# Patient Record
Sex: FEMALE | Race: WHITE | NOT HISPANIC OR LATINO | Employment: OTHER | URBAN - METROPOLITAN AREA
[De-identification: names, ages, dates, MRNs, and addresses within clinical notes are randomized per-mention and may not be internally consistent; named-entity substitution may affect disease eponyms.]

---

## 2017-01-11 ENCOUNTER — ALLSCRIPTS OFFICE VISIT (OUTPATIENT)
Dept: OTHER | Facility: OTHER | Age: 72
End: 2017-01-11

## 2017-01-24 RX ORDER — SODIUM CHLORIDE 9 MG/ML
20 INJECTION, SOLUTION INTRAVENOUS ONCE
Status: DISCONTINUED | OUTPATIENT
Start: 2017-02-01 | End: 2017-02-04 | Stop reason: HOSPADM

## 2017-01-31 ENCOUNTER — LAB CONVERSION - ENCOUNTER (OUTPATIENT)
Dept: OTHER | Facility: OTHER | Age: 72
End: 2017-01-31

## 2017-01-31 LAB
A/G RATIO (HISTORICAL): 1.1 (CALC) (ref 1–2.5)
ALBUMIN SERPL BCP-MCNC: 3.2 G/DL (ref 3.6–5.1)
ALP SERPL-CCNC: 87 U/L (ref 33–130)
ALT SERPL W P-5'-P-CCNC: 14 U/L (ref 6–29)
AST SERPL W P-5'-P-CCNC: 24 U/L (ref 10–35)
BASOPHILS # BLD AUTO: 0.5 %
BASOPHILS # BLD AUTO: 40 CELLS/UL (ref 0–200)
BILIRUB SERPL-MCNC: 1.1 MG/DL (ref 0.2–1.2)
BUN SERPL-MCNC: 8 MG/DL (ref 7–25)
BUN/CREA RATIO (HISTORICAL): ABNORMAL (CALC) (ref 6–22)
CALCIUM SERPL-MCNC: 8.5 MG/DL (ref 8.6–10.4)
CHLORIDE SERPL-SCNC: 102 MMOL/L (ref 98–110)
CO2 SERPL-SCNC: 27 MMOL/L (ref 20–31)
CREAT SERPL-MCNC: 0.84 MG/DL (ref 0.6–0.93)
DEPRECATED RDW RBC AUTO: 19.9 % (ref 11–15)
EGFR AFRICAN AMERICAN (HISTORICAL): 81 ML/MIN/1.73M2
EGFR-AMERICAN CALC (HISTORICAL): 70 ML/MIN/1.73M2
EOSINOPHIL # BLD AUTO: 166 CELLS/UL (ref 15–500)
EOSINOPHIL # BLD AUTO: 2.1 %
GAMMA GLOBULIN (HISTORICAL): 2.9 G/DL (CALC) (ref 1.9–3.7)
GLUCOSE (HISTORICAL): 97 MG/DL (ref 65–99)
HCT VFR BLD AUTO: 38.4 % (ref 35–45)
HGB BLD-MCNC: 12.9 G/DL (ref 11.7–15.5)
LYMPHOCYTES # BLD AUTO: 695 CELLS/UL (ref 850–3900)
LYMPHOCYTES # BLD AUTO: 8.8 %
MCH RBC QN AUTO: 30.7 PG (ref 27–33)
MCHC RBC AUTO-ENTMCNC: 33.5 G/DL (ref 32–36)
MCV RBC AUTO: 91.5 FL (ref 80–100)
MONOCYTES # BLD AUTO: 916 CELLS/UL (ref 200–950)
MONOCYTES (HISTORICAL): 11.6 %
NEUTROPHILS # BLD AUTO: 6083 CELLS/UL (ref 1500–7800)
NEUTROPHILS # BLD AUTO: 77 %
PLATELET # BLD AUTO: 166 THOUSAND/UL (ref 140–400)
PMV BLD AUTO: 9.3 FL (ref 7.5–12.5)
POTASSIUM SERPL-SCNC: 3.9 MMOL/L (ref 3.5–5.3)
RBC # BLD AUTO: 4.19 MILLION/UL (ref 3.8–5.1)
SODIUM SERPL-SCNC: 136 MMOL/L (ref 135–146)
TOTAL PROTEIN (HISTORICAL): 6.1 G/DL (ref 6.1–8.1)
WBC # BLD AUTO: 7.9 THOUSAND/UL (ref 3.8–10.8)

## 2017-02-01 ENCOUNTER — HOSPITAL ENCOUNTER (OUTPATIENT)
Dept: INFUSION CENTER | Facility: HOSPITAL | Age: 72
Discharge: HOME | End: 2017-02-01
Payer: COMMERCIAL

## 2017-02-01 VITALS
DIASTOLIC BLOOD PRESSURE: 72 MMHG | HEART RATE: 77 BPM | TEMPERATURE: 97.2 F | OXYGEN SATURATION: 96 % | BODY MASS INDEX: 32.09 KG/M2 | SYSTOLIC BLOOD PRESSURE: 123 MMHG | HEIGHT: 61 IN | WEIGHT: 169.97 LBS | RESPIRATION RATE: 18 BRPM

## 2017-02-01 PROCEDURE — 96377 APPLICATON ON-BODY INJECTOR: CPT

## 2017-02-01 PROCEDURE — 96413 CHEMO IV INFUSION 1 HR: CPT

## 2017-02-01 PROCEDURE — 96367 TX/PROPH/DG ADDL SEQ IV INF: CPT

## 2017-02-01 PROCEDURE — 96417 CHEMO IV INFUS EACH ADDL SEQ: CPT

## 2017-02-01 RX ADMIN — CYCLOPHOSPHAMIDE 1000 MG: 1 INJECTION, POWDER, FOR SOLUTION INTRAVENOUS; ORAL at 12:22

## 2017-02-01 RX ADMIN — FAMOTIDINE 20 MG: 10 INJECTION INTRAVENOUS at 10:06

## 2017-02-01 RX ADMIN — PEGFILGRASTIM 6 MG: KIT SUBCUTANEOUS at 13:06

## 2017-02-01 RX ADMIN — DEXAMETHASONE SODIUM PHOSPHATE: 10 INJECTION, SOLUTION INTRAMUSCULAR; INTRAVENOUS at 09:46

## 2017-02-01 RX ADMIN — DOCETAXEL 105 MG: 20 INJECTION, SOLUTION, CONCENTRATE INTRAVENOUS at 11:09

## 2017-02-01 RX ADMIN — SODIUM CHLORIDE 150 MG: 0.9 INJECTION, SOLUTION INTRAVENOUS at 10:27

## 2017-02-01 NOTE — PROGRESS NOTES
Cytoxan and NSS flush done  IV removed intact Lt forearm, pressure dssg applied  Pt rom well  Neulasta ON Body applied to JANELL  Due to start at 1406 tomorrow thru [phone]  Instructions reviewed  Pt disch amb to home with family, steady gait

## 2017-02-01 NOTE — PLAN OF CARE
Knowledge Deficit     Patient/family/caregiver demonstrates understanding of disease process, treatment plan, medications, and discharge instructions Progressing        Potential for Falls     Patient will remain free of falls Progressing

## 2017-02-01 NOTE — PROGRESS NOTES
Pt arrived amb for chemo, face and hands are flushed  Pt has been off chemo while she took steroids and steroid cream for her hand and foot syndrome  Starting 3rd cycle today with a 20% decrease in Taxotere  States the skin on her feet is still peeling a bit  Hands look better except for the flushing  Offers no other c/o  IV started Lt forearm by Julian Lindsay RN  NSS to kvo, premeds infusing

## 2017-02-03 ENCOUNTER — GENERIC CONVERSION - ENCOUNTER (OUTPATIENT)
Dept: OTHER | Facility: OTHER | Age: 72
End: 2017-02-03

## 2017-02-06 ENCOUNTER — HOSPITAL ENCOUNTER (EMERGENCY)
Facility: HOSPITAL | Age: 72
Discharge: HOME | End: 2017-02-06
Attending: EMERGENCY MEDICINE | Admitting: EMERGENCY MEDICINE
Payer: COMMERCIAL

## 2017-02-06 ENCOUNTER — APPOINTMENT (EMERGENCY)
Dept: RADIOLOGY | Facility: HOSPITAL | Age: 72
End: 2017-02-06
Payer: COMMERCIAL

## 2017-02-06 VITALS
RESPIRATION RATE: 18 BRPM | OXYGEN SATURATION: 98 % | DIASTOLIC BLOOD PRESSURE: 67 MMHG | SYSTOLIC BLOOD PRESSURE: 128 MMHG | HEIGHT: 62 IN | BODY MASS INDEX: 30.36 KG/M2 | WEIGHT: 165 LBS | TEMPERATURE: 98.3 F | HEART RATE: 105 BPM

## 2017-02-06 DIAGNOSIS — N39.0 URINARY TRACT INFECTION: Primary | ICD-10-CM

## 2017-02-06 LAB
BACTERIA UR QL AUTO: ABNORMAL /HPF
BILIRUB UR QL STRIP: ABNORMAL
CLARITY UR: CLEAR
CLARITY, POC: CLEAR
COLOR UR: ABNORMAL
COLOR, POC: NORMAL
GLUCOSE UR STRIP-MCNC: NEGATIVE MG/DL
HGB UR QL STRIP.AUTO: ABNORMAL
HYALINE CASTS #/AREA URNS LPF: ABNORMAL /LPF
KETONES UR STRIP-MCNC: NEGATIVE MG/DL
LEUKOCYTE ESTERASE UR QL STRIP: ABNORMAL
NITRITE UR QL STRIP: POSITIVE
NON-SQ EPI CELLS URNS QL MICRO: ABNORMAL /HPF
PH UR STRIP.AUTO: 6.5 [PH] (ref 4.5–8)
PROT UR STRIP-MCNC: ABNORMAL MG/DL
RBC #/AREA URNS AUTO: ABNORMAL /HPF
SP GR UR STRIP.AUTO: 1.02 (ref 1–1.03)
UROBILINOGEN UR QL STRIP.AUTO: >=8 E.U./DL
WBC #/AREA URNS AUTO: ABNORMAL /HPF

## 2017-02-06 PROCEDURE — 99284 EMERGENCY DEPT VISIT MOD MDM: CPT

## 2017-02-06 PROCEDURE — 87077 CULTURE AEROBIC IDENTIFY: CPT

## 2017-02-06 PROCEDURE — 81001 URINALYSIS AUTO W/SCOPE: CPT

## 2017-02-06 PROCEDURE — 87086 URINE CULTURE/COLONY COUNT: CPT

## 2017-02-06 PROCEDURE — 81002 URINALYSIS NONAUTO W/O SCOPE: CPT | Performed by: EMERGENCY MEDICINE

## 2017-02-06 PROCEDURE — 87186 SC STD MICRODIL/AGAR DIL: CPT

## 2017-02-06 RX ORDER — CEPHALEXIN 250 MG/1
500 CAPSULE ORAL ONCE
Status: COMPLETED | OUTPATIENT
Start: 2017-02-06 | End: 2017-02-06

## 2017-02-06 RX ORDER — CEPHALEXIN 500 MG/1
500 CAPSULE ORAL 2 TIMES DAILY
Qty: 20 CAPSULE | Refills: 0 | Status: SHIPPED | OUTPATIENT
Start: 2017-02-06 | End: 2017-02-16

## 2017-02-06 RX ADMIN — CEPHALEXIN 500 MG: 250 CAPSULE ORAL at 21:56

## 2017-02-09 LAB
BACTERIA UR CULT: NORMAL
BACTERIA UR CULT: NORMAL

## 2017-02-13 ENCOUNTER — ALLSCRIPTS OFFICE VISIT (OUTPATIENT)
Dept: OTHER | Facility: OTHER | Age: 72
End: 2017-02-13

## 2017-02-13 ENCOUNTER — TRANSCRIBE ORDERS (OUTPATIENT)
Dept: ADMINISTRATIVE | Facility: HOSPITAL | Age: 72
End: 2017-02-13

## 2017-02-13 DIAGNOSIS — C50.911 MALIGNANT NEOPLASM OF RIGHT FEMALE BREAST, UNSPECIFIED SITE OF BREAST: Primary | ICD-10-CM

## 2017-02-16 ENCOUNTER — TRANSCRIBE ORDERS (OUTPATIENT)
Dept: ADMINISTRATIVE | Facility: HOSPITAL | Age: 72
End: 2017-02-16

## 2017-02-16 ENCOUNTER — ALLSCRIPTS OFFICE VISIT (OUTPATIENT)
Dept: OTHER | Facility: OTHER | Age: 72
End: 2017-02-16

## 2017-02-16 DIAGNOSIS — C50.911 MALIGNANT NEOPLASM OF RIGHT FEMALE BREAST, UNSPECIFIED SITE OF BREAST: Primary | ICD-10-CM

## 2017-02-16 DIAGNOSIS — R93.89 ABNORMAL RADIOLOGICAL FINDINGS IN SKIN AND SUBCUTANEOUS TISSUE: ICD-10-CM

## 2017-02-16 DIAGNOSIS — N64.52 NIPPLE DISCHARGE: ICD-10-CM

## 2017-02-22 ENCOUNTER — ALLSCRIPTS OFFICE VISIT (OUTPATIENT)
Dept: OTHER | Facility: OTHER | Age: 72
End: 2017-02-22

## 2017-02-22 ENCOUNTER — HOSPITAL ENCOUNTER (OUTPATIENT)
Dept: INFUSION CENTER | Facility: HOSPITAL | Age: 72
Discharge: HOME | End: 2017-02-22
Payer: MEDICARE

## 2017-02-22 LAB
BILIRUB UR QL STRIP: NORMAL
CLARITY UR: NORMAL
COLOR UR: NORMAL
GLUCOSE (HISTORICAL): NORMAL
HGB UR QL STRIP.AUTO: 250
KETONES UR STRIP-MCNC: NEGATIVE MG/DL
LEUKOCYTE ESTERASE UR QL STRIP: NORMAL
NITRITE UR QL STRIP: NEGATIVE
PH UR STRIP.AUTO: 6 [PH]
PROT UR STRIP-MCNC: NORMAL MG/DL
SP GR UR STRIP.AUTO: 1.02
UROBILINOGEN UR QL STRIP.AUTO: 12

## 2017-02-23 ENCOUNTER — HOSPITAL ENCOUNTER (OUTPATIENT)
Dept: MRI IMAGING | Facility: HOSPITAL | Age: 72
Discharge: HOME | End: 2017-02-23
Attending: SURGERY
Payer: COMMERCIAL

## 2017-02-23 DIAGNOSIS — N64.52 NIPPLE DISCHARGE: ICD-10-CM

## 2017-02-23 DIAGNOSIS — C50.911 MALIGNANT NEOPLASM OF RIGHT FEMALE BREAST (HCC): ICD-10-CM

## 2017-02-23 DIAGNOSIS — R93.89 ABNORMAL FINDINGS ON DIAGNOSTIC IMAGING OF OTHER SPECIFIED BODY STRUCTURES: ICD-10-CM

## 2017-02-23 PROCEDURE — C8908 MRI W/O FOL W/CONT, BREAST,: HCPCS

## 2017-02-23 PROCEDURE — 0159T HB CAD BREAST MRI: CPT

## 2017-02-24 ENCOUNTER — GENERIC CONVERSION - ENCOUNTER (OUTPATIENT)
Dept: OTHER | Facility: OTHER | Age: 72
End: 2017-02-24

## 2017-02-24 ENCOUNTER — LAB CONVERSION - ENCOUNTER (OUTPATIENT)
Dept: OTHER | Facility: OTHER | Age: 72
End: 2017-02-24

## 2017-02-24 LAB
BACTERIA UR QL AUTO: ABNORMAL /HPF
BILIRUB UR QL STRIP: NEGATIVE
COLOR UR: YELLOW
COMMENT (HISTORICAL): CLEAR
COMMENTS (HISTORICAL): ABNORMAL
CULTURE RESULT (HISTORICAL): ABNORMAL
CULTURE RESULT (HISTORICAL): NORMAL
FECAL OCCULT BLOOD DIAGNOSTIC (HISTORICAL): ABNORMAL
GLUCOSE (HISTORICAL): NEGATIVE
HYALINE CASTS #/AREA URNS LPF: ABNORMAL /LPF
KETONES UR STRIP-MCNC: NEGATIVE MG/DL
LEUKOCYTE ESTERASE UR QL STRIP: ABNORMAL
NITRITE UR QL STRIP: NEGATIVE
PH UR STRIP.AUTO: 6 [PH] (ref 5–8)
PROT UR STRIP-MCNC: ABNORMAL MG/DL
RBC (HISTORICAL): ABNORMAL /HPF
SP GR UR STRIP.AUTO: 1.03 (ref 1–1.03)
SQUAMOUS EPITHELIAL CELLS (HISTORICAL): ABNORMAL /HPF
WBC # BLD AUTO: ABNORMAL /HPF

## 2017-03-02 ENCOUNTER — ALLSCRIPTS OFFICE VISIT (OUTPATIENT)
Dept: OTHER | Facility: OTHER | Age: 72
End: 2017-03-02

## 2017-03-10 ENCOUNTER — GENERIC CONVERSION - ENCOUNTER (OUTPATIENT)
Dept: OTHER | Facility: OTHER | Age: 72
End: 2017-03-10

## 2017-03-15 RX ORDER — SODIUM CHLORIDE, SODIUM LACTATE, POTASSIUM CHLORIDE, CALCIUM CHLORIDE 600; 310; 30; 20 MG/100ML; MG/100ML; MG/100ML; MG/100ML
20 INJECTION, SOLUTION INTRAVENOUS CONTINUOUS
Status: DISCONTINUED | OUTPATIENT
Start: 2017-03-16 | End: 2017-03-16 | Stop reason: HOSPADM

## 2017-03-16 ENCOUNTER — HOSPITAL ENCOUNTER (OUTPATIENT)
Facility: HOSPITAL | Age: 72
Setting detail: OUTPATIENT SURGERY
Discharge: HOME | End: 2017-03-16
Attending: SURGERY | Admitting: SURGERY
Payer: COMMERCIAL

## 2017-03-16 ENCOUNTER — ANESTHESIA EVENT (OUTPATIENT)
Dept: PERIOP | Facility: HOSPITAL | Age: 72
End: 2017-03-16
Payer: COMMERCIAL

## 2017-03-16 ENCOUNTER — ANESTHESIA (OUTPATIENT)
Dept: PERIOP | Facility: HOSPITAL | Age: 72
End: 2017-03-16
Payer: COMMERCIAL

## 2017-03-16 ENCOUNTER — HOSPITAL ENCOUNTER (OUTPATIENT)
Dept: ULTRASOUND IMAGING | Facility: HOSPITAL | Age: 72
Discharge: HOME | End: 2017-03-16
Attending: SURGERY
Payer: COMMERCIAL

## 2017-03-16 ENCOUNTER — CONVERSION ENCOUNTER (OUTPATIENT)
Dept: MRI IMAGING | Facility: HOSPITAL | Age: 72
End: 2017-03-16

## 2017-03-16 ENCOUNTER — APPOINTMENT (OUTPATIENT)
Dept: MAMMOGRAPHY | Facility: HOSPITAL | Age: 72
End: 2017-03-16
Attending: SURGERY
Payer: COMMERCIAL

## 2017-03-16 VITALS
WEIGHT: 179 LBS | BODY MASS INDEX: 32.94 KG/M2 | SYSTOLIC BLOOD PRESSURE: 134 MMHG | HEIGHT: 62 IN | RESPIRATION RATE: 16 BRPM | OXYGEN SATURATION: 98 % | HEART RATE: 84 BPM | TEMPERATURE: 97.9 F | DIASTOLIC BLOOD PRESSURE: 87 MMHG

## 2017-03-16 DIAGNOSIS — C50.911 MALIGNANT NEOPLASM OF RIGHT FEMALE BREAST (HCC): ICD-10-CM

## 2017-03-16 DIAGNOSIS — C50.911 MALIGNANT NEOPLASM OF RIGHT FEMALE BREAST, UNSPECIFIED SITE OF BREAST: ICD-10-CM

## 2017-03-16 PROCEDURE — 88342 IMHCHEM/IMCYTCHM 1ST ANTB: CPT | Performed by: SURGERY

## 2017-03-16 PROCEDURE — 88341 IMHCHEM/IMCYTCHM EA ADD ANTB: CPT | Performed by: SURGERY

## 2017-03-16 PROCEDURE — 19285 PERQ DEV BREAST 1ST US IMAG: CPT

## 2017-03-16 PROCEDURE — 88307 TISSUE EXAM BY PATHOLOGIST: CPT | Performed by: SURGERY

## 2017-03-16 RX ORDER — EPHEDRINE SULFATE 50 MG/ML
INJECTION, SOLUTION INTRAVENOUS AS NEEDED
Status: DISCONTINUED | OUTPATIENT
Start: 2017-03-16 | End: 2017-03-16 | Stop reason: SURG

## 2017-03-16 RX ORDER — IBUPROFEN 600 MG/1
600 TABLET ORAL EVERY 6 HOURS PRN
Status: DISCONTINUED | OUTPATIENT
Start: 2017-03-16 | End: 2017-03-16 | Stop reason: HOSPADM

## 2017-03-16 RX ORDER — FENTANYL CITRATE/PF 50 MCG/ML
25 SYRINGE (ML) INJECTION
Status: DISCONTINUED | OUTPATIENT
Start: 2017-03-16 | End: 2017-03-16 | Stop reason: HOSPADM

## 2017-03-16 RX ORDER — LIDOCAINE HYDROCHLORIDE 10 MG/ML
5 INJECTION, SOLUTION INFILTRATION; PERINEURAL ONCE
Status: COMPLETED | OUTPATIENT
Start: 2017-03-16 | End: 2017-03-16

## 2017-03-16 RX ORDER — BUPIVACAINE HYDROCHLORIDE 5 MG/ML
INJECTION, SOLUTION PERINEURAL AS NEEDED
Status: DISCONTINUED | OUTPATIENT
Start: 2017-03-16 | End: 2017-03-16 | Stop reason: HOSPADM

## 2017-03-16 RX ORDER — PROPOFOL 10 MG/ML
INJECTION, EMULSION INTRAVENOUS AS NEEDED
Status: DISCONTINUED | OUTPATIENT
Start: 2017-03-16 | End: 2017-03-16 | Stop reason: SURG

## 2017-03-16 RX ORDER — METOCLOPRAMIDE HYDROCHLORIDE 5 MG/ML
10 INJECTION INTRAMUSCULAR; INTRAVENOUS ONCE
Status: DISCONTINUED | OUTPATIENT
Start: 2017-03-16 | End: 2017-03-16 | Stop reason: HOSPADM

## 2017-03-16 RX ORDER — ACETAMINOPHEN 325 MG/1
650 TABLET ORAL EVERY 6 HOURS PRN
Status: DISCONTINUED | OUTPATIENT
Start: 2017-03-16 | End: 2017-03-16 | Stop reason: HOSPADM

## 2017-03-16 RX ORDER — MIDAZOLAM HYDROCHLORIDE 1 MG/ML
INJECTION INTRAMUSCULAR; INTRAVENOUS AS NEEDED
Status: DISCONTINUED | OUTPATIENT
Start: 2017-03-16 | End: 2017-03-16 | Stop reason: SURG

## 2017-03-16 RX ORDER — FENTANYL CITRATE 50 UG/ML
INJECTION, SOLUTION INTRAMUSCULAR; INTRAVENOUS AS NEEDED
Status: DISCONTINUED | OUTPATIENT
Start: 2017-03-16 | End: 2017-03-16 | Stop reason: SURG

## 2017-03-16 RX ADMIN — CEFAZOLIN SODIUM 2000 MG: 2 SOLUTION INTRAVENOUS at 15:15

## 2017-03-16 RX ADMIN — SODIUM CHLORIDE, SODIUM LACTATE, POTASSIUM CHLORIDE, AND CALCIUM CHLORIDE 20 ML/HR: .6; .31; .03; .02 INJECTION, SOLUTION INTRAVENOUS at 13:11

## 2017-03-16 RX ADMIN — MIDAZOLAM HYDROCHLORIDE 2 MG: 1 INJECTION, SOLUTION INTRAMUSCULAR; INTRAVENOUS at 15:12

## 2017-03-16 RX ADMIN — EPHEDRINE SULFATE 10 MG: 50 INJECTION, SOLUTION INTRAMUSCULAR; INTRAVENOUS; SUBCUTANEOUS at 15:42

## 2017-03-16 RX ADMIN — LIDOCAINE HYDROCHLORIDE 5 ML: 10 INJECTION, SOLUTION INFILTRATION; PERINEURAL at 14:15

## 2017-03-16 RX ADMIN — IBUPROFEN 600 MG: 600 TABLET ORAL at 17:07

## 2017-03-16 RX ADMIN — PROPOFOL 50 MG: 10 INJECTION, EMULSION INTRAVENOUS at 15:17

## 2017-03-16 RX ADMIN — FENTANYL CITRATE 100 MCG: 50 INJECTION, SOLUTION INTRAMUSCULAR; INTRAVENOUS at 15:12

## 2017-03-30 ENCOUNTER — TRANSCRIBE ORDERS (OUTPATIENT)
Dept: ADMINISTRATIVE | Facility: HOSPITAL | Age: 72
End: 2017-03-30

## 2017-03-30 ENCOUNTER — ALLSCRIPTS OFFICE VISIT (OUTPATIENT)
Dept: OTHER | Facility: OTHER | Age: 72
End: 2017-03-30

## 2017-03-30 DIAGNOSIS — R41.3 MEMORY LOSS: Primary | ICD-10-CM

## 2017-04-05 ENCOUNTER — LAB CONVERSION - ENCOUNTER (OUTPATIENT)
Dept: OTHER | Facility: OTHER | Age: 72
End: 2017-04-05

## 2017-04-05 ENCOUNTER — ALLSCRIPTS OFFICE VISIT (OUTPATIENT)
Dept: OTHER | Facility: OTHER | Age: 72
End: 2017-04-05

## 2017-04-05 LAB
BUN SERPL-MCNC: 12 MG/DL (ref 7–25)
CREAT SERPL-MCNC: 0.84 MG/DL (ref 0.6–0.93)
DEPRECATED RDW RBC AUTO: 15.2 % (ref 11–15)
EGFR AFRICAN AMERICAN (HISTORICAL): 81 ML/MIN/1.73M2
EGFR-AMERICAN CALC (HISTORICAL): 70 ML/MIN/1.73M2
HCT VFR BLD AUTO: 35.9 % (ref 35–45)
HGB BLD-MCNC: 11.9 G/DL (ref 11.7–15.5)
MCH RBC QN AUTO: 33.1 PG (ref 27–33)
MCHC RBC AUTO-ENTMCNC: 33.2 G/DL (ref 32–36)
MCV RBC AUTO: 99.8 FL (ref 80–100)
PLATELET # BLD AUTO: 181 THOUSAND/UL (ref 140–400)
PMV BLD AUTO: 9.2 FL (ref 7.5–12.5)
RBC # BLD AUTO: 3.6 MILLION/UL (ref 3.8–5.1)
WBC # BLD AUTO: 4.7 THOUSAND/UL (ref 3.8–10.8)

## 2017-04-08 ENCOUNTER — HOSPITAL ENCOUNTER (OUTPATIENT)
Dept: MRI IMAGING | Facility: HOSPITAL | Age: 72
Discharge: HOME | End: 2017-04-08
Attending: SURGERY
Payer: COMMERCIAL

## 2017-04-08 DIAGNOSIS — C50.911 MALIGNANT NEOPLASM OF RIGHT FEMALE BREAST (HCC): ICD-10-CM

## 2017-04-08 DIAGNOSIS — R60.0 LOCALIZED EDEMA: ICD-10-CM

## 2017-04-08 PROCEDURE — 70553 MRI BRAIN STEM W/O & W/DYE: CPT

## 2017-04-08 PROCEDURE — A9585 GADOBUTROL INJECTION: HCPCS | Performed by: SURGERY

## 2017-04-08 RX ADMIN — GADOBUTROL 7 ML: 604.72 INJECTION INTRAVENOUS at 10:05

## 2017-04-10 ENCOUNTER — ALLSCRIPTS OFFICE VISIT (OUTPATIENT)
Dept: OTHER | Facility: OTHER | Age: 72
End: 2017-04-10

## 2017-04-11 ENCOUNTER — GENERIC CONVERSION - ENCOUNTER (OUTPATIENT)
Dept: OTHER | Facility: OTHER | Age: 72
End: 2017-04-11

## 2017-04-11 ENCOUNTER — ALLSCRIPTS OFFICE VISIT (OUTPATIENT)
Dept: OTHER | Facility: OTHER | Age: 72
End: 2017-04-11

## 2017-04-20 ENCOUNTER — ALLSCRIPTS OFFICE VISIT (OUTPATIENT)
Dept: OTHER | Facility: OTHER | Age: 72
End: 2017-04-20

## 2017-05-03 ENCOUNTER — HOSPITAL ENCOUNTER (OUTPATIENT)
Dept: NON INVASIVE DIAGNOSTICS | Facility: HOSPITAL | Age: 72
Discharge: HOME | End: 2017-05-03
Attending: INTERNAL MEDICINE
Payer: COMMERCIAL

## 2017-05-03 DIAGNOSIS — R60.0 LOCALIZED EDEMA: ICD-10-CM

## 2017-05-03 PROCEDURE — 93970 EXTREMITY STUDY: CPT

## 2017-05-04 ENCOUNTER — ALLSCRIPTS OFFICE VISIT (OUTPATIENT)
Dept: OTHER | Facility: OTHER | Age: 72
End: 2017-05-04

## 2017-05-18 ENCOUNTER — ALLSCRIPTS OFFICE VISIT (OUTPATIENT)
Dept: OTHER | Facility: OTHER | Age: 72
End: 2017-05-18

## 2017-05-27 ENCOUNTER — LAB CONVERSION - ENCOUNTER (OUTPATIENT)
Dept: OTHER | Facility: OTHER | Age: 72
End: 2017-05-27

## 2017-05-27 LAB
BASOPHILS # BLD AUTO: 0.4 %
BASOPHILS # BLD AUTO: 16 CELLS/UL (ref 0–200)
DEPRECATED RDW RBC AUTO: 12.6 % (ref 11–15)
EOSINOPHIL # BLD AUTO: 1.5 %
EOSINOPHIL # BLD AUTO: 60 CELLS/UL (ref 15–500)
HCT VFR BLD AUTO: 38.4 % (ref 35–45)
HGB BLD-MCNC: 12.9 G/DL (ref 11.7–15.5)
LYMPHOCYTES # BLD AUTO: 20.1 %
LYMPHOCYTES # BLD AUTO: 804 CELLS/UL (ref 850–3900)
MCH RBC QN AUTO: 30.5 PG (ref 27–33)
MCHC RBC AUTO-ENTMCNC: 33.6 G/DL (ref 32–36)
MCV RBC AUTO: 90.7 FL (ref 80–100)
MONOCYTES # BLD AUTO: 392 CELLS/UL (ref 200–950)
MONOCYTES (HISTORICAL): 9.8 %
NEUTROPHILS # BLD AUTO: 2728 CELLS/UL (ref 1500–7800)
NEUTROPHILS # BLD AUTO: 68.2 %
PLATELET # BLD AUTO: 181 THOUSAND/UL (ref 140–400)
PMV BLD AUTO: 9.6 FL (ref 7.5–12.5)
RBC # BLD AUTO: 4.23 MILLION/UL (ref 3.8–5.1)
WBC # BLD AUTO: 4 THOUSAND/UL (ref 3.8–10.8)

## 2017-05-28 ENCOUNTER — LAB CONVERSION - ENCOUNTER (OUTPATIENT)
Dept: OTHER | Facility: OTHER | Age: 72
End: 2017-05-28

## 2017-05-28 LAB
A/G RATIO (HISTORICAL): 1 (CALC) (ref 1–2.5)
ALBUMIN SERPL BCP-MCNC: 3.2 G/DL (ref 3.6–5.1)
ALP SERPL-CCNC: 103 U/L (ref 33–130)
ALT SERPL W P-5'-P-CCNC: 8 U/L (ref 6–29)
AST SERPL W P-5'-P-CCNC: 23 U/L (ref 10–35)
BASOPHILS # BLD AUTO: 0.4 %
BASOPHILS # BLD AUTO: 16 CELLS/UL (ref 0–200)
BILIRUB SERPL-MCNC: 0.9 MG/DL (ref 0.2–1.2)
BUN SERPL-MCNC: 9 MG/DL (ref 7–25)
BUN/CREA RATIO (HISTORICAL): 9 (CALC) (ref 6–22)
CALCIUM SERPL-MCNC: 9 MG/DL (ref 8.6–10.4)
CHLORIDE SERPL-SCNC: 102 MMOL/L (ref 98–110)
CO2 SERPL-SCNC: 28 MMOL/L (ref 20–31)
CREAT SERPL-MCNC: 1.04 MG/DL (ref 0.6–0.93)
DEPRECATED RDW RBC AUTO: 12.6 % (ref 11–15)
EGFR AFRICAN AMERICAN (HISTORICAL): 63 ML/MIN/1.73M2
EGFR-AMERICAN CALC (HISTORICAL): 54 ML/MIN/1.73M2
EOSINOPHIL # BLD AUTO: 1.5 %
EOSINOPHIL # BLD AUTO: 60 CELLS/UL (ref 15–500)
GAMMA GLOBULIN (HISTORICAL): 3.3 G/DL (CALC) (ref 1.9–3.7)
GLUCOSE (HISTORICAL): 105 MG/DL (ref 65–99)
HCT VFR BLD AUTO: 38.4 % (ref 35–45)
HGB BLD-MCNC: 12.9 G/DL (ref 11.7–15.5)
LYMPHOCYTES # BLD AUTO: 20.1 %
LYMPHOCYTES # BLD AUTO: 804 CELLS/UL (ref 850–3900)
MCH RBC QN AUTO: 30.5 PG (ref 27–33)
MCHC RBC AUTO-ENTMCNC: 33.6 G/DL (ref 32–36)
MCV RBC AUTO: 90.7 FL (ref 80–100)
MONOCYTES # BLD AUTO: 392 CELLS/UL (ref 200–950)
MONOCYTES (HISTORICAL): 9.8 %
NEUTROPHILS # BLD AUTO: 2728 CELLS/UL (ref 1500–7800)
NEUTROPHILS # BLD AUTO: 68.2 %
PLATELET # BLD AUTO: 181 THOUSAND/UL (ref 140–400)
PMV BLD AUTO: 9.6 FL (ref 7.5–12.5)
POTASSIUM SERPL-SCNC: 3.8 MMOL/L (ref 3.5–5.3)
RBC # BLD AUTO: 4.23 MILLION/UL (ref 3.8–5.1)
SODIUM SERPL-SCNC: 138 MMOL/L (ref 135–146)
TOTAL PROTEIN (HISTORICAL): 6.5 G/DL (ref 6.1–8.1)
WBC # BLD AUTO: 4 THOUSAND/UL (ref 3.8–10.8)

## 2017-05-30 ENCOUNTER — ALLSCRIPTS OFFICE VISIT (OUTPATIENT)
Dept: OTHER | Facility: OTHER | Age: 72
End: 2017-05-30

## 2017-05-30 ENCOUNTER — LAB CONVERSION - ENCOUNTER (OUTPATIENT)
Dept: OTHER | Facility: OTHER | Age: 72
End: 2017-05-30

## 2017-05-30 LAB
A/G RATIO (HISTORICAL): 1 (CALC) (ref 1–2.5)
ALBUMIN SERPL BCP-MCNC: 3.2 G/DL (ref 3.6–5.1)
ALP SERPL-CCNC: 103 U/L (ref 33–130)
ALT SERPL W P-5'-P-CCNC: 8 U/L (ref 6–29)
AST SERPL W P-5'-P-CCNC: 23 U/L (ref 10–35)
BASOPHILS # BLD AUTO: 0.4 %
BASOPHILS # BLD AUTO: 16 CELLS/UL (ref 0–200)
BILIRUB SERPL-MCNC: 0.9 MG/DL (ref 0.2–1.2)
BUN SERPL-MCNC: 9 MG/DL (ref 7–25)
BUN/CREA RATIO (HISTORICAL): 9 (CALC) (ref 6–22)
CA 27.29 (HISTORICAL): 12 U/ML
CALCIUM SERPL-MCNC: 9 MG/DL (ref 8.6–10.4)
CHLORIDE SERPL-SCNC: 102 MMOL/L (ref 98–110)
CO2 SERPL-SCNC: 28 MMOL/L (ref 20–31)
CREAT SERPL-MCNC: 1.04 MG/DL (ref 0.6–0.93)
DEPRECATED RDW RBC AUTO: 12.6 % (ref 11–15)
EGFR AFRICAN AMERICAN (HISTORICAL): 63 ML/MIN/1.73M2
EGFR-AMERICAN CALC (HISTORICAL): 54 ML/MIN/1.73M2
EOSINOPHIL # BLD AUTO: 1.5 %
EOSINOPHIL # BLD AUTO: 60 CELLS/UL (ref 15–500)
GAMMA GLOBULIN (HISTORICAL): 3.3 G/DL (CALC) (ref 1.9–3.7)
GLUCOSE (HISTORICAL): 105 MG/DL (ref 65–99)
HCT VFR BLD AUTO: 38.4 % (ref 35–45)
HGB BLD-MCNC: 12.9 G/DL (ref 11.7–15.5)
LYMPHOCYTES # BLD AUTO: 20.1 %
LYMPHOCYTES # BLD AUTO: 804 CELLS/UL (ref 850–3900)
MCH RBC QN AUTO: 30.5 PG (ref 27–33)
MCHC RBC AUTO-ENTMCNC: 33.6 G/DL (ref 32–36)
MCV RBC AUTO: 90.7 FL (ref 80–100)
MONOCYTES # BLD AUTO: 392 CELLS/UL (ref 200–950)
MONOCYTES (HISTORICAL): 9.8 %
NEUTROPHILS # BLD AUTO: 2728 CELLS/UL (ref 1500–7800)
NEUTROPHILS # BLD AUTO: 68.2 %
PLATELET # BLD AUTO: 181 THOUSAND/UL (ref 140–400)
PMV BLD AUTO: 9.6 FL (ref 7.5–12.5)
POTASSIUM SERPL-SCNC: 3.8 MMOL/L (ref 3.5–5.3)
RBC # BLD AUTO: 4.23 MILLION/UL (ref 3.8–5.1)
SODIUM SERPL-SCNC: 138 MMOL/L (ref 135–146)
TOTAL PROTEIN (HISTORICAL): 6.5 G/DL (ref 6.1–8.1)
WBC # BLD AUTO: 4 THOUSAND/UL (ref 3.8–10.8)

## 2017-06-01 ENCOUNTER — TRANSCRIBE ORDERS (OUTPATIENT)
Dept: ADMINISTRATIVE | Facility: HOSPITAL | Age: 72
End: 2017-06-01

## 2017-06-01 ENCOUNTER — ALLSCRIPTS OFFICE VISIT (OUTPATIENT)
Dept: OTHER | Facility: OTHER | Age: 72
End: 2017-06-01

## 2017-06-01 DIAGNOSIS — C50.911 MALIGNANT NEOPLASM OF RIGHT FEMALE BREAST, UNSPECIFIED SITE OF BREAST: Primary | ICD-10-CM

## 2017-06-07 ENCOUNTER — HOSPITAL ENCOUNTER (OUTPATIENT)
Dept: CT IMAGING | Facility: HOSPITAL | Age: 72
Discharge: HOME | End: 2017-06-07
Attending: INTERNAL MEDICINE
Payer: COMMERCIAL

## 2017-06-07 DIAGNOSIS — C50.911 MALIGNANT NEOPLASM OF RIGHT FEMALE BREAST (HCC): ICD-10-CM

## 2017-06-07 PROCEDURE — 71260 CT THORAX DX C+: CPT

## 2017-06-07 PROCEDURE — 74177 CT ABD & PELVIS W/CONTRAST: CPT

## 2017-06-07 RX ADMIN — IOHEXOL 100 ML: 350 INJECTION, SOLUTION INTRAVENOUS at 10:25

## 2017-06-14 ENCOUNTER — ALLSCRIPTS OFFICE VISIT (OUTPATIENT)
Dept: OTHER | Facility: OTHER | Age: 72
End: 2017-06-14

## 2017-06-30 ENCOUNTER — ALLSCRIPTS OFFICE VISIT (OUTPATIENT)
Dept: OTHER | Facility: OTHER | Age: 72
End: 2017-06-30

## 2017-08-30 ENCOUNTER — ALLSCRIPTS OFFICE VISIT (OUTPATIENT)
Dept: OTHER | Facility: OTHER | Age: 72
End: 2017-08-30

## 2017-09-01 ENCOUNTER — HOSPITAL ENCOUNTER (OUTPATIENT)
Dept: MAMMOGRAPHY | Facility: CLINIC | Age: 72
Discharge: HOME | End: 2017-09-01
Payer: COMMERCIAL

## 2017-09-01 DIAGNOSIS — C50.911 MALIGNANT NEOPLASM OF RIGHT FEMALE BREAST, UNSPECIFIED SITE OF BREAST: ICD-10-CM

## 2017-09-01 PROCEDURE — G0206 DX MAMMO INCL CAD UNI: HCPCS

## 2017-09-01 PROCEDURE — G0279 TOMOSYNTHESIS, MAMMO: HCPCS

## 2017-09-07 ENCOUNTER — ALLSCRIPTS OFFICE VISIT (OUTPATIENT)
Dept: OTHER | Facility: OTHER | Age: 72
End: 2017-09-07

## 2017-09-08 ENCOUNTER — LAB CONVERSION - ENCOUNTER (OUTPATIENT)
Dept: OTHER | Facility: OTHER | Age: 72
End: 2017-09-08

## 2017-09-08 ENCOUNTER — GENERIC CONVERSION - ENCOUNTER (OUTPATIENT)
Dept: OTHER | Facility: OTHER | Age: 72
End: 2017-09-08

## 2017-09-08 LAB
A/G RATIO (HISTORICAL): 1.2 (CALC) (ref 1–2.5)
ALBUMIN SERPL BCP-MCNC: 3.3 G/DL (ref 3.6–5.1)
ALP SERPL-CCNC: 115 U/L (ref 33–130)
ALT SERPL W P-5'-P-CCNC: 11 U/L (ref 6–29)
AST SERPL W P-5'-P-CCNC: 24 U/L (ref 10–35)
BASOPHILS # BLD AUTO: 0.2 %
BASOPHILS # BLD AUTO: 8 CELLS/UL (ref 0–200)
BILIRUB SERPL-MCNC: 0.8 MG/DL (ref 0.2–1.2)
BUN SERPL-MCNC: 14 MG/DL (ref 7–25)
BUN/CREA RATIO (HISTORICAL): 14 (CALC) (ref 6–22)
CALCIUM SERPL-MCNC: 8.7 MG/DL (ref 8.6–10.4)
CHLORIDE SERPL-SCNC: 109 MMOL/L (ref 98–110)
CHOLEST SERPL-MCNC: 110 MG/DL
CHOLEST/HDLC SERPL: 2.4 (CALC)
CO2 SERPL-SCNC: 27 MMOL/L (ref 20–31)
CREAT SERPL-MCNC: 0.97 MG/DL (ref 0.6–0.93)
DEPRECATED RDW RBC AUTO: 15.5 % (ref 11–15)
EGFR AFRICAN AMERICAN (HISTORICAL): 68 ML/MIN/1.73M2
EGFR-AMERICAN CALC (HISTORICAL): 59 ML/MIN/1.73M2
EOSINOPHIL # BLD AUTO: 152 CELLS/UL (ref 15–500)
EOSINOPHIL # BLD AUTO: 3.7 %
GAMMA GLOBULIN (HISTORICAL): 2.8 G/DL (CALC) (ref 1.9–3.7)
GLUCOSE (HISTORICAL): 96 MG/DL (ref 65–99)
HCT VFR BLD AUTO: 35 % (ref 35–45)
HDLC SERPL-MCNC: 46 MG/DL
HGB BLD-MCNC: 11.9 G/DL (ref 11.7–15.5)
LDL CHOLESTEROL (HISTORICAL): 49 MG/DL (CALC)
LYMPHOCYTES # BLD AUTO: 21 %
LYMPHOCYTES # BLD AUTO: 861 CELLS/UL (ref 850–3900)
MCH RBC QN AUTO: 31 PG (ref 27–33)
MCHC RBC AUTO-ENTMCNC: 34 G/DL (ref 32–36)
MCV RBC AUTO: 91.1 FL (ref 80–100)
MONOCYTES # BLD AUTO: 504 CELLS/UL (ref 200–950)
MONOCYTES (HISTORICAL): 12.3 %
NEUTROPHILS # BLD AUTO: 2575 CELLS/UL (ref 1500–7800)
NEUTROPHILS # BLD AUTO: 62.8 %
NON-HDL-CHOL (CHOL-HDL) (HISTORICAL): 64 MG/DL (CALC)
PLATELET # BLD AUTO: 148 THOUSAND/UL (ref 140–400)
PMV BLD AUTO: 11.5 FL (ref 7.5–12.5)
POTASSIUM SERPL-SCNC: 4 MMOL/L (ref 3.5–5.3)
RBC # BLD AUTO: 3.84 MILLION/UL (ref 3.8–5.1)
SODIUM SERPL-SCNC: 142 MMOL/L (ref 135–146)
TOTAL PROTEIN (HISTORICAL): 6.1 G/DL (ref 6.1–8.1)
TRIGL SERPL-MCNC: 69 MG/DL
TSH SERPL DL<=0.05 MIU/L-ACNC: 0.55 MIU/L (ref 0.4–4.5)
WBC # BLD AUTO: 4.1 THOUSAND/UL (ref 3.8–10.8)

## 2017-09-09 ENCOUNTER — LAB CONVERSION - ENCOUNTER (OUTPATIENT)
Dept: OTHER | Facility: OTHER | Age: 72
End: 2017-09-09

## 2017-09-09 LAB
A/G RATIO (HISTORICAL): 1.2 (CALC) (ref 1–2.5)
ALBUMIN SERPL BCP-MCNC: 3.3 G/DL (ref 3.6–5.1)
ALP SERPL-CCNC: 115 U/L (ref 33–130)
ALT SERPL W P-5'-P-CCNC: 11 U/L (ref 6–29)
AST SERPL W P-5'-P-CCNC: 24 U/L (ref 10–35)
BASOPHILS # BLD AUTO: 0.2 %
BASOPHILS # BLD AUTO: 8 CELLS/UL (ref 0–200)
BILIRUB SERPL-MCNC: 0.8 MG/DL (ref 0.2–1.2)
BUN SERPL-MCNC: 14 MG/DL (ref 7–25)
BUN/CREA RATIO (HISTORICAL): 14 (CALC) (ref 6–22)
CA 27.29 (HISTORICAL): 8 U/ML
CALCIUM SERPL-MCNC: 8.7 MG/DL (ref 8.6–10.4)
CHLORIDE SERPL-SCNC: 109 MMOL/L (ref 98–110)
CO2 SERPL-SCNC: 27 MMOL/L (ref 20–31)
CREAT SERPL-MCNC: 0.97 MG/DL (ref 0.6–0.93)
DEPRECATED RDW RBC AUTO: 15.5 % (ref 11–15)
EGFR AFRICAN AMERICAN (HISTORICAL): 68 ML/MIN/1.73M2
EGFR-AMERICAN CALC (HISTORICAL): 59 ML/MIN/1.73M2
EOSINOPHIL # BLD AUTO: 152 CELLS/UL (ref 15–500)
EOSINOPHIL # BLD AUTO: 3.7 %
GAMMA GLOBULIN (HISTORICAL): 2.8 G/DL (CALC) (ref 1.9–3.7)
GLUCOSE (HISTORICAL): 96 MG/DL (ref 65–99)
HCT VFR BLD AUTO: 35 % (ref 35–45)
HGB BLD-MCNC: 11.9 G/DL (ref 11.7–15.5)
LYMPHOCYTES # BLD AUTO: 21 %
LYMPHOCYTES # BLD AUTO: 861 CELLS/UL (ref 850–3900)
MCH RBC QN AUTO: 31 PG (ref 27–33)
MCHC RBC AUTO-ENTMCNC: 34 G/DL (ref 32–36)
MCV RBC AUTO: 91.1 FL (ref 80–100)
MONOCYTES # BLD AUTO: 504 CELLS/UL (ref 200–950)
MONOCYTES (HISTORICAL): 12.3 %
NEUTROPHILS # BLD AUTO: 2575 CELLS/UL (ref 1500–7800)
NEUTROPHILS # BLD AUTO: 62.8 %
PLATELET # BLD AUTO: 148 THOUSAND/UL (ref 140–400)
PMV BLD AUTO: 11.5 FL (ref 7.5–12.5)
POTASSIUM SERPL-SCNC: 4 MMOL/L (ref 3.5–5.3)
RBC # BLD AUTO: 3.84 MILLION/UL (ref 3.8–5.1)
SODIUM SERPL-SCNC: 142 MMOL/L (ref 135–146)
TOTAL PROTEIN (HISTORICAL): 6.1 G/DL (ref 6.1–8.1)
WBC # BLD AUTO: 4.1 THOUSAND/UL (ref 3.8–10.8)

## 2017-09-20 ENCOUNTER — GENERIC CONVERSION - ENCOUNTER (OUTPATIENT)
Dept: OTHER | Facility: OTHER | Age: 72
End: 2017-09-20

## 2017-09-20 ENCOUNTER — TRANSCRIBE ORDERS (OUTPATIENT)
Dept: ADMINISTRATIVE | Facility: HOSPITAL | Age: 72
End: 2017-09-20

## 2017-09-20 DIAGNOSIS — C50.911 MALIGNANT NEOPLASM OF RIGHT FEMALE BREAST, UNSPECIFIED SITE OF BREAST: Primary | ICD-10-CM

## 2017-10-17 ENCOUNTER — ALLSCRIPTS OFFICE VISIT (OUTPATIENT)
Dept: OTHER | Facility: OTHER | Age: 72
End: 2017-10-17

## 2017-10-18 NOTE — PROGRESS NOTES
Assessment  Assessed    1  Atrial fibrillation, unspecified type (427 31) (I48 91)   2  Long QT interval (794 31) (R94 31)   3  Edema (782 3) (R60 9)    Plan  BPPV (benign paroxysmal positional vertigo), Cardiomyopathy    · ECG WITH RHYTHM STRIP; Status:Complete;   Done: 37MEU5382 01:04PM   Perform:[facility]; Last Updated By:Gris Alfonso; 10/17/2017 1:04:59 PM;Ordered; For:BPPV (benign paroxysmal positional vertigo), Cardiomyopathy; Ordered By:Eugenio Leyva; Discussion/Summary  [facility]   1  Atrial fibrillation, rapid ventricular response, failure of medication/ Post cryo ablation  patient has undergone Successful cryoablation of her atrial fibrillationis currently in sinus rhythm and symptoms are well controlled  recommendation to the patient that as follows: Anticoagulation -she is off apixaban as was having bleeding from surgical site ; now that in sinus we will avoid the same rate control agent - on metoprolol tartrate 50 mg 2 times daily,helps in controlling the PACswas very symptomatic when in atrial fibrillation  Hence we will know when to restart blood thinner if she were to go back into atrial fibrillationhas no obstructive sleep apnea      Lower extremity edemais chronicwith primary physician      Long QTpatient's current QTC is around 460-480 millisecondsis no obvious medication that prolong QThypokalemia as she uses furosemide from time to time  and primary care is being requested to let us know if any new medications are addeddo not want to start any QT prolonging medications in her    is going to have an EKG in 3 months timeneed to follow the QTC if it is further prolonging        Chief Complaint  Follow-up of atrial fibrillation   Chief Complaint Free Text Note Form: 6 month f/u- AFIB      History of Present Illness  The patient is currently doing very well  According to her, she is cancer-free from her breast cancer      As far as her atrial fibrillation is concerned:  Her shortness of breath has improved very significantly  She is able to walk around the house without any difficulty  She does not complain of any angina like chest pain or chest pressure  She does not complain of any orthopnea, paroxysmal nocturnal dyspnea, leg swelling  She is not complaining of any palpitations, presyncope or syncope      She has undergone a successful ablation of her atrial fibrillation in August 2016    Her history of atrial fibrillation is as follows:  Patient is a very pleasant 79-year-old lady who was found to be in atrial fibrillation in April of 2016  She has not been feeling well for 2-3 months, when an EKG was done and atrial fibrillation identified  She was given a brief trial of dronedarone and cardioverted  She could not tolerate the dronedarone because of side effects  Has gone back into atrial fibrillation      She did feel occasional palpitations and lightheadedness ,orthopnea needing 2 pillows, Episodes of PND  significant exercise intolerance- she was able to walk 4-5 miles in 2015 but could  hardly walk a mile  She had difficulty going up flights of steps and she does not walk uphill  She did have an echocardiogram her heart function was reduced        Review of Systems  As described in my history of present illness  All other systems reviewed and are negative        Active Problems  Problems    1  Benign essential hypertension (401 1) (I10)   2  Bilateral edema of lower extremity (782 3) (R60 0)   3  BPPV (benign paroxysmal positional vertigo) (386 11) (H81 10)   4  Cardiomyopathy (425 4) (I42 9)   5  Cervical spinal stenosis (723 0) (M48 02)   6  Dyspnea on exertion (786 09) (R06 09)   7  Edema (782 3) (R60 9)   8  Homocysteinemia (270 4) (E72 11)   9  Hypokalemia (276 8) (E87 6)   10  Hypothyroidism (244 9) (E03 9)   11  Labyrinthitis, unspecified laterality (386 30) (H83 09)   12  Lymphedema (457 1) (I89 0)   13   Malignant neoplasm of unspecified site of right female breast (174 9) (C50 911)   14  Nonhealing skin ulcer, limited to breakdown of skin (707 9) (L98 491)   15  Obesity (278 00) (E66 9)   16  Osteopenia (733 90) (M85 80)   17  Overactive bladder (596 51) (N32 81)   18  Peripheral neuropathy (356 9) (G62 9)   19  Pulmonary nodule seen on imaging study (793 11) (R91 1)   20  UTI (urinary tract infection), bacterial (599 0,041 9) (N39 0,A49 9)   21  Vitamin D deficiency (268 9) (E55 9)    Past Medical History  Problems    1  History of Abnormal chest CT (793 2) (R93 8)   2  History of Abnormal glucose (790 29) (R73 09)   3  Denied: History of Alcohol abuse   4  History of Convalescence after chemotherapy (V66 2) (Z51 89)   5  History of Discharge from left nipple (611 79) (N64 52)   6  History of Fever, unspecified fever cause (780 60) (R50 9)   7  History of basal cell carcinoma (V10 83) (Z85 828)   8  History of malignant neoplasm of skin (V10 83) (Z85 828)   9  History of pregnancy (V13 29)   10  Denied: History of substance abuse   11  History of Intraductal Papilloma Of The Breast (217)   12  History of Memory loss (780 93) (R41 3)   13  History of Menarche (V21 8)   14  Denied: History of Mental health problem   15  History of Open wound (879 8) (T14 8XXA)   16  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)  Active Problems And Past Medical History Reviewed: The active problems and past medical history were reviewed and updated today  Surgical History  Problems    1  History of Appendectomy   2  History of Biopsy Breast Percutaneous Needle Core   3  History of Breast Surgery   4  History of Breast Surgery Mastectomy   5  History of Catheter Ablation Atrial Fibrillation   6  History of Hysterectomy   7  History of Laminectomy Decompressive Up To Two Lumbar Segments   8  History of Oophorectomy   9  History of [place] Lymph Node Biopsy  Surgical History Reviewed:    The surgical history was reviewed and updated today  Family History  Mother    1  Family history of CABG   2  Family history of diabetes mellitus (V18 0) (Z83 3)  Son    3  Family history of myocardial infarction (V17 3) (Z82 49)   4  Family history of Kidney Cancer (V16 51)  Sister    5  Family history of diabetes mellitus (V18 0) (Z83 3)  Brother    10  Family history of CABG   7  Family history of CAD (coronary artery disease) of bypass graft   8  Family history of diabetes mellitus (V18 0) (Z83 3)  Family History    9  Denied: Family history of Alcohol abuse   10  Denied: Family history of substance abuse  Family History Reviewed: The family history was reviewed and updated today  Social History  Problems    · Always uses seat belt   · Being A Social Drinker   · Marital History - Currently    · Never smoker   · Retired  Social History Reviewed: The social history was reviewed and updated today  Current Meds   1  Aleve CAPS; Therapy: (Recorded:21Nov2016) to Recorded   2  Furosemide 40 MG Oral Tablet; TAKE 1 TABLET BY MOUTH DAILY IN THE AM AND 1 TAB   PO Q PM;   Therapy: 52TRU1334 to (Evaluate:28Vei2912)  Requested for: 56RGK7078; Last   Rx:30Jun2017 Ordered   3  Levothyroxine Sodium 150 MCG Oral Tablet; TAKE 1 TABLET DAILY EXCEPT FOR ONE   DAY A WEEK TAKE 1/2 ;   Therapy: 10SCC9716 to (Evaluate:63Ems7171)  Requested for: 62Acm5381; Last   Rx:[id number] Ordered   4  Meclizine HCl - 25 MG Oral Tablet; TAKE 1 TABLET BY MOUTH EVERY 8 HOURS as   needed for dizziness; Therapy: 35OMA8890 to (Evaluate:23Jun2017)  Requested for: 90VTH2275; Last   ZH:[id number] Ordered   5  Metoprolol Tartrate 50 MG Oral Tablet; TAKE 1 TABLET TWICE DAILY  Requested for:   35Azy3321; Last Rx:86Cjc1001 Ordered   6  MV-One Oral Capsule; take 1 capsule daily; Therapy: 21Dql7525 to (Evaluate:08Oct2017)  Requested for: 52Sur3666; Last   Rx:76Nqs9642 Ordered   7  Potassium Chloride ER 10 MEQ Oral Capsule Extended Release; TAKE 1 CAPSULE   DAILY;    Therapy: 57IBP1570 to (Evaluate:96Lyw3651)  Requested for: 55Vrl8826; Last   Rx:06Gcm4115 Ordered   8  Vitamin B-12 500 MCG Oral Tablet; TAKE 1 TABLET DAILY; Therapy: 88IDN7583 to Recorded  Medication List Reviewed: The medication list was reviewed and updated today  Allergies  Medication    1  Morphine Derivatives    Vitals  Vital Signs    Recorded: 82EWK9688 01:05PM   Heart Rate 63   Systolic 530   Diastolic 60   Weight 240 lb 6 oz   BMI Calculated 33 14   BSA Calculated 1 79     Physical Exam    Constitutional   General appearance: No acute distress, well appearing and well nourished  -- Patient has some flushing of the face, she has rosacea  Eyes no pallor, cyanosis or icterus  Ears, Nose, Mouth, and Throat - External inspection of ears and nose: Normal without deformities or discharge  -- Oropharynx: Clear, nares are clear, mucous membranes are moist    Neck   Neck and thyroid: Normal, supple, trachea midline, no thyromegaly  Pulmonary   Auscultation of lungs: Clear to auscultation, no rales, no rhonchi, no wheezing, good air movement  Cardiovascular S1-S2 Regular, no S3 or S4 -- Bilateral pedal edema, chronic  Abdomen central obesity present, normal bowel sounds present  Musculoskeletal Gait and station: Normal gait  Skin - No obvious rash ulcer or nodules  Neurologic - Facial symmetry is retained, extraocular movements are full and symmetric, head neck tongue and palate movement is bilateral and symmetric  -- Speech: Normal     Psychiatric - Orientation to person, place, and time: Normal -- Mood and affect: Normal       Results/Data  ECG Report: EKG reviewed by myself, normal sinus rhythm, single PVC, QTC is between 460-480 milliseconds      Future Appointments    Date/Time Provider Specialty Site   12/27/2017 11:00 AM VICKY Melchor  Hematology Oncology [facility]   03/15/2018 09:30 AM VICKY Zaragoza   Surgical Oncology [facility] [place]   02/28/2018 09:40 AM Deberah Hashimoto, MD Family Medicine Yaquelin Ervin MD     Signatures   Electronically signed by : VICKY Suazo ; Oct 17 2017  1:37PM EST                       (Author)

## 2017-12-20 ENCOUNTER — HOSPITAL ENCOUNTER (OUTPATIENT)
Dept: CT IMAGING | Facility: HOSPITAL | Age: 72
Discharge: HOME | End: 2017-12-20
Attending: INTERNAL MEDICINE
Payer: COMMERCIAL

## 2017-12-20 DIAGNOSIS — C50.911 MALIGNANT NEOPLASM OF RIGHT FEMALE BREAST (HCC): ICD-10-CM

## 2017-12-20 PROCEDURE — 71260 CT THORAX DX C+: CPT

## 2017-12-20 PROCEDURE — 74177 CT ABD & PELVIS W/CONTRAST: CPT

## 2017-12-20 RX ADMIN — IOHEXOL 100 ML: 350 INJECTION, SOLUTION INTRAVENOUS at 13:30

## 2017-12-27 ENCOUNTER — ALLSCRIPTS OFFICE VISIT (OUTPATIENT)
Dept: OTHER | Facility: OTHER | Age: 72
End: 2017-12-27

## 2017-12-28 NOTE — PROGRESS NOTES
Assessment   1  Malignant neoplasm of unspecified site of right female breast (174 9) (C50 421)    Plan   Malignant neoplasm of unspecified site of right female breast    · (1) CA 27 29; Status:Active; Requested for:27Mar2018; Perform:[facility]; JUO:[id number];GRHCZNN; For:Malignant neoplasm of unspecified site of right female breast; Ordered By:Daniel Godfrey;   · (1) CBC/PLT/DIFF; Status:Active; Requested for:27Mar2018; Perform:[facility]; OIZ:[id number];KBIAYMS; For:Malignant neoplasm of unspecified site of right female breast; Ordered By:Daniel Godfrey;   · (1) COMPREHENSIVE METABOLIC PANEL; Status:Active; Requested for:27Mar2018; Perform:[facility]; OHW:00ULH8792;TBQRDCY; For:Malignant neoplasm of unspecified site of right female breast; Ordered By:Daniel Godfrey;   · Follow-up visit in 3 months Evaluation and Treatment  Follow-up  Status: Hold For -    Scheduling  Requested for: 55Vhq2573   Ordered; For: Malignant neoplasm of unspecified site of right female breast; Ordered By: Keiko Mercedes Performed:  Due: 78IGZ7927    Discussion/Summary   Discussion Summary: This is a pleasant 77-year-old female who was diagnosed with a triple negative breast cancer  Her tumor was a T2 N0 M0 triple negative breast cancer with areas of DCIS  It was multifocal  It seemed to be a high-grade tumor  I advised adjuvant chemotherapy  I explained I would use Taxotere and Cytoxan every 3 weeks for 4 cycles  This was because she had negative lymph nodes otherwise they would have gotten an anthracycline-based chemotherapy  The patient agreed  She got 3 cycles and refused any further chemotherapy  Her most recent imaging shows no evidence of recurrence or metastatic disease  She has a small stable 4 mm nodule in her lung  At this point I'll continue her on observation  I'll see her back in 3 months  This will be with blood work  I will repeat imaging in 6 months               Counseling Documentation With Imm: The patient, patient's family was counseled regarding diagnostic results,-- instructions for management,-- prognosis,-- patient and family education  Goals and Barriers: The patient has the current Goals: Prolong survival from breast cancer  The patent has the current Barriers: None  Patient's Capacity to Self-Care: Patient is able to Self-Care  Medication SE Review and Pt Understands Tx: Possible side effects of new medications were reviewed with the patient/guardian today  The treatment plan was reviewed with the patient/guardian  The patient/guardian understands and agrees with the treatment plan      Chief Complaint   Chief Complaint Free Text Note Form: Stage II a triple negative breast cancer  9 lymph nodes were removed all of which were negative for malignancy      History of Present Illness   Previous Therapy:    Surgery in the adjuvant setting  Dose #4 of Taxotere and Cytoxan was opposed to be on 22 February 2017 but she discontinued chemotherapy before then  then had surgery for a small local recurrence/positive margin near clip in the surgical bed which was positive for 4 mm focus             Current Therapy: Observation             Interval History: Patient returns for follow-up visit  She last saw me in February 2017 and received a total of 3 cycles of chemotherapy and refused to get the fourth  She was then found to have a small local recurrence near one of the clips from her surgical site  She had this removed  Her CT scan from 7 June showed no evidence of recurrence or metastatic disease  She had a small pulmonary nodule which was stable at 4 mm  follow-up is recommended so we did this and her most recent imaging from December 2017 shows a stable 4 mm upper lobe, but no evidence of metastatic disease or progression  Denies any nausea denies any vomiting  She did have some issues with her wound which is now healed well  The rest of her 14 point review of systems today was negative   I will now repeat her imaging I see her back in 3 months  I will also do blood work with tumor markers at that time  Her most recent blood work, imaging and tumor marker shows no evidence of recurrence  Review of Systems   Complete-Female: As stated in history of present illness otherwise her 14 point review of systems today was negative  Active Problems   1  Atrial fibrillation, unspecified type (427 31) (I48 91)   2  Benign essential hypertension (401 1) (I10)   3  Bilateral edema of lower extremity (782 3) (R60 0)   4  BPPV (benign paroxysmal positional vertigo) (386 11) (H81 10)   5  Bursitis of left shoulder (726 10) (M75 52)   6  Cardiomyopathy (425 4) (I42 9)   7  Cervical spinal stenosis (723 0) (M48 02)   8  Dyspnea on exertion (786 09) (R06 09)   9  Edema (782 3) (R60 9)   10  Homocysteinemia (270 4) (E72 11)   11  Hypokalemia (276 8) (E87 6)   12  Hypothyroidism (244 9) (E03 9)   13  Labyrinthitis, unspecified laterality (386 30) (H83 09)   14  Long QT interval (794 31) (R94 31)   15  Lymphedema (457 1) (I89 0)   16  Malignant neoplasm of unspecified site of right female breast (174 9) (C50 911)   17  Nonhealing skin ulcer, limited to breakdown of skin (707 9) (L98 491)   18  Obesity (278 00) (E66 9)   19  Osteopenia (733 90) (M85 80)   20  Overactive bladder (596 51) (N32 81)   21  Peripheral neuropathy (356 9) (G62 9)   22  Pulmonary nodule seen on imaging study (793 11) (R91 1)   23  UTI (urinary tract infection), bacterial (599 0,041 9) (N39 0,A49 9)   24  Vitamin D deficiency (268 9) (E55 9)    Past Medical History   1  History of Abnormal chest CT (793 2) (R93 8)   2  History of Abnormal glucose (790 29) (R73 09)   3  Denied: History of Alcohol abuse   4  History of Convalescence after chemotherapy (V66 2) (Z51 89)   5  History of Discharge from left nipple (611 79) (N64 52)   6  History of Fever, unspecified fever cause (780 60) (R50 9)   7  History of basal cell carcinoma (V10 83) (Z85 828)   8   History of malignant neoplasm of skin (V10 83) (Z85 828)   9  History of pregnancy (V13 29)   10  Denied: History of substance abuse   11  History of Intraductal Papilloma Of The Breast (217)   12  History of Memory loss (780 93) (R41 3)   13  History of Menarche (V21 8)   14  Denied: History of Mental health problem   15  History of Open wound (879 8) (T14 8XXA)   16  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)  Active Problems And Past Medical History Reviewed: The active problems and past medical history were reviewed and updated today  Positive for hypertension, hypothyroidism  She has a history of A  fib and is status post ablation  Surgical History   1  History of Appendectomy   2  History of Biopsy Breast Percutaneous Needle Core   3  History of Breast Surgery   4  History of Breast Surgery Mastectomy   5  History of Catheter Ablation Atrial Fibrillation   6  History of Hysterectomy   7  History of Laminectomy Decompressive Up To Two Lumbar Segments   8  History of Oophorectomy   9  History of [place] Lymph Node Biopsy  Surgical History Reviewed: The surgical history was reviewed and updated today  Family History   Mother    1  Family history of CABG   2  Family history of diabetes mellitus (V18 0) (Z83 3)  Son    3  Family history of myocardial infarction (V17 3) (Z82 49)   4  Family history of Kidney Cancer (V16 51)  Sister    5  Family history of diabetes mellitus (V18 0) (Z83 3)  Brother    10  Family history of CABG   7  Family history of CAD (coronary artery disease) of bypass graft   8  Family history of diabetes mellitus (V18 0) (Z83 3)  Family History    9  Denied: Family history of Alcohol abuse   10  Denied: Family history of substance abuse  Family History Reviewed: The family history was reviewed and updated today    Her son had kidney cancer      Social History    · Always uses seat belt   · Being A Social Drinker   · Marital History - Currently    · Never smoker   · Retired  Social History Reviewed: The social history was reviewed and updated today  Does not drink  Does not smoke      Current Meds    1  Aleve CAPS; Therapy: (Recorded:21Nov2016) to Recorded   2  Furosemide 40 MG Oral Tablet; TAKE 1 TABLET BY MOUTH DAILY IN THE AM AND 1 TAB     PO Q PM;     Therapy: 79IXV1769 to (Evaluate:52Fuo2851)  Requested for: 27BET8353; Last     Rx:30Jun2017 Ordered   3  Levothyroxine Sodium 150 MCG Oral Tablet; TAKE 1 TABLET DAILY EXCEPT FOR ONE     DAY A WEEK TAKE 1/2 ;     Therapy: 53GRF2147 to (Evaluate:73Tqg0374)  Requested for: 70Fxq7375; Last     Rx:14Wdj8835 Ordered   4  Meclizine HCl - 25 MG Oral Tablet; TAKE 1 TABLET BY MOUTH EVERY 8 HOURS as     needed for dizziness; Therapy: 40ICV5013 to (Evaluate:23Jun2017)  Requested for: 22HGO4170; Last     ZP:19LXB1617 Ordered   5  Metoprolol Tartrate 50 MG Oral Tablet; TAKE 1 TABLET TWICE DAILY  Requested for:     42Hwl4346; Last Rx:09Gta1627 Ordered   6  MV-One Oral Capsule; take 1 capsule daily; Therapy: 29Qoc9602 to (Evaluate:08Oct2017)  Requested for: 08Yoo2866; Last     Rx:30Fph1730 Ordered   7  Potassium Chloride ER 10 MEQ Oral Capsule Extended Release; TAKE 1 CAPSULE     DAILY; Therapy: 14GNN7405 to (Evaluate:11Aug2017)  Requested for: 49Xmn5138; Last     Rx:37Oiw4318 Ordered   8  Vitamin B-12 500 MCG Oral Tablet; TAKE 1 TABLET DAILY; Therapy: 48JFI7745 to Recorded  Medication List Reviewed: The medication list was reviewed and updated today  Allergies   1  Morphine Derivatives    Vitals   Vital Signs    Recorded: 24Cip4693 11:06AM   Temperature 97 6 F   Heart Rate 77   Respiration 16   Systolic 539   Diastolic 78   Height 5 ft 1 in   Weight 178 lb    BMI Calculated 33 63   BSA Calculated 1 8   O2 Saturation 98   Pain Scale 0     Physical Exam        Constitutional      General appearance: No acute distress, well appearing and well nourished         Eyes      Conjunctiva and lids: No swelling, erythema or discharge  Pupils and irises: Equal, round and reactive to light  Ears, Nose, Mouth, and Throat      External inspection of ears and nose: Normal        Otoscopic examination: Tympanic membranes translucent with normal light reflex  Canals patent without erythema  Nasal mucosa, septum, and turbinates: Normal without edema or erythema  Oropharynx: Normal with no erythema, edema, exudate or lesions  Pulmonary      Respiratory effort: No increased work of breathing or signs of respiratory distress  Auscultation of lungs: Clear to auscultation  Cardiovascular      Palpation of heart: Normal PMI, no thrills  Auscultation of heart: Normal rate and rhythm, normal S1 and S2, without murmurs  Examination of extremities for edema and/or varicosities: Normal        Carotid pulses: Normal        Abdomen      Abdomen: Non-tender, no masses  Liver and spleen: No hepatomegaly or splenomegaly  Lymphatic      Palpation of lymph nodes in neck: No lymphadenopathy  Musculoskeletal      Gait and station: Normal        Digits and nails: Normal without clubbing or cyanosis  Inspection/palpation of joints, bones, and muscles: Normal        Skin      Skin and subcutaneous tissue: Normal without rashes or lesions  Neurologic      Cranial nerves: Cranial nerves 2-12 intact  Reflexes: 2+ and symmetric  Sensation: No sensory loss  Psychiatric      Orientation to person, place, and time: Normal        Mood and affect: Normal        Additional Exam:  She does have redness along with tenderness and mild swelling of bilateral hands  She states the joints do not hurt but it is just the skin that is very tender  ECOG 1       Results/Data   * CT CHEST ABDOMEN PELVIS W CONTRAST 24Kds2913 12:53PM Dulcie Iron   TW Order Number: [id number]       - Patient Instructions:  To schedule this appointment, please contact Central Scheduling at [phone]  Testing to be done @@ [facility]      Test Name Result Flag Reference   CT CHEST ABDOMEN PELVIS W CONTRAST (Report)     CT CHEST, ABDOMEN AND PELVIS WITH IV CONTRAST           INDICATION: C50 911: Malignant neoplasm of unspecified site of right female breast (this clinical history is taken directly from the electronic ordering system)  COMPARISON: Multiple prior examinations, most recently June 7, 2017  TECHNIQUE: CT examination of the chest, abdomen and pelvis was performed  Reformatted images were created in axial, sagittal, and coronal planes  Radiation dose length product (DLP) for this visit: 686 mGy-cm   This examination, like all CT scans performed in the [facility], was performed utilizing techniques to minimize radiation dose exposure, including the use of iterative       reconstruction and automated exposure control  IV Contrast: 100 mL of iohexol (OMNIPAQUE)           Enteric Contrast: Enteric contrast was administered  FINDINGS:           CHEST           LUNGS: A 4 mm noncalcified pulmonary nodule in the lateral right upper lobe on image 16 of series 3 is unchanged from as far back as August 10, 2016  No new or suspicious pulmonary nodule or mass is identified  Biapical pleural parenchymal scarring is      again seen  No tracheal or endobronchial mass  PLEURA: Unremarkable  HEART/GREAT VESSELS: Unremarkable for patient's age  MEDIASTINUM AND MILO: Unremarkable  CHEST WALL AND LOWER NECK: There has been right mastectomy  There is continued decrease in the size of a very small fluid collection, now decreased to only 3 mm in thickness compared with 8 mm on most recent prior examination and 17 mm on November 29, 2016  No axillary lymphadenopathy  ABDOMEN           LIVER/BILIARY TREE: Unremarkable  GALLBLADDER: No calcified gallstones   No pericholecystic inflammatory change  SPLEEN: Unremarkable  PANCREAS: Unremarkable  ADRENAL GLANDS: Unremarkable  KIDNEYS/URETERS: Again noted is a cortical cyst at the upper pole the left kidney, 16 mm in size similar from prior exam  Kidneys are otherwise unremarkable  STOMACH AND BOWEL: Few sigmoid diverticula are noted without evidence of acute diverticulitis  Stomach and bowel appear otherwise unremarkable  APPENDIX: No findings to suggest appendicitis  ABDOMINOPELVIC CAVITY: No ascites or free intraperitoneal air  No lymphadenopathy  VESSELS: No abdominal aortic aneurysm  There is a retroaortic left renal vein and extensive left retroperitoneal varices which appear to arise from the left gonadal vein and drain into the IVC, unchanged from prior exam            PELVIS           REPRODUCTIVE ORGANS: Patient is status post hysterectomy  No suspicious pelvic mass  URINARY BLADDER: Again noted is right urinary bladder dome diverticulum  Bladder is otherwise unremarkable  ABDOMINAL WALL/INGUINAL REGIONS: Unremarkable  OSSEOUS STRUCTURES: No acute fracture or destructive osseous lesion  IMPRESSION:           No CT evidence of recurrent or metastatic disease in the chest, abdomen or pelvis  Continued interval decrease in the size of a now very small right chest wall seroma  A 4 mm right upper lobe pulmonary nodule is unchanged                  Workstation performed: [id number]           Signed by:      Sang Morse MD      12/20/17      (1) COMPREHENSIVE METABOLIC PANEL 62ZTC9024 78:26CS Radha Sanchez      Test Name Result Flag Reference   GLUCOSE 96 mg/dL  65-99   Fasting reference interval   UREA NITROGEN (BUN) 14 mg/dL  7-25   CREATININE 0 97 mg/dL H 0 60-0 93   For patients >52years of age, the reference limit     for Creatinine is approximately 13% higher for people     identified as -American  eGFR NON-AFR  AMERICAN 59 mL/min/1 73m2 L > OR = 60   eGFR AFRICAN AMERICAN 68 mL/min/1 73m2  > OR = 60   BUN/CREATININE RATIO 14 (calc)  6-22   SODIUM 142 mmol/L  135-146   POTASSIUM 4 0 mmol/L  3 5-5 3   CHLORIDE 109 mmol/L     CARBON DIOXIDE 27 mmol/L  20-31   CALCIUM 8 7 mg/dL  8 6-10 4   PROTEIN, TOTAL 6 1 g/dL  6 1-8 1   ALBUMIN 3 3 g/dL L 3 6-5 1   GLOBULIN 2 8 g/dL (calc)  1 9-3 7   ALBUMIN/GLOBULIN RATIO 1 2 (calc)  1 0-2 5   BILIRUBIN, TOTAL 0 8 mg/dL  0 2-1 2   ALKALINE PHOSPHATASE 115 U/L     AST 24 U/L  10-35   ALT 11 U/L  6-29      (1) CBC/PLT/DIFF 17QJJ2353 09:43AM Daniel Stoner      Test Name Result Flag Reference   WHITE BLOOD CELL COUNT 4 1 Thousand/uL  3 8-10 8   RED BLOOD CELL COUNT 3 84 Million/uL  3 80-5 10   HEMOGLOBIN 11 9 g/dL  11 7-15 5   HEMATOCRIT 35 0 %  35 0-45 0   MCV 91 1 fL  80 0-100 0   MCH 31 0 pg  27 0-33 0   MCHC 34 0 g/dL  32 0-36 0   RDW 15 5 % H 11 0-15 0   PLATELET COUNT 758 Thousand/uL  140-400   ABSOLUTE NEUTROPHILS 2575 cells/uL  7531-1025   ABSOLUTE LYMPHOCYTES 861 cells/uL  850-3900   ABSOLUTE MONOCYTES 504 cells/uL  200-950   ABSOLUTE EOSINOPHILS 152 cells/uL     ABSOLUTE BASOPHILS 8 cells/uL  0-200   NEUTROPHILS 62 8 %     LYMPHOCYTES 21 0 %     MONOCYTES 12 3 %     EOSINOPHILS 3 7 %     BASOPHILS 0 2 %     MPV 11 5 fL  7 5-12 5      (1) CA 27 29 98Gqu7234 09:43AM Daniel Stoner   REPORT COMMENT:     FASTING:YES      Test Name Result Flag Reference   CA27 29 (CHIRON) 8 U/mL  <38   This test was performed using the Siemens              chemiluminescent method  Values obtained from different              assay methods cannot be used interchangeably  CA 27 29              levels, regardless of value, should not be interpreted as              absolute evidence of the presence or absence of disease        Future Appointments      Date/Time Provider Specialty Site   01/10/2018 11:00 AM Cardiology, Provider Room [address] [place] [address] 03/15/2018 09:30 AM VICKY Queen   Surgical Oncology [place] [address] [facility]   02/28/2018 09:40 AM Molly Dugan MD Family Medicine Radha Rowan MD     Signatures    Electronically signed by : VICKY Nesbitt ; Dec 27 2017 11:33AM EST                       (Author)

## 2018-01-09 NOTE — PROGRESS NOTES
Discussion/Summary  Social Work-Discussion Summary [facility]: Patient is being seen for a distress screenning assessment  Pt scored 6/10 on Distress Thermometer completed during tx on 2/1/17  MSW contacted pt on 2/2/17 to inform of support services, also emailed info including support group flyer  Pt will contact MSW as needed and meet during next tx  Signatures   Electronically signed by :  RONDA Sharma; Feb  3 2017  2:33PM EST                       (Author)

## 2018-01-09 NOTE — MISCELLANEOUS
Message  Received clearance note from DR Mayra Gamble dated 10/06/16 in [employer]  Cleared for surgery  Patient to stop Elaquis 2 days prior to surgery as per DR Mayra Gamble  Pt had been informed and understands  Will proceed with scheduling surgery  Active Problems    1  Aftercare following surgery of the musculoskeletal system (V58 78) (Z47 89)   2  Arthritis of hand, degenerative (715 94) (M19 049)   3  Atrial fibrillation (427 31) (I48 91)   4  Basal cell carcinoma of skin (173 91) (C44 91)   5  Benign essential hypertension (401 1) (I10)   6  Breast mass, right (611 72) (N63)   7  Cardiomyopathy (425 4) (I42 9)   8  Cervical spinal stenosis (723 0) (M48 02)   9  Dyspnea on exertion (786 09) (R06 09)   10  Edema (782 3) (R60 9)   11  Encounter for screening colonoscopy (V76 51) (Z12 11)   12  Encounter for screening mammogram for malignant neoplasm of breast (V76 12)    (Z12 31)   13  Finger pain, right (729 5) (M79 644)   14  History of diverticulitis of colon (V12 79) (Z87 19)   15  Homocysteinemia (270 4) (E72 11)   16  Hypokalemia (276 8) (E87 6)   17  Hypothyroidism (244 9) (E03 9)   18  Intraductal Papilloma Of The Breast (217)   19  Labyrinthitis, unspecified laterality (386 30) (H83 09)   20  Malignant neoplasm of right female breast, unspecified site of breast (174 9) (C50 911)   21  Obesity (278 00) (E66 9)   22  Osteopenia (733 90) (M85 80)   23  Overactive bladder (596 51) (N32 81)   24  Preoperative clearance (V72 84) (Z01 818)   25  Pulmonary nodule seen on imaging study (793 11) (R91 1)   26  Sesamoid pain (733 90) (M89 9)   27  History of Syncopal vertigo (780 2,780 4) (R55,R42)   28  Visit for routine gyn exam (V72 31) (Z01 419)   29  Vitamin D deficiency (268 9) (E55 9)    Current Meds   1  Eliquis 5 MG Oral Tablet; take 1 tablet every twelve hours  Requested for: 44Nrt8847;   Last Rx:87Yxf0015 Ordered   2  Furosemide 40 MG Oral Tablet; Take 1 tablet daily;    Therapy: 37VJC4888 to (Evaluate:29Sep2016)  Requested for: 66PAZ0151; Last   Rx:01Jun2016 Ordered   3  Levothyroxine Sodium 150 MCG Oral Tablet; TAKE 1 TABLET DAILY EXCEPT FOR ONE   DAY A WEEK TAKE 1/2 ;   Therapy: 88DXH8408 to (77 873 135)  Requested for: 25UMM7328 Recorded   4  Meclizine HCl - 25 MG Oral Tablet; TAKE 1 TABLET BY MOUTH EVERY 8 HOURS as   needed for dizziness; Therapy: 12HZD8388 to (Evaluate:14Oct2016)  Requested for: 91Xaa0383; Last   Rx:01Cjx4069 Ordered   5  Metoprolol Tartrate 50 MG Oral Tablet; TAKE 1 TABLET TWICE DAILY; Therapy: (Recorded:01Jun2016) to Recorded   6  Oxycodone-Acetaminophen 5-325 MG Oral Tablet (Percocet); TAKE 1 TO 2 TABLETS   EVERY 4 HOURS AS NEEDED FOR PAIN;   Therapy: 03Tnm0876 to (Last Rx:29Sep2016) Ordered   7  Potassium Chloride ER 10 MEQ Oral Capsule Extended Release; TAKE 1 CAPSULE   DAILY; Therapy: 59PIU6415 to (Evaluate:11Aug2017)  Requested for: 66Xfi7959; Last   Rx:80Jqe4274 Ordered   8  Vitamin B-12 500 MCG Oral Tablet; TAKE 1 TABLET DAILY; Therapy: 27DIL2232 to Recorded    Allergies    1  Morphine Derivatives    Signatures   Electronically signed by :  Peterson Goldberg, ; Oct 14 2016  1:03PM EST                       (Author)

## 2018-01-09 NOTE — PROGRESS NOTES
Assessment    1  Abnormal chest CT (793 2) (R93 8)   2  Discharge from left nipple (611 79) (N64 52)   3  Malignant neoplasm of right female breast, unspecified site of breast (174 9) (C50 911)   4  History of Convalescence after chemotherapy (V66 2) (Z51 89)    Plan   * MRI BREAST BILATERAL W OR WO CONTRAST; Status:Resulted - Requires Verification;   Done: 68XPE7728 12:00AM  Order Comments:[address] 6:30PM; BDE:[id number]; Ordered; For:Abnormal chest CT, Discharge from left nipple, Malignant neoplasm of right female breast, unspecified site of breast; Ordered By:Judith Young; Annotations              EMAILED [facility]     Discussion/Summary  69 yo female s/p right mastectomy and sentinel node biopsy for a Stage IIA triple 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  She is now s/p three cycles of cytoxan and taxotere and will not complete her fourth cycle secondary to intolerable side effects  She had a stage CT that showed a questionable residual lesion in the right chest wall  She did have multifocal disease on her initial breast imaging with the palpable mass in the lower outer right breast and a second lesion at 6:00; she also had multifocal disease on her surgical pathology  Of note, on her post biopsy mammogram the 6:00 clip could not be seen secondary to the extreme posterior location  There were no chest wall masses palpated at the time of her mastectomy and as noted her surgical pathology did show two lesions in the breast with one of them being at 6:00  She had a right chest wall ultrasound after the CT that showed a nodule with clip and a breast MRI was recommended for this as well as reported nipple discharge on the left side with no focal findings on ultrasound of the left breast  She preferred to wait until she completed chemotherapy to proceed with the MRI  She recently did so and is here today for follow up   I reviewed all these findings with the patient and her family who accompanies her  I will make arrangements for the breast MRI and will call her  Regarding the right chest wall, if the lesion is still present I will recommend excision of this area  If it is no longer appreciated I will refer her for radiation therapy  I will call her with these results  Chief Complaint  Chief Complaint Free Text Note Form: Pt is here for her 3 month breast CA follow-up   no new complaints at this time  breast imagin16 thomas us (ABUS)( RUFUS)  provider:Dr Jarret Sullivan  History of Present Illness  Diagnosis and Staging: Stage IIA IDC right breast  ER/VT/HER2-   Treatment History: 10/28/16 right mastectomy, SLNB  TC x 3 cycles with Dr Haley Reynolds, did not complete 4th cycle secondary to significant side effects      Review of Systems  Complete Female ROS SurgOnc:   Constitutional: feeling poorly  Musculoskeletal: swelling and pain in hands  Active Problems     1  Arthritis of hand, degenerative (715 94) (M19 049)   2  Atrial fibrillation (427 31) (I48 91)   3  Benign essential hypertension (401 1) (I10)   4  Cardiomyopathy (425 4) (I42 9)   5  Cervical spinal stenosis (723 0) (M48 02)   6  Discharge from left nipple (611 79) (N64 52)   7  Dyspnea on exertion (786 09) (R06 09)   8  Edema (782 3) (R60 9)   9  Finger pain, right (729 5) (M79 644)   10  Homocysteinemia (270 4) (E72 11)   11  Hypokalemia (276 8) (E87 6)   12  Hypothyroidism (244 9) (E03 9)   13  Labyrinthitis, unspecified laterality (386 30) (H83 09)   14  Obesity (278 00) (E66 9)   15  Osteopenia (733 90) (M85 80)   16  Overactive bladder (596 51) (N32 81)   17  Pulmonary nodule seen on imaging study (793 11) (R91 1)   18   Vitamin D deficiency (268 9) (E55 9)    Basal cell carcinoma of skin (173 91) (C44 91)       Intraductal Papilloma Of The Breast (217)       Breast mass, right (611 72) (N63)       Malignant neoplasm of right female breast, unspecified site of breast (174 9) (C50 911) 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 left breast  09/12/16 right breast X 3   3  History of Breast Surgery Mastectomy   · 10/28/16   4  History of Catheter Ablation Atrial Fibrillation   5  History of Hysterectomy   6  History of Laminectomy Decompressive Up To Two Lumbar Segments   7  History of Oophorectomy   · 1993   8  History of Salol Lymph Node Biopsy   · 10/28/16  Surgical History Reviewed: The surgical history was reviewed and updated today  Family History  Mother    1  Family history of CABG   2  Family history of diabetes mellitus (V18 0) (Z83 3)  Son    3  Family history of myocardial infarction (V17 3) (Z82 49)   4  Family history of Kidney Cancer (V16 51)  Sister    5  Family history of diabetes mellitus (V18 0) (Z83 3)  Brother    10  Family history of CABG   7  Family history of CAD (coronary artery disease) of bypass graft   8  Family history of diabetes mellitus (V18 0) (Z83 3)  Family History Reviewed: The family history was reviewed and updated today  Social History    · Being A Social Drinker   · Marital History - Currently    · Non-smoker (V49 89) (Z78 9)   · Retired  Social History Reviewed: The social history was reviewed and updated today  The social history was reviewed and is unchanged  Current Meds   1  Aleve CAPS; Therapy: (Recorded:21Nov2016) to Recorded   2  Claritin 10 MG Oral Tablet; TAKE 1 TABLET AT BEDTIME; Therapy: 68Ppc2195 to Recorded   3   Dexamethasone 4 MG Oral Tablet; TAKE 1 TABLET TWICE DAILY  START DAY BEFORE   CHEMO AND ONE DAY AFTER CHEMO-2 DAYS TOTAL; Therapy: 40RFZ6228 to (Evaluate:23Jan2017)  Requested for: 96MFM3828; Last   Rx:74Hsg4029 Ordered   4  Eliquis 5 MG Oral Tablet; take 1 tablet every twelve hours  Requested for: 66Wtk3916;   Last Rx:71Bhe0167 Ordered   5  Furosemide 40 MG Oral Tablet; take 1 tablet by mouth daily as directed; Therapy: 27CJB4654 to (Evaluate:29Jan2017)  Requested for: 31Oct2016; Last   Rx:89Jea0243 Ordered   6  Hydrocortisone 2 5 % External Cream; APPLY SPARINGLY TO AFFECTED AREA(S) 2 TO   3 TIMES DAILY; Therapy: 93VVH3735 to (Evaluate:14Apr2017)  Requested for: 42QKU8089; Last   Rx:28Hov1433 Ordered   7  Levothyroxine Sodium 150 MCG Oral Tablet; TAKE 1 TABLET DAILY EXCEPT FOR ONE   DAY A WEEK TAKE 1/2 ;   Therapy: 10JYS8348 to (Evaluate:09Oct2017)  Requested for: 33WMY4518 Recorded   8  Meclizine HCl - 25 MG Oral Tablet; TAKE 1 TABLET BY MOUTH EVERY 8 HOURS as   needed for dizziness; Therapy: 12YSY7153 to (Evaluate:14Oct2016)  Requested for: 78Xii8142; Last   Rx:86Kmk1913 Ordered   9  Medrol 4 MG Oral Tablet Therapy Pack; Use as directed per pack; Therapy: 20RAK4671 to (Last Rx:12Jan2017)  Requested for: 12Jan2017 Ordered   10  Metoprolol Tartrate 50 MG Oral Tablet; TAKE 1 TABLET TWICE DAILY; Therapy: (Recorded:01Jun2016) to Recorded   11  Potassium Chloride ER 10 MEQ Oral Capsule Extended Release; TAKE 1 CAPSULE    DAILY; Therapy: 58MND8249 to (Evaluate:11Aug2017)  Requested for: 60Tze8943; Last    Rx:99Sxo5875 Ordered   12  Vitamin B-12 500 MCG Oral Tablet; TAKE 1 TABLET DAILY; Therapy: 04TEF7757 to Recorded  Medication List Reviewed: The medication list was reviewed and updated today  Allergies    1   Morphine Derivatives    Vitals  Vital Signs    Recorded: 21XPW6532 10:41AM   Temperature 98 5 F   Heart Rate 110   Respiration 15   Systolic 468   Diastolic 78   Height 5 ft 1 in   Weight 169 lb    BMI Calculated 31 93   BSA Calculated 1 75   O2 Saturation 95     Physical Exam    Constitutional: General appearance: Abnormal   appears tired  Chest: The lungs are clear to auscultation  Cardiac: Heart is regular rate  Extremities: swelling and discoloration bilateral hands  Neuro: Orientation to person, place and time: Normal   Mood and affect: Normal     Lymphatic: no evidence of cervical adenopathy bilaterally  no evidence of axillary adenopathy bilaterally  Skin: Examination of Breast: Abnormal   Breast: Examination of the right breast revealed a total mastectomy and a mastectomy scar, but no breast reconstruction  Examination of the left breast revealed no erythema, no swelling and no tenderness  The left nipple was not inverted  No discharge was noted from the left nipple  No mass was palpable in the left breast  No chest wall masses or skin nodules on the right side  Axillary nodes: no enlarged nodes  Results/Data  Diagnostic Studies Reviewed Surg Onc:   X-ray Review 12/20/16 bilateral breast ultrasound Left side with ductal debris and right chest wall with nodule and clip ? residual disease; MRI recommended; pt preferred to delay MRI until after her chemotherapy; pt had left breast ultrasound as she self reported nipple discharge  CT Scan Review 11/29/16 CT chest/abd/pelvis residual lesion right chest wall, 2-3mm pulm nodule (decreased); ultrasound recommended for right chest wall lesion as noted above  Future Appointments    Date/Time Provider Specialty Site   06/14/2017 10:30 AM VICKY Shepard  Hematology Oncology [facility]   03/16/2017 03:00 PM VICKY Newell  Surgical Oncology [facility]   03/30/2017 02:00 PM VICKY Newell  Surgical Oncology [facility]   03/14/2017 01:20 PM Elicia Hodgkins, M D   Cardiology [facility]   05/30/2017 09:20 AM Michelle Pringle MD Family Medicine Pancho Bass MD     End of Encounter Meds    1  Eliquis 5 MG Oral Tablet; take 1 tablet every twelve hours  Requested for: 66Eru9124;   Last Rx:93Txt2471 Ordered   2  Metoprolol Tartrate 50 MG Oral Tablet; TAKE 1 TABLET TWICE DAILY; Therapy: (Recorded:01Jun2016) to Recorded    3  Furosemide 40 MG Oral Tablet; take 1 tablet by mouth daily as directed; Therapy: 79YOV2367 to (Evaluate:29Jan2017)  Requested for: 75Apw4259; Last   Rx:50Cda5436 Ordered    4  Potassium Chloride ER 10 MEQ Oral Capsule Extended Release; TAKE 1 CAPSULE   DAILY; Therapy: 37TMO9869 to (Evaluate:11Aug2017)  Requested for: 01Hwl5967; Last   Rx:60Gco6143 Ordered    5  Vitamin B-12 500 MCG Oral Tablet; TAKE 1 TABLET DAILY; Therapy: 54TPW7235 to Recorded    6  Levothyroxine Sodium 150 MCG Oral Tablet; TAKE 1 TABLET DAILY EXCEPT FOR ONE   DAY A WEEK TAKE 1/2 ;   Therapy: 95MKT9676 to (Evaluate:09Oct2017)  Requested for: 41RVY5420 Recorded    7  Meclizine HCl - 25 MG Oral Tablet; TAKE 1 TABLET BY MOUTH EVERY 8 HOURS as   needed for dizziness; Therapy: 04JFV3285 to (Evaluate:14Oct2016)  Requested for: 01Tca6891; Last   Rx:06Smi0495 Ordered    8  Dexamethasone 4 MG Oral Tablet; TAKE 1 TABLET TWICE DAILY  START DAY BEFORE   CHEMO AND ONE DAY AFTER CHEMO-2 DAYS TOTAL; Therapy: 36GSZ5695 to (Evaluate:23Jan2017)  Requested for: 74IZN3851; Last   Rx:87Oyr7933 Ordered    9  Hydrocortisone 2 5 % External Cream; APPLY SPARINGLY TO AFFECTED AREA(S) 2 TO   3 TIMES DAILY; Therapy: 91MLQ2634 to (Evaluate:14Apr2017)  Requested for: 98WVK5433; Last   Rx:80Afb8231 Ordered   10  Medrol 4 MG Oral Tablet Therapy Pack (MethylPREDNISolone); Use as directed per pack; Therapy: 88SKW7906 to (Last Rx:12Jan2017)  Requested for: 12Jan2017 Ordered    11  Aleve CAPS; Therapy: (Recorded:21Nov2016) to Recorded   12  Claritin 10 MG Oral Tablet; TAKE 1 TABLET AT BEDTIME;     Therapy: 39TZD9479 to Recorded    Signatures   Electronically signed by : VICKY Serra ; Mar 10 2017  9:05PM EST (Author)

## 2018-01-10 ENCOUNTER — ALLSCRIPTS OFFICE VISIT (OUTPATIENT)
Dept: OTHER | Facility: OTHER | Age: 73
End: 2018-01-10

## 2018-01-10 NOTE — PROGRESS NOTES
Discussion/Summary  Social Work-Discussion Summary [facility]: Patient is being seen for a distress screenning assessment  Contacted pt by phone today  Pt seen by Dr Alexandro Cardona on 9 29 16 and completed a Distress Thermometer, scoring 10/10, citing worry, nervousness, fear, sadness  Pt states she was very frightened but many of her fears were alleviated after meeting with Dr Alexandro Cardona  She is feeling confident about surgery and will be agreeable to whatever treatment is recommended  She has friends who have had mastectomies who she can call on for support  Family also supportive  Pt interested in support group after surgery  MSW will contact pt with info at that time  Signatures   Electronically signed by :  RONDA Olivares; Oct  4 2016  4:12PM EST                       (Author)

## 2018-01-11 NOTE — RESULT NOTES
Verified Results  (1) URINALYSIS w URINE C/S REFLEX (will reflex a microscopy if leukocytes, occult blood, or nitrites are not within normal limits) 85HPY0127 12:00AM Gomez Lindsey     Test Name Result Flag Reference   COLOR YELLOW  YELLOW   APPEARANCE CLEAR  CLEAR   SPECIFIC GRAVITY 1 026  1 001-1 035   PH 6 0  5 0-8 0   GLUCOSE NEGATIVE  NEGATIVE   BILIRUBIN NEGATIVE  NEGATIVE   KETONES NEGATIVE  NEGATIVE   OCCULT BLOOD 3+ A NEGATIVE   PROTEIN 1+ A NEGATIVE   NITRITE NEGATIVE  NEGATIVE   LEUKOCYTE ESTERASE TRACE A NEGATIVE   WBC 6-10 /HPF A < OR = 5   RBC 40-60 /HPF A < OR = 2   SQUAMOUS EPITHELIAL CELLS 10-20 /HPF A < OR = 5   BACTERIA NONE SEEN /HPF  NONE SEEN   HYALINE CAST NONE SEEN /LPF  NONE SEEN   COMMENTS MANY MUCOUS THREADS     REFLEXIVE URINE CULTURE      CULTURE INDICATED - RESULTS TO FOLLOW     REFLEXIVE URINE CULTURE [id number] 12:00AM Gomez Lindsey     Test Name Result Flag Reference   CULTURE, URINE, ROUTINE      CULTURE, URINE, ROUTINE         MICRO NUMBER:      [id number]    TEST STATUS:       FINAL    SPECIMEN SOURCE:   URINE    SPECIMEN QUALITY:  ADEQUATE    RESULT:            No Growth       Discussion/Summary    Urine culture negative   No infection, no need for more antibiotic     pt aware

## 2018-01-12 VITALS
DIASTOLIC BLOOD PRESSURE: 74 MMHG | HEIGHT: 61 IN | WEIGHT: 170 LBS | RESPIRATION RATE: 14 BRPM | SYSTOLIC BLOOD PRESSURE: 118 MMHG | OXYGEN SATURATION: 97 % | BODY MASS INDEX: 32.1 KG/M2 | TEMPERATURE: 98 F | HEART RATE: 80 BPM

## 2018-01-12 VITALS
SYSTOLIC BLOOD PRESSURE: 120 MMHG | RESPIRATION RATE: 15 BRPM | DIASTOLIC BLOOD PRESSURE: 78 MMHG | HEIGHT: 61 IN | TEMPERATURE: 98.5 F | WEIGHT: 169 LBS | OXYGEN SATURATION: 95 % | BODY MASS INDEX: 31.91 KG/M2 | HEART RATE: 110 BPM

## 2018-01-12 VITALS
TEMPERATURE: 97.5 F | DIASTOLIC BLOOD PRESSURE: 68 MMHG | BODY MASS INDEX: 33.79 KG/M2 | OXYGEN SATURATION: 98 % | HEART RATE: 92 BPM | HEIGHT: 61 IN | SYSTOLIC BLOOD PRESSURE: 128 MMHG | WEIGHT: 179 LBS | RESPIRATION RATE: 14 BRPM

## 2018-01-12 VITALS
HEART RATE: 79 BPM | TEMPERATURE: 97.7 F | SYSTOLIC BLOOD PRESSURE: 148 MMHG | DIASTOLIC BLOOD PRESSURE: 76 MMHG | RESPIRATION RATE: 16 BRPM | HEIGHT: 61 IN | BODY MASS INDEX: 32.17 KG/M2 | WEIGHT: 170.38 LBS | OXYGEN SATURATION: 97 %

## 2018-01-12 VITALS
BODY MASS INDEX: 33.99 KG/M2 | HEIGHT: 61 IN | WEIGHT: 180 LBS | SYSTOLIC BLOOD PRESSURE: 110 MMHG | DIASTOLIC BLOOD PRESSURE: 62 MMHG | HEART RATE: 74 BPM

## 2018-01-12 NOTE — MISCELLANEOUS
Message  Spoke with RBC nurse, Tonya Khoury, pt scheduled for US of chest with Dr Karen Green on 12/20/16 at 11:30 am  Informed pt and she understands  She is familiar with the RBC location  Dr Ernestine De La O has placed order  Active Problems    1  Aftercare following surgery of the musculoskeletal system (V58 78) (Z47 89)   2  Arthritis of hand, degenerative (715 94) (M19 049)   3  Atrial fibrillation (427 31) (I48 91)   4  Basal cell carcinoma of skin (173 91) (C44 91)   5  Benign essential hypertension (401 1) (I10)   6  Breast mass, right (611 72) (N63)   7  Cardiomyopathy (425 4) (I42 9)   8  Cervical spinal stenosis (723 0) (M48 02)   9  Dyspnea on exertion (786 09) (R06 09)   10  Edema (782 3) (R60 9)   11  Encounter for screening colonoscopy (V76 51) (Z12 11)   12  Encounter for screening mammogram for malignant neoplasm of breast (V76 12)    (Z12 31)   13  Encounter for special screening examination for genitourinary disorder (V81 6) (Z13 89)   14  Encounter for special screening examination for nervous system disorder (V80 09)    (Z13 858)   15  Finger pain, right (729 5) (M79 644)   16  History of diverticulitis of colon (V12 79) (Z87 19)   17  Homocysteinemia (270 4) (E72 11)   18  Hypokalemia (276 8) (E87 6)   19  Hypothyroidism (244 9) (E03 9)   20  Intraductal Papilloma Of The Breast (217)   21  Labyrinthitis, unspecified laterality (386 30) (H83 09)   22  Malignant neoplasm of right female breast, unspecified site of breast (174 9) (C50 911)   23  Obesity (278 00) (E66 9)   24  Osteopenia (733 90) (M85 80)   25  Overactive bladder (596 51) (N32 81)   26  Preoperative clearance (V72 84) (Z01 818)   27  Pulmonary nodule seen on imaging study (793 11) (R91 1)   28  Sesamoid pain (733 90) (M89 9)   29  History of Syncopal vertigo (780 2,780 4) (R55,R42)   30  Visit for routine gyn exam (V72 31) (Z01 419)   31  Vitamin D deficiency (268 9) (E55 9)    Current Meds   1  Aleve CAPS;    Therapy: (Recorded:21Nov2016) to Recorded   2  Eliquis 5 MG Oral Tablet; take 1 tablet every twelve hours  Requested for: 17Frp5047;   Last Rx:40Kdd5545 Ordered   3  Furosemide 40 MG Oral Tablet; take 1 tablet by mouth daily as directed; Therapy: 64GME1418 to (Evaluate:29Jan2017)  Requested for: 31Oct2016; Last   Rx:31Oct2016 Ordered   4  Levothyroxine Sodium 150 MCG Oral Tablet; TAKE 1 TABLET DAILY EXCEPT FOR ONE   DAY A WEEK TAKE 1/2 ;   Therapy: 02FUT9226 to (Evaluate:09Oct2017)  Requested for: 93RLE8415 Recorded   5  Meclizine HCl - 25 MG Oral Tablet; TAKE 1 TABLET BY MOUTH EVERY 8 HOURS as   needed for dizziness; Therapy: 71BUL1963 to (Evaluate:14Oct2016)  Requested for: 09Vpt6128; Last   Rx:50Upt6788 Ordered   6  Metoprolol Tartrate 50 MG Oral Tablet; TAKE 1 TABLET TWICE DAILY; Therapy: (Recorded:01Jun2016) to Recorded   7  Potassium Chloride ER 10 MEQ Oral Capsule Extended Release; TAKE 1 CAPSULE   DAILY; Therapy: 55SOT6071 to (Evaluate:11Aug2017)  Requested for: 97Ojz5965; Last   Rx:25Nzm9921 Ordered   8  Prochlorperazine Maleate 10 MG Oral Tablet; TAKE 1 TABLET EVERY 6 HOURS AS   NEEDED FOR NAUSEA; Therapy: 63KGR4000 to (Evaluate:01Mar2017)  Requested for: 60TIT8539; Last   Rx:84Giy4066 Ordered   9  Vitamin B-12 500 MCG Oral Tablet; TAKE 1 TABLET DAILY; Therapy: 68XIR6593 to Recorded    Allergies    1  Morphine Derivatives    Signatures   Electronically signed by :  Thelma Zapien, ; Dec  9 2016 11:59AM EST                       (Author)

## 2018-01-13 VITALS
RESPIRATION RATE: 18 BRPM | HEART RATE: 105 BPM | WEIGHT: 165 LBS | SYSTOLIC BLOOD PRESSURE: 108 MMHG | TEMPERATURE: 101.2 F | DIASTOLIC BLOOD PRESSURE: 68 MMHG | BODY MASS INDEX: 31.15 KG/M2 | HEIGHT: 61 IN | OXYGEN SATURATION: 98 %

## 2018-01-13 VITALS
DIASTOLIC BLOOD PRESSURE: 80 MMHG | HEIGHT: 61 IN | HEART RATE: 72 BPM | SYSTOLIC BLOOD PRESSURE: 124 MMHG | WEIGHT: 174.2 LBS | BODY MASS INDEX: 32.89 KG/M2 | TEMPERATURE: 97.9 F

## 2018-01-13 VITALS
TEMPERATURE: 97.8 F | HEIGHT: 61 IN | RESPIRATION RATE: 15 BRPM | WEIGHT: 174 LBS | BODY MASS INDEX: 32.85 KG/M2 | DIASTOLIC BLOOD PRESSURE: 78 MMHG | OXYGEN SATURATION: 98 % | HEART RATE: 77 BPM | SYSTOLIC BLOOD PRESSURE: 118 MMHG

## 2018-01-13 VITALS
SYSTOLIC BLOOD PRESSURE: 134 MMHG | DIASTOLIC BLOOD PRESSURE: 80 MMHG | HEART RATE: 80 BPM | WEIGHT: 165 LBS | BODY MASS INDEX: 31.15 KG/M2 | HEIGHT: 61 IN | TEMPERATURE: 98.5 F

## 2018-01-13 VITALS
OXYGEN SATURATION: 98 % | TEMPERATURE: 98.5 F | HEIGHT: 61 IN | SYSTOLIC BLOOD PRESSURE: 128 MMHG | DIASTOLIC BLOOD PRESSURE: 84 MMHG | BODY MASS INDEX: 34.36 KG/M2 | RESPIRATION RATE: 15 BRPM | HEART RATE: 88 BPM | WEIGHT: 182 LBS

## 2018-01-13 VITALS
SYSTOLIC BLOOD PRESSURE: 110 MMHG | DIASTOLIC BLOOD PRESSURE: 60 MMHG | WEIGHT: 175.38 LBS | HEART RATE: 63 BPM | BODY MASS INDEX: 33.14 KG/M2

## 2018-01-13 VITALS
HEIGHT: 61 IN | WEIGHT: 181 LBS | SYSTOLIC BLOOD PRESSURE: 102 MMHG | TEMPERATURE: 98.6 F | HEART RATE: 68 BPM | DIASTOLIC BLOOD PRESSURE: 60 MMHG | BODY MASS INDEX: 34.17 KG/M2

## 2018-01-13 VITALS
TEMPERATURE: 98.1 F | WEIGHT: 170 LBS | SYSTOLIC BLOOD PRESSURE: 110 MMHG | HEIGHT: 61 IN | OXYGEN SATURATION: 98 % | DIASTOLIC BLOOD PRESSURE: 78 MMHG | HEART RATE: 78 BPM | RESPIRATION RATE: 13 BRPM | BODY MASS INDEX: 32.1 KG/M2

## 2018-01-13 NOTE — RESULT NOTES
Discussion/Summary    Excellent cholesterol and thyroid  Patient aware         Verified Results  (1) LIPID PANEL, FASTING 12Dge9632 09:42AM Claudine Rist     Test Name Result Flag Reference   CHOLESTEROL, TOTAL 110 mg/dL  <200   HDL CHOLESTEROL 46 mg/dL L >44   TRIGLICERIDES 69 mg/dL  <651   LDL-CHOLESTEROL 49 mg/dL (calc)     Reference range: <100     Desirable range <100 mg/dL for patients with CHD or  diabetes and <70 mg/dL for diabetic patients with  known heart disease  The Kunal-Centeno calculation is a validated novel   method that provides better accuracy than the   Friedewald equation in the estimation of LDL-C,   particularly when TG levels are 150-400 mg/dL and   LDL-C levels are lower than 70 mg/dL  Reference:  Sidney Pierson  Comparison of a Novel   Method vs the Friedewald Equation for Estimating   Low-Density Lipoprotein Cholesterol Levels From the   Standard Lipid Profile  CHAN  6128;997(66): 0784-6288  For additional information, please refer to  http://[employer]/faq/ZBP331  (This link is being provided for informational/  educational purposes only )   CHOL/HDLC RATIO 2 4 (calc)  <5 0   NON HDL CHOLESTEROL 64 mg/dL (calc)  <130   For patients with diabetes plus 1 major ASCVD risk   factor, treating to a non-HDL-C goal of <100 mg/dL   (LDL-C of <70 mg/dL) is considered a therapeutic   option       (Q) TSH, 3RD GENERATION W/REFLEX TO FT4 33QBM8226 09:42AM Claudine Rist   REPORT COMMENT:  FASTING:YES     Test Name Result Flag Reference   TSH W/REFLEX TO FT4 0 55 mIU/L  0 40-4 50

## 2018-01-13 NOTE — PROGRESS NOTES
Assessment    1  Encounter for preventive health examination (V70 0) (Z00 00)   2  History of atrial fibrillation (V12 59) (Z86 79)   3  Hypothyroidism (244 9) (E03 9)   4  Malignant neoplasm of unspecified site of right female breast (174 9) (C50 911)   5  BPPV (benign paroxysmal positional vertigo) (386 11) (H81 10)   6  Bilateral edema of lower extremity (782 3) (R60 0)    Plan  BPPV (benign paroxysmal positional vertigo)    · *1 -  PHYSICAL THERAPY-[facility]  *Vestibular  Rehabilitation  Status: Active  Requested for: 30Aug2017  Care Summary provided  : Yes  Health Maintenance    · *VB - Fall Risk Assessment  (Dx Z13 89 Screen for Neurologic Disorder); Status:Active; Requested for:30Aug2017;    · *VB - Urinary Incontinence Screen (Dx Z13 89 Screen for UI); Status:Active; Requested  for:30Aug2017;    · Eat a low fat and low cholesterol diet ; Status:Complete;   Done: 55JHX8096   · Stretch and warm up your muscles during the first 10 minutes , then cool down your  muscles for the last 10 minutes of exercise ; Status:Complete;   Done: 67QZZ7037   · The plan of care for falls is detailed in the plan and/or discussion section of today's note ;  Status:Complete;   Done: 50POD6452   · The plan of care for urinary incontinence is detailed in the plan and/or discussion section  of today's note ; Status:Complete;   Done: 68JYA6350   · We recommend that you bring your body mass index down to 26 ; Status:Complete;    Done: 74LRV0205   · We recommend that you create an advance directive ; Status:Complete;   Done:  25UZG5109  Hypothyroidism    · (1) LIPID PANEL, FASTING; Status:Active; Requested for:30Aug2017;    · (1) TSH WITH FT4 REFLEX; Status:Active; Requested for:30Aug2017;     Discussion/Summary  Impression: Subsequent Annual Wellness Visit, with preventive exam as well as age and risk appropriate counseling completed  Cardiovascular screening and counseling: screening is current     Diabetes screening and counseling: screening is current  Colorectal cancer screening and counseling: the risks and benefits of screening were discussed and due for a colonoscopy (low risk)  Breast cancer screening and counseling: screening is current  Cervical cancer screening and counseling: screening is current  Osteoporosis screening and counseling: screening is current  Abdominal aortic aneurysm screening and counseling: screening not indicated  Glaucoma screening and counseling: screening is current  HIV screening and counseling: screening not indicated  Immunizations: the patient declines the influenza vaccination, the patient declines the pneumococcal vaccination, hepatitis B vaccination series is not indicated at this time due to the patient's low risk of christopher the disease, the patient declines the Zostavax vaccine and the patient declines the Td vaccine  Advance Directive Planning: not complete  Advice and education were given regarding fall risk reduction  She was referred to none  Medical Equipment/Suppliers: none  Patient Discussion: plan discussed with the patient, follow-up visit needed in 6 months  Self Referrals: No   Possible side effects of new medications were reviewed with the patient/guardian today  The treatment plan was reviewed with the patient/guardian  The patient/guardian understands and agrees with the treatment plan      Chief Complaint  HM      Advance Directives  Advance Directive [facility]:   YES - Patient has an advance health care directive  Capacity/Competence: This patient has full decision making capacity for discussion of advance care planning and This patient has full decision making competency for discussion of advance care planning  History of Present Illness  HPI: Still some dizziness  using Meclizine twice a day  skin lesion on LLE  Has mammogram coming up   Welcome to [employer] and Wellness Visits:  The patient is being seen for the subsequent annual wellness visit  Medicare Screening and Risk Factors   Hospitalizations: no previous hospitalizations  Once per lifetime medicare screening tests: ECG ~U() and AAA screening US has not yet been done  Medicare Screening Tests Risk Questions   Abdominal aortic aneurysm risk assessment: none indicated  Osteoporosis risk assessment: , female gender and over 48years of age  HIV risk assessment: none indicated  Drug and Alcohol Use: The patient has never smoked cigarettes and has never used smokeless tobacco  The patient reports never drinking alcohol  She has never used illicit drugs  Diet and Physical Activity: Current diet includes well balanced meals, low salt food choices, limited junk food, 1 servings of fruit per day, 1 servings of vegetables per day, 1 servings of meat per day and 1 cups of coffee per day  She exercises infrequently and exercises daily  Exercise: walking  Mood Disorder and Cognitive Impairment Screening: PHQ-9 Depression Scale   Depression screening was done using 15 point geriatric depression scale and score was 0     negative for symptoms  She denies feeling down, depressed, or hopeless over the past two weeks  She denies feeling little interest or pleasure in doing things over the past two weeks  Cognitive impairment screening: denies difficulty learning/retaining new information, denies difficulty handling complex tasks, denies difficulty with reasoning, denies difficulty with spatial ability and orientation, denies difficulty with language and denies difficulty with behavior  Functional Ability/Level of Safety: Hearing is significantly decreased  She denies hearing difficulties  She does not use a hearing aid   The patient is currently able to do activities of daily living without limitations, able to do instrumental activities of daily living without limitations, able to participate in social activities without limitations and able to drive without limitations  Activities of daily living details: does not need help using the phone, no transportation help needed, does not need help shopping, no meal preparation help needed, does not need help doing housework, does not need help doing laundry, does not need help managing medications and does not need help managing money  Injury History: no polypharmacy, no alcohol use, no mobility impairment, no antidepressant use, no deconditioning, no postural hypotension, no sedative use, no visual impairment, no urinary incontinence, no antihypertensive use, no cognitive impairment, up and go test was normal and no previous fall  Home safety risk factors:  no unfamiliar surroundings, no loose rugs, no poor household lighting, no uneven floors, no household clutter, grab bars in the bathroom and handrails on the stairs  Advance Directives: Advance directives: no living will, no durable power of  for health care directives and no advance directives  end of life decisions were not reviewed with the patient  Co-Managers and Medical Equipment/Suppliers: See Patient Care Team       Reviewed Updated [facility]:   Last Medicare Wellness Visit Information was reviewed, patient interviewed and updates made to the record today  Preventive Quality Program 65 and Older: Falls Risk: The patient fell 0 times in the past 12 months  The patient is currently asymptomatic Symptoms Include:  Associated symptoms:  No associated symptoms are reported  The patient currently has no urinary incontinence symptoms  Patient Care Team    Care Team Member Role Specialty Office Number   [id number] [place] [phone]   Kim KRUSE  Specialist Surgical Oncology [phone]   Linden KRUSE  Specialist Hematology Oncology [phone]     Review of Systems    Constitutional: no fever and no malaise  ENT: no nasal congestion  Cardiovascular: negative  Respiratory: no cough     Gastrointestinal: no abdominal pain  Genitourinary: urinary urgency  Over the past 2 weeks, how often have you been bothered by the following problems? 1 ) Little interest or pleasure in doing things? Not at all    2 ) Feeling down, depressed or hopeless? Not at all    3 ) Trouble falling asleep or sleeping too much? Not at all    4 ) Feeling tired or having little energy? Not at all    5 ) Poor appetite or overeating? Not at all    6 ) Feeling bad about yourself, or that you are a failure, or have let yourself or your family down? Not at all    7 ) Trouble concentrating on things, such as reading a newspaper or watching television? Not at all    8 ) Moving or speaking so slowly that other people could have noticed, or the opposite, moving or speaking faster than usual? Not at all  How difficult have these problems made it for you to do your work, take care of things at home, or get along with people? Not at all  Score 0      Active Problems    1  Benign essential hypertension (401 1) (I10)   2  Bilateral edema of lower extremity (782 3) (R60 0)   3  Cardiomyopathy (425 4) (I42 9)   4  Cervical spinal stenosis (723 0) (M48 02)   5  Dyspnea on exertion (786 09) (R06 09)   6  Edema (782 3) (R60 9)   7  Homocysteinemia (270 4) (E72 11)   8  Hypokalemia (276 8) (E87 6)   9  Hypothyroidism (244 9) (E03 9)   10  Labyrinthitis, unspecified laterality (386 30) (H83 09)   11  Lymphedema (457 1) (I89 0)   12  Malignant neoplasm of unspecified site of right female breast (174 9) (C50 911)   13  Nonhealing skin ulcer, limited to breakdown of skin (707 9) (L98 491)   14  Obesity (278 00) (E66 9)   15  Osteopenia (733 90) (M85 80)   16  Overactive bladder (596 51) (N32 81)   17  Peripheral neuropathy (356 9) (G62 9)   18  Pulmonary nodule seen on imaging study (793 11) (R91 1)   19  UTI (urinary tract infection), bacterial (599 0,041 9) (N39 0,A49 9)   20   Vitamin D deficiency (268 9) (E55 9)    Past Medical History    · History of Abnormal chest CT (793 2) (R93 8)   · History of Abnormal glucose (790 29) (R73 09)   · Denied: History of Alcohol abuse   · History of Convalescence after chemotherapy (V66 2) (Z51 89)   · History of Discharge from left nipple (611 79) (N64 52)   · History of Fever, unspecified fever cause (780 60) (R50 9)   · History of atrial fibrillation (V12 59) (Z86 79)   · History of basal cell carcinoma (V10 83) (G33 524)   · History of malignant neoplasm of skin (V10 83) (Z85 828)   · History of pregnancy (V13 29)   · Denied: History of substance abuse   · History of Intraductal Papilloma Of The Breast (217)   · History of Memory loss (780 93) (R41 3)   · History of Menarche (V21 8)   · Denied: History of Mental health problem   · History of Open wound (879 8) (T14 8)   · 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)    The active problems and past medical history were reviewed and updated today  Surgical History    · History of Appendectomy   · History of Biopsy Breast Percutaneous Needle Core   · History of Breast Surgery   · History of Breast Surgery Mastectomy   · History of Catheter Ablation Atrial Fibrillation   · History of Hysterectomy   · History of Laminectomy Decompressive Up To Two Lumbar Segments   · History of Oophorectomy   · History of Nelson Lymph Node Biopsy    The surgical history was reviewed and updated today         Family History  Mother    · Family history of CABG   · Family history of diabetes mellitus (V18 0) (Z83 3)  Son    · Family history of myocardial infarction (V17 3) (Z82 49)   · Family history of Kidney Cancer (V16 51)  Sister    · Family history of diabetes mellitus (V18 0) (Z83 3)  Brother    · Family history of CABG   · Family history of CAD (coronary artery disease) of bypass graft   · Family history of diabetes mellitus (V18 0) (Z83 3)  Family History    · Denied: Family history of Alcohol abuse   · Denied: Family history of substance abuse    The family history was reviewed and updated today  Social History    · Always uses seat belt   · Being A Social Drinker   · Marital History - Currently    · Never smoker   · Retired  The social history was reviewed and updated today  The social history was reviewed and is unchanged  Current Meds   1  Aleve CAPS; Therapy: (Recorded:21Nov2016) to Recorded   2  Furosemide 40 MG Oral Tablet; TAKE 1 TABLET BY MOUTH DAILY IN THE AM AND 1   TAB PO Q PM;   Therapy: 06CRS2178 to (Evaluate:86Pae6200)  Requested for: 72FON1847; Last   Rx:30Jun2017 Ordered   3  Levothyroxine Sodium 150 MCG Oral Tablet; TAKE 1 TABLET DAILY EXCEPT FOR ONE   DAY A WEEK TAKE 1/2 ;   Therapy: 76DBV1015 to (Evaluate:71Fxl3902)  Requested for: 69Gnu3180; Last   Rx:97Zrz0513 Ordered   4  Meclizine HCl - 25 MG Oral Tablet; TAKE 1 TABLET BY MOUTH EVERY 8 HOURS as   needed for dizziness; Therapy: 91GEW3120 to (Evaluate:23Jun2017)  Requested for: 92OQW3534; Last   TQ:64PVC3238 Ordered   5  Metoprolol Tartrate 50 MG Oral Tablet; TAKE 1 TABLET TWICE DAILY; Last Rx:79Nca2986   Ordered   6  MV-One Oral Capsule; take 1 capsule daily; Therapy: 43Ass1145 to (Evaluate:08Oct2017)  Requested for: 91Vnb2618; Last   Rx:74Xuf9904 Ordered   7  Potassium Chloride ER 10 MEQ Oral Capsule Extended Release; TAKE 1 CAPSULE   DAILY; Therapy: 52ZQF2960 to (Evaluate:11Aug2017)  Requested for: 48Yir4492; Last   Rx:80Xxe5447 Ordered   8  Vitamin B-12 500 MCG Oral Tablet; TAKE 1 TABLET DAILY; Therapy: 65EUE1187 to Recorded    The medication list was reviewed and updated today  Allergies    1   Morphine Derivatives    Immunizations   1    Influenza  Temporarily Deferred: Pt refuses, As of: 66TMR7241, Defer for 5 Years    PPSV  Temporarily Deferred: Pt refuses, As of: 92MHA6112, Defer for 10 Years    Tdap  Temporarily Deferred: Pt refuses, As of: 38RDY8109, Defer for 5 Years    Zoster  Temporarily Deferred: Pt refuses, As of: 01TIQ2827, Defer for 10 Years Vitals  Signs    Temperature: 97 3 F, Tympanic  Heart Rate: 76, L Radial  Pulse Quality: Regular, L Radial  Systolic: 539, LUE, Sitting  Diastolic: 80, LUE, Sitting  Height: 5 ft 1 in  Weight: 183 lb   BMI Calculated: 34 58  BSA Calculated: 1 82    Physical Exam    Constitutional   General appearance: No acute distress, well appearing and well nourished  Eyes   Conjunctiva and lids: No swelling, erythema or discharge  Ears, Nose, Mouth, and Throat   External inspection of ears and nose: Normal     Otoscopic examination: Tympanic membranes translucent with normal light reflex  Canals patent without erythema  Hearing: Normal     Neck   Neck: Supple, symmetric, trachea midline, no masses  Thyroid: Normal, no thyromegaly  Pulmonary   Respiratory effort: No increased work of breathing or signs of respiratory distress  Auscultation of lungs: Clear to auscultation  Cardiovascular   Auscultation of heart: Normal rate and rhythm, normal S1 and S2, no murmurs  Examination of extremities for edema and/or varicosities: Abnormal   2+ edema both LE's  Musculoskeletal   Digits and nails: Normal without clubbing or cyanosis  Psychiatric   Judgment and insight: Normal     Orientation to person, place, and time: Normal     Recent and remote memory: Intact  Mood and affect: Normal        Results/Data  PHQ-9 Adult Depression Screening [id number] 09:33AM Amirah Shah     Test Name Result Flag Reference   PHQ-9 Adult Depression Score 0     Over the last two weeks, how often have you been bothered by any of the following problems?   Little interest or pleasure in doing things: Not at all - 0  Feeling down, depressed, or hopeless: Not at all - 0  Trouble falling or staying asleep, or sleeping too much: Not at all - 0  Feeling tired or having little energy: Not at all - 0  Poor appetite or over eating: Not at all - 0  Feeling bad about yourself - or that you are a failure or have let yourself or your family down: Not at all - 0  Trouble concentrating on things, such as reading the newspaper or watching television: Not at all - 0  Moving or speaking so slowly that other people could have noticed  Or the opposite -  being so fidgety or restless that you have been moving around a lot more than usual: Not at all - 0  Thoughts that you would be better off dead, or of hurting yourself in some way: Not at all - 0   PHQ-9 Adult Depression Screening Negative     PHQ-9 Difficulty Level Not difficult at all     PHQ-9 Severity No Depression         Procedure    Procedure: Visual Acuity Test    Indication: routine screening  Inforrmation supplied by a Snellen chart  Results: 20/30 in the right eye without corrective device, 20/40 in the left eye without corrective device      Future Appointments    Date/Time Provider Specialty Site   09/20/2017 10:40 AM VICKY Shepard  Hematology Oncology [facility]   09/07/2017 09:00 AM VICKY Newell   Surgical Oncology [place] [id number] [facility]   02/28/2018 09:40 AM Michelle Pringle MD Family Medicine Pancho Bass MD     Signatures   Electronically signed by : Dimas Mao MD; Aug 30 2017 11:18AM EST                       (Author)

## 2018-01-13 NOTE — MISCELLANEOUS
Message  Received clearance from Dr Linda Machuca concerning stopping her Eliquis  Dr Geraldine Reyes recommendation was to stop the Eliquis 3 - 4 days prior to surgery  Pt shared his office staff called her and told her to take her last dose on Sunday evening, 03/12/17  She understands  Active Problems    1  Abnormal chest CT (793 2) (R93 8)   2  Arthritis of hand, degenerative (715 94) (M19 049)   3  Atrial fibrillation (427 31) (I48 91)   4  Benign essential hypertension (401 1) (I10)   5  Cardiomyopathy (425 4) (I42 9)   6  Cervical spinal stenosis (723 0) (M48 02)   7  Discharge from left nipple (611 79) (N64 52)   8  Dyspnea on exertion (786 09) (R06 09)   9  Edema (782 3) (R60 9)   10  Fever, unspecified fever cause (780 60) (R50 9)   11  Finger pain, right (729 5) (M79 644)   12  Homocysteinemia (270 4) (E72 11)   13  Hypokalemia (276 8) (E87 6)   14  Hypothyroidism (244 9) (E03 9)   15  Intraductal Papilloma Of The Breast (217)   16  Labyrinthitis, unspecified laterality (386 30) (H83 09)   17  Malignant neoplasm of right female breast, unspecified site of breast (174 9) (C50 911)   18  Obesity (278 00) (E66 9)   19  Osteopenia (733 90) (M85 80)   20  Overactive bladder (596 51) (N32 81)   21  Pulmonary nodule seen on imaging study (793 11) (R91 1)   22  UTI (urinary tract infection), bacterial (599 0,041 9) (N39 0,A49 9)   23  Vitamin D deficiency (268 9) (E55 9)    Current Meds   1  Aleve CAPS; Therapy: (Recorded:21Nov2016) to Recorded   2  Claritin 10 MG Oral Tablet; TAKE 1 TABLET AT BEDTIME; Therapy: 86Dgd9246 to Recorded   3  Dexamethasone 4 MG Oral Tablet; TAKE 1 TABLET TWICE DAILY  START DAY BEFORE   CHEMO AND ONE DAY AFTER CHEMO-2 DAYS TOTAL; Therapy: 39ZXE7858 to (Evaluate:23Jan2017)  Requested for: 66QEA2935; Last   Rx:90Bsz5984 Ordered   4  Eliquis 5 MG Oral Tablet; take 1 tablet every twelve hours  Requested for: 57Mmy3012;   Last Rx:44Lpa0482 Ordered   5   Furosemide 40 MG Oral Tablet; take 1 tablet by mouth daily as directed; Therapy: 62MST9660 to (Evaluate:29Jan2017)  Requested for: 31Oct2016; Last   Rx:31Oct2016 Ordered   6  Hydrocortisone 2 5 % External Cream; APPLY SPARINGLY TO AFFECTED AREA(S) 2   TO 3 TIMES DAILY; Therapy: 37GKC0883 to (Evaluate:14Apr2017)  Requested for: 47AYZ8522; Last   Rx:58Eyk7247 Ordered   7  Levothyroxine Sodium 150 MCG Oral Tablet; TAKE 1 TABLET DAILY EXCEPT FOR ONE   DAY A WEEK TAKE 1/2 ;   Therapy: 98EKR3331 to (Evaluate:09Oct2017)  Requested for: 10MCQ1452 Recorded   8  Meclizine HCl - 25 MG Oral Tablet; TAKE 1 TABLET BY MOUTH EVERY 8 HOURS as   needed for dizziness; Therapy: 12JVT5739 to (Evaluate:14Oct2016)  Requested for: 83Juv0064; Last   Rx:45Uwk9411 Ordered   9  Medrol 4 MG Oral Tablet Therapy Pack (MethylPREDNISolone); Use as directed per   pack; Therapy: 44OPP1491 to (Last Rx:12Jan2017)  Requested for: 12Jan2017 Ordered   10  Metoprolol Tartrate 50 MG Oral Tablet; TAKE 1 TABLET TWICE DAILY; Therapy: (Recorded:01Jun2016) to Recorded   11  Potassium Chloride ER 10 MEQ Oral Capsule Extended Release; TAKE 1 CAPSULE    DAILY; Therapy: 09UWW9671 to (Evaluate:11Aug2017)  Requested for: 15Sxc4077; Last    Rx:51Kpy1629 Ordered   12  Vitamin B-12 500 MCG Oral Tablet; TAKE 1 TABLET DAILY; Therapy: 52LDR8528 to Recorded    Allergies    1  Morphine Derivatives    Signatures   Electronically signed by :  Kristie Regan, ; Mar 10 2017 11:41AM EST                       (Author)

## 2018-01-14 VITALS
HEIGHT: 61 IN | RESPIRATION RATE: 13 BRPM | SYSTOLIC BLOOD PRESSURE: 110 MMHG | HEART RATE: 67 BPM | DIASTOLIC BLOOD PRESSURE: 58 MMHG | BODY MASS INDEX: 34.17 KG/M2 | TEMPERATURE: 98.7 F | WEIGHT: 181 LBS | OXYGEN SATURATION: 99 %

## 2018-01-14 VITALS
SYSTOLIC BLOOD PRESSURE: 110 MMHG | HEIGHT: 61 IN | OXYGEN SATURATION: 98 % | RESPIRATION RATE: 14 BRPM | DIASTOLIC BLOOD PRESSURE: 62 MMHG | BODY MASS INDEX: 33.42 KG/M2 | HEART RATE: 79 BPM | WEIGHT: 177 LBS | TEMPERATURE: 98.3 F

## 2018-01-14 NOTE — PROGRESS NOTES
Chief Complaint  I spoke with Shannon today  We reviewed her Treatment Summary and Survivorship Care Plan  This information was discussed with her in detail  She was mailed a packet of information including her treatment summary, care plan and education information  A copy was faxed to Dr Donya Faria as well  She was advised to review the information with her physicians and family members  She was also advised to contact myself or Dr Chuckie Agrawal with any additional information, questions, comments or concerns  Her packet was signed and a copy was scanned into her chart  She verbalized understanding  Active Problems    1  Atrial fibrillation (427 31) (I48 91)   2  Benign essential hypertension (401 1) (I10)   3  Cardiomyopathy (425 4) (I42 9)   4  Cervical spinal stenosis (723 0) (M48 02)   5  Dyspnea on exertion (786 09) (R06 09)   6  Edema (782 3) (R60 9)   7  Fever, unspecified fever cause (780 60) (R50 9)   8  Homocysteinemia (270 4) (E72 11)   9  Hypokalemia (276 8) (E87 6)   10  Hypothyroidism (244 9) (E03 9)   11  Labyrinthitis, unspecified laterality (386 30) (H83 09)   12  Malignant neoplasm of right female breast, unspecified site of breast (174 9) (C50 911)   13  Memory loss (780 93) (R41 3)   14  Obesity (278 00) (E66 9)   15  Open wound (879 8) (T14 8)   16  Osteopenia (733 90) (M85 80)   17  Overactive bladder (596 51) (N32 81)   18  Peripheral neuropathy (356 9) (G62 9)   19  Pulmonary nodule seen on imaging study (793 11) (R91 1)   20  UTI (urinary tract infection), bacterial (599 0,041 9) (N39 0,A49 9)   21  Vitamin D deficiency (268 9) (E55 9)    Current Meds   1  Aleve CAPS; Therapy: (Recorded:21Nov2016) to Recorded   2  Claritin 10 MG Oral Tablet; TAKE 1 TABLET AT BEDTIME; Therapy: 29Dec2016 to Recorded   3  Dexamethasone 4 MG Oral Tablet; TAKE 1 TABLET TWICE DAILY  START DAY BEFORE   CHEMO AND ONE DAY AFTER CHEMO-2 DAYS TOTAL;    Therapy: 02SAM7608 to (Evaluate:23Jan2017)  Requested for: 83TZT0954; Last   Rx:57Ifw1499 Ordered   4  Eliquis 5 MG Oral Tablet; take 1 tablet every twelve hours  Requested for: 49Jre3946;   Last Rx:13Jga3364 Ordered   5  Furosemide 40 MG Oral Tablet; take 1 tablet by mouth daily as directed; Therapy: 97LOS8780 to (Evaluate:29Jan2017)  Requested for: 31Oct2016; Last   Rx:09Fly0348 Ordered   6  Hydrocortisone 2 5 % External Cream; APPLY SPARINGLY TO AFFECTED AREA(S) 2   TO 3 TIMES DAILY; Therapy: 06SMJ2414 to (Evaluate:14Apr2017)  Requested for: 26NFW5040; Last   Rx:41Mmb1999 Ordered   7  Levothyroxine Sodium 150 MCG Oral Tablet; TAKE 1 TABLET DAILY EXCEPT FOR ONE   DAY A WEEK TAKE 1/2 ;   Therapy: 41HCX5327 to (Evaluate:09Oct2017)  Requested for: 83WMW3193 Recorded   8  Meclizine HCl - 25 MG Oral Tablet; TAKE 1 TABLET BY MOUTH EVERY 8 HOURS as   needed for dizziness; Therapy: 18JQY0614 to (Evaluate:14Oct2016)  Requested for: 85Pko7686; Last   Rx:51Apf3827 Ordered   9  Medrol 4 MG Oral Tablet Therapy Pack (MethylPREDNISolone); Use as directed per   pack; Therapy: 32AXV9808 to (Last Rx:12Jan2017)  Requested for: 12Jan2017 Ordered   10  Metoprolol Tartrate 50 MG Oral Tablet; TAKE 1 TABLET TWICE DAILY; Therapy: (Recorded:01Jun2016) to Recorded   11  MV-One Oral Capsule; take 1 capsule daily; Therapy: 93Ncb0329 to (Evaluate:08Oct2017)  Requested for: 98Jdx5031; Last    Rx:20Miu6097 Ordered   12  Potassium Chloride ER 10 MEQ Oral Capsule Extended Release; TAKE 1 CAPSULE    DAILY; Therapy: 37AMK3671 to (Evaluate:11Aug2017)  Requested for: 50Gxg9538; Last    Rx:30Chx0264 Ordered   13  Vitamin B-12 500 MCG Oral Tablet; TAKE 1 TABLET DAILY; Therapy: 35JYM2771 to Recorded    Allergies    1  Morphine Derivatives    Future Appointments    Date/Time Provider Specialty Site   06/14/2017 10:30 AM VICKY Santo  Hematology Oncology [facility]   04/20/2017 11:30 AM VICKY Rene   Surgical Oncology [address] [facility]   05/30/2017 09:20 AM Ramon Nuñez MD     Signatures   Electronically signed by : Wayne Bernard RN; Apr 11 2017  4:32PM EST                       (Author)

## 2018-01-14 NOTE — MISCELLANEOUS
Message  Contacted pt for post operative follow up  Pt shared she is feeling well  She denies any pain at surgical site  Her incision line is dry and intact  Her MONA drains are functioning well  She declined any questions or concerns  She has follow up appt scheduled with Dr Jeffrey Dallas  Active Problems    1  Aftercare following surgery of the musculoskeletal system (V58 78) (Z47 89)   2  Arthritis of hand, degenerative (715 94) (M19 049)   3  Atrial fibrillation (427 31) (I48 91)   4  Basal cell carcinoma of skin (173 91) (C44 91)   5  Benign essential hypertension (401 1) (I10)   6  Breast mass, right (611 72) (N63)   7  Cardiomyopathy (425 4) (I42 9)   8  Cervical spinal stenosis (723 0) (M48 02)   9  Dyspnea on exertion (786 09) (R06 09)   10  Edema (782 3) (R60 9)   11  Encounter for screening colonoscopy (V76 51) (Z12 11)   12  Encounter for screening mammogram for malignant neoplasm of breast (V76 12)    (Z12 31)   13  Encounter for special screening examination for genitourinary disorder (V81 6) (Z13 89)   14  Encounter for special screening examination for nervous system disorder (V80 09)    (Z13 858)   15  Finger pain, right (729 5) (M79 644)   16  History of diverticulitis of colon (V12 79) (Z87 19)   17  Homocysteinemia (270 4) (E72 11)   18  Hypokalemia (276 8) (E87 6)   19  Hypothyroidism (244 9) (E03 9)   20  Intraductal Papilloma Of The Breast (217)   21  Labyrinthitis, unspecified laterality (386 30) (H83 09)   22  Malignant neoplasm of right female breast, unspecified site of breast (174 9) (C50 911)   23  Obesity (278 00) (E66 9)   24  Osteopenia (733 90) (M85 80)   25  Overactive bladder (596 51) (N32 81)   26  Preoperative clearance (V72 84) (Z01 818)   27  Pulmonary nodule seen on imaging study (793 11) (R91 1)   28  Sesamoid pain (733 90) (M89 9)   29  History of Syncopal vertigo (780 2,780 4) (R55,R42)   30  Visit for routine gyn exam (V72 31) (Z01 419)   31   Vitamin D deficiency (268 9) (E55 9)    Current Meds   1  Eliquis 5 MG Oral Tablet; take 1 tablet every twelve hours  Requested for: 08Ybu6653;   Last Rx:66Amn5369 Ordered   2  Furosemide 40 MG Oral Tablet; Take 1 tablet daily; Therapy: 29NDE3347 to (Evaluate:29Sep2016)  Requested for: 31OFD9080; Last   Rx:01Jun2016 Ordered   3  Levothyroxine Sodium 150 MCG Oral Tablet; TAKE 1 TABLET DAILY EXCEPT FOR ONE   DAY A WEEK TAKE 1/2 ;   Therapy: 27TVJ8268 to (Alie Quiroz)  Requested for: 75WOF6350 Recorded   4  Meclizine HCl - 25 MG Oral Tablet; TAKE 1 TABLET BY MOUTH EVERY 8 HOURS as   needed for dizziness; Therapy: 32QHV9363 to (Evaluate:14Oct2016)  Requested for: 76Vif1889; Last   Rx:21Qtz7808 Ordered   5  Metoprolol Tartrate 50 MG Oral Tablet; TAKE 1 TABLET TWICE DAILY; Therapy: (Recorded:01Jun2016) to Recorded   6  Oxycodone-Acetaminophen 5-325 MG Oral Tablet (Percocet); TAKE 1 TO 2 TABLETS   EVERY 4 HOURS AS NEEDED FOR PAIN;   Therapy: 60Rfo6271 to (Last Rx:29Sep2016) Ordered   7  Potassium Chloride ER 10 MEQ Oral Capsule Extended Release; TAKE 1 CAPSULE   DAILY; Therapy: 80AWU8572 to (Evaluate:11Aug2017)  Requested for: 26Bck6278; Last   Rx:20Npc8320 Ordered   8  Vitamin B-12 500 MCG Oral Tablet; TAKE 1 TABLET DAILY; Therapy: 68GLR7780 to Recorded    Allergies    1  Morphine Derivatives    Signatures   Electronically signed by :  Christian Blake, ; Oct 31 2016  1:34PM EST                       (Author)

## 2018-01-15 VITALS
BODY MASS INDEX: 32.28 KG/M2 | DIASTOLIC BLOOD PRESSURE: 70 MMHG | WEIGHT: 171 LBS | SYSTOLIC BLOOD PRESSURE: 110 MMHG | HEART RATE: 76 BPM | HEIGHT: 61 IN | TEMPERATURE: 98.2 F

## 2018-01-15 VITALS
DIASTOLIC BLOOD PRESSURE: 72 MMHG | RESPIRATION RATE: 16 BRPM | HEART RATE: 116 BPM | BODY MASS INDEX: 31.06 KG/M2 | TEMPERATURE: 98.3 F | WEIGHT: 164.5 LBS | SYSTOLIC BLOOD PRESSURE: 126 MMHG | HEIGHT: 61 IN | OXYGEN SATURATION: 98 %

## 2018-01-15 NOTE — PROGRESS NOTES
Assessment    1  Abnormal chest CT (793 2) (R93 8)   2  Malignant neoplasm of right female breast, unspecified site of breast (174 9) (C50 911)    Plan  Malignant neoplasm of right female breast, unspecified site of breast    · Follow Up After Surgery Evaluation and Treatment  Follow-up  Status: Complete  Done:  71MUJ1301   Ordered; For: Malignant neoplasm of right female breast, unspecified site of breast; Ordered By: Yesenia Case Performed:  Due: 06Zpn5279; Last Updated By: Nini Cortez; 3/7/2017 9:20:31 AM   · Schedule Surgery Treatment  Procedure  Status: Active  Requested for: 03ECX8778   Ordered; For: Malignant neoplasm of right female breast, unspecified site of breast; Ordered By: Yesenia Case Performed:  Due: 64PWC8434; Last Updated By: Nini Cortez; 3/7/2017 9:21:15 AM    Discussion/Summary  69 yo female s/p right mastectomy and sentinel node biopsy with adjuvant chemotherapy, TC x 3  She has a residual nodule in the right chest wall  I am therefore recommending excision of this area  Given that it was visualized on ultrasound and is not palpable in nature, I am recommending ultrasound guided localization followed by excision  She understands that she will have muscle excised in this area and clips will be placed for any needed radiation therapy  She and her family understand the rationale for this and agree to proceed in this fashion  Consent was signed today in the office  Chief Complaint  Chief Complaint Free Text Note Form: Pt is here to discuss surgery  no new complaints at this time  breast imagin2017 thomas mri (B6)  provider:Dr Nora Alejandra  History of Present Illness  Diagnosis and Staging: Stage IIA IDC right breast  ER/MD/HER2-   Treatment History: 10/28/16 right mastectomy, SLNB  TC x 3 cycles with Dr Chuck Pallas, did not complete 4th cycle secondary to significant side effects      Review of Systems  Complete Female ROS SurgOnc:   Constitutional: feeling tired  Musculoskeletal: swelling and pain in hands-somewhat improved  Active Problems     1  Arthritis of hand, degenerative (715 94) (M19 049)   2  Atrial fibrillation (427 31) (I48 91)   3  Benign essential hypertension (401 1) (I10)   4  Cardiomyopathy (425 4) (I42 9)   5  Cervical spinal stenosis (723 0) (M48 02)   6  Discharge from left nipple (611 79) (N64 52)   7  Dyspnea on exertion (786 09) (R06 09)   8  Edema (782 3) (R60 9)   9  Fever, unspecified fever cause (780 60) (R50 9)   10  Finger pain, right (729 5) (M79 644)   11  Homocysteinemia (270 4) (E72 11)   12  Hypokalemia (276 8) (E87 6)   13  Hypothyroidism (244 9) (E03 9)   14  Intraductal Papilloma Of The Breast (217)   15  Labyrinthitis, unspecified laterality (386 30) (H83 09)   16  Malignant neoplasm of right female breast, unspecified site of breast (174 9) (C50 911)   17  Obesity (278 00) (E66 9)   18  Osteopenia (733 90) (M85 80)   19  Overactive bladder (596 51) (N32 81)   20  Pulmonary nodule seen on imaging study (793 11) (R91 1)   21  UTI (urinary tract infection), bacterial (599 0,041 9) (N39 0,A49 9)   22  Vitamin D deficiency (268 9) (E55 9)    Abnormal chest CT (793 2) (R93 8)          Past Medical History    1  History of Abnormal glucose (790 29) (R73 09)   2  History of Acute Bronchitis With Bronchospasm (466 0)   3  History of basal cell carcinoma (V10 83) (Z85 828)   4  History of malignant neoplasm of skin (V10 83) (Z85 828)   5  History of pregnancy (V13 29)   6  History of Menarche (V21 8)   7  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 left breast  09/12/16 right breast X 3   3  History of Breast Surgery Mastectomy   · 10/28/16   4  History of Catheter Ablation Atrial Fibrillation   5  History of Hysterectomy   6  History of Laminectomy Decompressive Up To Two Lumbar Segments   7   History of Oophorectomy   · 1993 8  History of [place] Lymph Node Biopsy   · 10/28/16  Surgical History Reviewed: The surgical history was reviewed and updated today  Family History  Mother    1  Family history of CABG   2  Family history of diabetes mellitus (V18 0) (Z83 3)  Son    3  Family history of myocardial infarction (V17 3) (Z82 49)   4  Family history of Kidney Cancer (V16 51)  Sister    5  Family history of diabetes mellitus (V18 0) (Z83 3)  Brother    10  Family history of CABG   7  Family history of CAD (coronary artery disease) of bypass graft   8  Family history of diabetes mellitus (V18 0) (Z83 3)  Family History Reviewed: The family history was reviewed and updated today  Social History    · Being A Social Drinker   · Marital History - Currently    · Non-smoker (V49 89) (Z78 9)   · Retired  Social History Reviewed: The social history was reviewed and updated today  The social history was reviewed and is unchanged  Current Meds   1  Aleve CAPS; Therapy: (Recorded:21Nov2016) to Recorded   2  Claritin 10 MG Oral Tablet; TAKE 1 TABLET AT BEDTIME; Therapy: 87Dge4956 to Recorded   3  Dexamethasone 4 MG Oral Tablet; TAKE 1 TABLET TWICE DAILY  START DAY BEFORE   CHEMO AND ONE DAY AFTER CHEMO-2 DAYS TOTAL; Therapy: 02ZWJ9781 to (Evaluate:23Jan2017)  Requested for: 55DXN5967; Last   Rx:50Twp1525 Ordered   4  Eliquis 5 MG Oral Tablet; take 1 tablet every twelve hours  Requested for: 26Iqv2746;   Last Rx:77Nen6843 Ordered   5  Furosemide 40 MG Oral Tablet; take 1 tablet by mouth daily as directed; Therapy: 81ORY3319 to (Evaluate:29Jan2017)  Requested for: 31Oct2016; Last   Rx:31Oct2016 Ordered   6  Hydrocortisone 2 5 % External Cream; APPLY SPARINGLY TO AFFECTED AREA(S) 2 TO   3 TIMES DAILY; Therapy: 34MNC3165 to (Evaluate:14Apr2017)  Requested for: 21YGL6371; Last   Rx:59Fxw5279 Ordered   7   Levothyroxine Sodium 150 MCG Oral Tablet; TAKE 1 TABLET DAILY EXCEPT FOR ONE   DAY A WEEK TAKE 1/2 ;   Therapy: 78VKR5920 to (Evaluate:09Oct2017)  Requested for: 14Oct2016 Recorded   8  Meclizine HCl - 25 MG Oral Tablet; TAKE 1 TABLET BY MOUTH EVERY 8 HOURS as   needed for dizziness; Therapy: 81QFW1391 to (Evaluate:14Oct2016)  Requested for: 72Hfj3588; Last   Rx:21Zdc6162 Ordered   9  Medrol 4 MG Oral Tablet Therapy Pack; Use as directed per pack; Therapy: 47YEI3212 to (Last Rx:12Jan2017)  Requested for: 12Jan2017 Ordered   10  Metoprolol Tartrate 50 MG Oral Tablet; TAKE 1 TABLET TWICE DAILY; Therapy: (Recorded:01Jun2016) to Recorded   11  Potassium Chloride ER 10 MEQ Oral Capsule Extended Release; TAKE 1 CAPSULE    DAILY; Therapy: 51JSU8941 to (Evaluate:11Aug2017)  Requested for: 59Wfw8314; Last    Rx:67Poh4709 Ordered   12  Vitamin B-12 500 MCG Oral Tablet; TAKE 1 TABLET DAILY; Therapy: 33VWD1524 to Recorded  Medication List Reviewed: The medication list was reviewed and updated today  Allergies    1  Morphine Derivatives    Vitals  Vital Signs    Recorded: 63GUF2483 08:50AM   Temperature 97 4 F   Heart Rate 89   Respiration 14   Systolic 646   Diastolic 74   Height 5 ft 1 in   Weight 178 lb    BMI Calculated 33 63   BSA Calculated 1 79   O2 Saturation 98     Physical Exam    Constitutional: General appearance: The Patient is well-developed, well-nourished female who appears her stated age in no acute distress  She is pleasant and talkative  Chest: The lungs are clear to auscultation  Cardiac: Heart is regular rate  Extremities: decreased swelling in hands, less ryan in color  Neuro: Orientation to person, place and time: Normal   Mood and affect: Normal        Results/Data  Diagnostic Studies Reviewed Surg Onc:   MRI Review 2/23/17 bilateral breast MRI left side is benign; right chest wall with 8mm nodule that correlates with chest CT and ultrasound  Future Appointments    Date/Time Provider Specialty Site   06/14/2017 10:30 AM VICKY Gonzales   Hematology Oncology [facility] ONCOLOGY   03/16/2017 03:00 PM VICKY Cardenas  Surgical Oncology [facility] INPATIENT   03/30/2017 02:00 PM VICKY Cardenas  Surgical Oncology [facility]   03/14/2017 01:20 PM VICKY Rojas  Cardiology [facility] [place]   05/30/2017 09:20 AM Renée Wheat MD Family Medicine Preethi Heath MD     End of Encounter Meds    1  Eliquis 5 MG Oral Tablet; take 1 tablet every twelve hours  Requested for: 75Clc3477;   Last Rx:04Hjc8529 Ordered   2  Metoprolol Tartrate 50 MG Oral Tablet; TAKE 1 TABLET TWICE DAILY; Therapy: (Recorded:01Jun2016) to Recorded    3  Furosemide 40 MG Oral Tablet; take 1 tablet by mouth daily as directed; Therapy: 72RDN7526 to (Evaluate:29Jan2017)  Requested for: 31Oct2016; Last   Rx:31Oct2016 Ordered    4  Potassium Chloride ER 10 MEQ Oral Capsule Extended Release; TAKE 1 CAPSULE   DAILY; Therapy: 96HDT9333 to (Evaluate:11Aug2017)  Requested for: 98Nmd9852; Last   Rx:68Nic3614 Ordered    5  Vitamin B-12 500 MCG Oral Tablet; TAKE 1 TABLET DAILY; Therapy: 28SIU8772 to Recorded    6  Levothyroxine Sodium 150 MCG Oral Tablet; TAKE 1 TABLET DAILY EXCEPT FOR ONE   DAY A WEEK TAKE 1/2 ;   Therapy: 31YRQ3528 to (Evaluate:09Oct2017)  Requested for: 04RKS9179 Recorded    7  Meclizine HCl - 25 MG Oral Tablet; TAKE 1 TABLET BY MOUTH EVERY 8 HOURS as   needed for dizziness; Therapy: 19VWH1586 to (Evaluate:14Oct2016)  Requested for: 09Cqi4756; Last   Rx:98Zkl7379 Ordered    8  Dexamethasone 4 MG Oral Tablet; TAKE 1 TABLET TWICE DAILY  START DAY BEFORE   CHEMO AND ONE DAY AFTER CHEMO-2 DAYS TOTAL; Therapy: 44UQB2013 to (Evaluate:23Jan2017)  Requested for: 46HQC3112; Last   Rx:48Oqg1391 Ordered    9  Hydrocortisone 2 5 % External Cream; APPLY SPARINGLY TO AFFECTED AREA(S) 2 TO   3 TIMES DAILY; Therapy: 15DID5949 to (Evaluate:14Apr2017)  Requested for: 11QMC8720; Last   Rx:55Rsv4347 Ordered   10   Medrol 4 MG Oral Tablet Therapy Pack (MethylPREDNISolone); Use as directed per pack; Therapy: 32EZA9991 to (Last Rx:12Jan2017)  Requested for: 12Jan2017 Ordered    11  Aleve CAPS; Therapy: (Recorded:21Nov2016) to Recorded   12  Claritin 10 MG Oral Tablet; TAKE 1 TABLET AT BEDTIME;     Therapy: 00HNO7330 to Recorded    Signatures   Electronically signed by : VICKY Stark ; Mar 10 2017  9:14PM EST                       (Author)

## 2018-01-16 NOTE — RESULT NOTES
Verified Results  (1) BASIC METABOLIC PROFILE 43FBC1197 09:31AM Silvano Diaz     Test Name Result Flag Reference   GLUCOSE 97 mg/dL  65-99   Fasting reference interval   UREA NITROGEN (BUN) 16 mg/dL  7-25   CREATININE 0 96 mg/dL H 0 60-0 93   For patients >52years of age, the reference limit  for Creatinine is approximately 13% higher for people  identified as -American  eGFR NON-AFR  AMERICAN 60 mL/min/1 73m2  > OR = 60   eGFR AFRICAN AMERICAN 69 mL/min/1 73m2  > OR = 60   BUN/CREATININE RATIO 17 (calc)  6-22   SODIUM 143 mmol/L  135-146   POTASSIUM 4 2 mmol/L  3 5-5 3   CHLORIDE 110 mmol/L     CARBON DIOXIDE 24 mmol/L  20-31   CALCIUM 9 0 mg/dL  8 6-10 4     (1) MAGNESIUM 77Ogl9504 09:31AM Silvano Diaz     Test Name Result Flag Reference   MAGNESIUM 2 0 mg/dL  1 5-2 5     (Q) TSH, 3RD GENERATION W/REFLEX TO FT4 71Emu2289 09:31AM Silvano Diaz   REPORT COMMENT:  FASTING:YES     Test Name Result Flag Reference   T4, FREE 1 9 ng/dL H 0 8-1 8   TSH W/REFLEX TO FT4 0 02 mIU/L L 0 40-4 50       Discussion/Summary    Thyroid level is too high  Continue same dose but on one day each week take only 1/2 tablet   Patient aware

## 2018-01-16 NOTE — PROCEDURES
Procedures by Low Wilkins MD at 8/12/2016   4:37 PM      Author:  Low Wilkins MD Service:  Electrophysiology Author Type:  Physician     Filed:  8/12/2016  5:17 PM Date of Service:  8/12/2016  4:37 PM Status:  Signed     :  Low Wilkins MD (Physician)                 Atrial Fibrillation        History and physical were reviewed  Patient was examined and history was reviewed  No change in patient's condition Since history and physical has been completed            The pre- operative diagnosis:  Atrial fibrillation           Postoperative diagnosis:  Atrial fibrillation        Procedure:  1  Cryoablation of atrial fibrillation  2 comprehensive electrophysiologic evaluation with left atrial pacing and recording  3  3-D electro-anatomic mapping using NavX system  4  Intracardiac echocardiography  5  Drug infusion and testing for block - Adenosine  6  Single DCCV - 200 J , synchronised             Surgeon: Low Wilkins     Assistants -none     Specimens - none     Estimated blood loss- 30 ml     Findings-none     Complications- none     Anesthesia- general anesthesia, local lidocaine by myself        Details of the procedure        Sheaths used  All access was obtained under ultrasound guidance  A  Right femoral vein- 14 F for cryocath, 9 F for ICE ,7F octapolar  B  Left femoral vein- 5F for RV , 5F for His, 8 F for CS              Catheters used  1  HRA catheters - Octapolar for phrenic pacing and subsequently for atrial pacing  2  His catheter - Lexiid  3  RV catheter - Laith  4  Coronary sinus catheter - Biosense jacob FJ curve  5  Ablation catheter - Cryocath for Cryo ablation,   6  Lasso - Achieve with cryocath  7  ICE - Standard Acuson           Imaging systems used  1  Fluoroscopy    Right anterior oblique views were used whenever we needed to determine between anterior and posterior  Left anterior oblique views were used whenever we needed to determine between right and left  2  Electrotomy mapping - 3D electro-anatomic mapping was done by the Chuck  This was also used whenever catheter was moved  The His bundle was marked  3  ICE -for transeptal and thereafter to look at veins and appendage   Intermittently to look at pericardial space           Anticoagulation:  Heparin was used for anticoagulation to keep ACT in the therapeutic range 350-400ms     Correction at end of ablation :protamine 40              Details of the ablation    The patient has been explained the procedures which we planned  Proper consent has been obtained  She was brought to the electrophysiologic laboratory  Proper time out was done  General anesthesia was done and an arterial line was placed  YANIV was done by Dr Rhodes, and there was no clot in the left atrial appendage  Patient was draped in a sterile fashion        All access obtained as described above  All sheaths placed as described above  Heparin was used as described above, and once ACT was over 350 we went ahead with transseptal        Transeptal   Thereafter we went ahead with transseptal  A BRK1 needle with an SLO sheath was used  Septal puncture was obtained under both fluoroscopy and intracardiac ice guidance  Position was confirmed and then cryosheath was taken to the left side           Cryoablation -sequence  Once we were on the left side, the sequence of ablation were as follows  Left superior pulmonary vein  Left inferior pulmonary vein  Right superior pulmonary vein  Right inferior pulmonary vein           Cryoablation - process   For each of the veins the following sequence was followed  -We went into the vein with Achieve catheter and an anatomy of the same was made on Navx  -Thereafter the CryoCath catheter was positioned, such that when the cryo-balloon was inflated it occluded ostium of the vein  -The occlusion was confirmed by- ventricularization of the venous waveform, intracardiac echo showing absence of venous leak, and at times using contrast venography to confirm the completeness of occlusion  - Thereafter we would come on cryoablation - following the freeze thaw freeze protocol  - Each time we made sure that therapeutic temperature was reached in an appropriate time ( usually -45 to -50 degrees in 60 sec)  - Electrograms were followed so that the pulmonary vein potentials were terminated  - The esophageal temperature was followed closely and never allowed to fall below 32 7°C  - While ablating on the right side, the phrenic nerve was stimulated continuously by a catheter placed in the subclavian vein, and adequate capture of the diaphragm was confirmed throughout the process, to prevent phrenic nerve injury           Cryoablation- details for each vein         Cryoablation - confirmation of block - adenosine use with pacing  - patient still remained in tachycardia post ablation of all 4 veins  Irregular CL  CS activation was distal to proximal  Not suggestive of atrial flutter  Suggestive of PV tachycardia     - DCCV done to have her in sinus rhythm    -Achieve catheter was placed at the ostium  -Each time 18 mg of adenosine was given followed by flush  There was adequate adenosine effect as confirmed by AV block and hypotension  - pacing was done with Achieve catheter at the ostium  - Reconnection noted at LSPV  Repeat freezing done for 3 min and subsequent testing showed inablity to capture LA while pacing from the LSPV even with adenosine flush  -There was no recurrence of pulmonary vein potential, when pacing distal CS, suggesting complete block across the ostium  -both entrance and exit block demonstrated      - Subsequently the 3 other veins were checked for block - LIPV, RSPV, RIPV        Thereafter sheath and catheters were pulled out from the left atrium and heparin infusion was stopped      An EP study was done        Electrophysiologic Study  Done after      Baseline:(ms)  BCL- 1008  AH-93  HV-35  DC-127  QRS- 92  QT-520        Ventricular Stimulation:  Decremental - Wenckebach-490  Extrastimuli:  VA block-600/380  VERP-600/330        Atrial Stimulation:  Decremental - Wenckebach - 350  Extrastimuli :  AVN ERP <600/280  AERP- 600/280  AH jump - not noted                Completion of procedure  The patient's ACT was 350-400  She was given 40 mg of protamine  ACT was 168 when all sheaths were withdrawn-there is adequate hemostasis  Patient was woken up from general anesthesia  She is neurologically intact              Summary of procedure :     1  Successful cryoablation for paroxysmal atrial fibrillation  Block across the pulmonary vein ostium confirmed with adenosine                                                        Received Walter KRUSE    Aug 12 2016  5:15PM [facility] Standard Time

## 2018-01-16 NOTE — RESULT NOTES
Verified Results  (1) CBC/PLT/DIFF 07Apr2016 09:30AM [employer]     Test Name Result Flag Reference   WHITE BLOOD CELL COUNT 4 8 Thousand/uL  3 8-10 8   RED BLOOD CELL COUNT 4 89 Million/uL  3 80-5 10   HEMOGLOBIN 14 4 g/dL  11 7-15 5   HEMATOCRIT 44 1 %  35 0-45 0   MCV 90 3 fL  80 0-100 0   MCH 29 4 pg  27 0-33 0   MCHC 32 6 g/dL  32 0-36 0   RDW 14 1 %  11 0-15 0   PLATELET COUNT 891 Thousand/uL  140-400   MPV 10 3 fL  7 5-11 5   ABSOLUTE NEUTROPHILS 3110 cells/uL  5839-8152   ABSOLUTE LYMPHOCYTES 1186 cells/uL  850-3900   ABSOLUTE MONOCYTES 437 cells/uL  200-950   ABSOLUTE EOSINOPHILS 48 cells/uL     ABSOLUTE BASOPHILS 19 cells/uL  0-200   NEUTROPHILS 64 8 %     LYMPHOCYTES 24 7 %     MONOCYTES 9 1 %     EOSINOPHILS 1 0 %     BASOPHILS 0 4 %       (1) COMPREHENSIVE METABOLIC PANEL [id number] 95:22PC [employer]     Test Name Result Flag Reference   GLUCOSE 87 mg/dL  65-99   Fasting reference interval   UREA NITROGEN (BUN) 16 mg/dL  7-25   CREATININE 1 00 mg/dL H 0 60-0 93   For patients >52years of age, the reference limit  for Creatinine is approximately 13% higher for people  identified as -American  eGFR NON-AFR   AMERICAN 57 mL/min/1 73m2 L > OR = 60   eGFR AFRICAN AMERICAN 66 mL/min/1 73m2  > OR = 60   BUN/CREATININE RATIO 16 (calc)  6-22   SODIUM 141 mmol/L  135-146   POTASSIUM 4 3 mmol/L  3 5-5 3   CHLORIDE 107 mmol/L     CARBON DIOXIDE 26 mmol/L  19-30   CALCIUM 9 0 mg/dL  8 6-10 4   PROTEIN, TOTAL 6 8 g/dL  6 1-8 1   ALBUMIN 3 9 g/dL  3 6-5 1   GLOBULIN 2 9 g/dL (calc)  1 9-3 7   ALBUMIN/GLOBULIN RATIO 1 3 (calc)  1 0-2 5   BILIRUBIN, TOTAL 0 9 mg/dL  0 2-1 2   ALKALINE PHOSPHATASE 78 U/L     AST 19 U/L  10-35   ALT 9 U/L  6-29     (1) LIPID PANEL, FASTING 07Apr2016 09:30AM [employer]     Test Name Result Flag Reference   CHOLESTEROL, TOTAL 132 mg/dL  125-200   HDL CHOLESTEROL 45 mg/dL L > OR = 46   TRIGLICERIDES 65 mg/dL  <429   LDL-CHOLESTEROL 74 mg/dL (calc)  <130   Desirable range <100 mg/dL for patients with CHD or  diabetes and <70 mg/dL for diabetic patients with  known heart disease  CHOL/HDLC RATIO 2 9 (calc)  < OR = 5 0   NON HDL CHOLESTEROL 87 mg/dL (calc)     Target for non-HDL cholesterol is 30 mg/dL higher than   LDL cholesterol target  (Q) TSH, 3RD GENERATION W/REFLEX TO FT4 07Apr2016 09:30AM Naye Tompkins   REPORT COMMENT:  FASTING:YES     Test Name Result Flag Reference   TSH W/REFLEX TO FT4 0 79 mIU/L  0 40-4 50        Pt aware1      1 Amended By: Gilberto Lockett; Apr 08 2016 10:22 AM EST    Discussion/Summary   All labs are excellent   Keep same dose

## 2018-01-16 NOTE — RESULT NOTES
Verified Results  *US BREAST RIGHT LIMITED (DIAGNOSTIC) [id number] 11:15AM Dileep Kuhn Order Number: [id number]    - Patient Instructions: To schedule this appointment, please contact Central Scheduling at [phone]  Test Name Result Flag Reference   US BREAST RIGHT LIMITED (Report)     Patient History:   Patient is postmenopausal, has history of cancer in the right    breast at age 79, and has history of cancer in the right breast    at age 79  No known family history of cancer  Malignant mastectomy of the right breast, October 28, 2016  Malignant US guidance breast biopsy right (ea add) of the right    breast, September 12, 2016  Benign US guided breast biopsy right   of the right breast, September 12, 2016  Mammo Pathology Letter   of the right breast, September 12, 2016  Benign WBUS breast    guidance of the left breast, March 30, 2010  Patient has never smoked  Patient's BMI is 33 8  Patient status post right mastectomy for breast cancer has    received one round of chemotherapy  Patient has intermittent    left nipple discharge  A prior CT of the chest from November 29, 2016 had demonstrated a small nodule in the right chest wall    which appeared to contain a biopsy marker clip  US Breast Right Limited: December 20, 2016 - Check In #: [de-identified]   Technologist: Saleem Barbosa RDMS   The targeted ultrasound of the right chest wall towards the 6:00    location (estimated based on general landmarks in the absence of    any residual breast tissue) demonstrates a small slightly    lobulated solid-appearing hypoechoic nodule which is 11 x 7 x 11    mm  It contains an echogenic focus which appears to represent    the biopsy marker clip  When compared with the prior images from   ultrasound-guided core biopsy procedure, this appears to be the    same finding as was labeled right 6:00 3 cmfn site C   The    biopsy results from this site were invasive breast carcinoma of    no special type      Within the chest wall near the site is a subcutaneous fluid    collection which was not fully imaged on this exam but is most    compatible with postop seroma  Images were obtained of the left breast around the nipple region  At the 12 o'clock position there was a mildly prominent duct    which appeared to contain some debris  Doppler did not    demonstrate any internal vascular tissue  In the adjacent areas    towards the 3:00 and 6:00 and 9:00 regions, there were several    extremely small indeterminate hypoechoic structures which were    felt to probably be complicated cysts or foci of minimal duct    ectasia containing debris  Given the history of nipple discharge   and these indeterminate ultrasound findings, I would suggest a    follow-up MRI of the breast to help determine if there are any    focal lesions which might require tissue sampling  Solid small mass in the right chest wall containing    biopsy marker clip which was known to represent breast cancer    based on the prior biopsy report  This appears very similar to    the prior studies  One of the long axis measurements is 2 mm    greater than on the prior study  The other 2 measurements are    the same  Left-sided mild duct ectasia and indeterminate tiny hypoechoic    foci near the nipple  MRI recommended  US Breast Left Limited: December 20, 2016 - Check In #: [de-identified]   Technologist: Marques Srivastava RDMS     ASSESSMENT: BiRad:6 - Known biopsy proven malignancy (Overall)   Right breast Right Brst US: BiRad:6 - known biopsy proven    malignancy of the right breast    Right breast Right Brst US: BiRad:0 - incomplete: needs    additional imaging evaluation of the left breast    Left breast Left Brst US: BiRad:0 - incomplete: needs additional    imaging evaluation of the left breast      Recommendation:   Breast MRI and clinical management     An MRI is suggested for further assessment of the clinical    finding of left breast nipple discharge  Please note that the    patient receiving chemotherapy could potentially have a false    negative MRI and therefore even if the MRI is normal, she may    require short interval follow-up ultrasound reevaluation to    ensure stability or resolution of the mild duct ectasia and tiny    indeterminate foci seen today  Regarding the right sided lesion,   the patient asked if this would require surgical excision or if    chemotherapy was sufficient for treatment  I told her that I was   unaware of the exact management recommendations and that she    should speak with her oncologist and breast surgeon       Transcription Location: [facility] 98: [id number]     Risk Value(s):   Myriad Table: 12 1%   Signed by:   Santana Rodgers MD   12/20/16

## 2018-01-17 NOTE — PROGRESS NOTES
Chief Complaint  Patient came in for a EKG  Patient had a cardioversion as of last Thursday  She said that she was on Multaq and she was told to stop it on Sat  Patient still continues with the shortness of breath and she is fatigue with lower extremity edema  As per Dr Ericka Peace patient EKG is fine  Active Problems    1  Aftercare following surgery of the musculoskeletal system (V58 78) (Z47 89)   2  Arthritis of hand, degenerative (715 94) (M19 049)   3  Atrial fibrillation (427 31) (I48 91)   4  Basal cell carcinoma of skin (173 91) (C44 91)   5  Cervical spinal stenosis (723 0) (M48 02)   6  Dyspnea on exertion (786 09) (R06 09)   7  Edema (782 3) (R60 9)   8  Encounter for screening colonoscopy (V76 51) (Z12 11)   9  Encounter for screening mammogram for malignant neoplasm of breast (V76 12)   (Z12 31)   10  Finger pain, right (729 5) (M79 644)   11  History of diverticulitis of colon (V12 79) (Z87 19)   12  Homocysteinemia (270 4) (E72 11)   13  Hypothyroidism (244 9) (E03 9)   14  Intraductal Papilloma Of The Breast (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)   15  Labyrinthitis, unspecified laterality (386 30) (H83 09)   16  Obesity (278 00) (E66 9)   17  Osteopenia (733 90) (M85 80)   18  Overactive bladder (596 51) (N32 81)   19  Preoperative clearance (V72 84) (Z01 818)   20  Sesamoid pain (733 90) (M89 9)   21  History of Syncopal vertigo (780 2,780 4) (R55)   22  Visit for routine gyn exam (V72 31) (Z01 419)   23  Vitamin D deficiency (268 9) (E55 9)    Current Meds   1  Eliquis 5 MG Oral Tablet; TAKE 1 TABLET Twice daily for blood thinning; Therapy: 13Apr2016 to (Evaluate:08Apr2017)  Requested for: 13Apr2016; Last   Rx:13Apr2016 Ordered   2  Furosemide 40 MG Oral Tablet; Take 1 tablet daily; Therapy: 80HSM0978 to (Evaluate:78Ztq8924)  Requested for: 18SBL6606; Last   Rx:01Jun2016 Ordered   3  Levothyroxine Sodium 150 MCG Oral Tablet; TAKE 1 TABLET DAILY AS DIRECTED;    Therapy: 19THM2463 to (Evaluate:87Glg8396)  Requested for: 46OSB9767; Last   Rx:91Ixo1555 Ordered   4  Meclizine HCl - 25 MG Oral Tablet; TAKE 1 TABLET BY MOUTH EVERY 8 HOURS as   needed for dizziness; Therapy: 80DCX9709 to (Evaluate:16Apr2016)  Requested for: 05TBJ1589; Last   Rx:71Ocz0564 Ordered   5  Metoprolol Tartrate 100 MG Oral Tablet; TAKE 1 TABLET TWICE DAILY; Therapy: (Recorded:78Wej7188) to Recorded   6  Potassium Chloride ER 10 MEQ Oral Capsule Extended Release; TAKE 1 CAPSULE   DAILY; Therapy: 69CFR0071 to (Reatha Toya)  Requested for: 56BOG1683; Last   Rx:01Jun2016 Ordered   7  Vitamin B-12 500 MCG Oral Tablet; TAKE 1 TABLET DAILY; Therapy: 90ZAM7171 to Recorded    Allergies    1  Morphine Derivatives    Plan   EKG/ECG- POC; Status:Active; Requested FYI:85JDV4616;   Perform: In Office; ZNT:81ZST7701;IDPVSXI; For:Atrial fibrillation; Ordered By:Hector Terrell; Future Appointments    Date/Time Provider Specialty Site   07/06/2016 09:40 AM VICKY Vences   Cardiology [facility]   10/14/2016 10:00 AM Amirah Shah MD Family Medicine Solitario Oliver MD     Signatures   Electronically signed by : Darryll Mcardle, M D ; Jun 1 2016  2:47PM EST                       (Co-author)

## 2018-01-17 NOTE — PROCEDURES
Procedures by Vera Holland MD at 5/26/2016   4:29 PM      Author:  Vera Holland MD Service:  Cardiology Author Type:  Physician     Filed:  5/26/2016  4:32 PM Date of Service:  5/26/2016  4:29 PM Status:  Signed     :  Vera Holland MD (Physician)         Pre-procedure Diagnoses:       1  Persistent atrial fibrillation [I48 1]                Post-procedure Diagnoses:       1  Atrial fibrillation, currently in sinus rhythm [I48 91]                Procedures:       1  ELECTRICAL CARDIOVERSION [MXW720 (Custom)]                   Hilary Lafleur is a 79 y o  y o  female who follows with Dr Geraldine Cadet in the office  Sh recently developed persistent afib with fatigue and lethargy  She was started  on Eliquis more than 4 weeks ago and referred for a cardioversion  The patient was identified in the echo lab  A time out procedure was performed by myself  The patient was sedated by the anesthesia service with propofol  The patient was cardioverted to sinus rhythm with a 100J biphasic shock  There were no complications  The patient was monitored for the appropriate time interval in the holding area, and was transferred back to the medical floor in stable condition             Received Pascal Holstein MD  May 26 2016  4:32PM [facility] Standard Time

## 2018-01-22 VITALS
TEMPERATURE: 97.6 F | HEART RATE: 77 BPM | HEIGHT: 61 IN | BODY MASS INDEX: 33.61 KG/M2 | SYSTOLIC BLOOD PRESSURE: 118 MMHG | WEIGHT: 178 LBS | DIASTOLIC BLOOD PRESSURE: 78 MMHG | RESPIRATION RATE: 16 BRPM | OXYGEN SATURATION: 98 %

## 2018-01-22 VITALS
TEMPERATURE: 97.3 F | BODY MASS INDEX: 34.55 KG/M2 | HEIGHT: 61 IN | TEMPERATURE: 97.4 F | BODY MASS INDEX: 33.61 KG/M2 | SYSTOLIC BLOOD PRESSURE: 132 MMHG | DIASTOLIC BLOOD PRESSURE: 80 MMHG | RESPIRATION RATE: 14 BRPM | WEIGHT: 178 LBS | HEIGHT: 61 IN | DIASTOLIC BLOOD PRESSURE: 74 MMHG | HEART RATE: 89 BPM | HEART RATE: 76 BPM | WEIGHT: 183 LBS | SYSTOLIC BLOOD PRESSURE: 138 MMHG | OXYGEN SATURATION: 98 %

## 2018-01-22 VITALS
DIASTOLIC BLOOD PRESSURE: 68 MMHG | BODY MASS INDEX: 33.79 KG/M2 | TEMPERATURE: 97.6 F | OXYGEN SATURATION: 97 % | HEART RATE: 68 BPM | HEIGHT: 61 IN | WEIGHT: 179 LBS | RESPIRATION RATE: 16 BRPM | SYSTOLIC BLOOD PRESSURE: 102 MMHG

## 2018-01-23 NOTE — PROGRESS NOTES
Chief Complaint  Patient here for EKG to monitor QTC  Patient states she is feeling good still has some swelling in her legs  (more in her right leg)      Active Problems    1  Atrial fibrillation, unspecified type (427 31) (I48 91)   2  Benign essential hypertension (401 1) (I10)   3  Bilateral edema of lower extremity (782 3) (R60 0)   4  BPPV (benign paroxysmal positional vertigo) (386 11) (H81 10)   5  Bursitis of left shoulder (726 10) (M75 52)   6  Cardiomyopathy (425 4) (I42 9)   7  Cervical spinal stenosis (723 0) (M48 02)   8  Dyspnea on exertion (786 09) (R06 09)   9  Edema (782 3) (R60 9)   10  Homocysteinemia (270 4) (E72 11)   11  Hypokalemia (276 8) (E87 6)   12  Hypothyroidism (244 9) (E03 9)   13  Labyrinthitis, unspecified laterality (386 30) (H83 09)   14  Long QT interval (794 31) (R94 31)   15  Lymphedema (457 1) (I89 0)   16  Malignant neoplasm of unspecified site of right female breast (174 9) (C50 911)   17  Nonhealing skin ulcer, limited to breakdown of skin (707 9) (L98 491)   18  Obesity (278 00) (E66 9)   19  Osteopenia (733 90) (M85 80)   20  Overactive bladder (596 51) (N32 81)   21  Peripheral neuropathy (356 9) (G62 9)   22  Pulmonary nodule seen on imaging study (793 11) (R91 1)   23  UTI (urinary tract infection), bacterial (599 0,041 9) (N39 0,A49 9)   24  Vitamin D deficiency (268 9) (E55 9)    Current Meds   1  Aleve CAPS; Therapy: (Recorded:21Nov2016) to Recorded   2  Furosemide 40 MG Oral Tablet; take 1 tablet by mouth daily as directed; Therapy: 03ZBH6672 to (Evaluate:11Fnj3300)  Requested for: 40XOB1195; Last   Rx:04Jan2018 Ordered   3  Levothyroxine Sodium 150 MCG Oral Tablet; TAKE 1 TABLET DAILY EXCEPT FOR ONE   DAY A WEEK TAKE 1/2 ;   Therapy: 31KEA0046 to (Evaluate:13Wif7361)  Requested for: 32Lcc0019; Last   Rx:75Yjq1379 Ordered   4  Meclizine HCl - 25 MG Oral Tablet; TAKE 1 TABLET BY MOUTH EVERY 8 HOURS as   needed for dizziness;    Therapy: 21Xlw6615 to (TXKSZHUQ:70WIG0485)  Requested for: 83GVC0220; Last   FQ:81DFU8916 Ordered   5  Metoprolol Tartrate 50 MG Oral Tablet; TAKE 1 TABLET TWICE DAILY  Requested for:   27Eme5067; Last Rx:44Zmw3611 Ordered   6  MV-One Oral Capsule; take 1 capsule daily; Therapy: 09Yva6926 to (Evaluate:08Oct2017)  Requested for: 83Zsk3365; Last   Rx:14Uqc3038 Ordered   7  Potassium Chloride ER 10 MEQ Oral Capsule Extended Release; TAKE 1 CAPSULE   DAILY; Therapy: 81GEO9059 to (Evaluate:11Aug2017)  Requested for: 48Bng9504; Last   Rx:58Gwa9825 Ordered   8  Vitamin B-12 500 MCG Oral Tablet; TAKE 1 TABLET DAILY; Therapy: 53LTS3129 to Recorded    Allergies    1  Morphine Derivatives    Discussion/Summary    Sinus rhythm  QTc in normal range  Future Appointments    Date/Time Provider Specialty Site   04/02/2018 10:20 AM VICKY Barron  Hematology [facility]cology [facility]   03/15/2018 09:30 AM VICKY Hickman   Surgical Oncology [place] [id number] [facility]   02/28/2018 09:40 AM Yoryd Zhao  [facility] MD     Signatures   Electronically signed by : Yan Maldonado, ; Giancarlo 10 2018 11:29AM EST                       (Author)    Electronically signed by : VICKY Montejo ; Jan 12 2018  8:58PM EST                       (Author)

## 2018-02-12 ENCOUNTER — OFFICE VISIT (OUTPATIENT)
Dept: FAMILY MEDICINE CLINIC | Facility: HOSPITAL | Age: 73
End: 2018-02-12
Payer: COMMERCIAL

## 2018-02-12 VITALS
HEIGHT: 62 IN | SYSTOLIC BLOOD PRESSURE: 110 MMHG | HEART RATE: 64 BPM | WEIGHT: 183.2 LBS | TEMPERATURE: 98.7 F | BODY MASS INDEX: 33.71 KG/M2 | DIASTOLIC BLOOD PRESSURE: 70 MMHG

## 2018-02-12 DIAGNOSIS — J20.9 BRONCHITIS WITH BRONCHOSPASM: Primary | ICD-10-CM

## 2018-02-12 PROBLEM — R94.31 LONG QT INTERVAL: Status: ACTIVE | Noted: 2017-10-17

## 2018-02-12 PROBLEM — I89.0 LYMPHEDEMA: Status: ACTIVE | Noted: 2017-06-30

## 2018-02-12 PROBLEM — M75.52 BURSITIS OF LEFT SHOULDER: Status: ACTIVE | Noted: 2017-12-11

## 2018-02-12 PROBLEM — R60.0 BILATERAL EDEMA OF LOWER EXTREMITY: Status: ACTIVE | Noted: 2017-04-27

## 2018-02-12 PROBLEM — H81.10 BPPV (BENIGN PAROXYSMAL POSITIONAL VERTIGO): Status: ACTIVE | Noted: 2017-08-30

## 2018-02-12 PROBLEM — G62.9 PERIPHERAL NEUROPATHY: Status: ACTIVE | Noted: 2017-04-05

## 2018-02-12 PROCEDURE — 99213 OFFICE O/P EST LOW 20 MIN: CPT | Performed by: FAMILY MEDICINE

## 2018-02-12 RX ORDER — COVID-19 ANTIGEN TEST
KIT MISCELLANEOUS
COMMUNITY
End: 2019-03-07 | Stop reason: SDUPTHER

## 2018-02-12 RX ORDER — CHOLECALCIFEROL (VITAMIN D3) 125 MCG
1 CAPSULE ORAL DAILY
COMMUNITY
Start: 2015-06-22

## 2018-02-12 RX ORDER — FUROSEMIDE 40 MG/1
40 TABLET ORAL 2 TIMES DAILY
COMMUNITY
Start: 2016-06-01 | End: 2018-07-17 | Stop reason: SDUPTHER

## 2018-02-12 RX ORDER — METOPROLOL TARTRATE 50 MG/1
1 TABLET, FILM COATED ORAL 2 TIMES DAILY
COMMUNITY
End: 2019-03-07 | Stop reason: SDUPTHER

## 2018-02-12 RX ORDER — POTASSIUM CHLORIDE 750 MG/1
1 CAPSULE, EXTENDED RELEASE ORAL DAILY
COMMUNITY
Start: 2016-06-01 | End: 2018-05-29 | Stop reason: SDUPTHER

## 2018-02-12 RX ORDER — MECLIZINE HYDROCHLORIDE 25 MG/1
1 TABLET ORAL EVERY 8 HOURS PRN
COMMUNITY
Start: 2016-02-26 | End: 2018-05-29

## 2018-02-12 RX ORDER — FLUTICASONE FUROATE AND VILANTEROL 100; 25 UG/1; UG/1
1 POWDER RESPIRATORY (INHALATION) DAILY
Qty: 14 EACH | Refills: 0 | Status: SHIPPED | COMMUNITY
Start: 2018-02-12 | End: 2018-05-29

## 2018-02-12 RX ORDER — CEFUROXIME AXETIL 500 MG/1
500 TABLET ORAL EVERY 12 HOURS SCHEDULED
Qty: 14 TABLET | Refills: 0 | Status: SHIPPED | OUTPATIENT
Start: 2018-02-12 | End: 2018-02-19

## 2018-02-12 RX ORDER — LEVOTHYROXINE SODIUM 0.15 MG/1
TABLET ORAL
COMMUNITY
Start: 2011-10-03 | End: 2018-08-23 | Stop reason: SDUPTHER

## 2018-02-12 NOTE — PATIENT INSTRUCTIONS

## 2018-02-12 NOTE — PROGRESS NOTES
Assessment/Plan:         Diagnoses and all orders for this visit:    Bronchitis with bronchospasm  Comments:  Due to duration of illness and for comorbidities, start Cefuroxime and incorporate use of Breo  Pt shown how to use at time of visit  Orders:  -     fluticasone furoate-vilanterol (BREO ELLIPTA); Inhale 1 puff daily  -     cefuroxime (CEFTIN) 500 mg tablet; Take 1 tablet (500 mg total) by mouth every 12 (twelve) hours for 7 days    Other orders  -     meclizine (ANTIVERT) 25 mg tablet; Take 1 tablet by mouth every 8 (eight) hours as needed  -     Multiple Vitamin (MV-ONE PO); Take 1 capsule by mouth daily  -     cyanocobalamin (VITAMIN B-12) 500 mcg tablet; Take 1 tablet by mouth daily  -     Naproxen Sodium (ALEVE) 220 MG CAPS; Take by mouth  -     furosemide (LASIX) 40 mg tablet; Take 1 tablet by mouth daily  -     levothyroxine 150 mcg tablet; Take by mouth  -     metoprolol tartrate (LOPRESSOR) 50 mg tablet; Take 1 tablet by mouth 2 (two) times a day  -     potassium chloride (MICRO-K) 10 MEQ CR capsule; Take 1 capsule by mouth daily          Subjective:      Patient ID: Meenakshi Birmingham is a 67 y o  female  Three weeks of chest congestion and wheeze  Cough is somewhat productive of yellow sputum  No fever, no achiness  She does feel SOB with activity  The following portions of the patient's history were reviewed and updated as appropriate: allergies, current medications, past family history, past medical history, past social history, past surgical history and problem list     Review of Systems   HENT: Negative for ear pain, postnasal drip, sinus pressure and sore throat  Eyes: Negative for pain  Respiratory: Positive for cough, chest tightness, shortness of breath and wheezing  Cardiovascular: Negative for chest pain and palpitations  Gastrointestinal: Negative for abdominal pain  Musculoskeletal: Negative for arthralgias  Skin: Negative for rash     Neurological: Negative for light-headedness and headaches  Psychiatric/Behavioral: The patient is not nervous/anxious  Objective:    Vitals:    02/12/18 1423   BP: 110/70   Pulse: 64   Temp: 98 7 °F (37 1 °C)        Physical Exam   Constitutional: No distress  HENT:   Right Ear: Tympanic membrane and ear canal normal    Left Ear: Tympanic membrane and ear canal normal    Nose: Right sinus exhibits no maxillary sinus tenderness and no frontal sinus tenderness  Left sinus exhibits no maxillary sinus tenderness and no frontal sinus tenderness  Mouth/Throat: She has dentures  No oropharyngeal exudate, posterior oropharyngeal edema or posterior oropharyngeal erythema  Neck: No thyromegaly present  Cardiovascular: Regular rhythm  Pulmonary/Chest: No respiratory distress  She has wheezes in the right upper field, the right middle field, the right lower field, the left upper field, the left middle field and the left lower field  She has rhonchi  She has no rales

## 2018-02-19 ENCOUNTER — TELEPHONE (OUTPATIENT)
Dept: FAMILY MEDICINE CLINIC | Facility: HOSPITAL | Age: 73
End: 2018-02-19

## 2018-02-19 DIAGNOSIS — J20.9 BRONCHOSPASM WITH BRONCHITIS, ACUTE: Primary | ICD-10-CM

## 2018-02-19 RX ORDER — PREDNISONE 10 MG/1
TABLET ORAL
Qty: 16 TABLET | Refills: 0 | Status: SHIPPED | OUTPATIENT
Start: 2018-02-19 | End: 2018-02-28

## 2018-02-19 NOTE — TELEPHONE ENCOUNTER
Patient called stating she just finished ceftin for bronchitis  Symptoms have improved greatly but is still wheezing  Is there anything else she should be doing?

## 2018-02-28 ENCOUNTER — OFFICE VISIT (OUTPATIENT)
Dept: FAMILY MEDICINE CLINIC | Facility: HOSPITAL | Age: 73
End: 2018-02-28
Payer: COMMERCIAL

## 2018-02-28 VITALS
TEMPERATURE: 98.1 F | SYSTOLIC BLOOD PRESSURE: 134 MMHG | HEART RATE: 76 BPM | DIASTOLIC BLOOD PRESSURE: 78 MMHG | WEIGHT: 182 LBS | BODY MASS INDEX: 33.49 KG/M2 | HEIGHT: 62 IN

## 2018-02-28 DIAGNOSIS — I48.20 CHRONIC ATRIAL FIBRILLATION (HCC): Chronic | ICD-10-CM

## 2018-02-28 DIAGNOSIS — J20.9 BRONCHITIS WITH BRONCHOSPASM: ICD-10-CM

## 2018-02-28 DIAGNOSIS — Z12.11 SCREEN FOR COLON CANCER: ICD-10-CM

## 2018-02-28 DIAGNOSIS — B37.81 CANDIDAL ESOPHAGITIS (HCC): Primary | ICD-10-CM

## 2018-02-28 DIAGNOSIS — I10 BENIGN ESSENTIAL HYPERTENSION: ICD-10-CM

## 2018-02-28 DIAGNOSIS — C50.911 MALIGNANT NEOPLASM OF RIGHT FEMALE BREAST, UNSPECIFIED ESTROGEN RECEPTOR STATUS, UNSPECIFIED SITE OF BREAST (HCC): ICD-10-CM

## 2018-02-28 DIAGNOSIS — E03.9 ACQUIRED HYPOTHYROIDISM: Chronic | ICD-10-CM

## 2018-02-28 PROCEDURE — 99214 OFFICE O/P EST MOD 30 MIN: CPT | Performed by: FAMILY MEDICINE

## 2018-02-28 NOTE — PROGRESS NOTES
Assessment/Plan:         Diagnoses and all orders for this visit:    Benign essential hypertension  Comments:  Good control of BP    Bronchitis with bronchospasm  Comments:  Continue inhaler for awhile longer- sample Advair 250 given to use once daily    Chronic atrial fibrillation ([address] )  Comments:  clinically stable, to continue on Eliquis    Malignant neoplasm of right female breast, unspecified estrogen receptor status, unspecified site of breast ([address] )    Acquired hypothyroidism  Comments:  Euthyroid clinically  Continue 150 mcg  Orders:  -     TSH, 3rd generation with T4 reflex; Future  -     TSH, 3rd generation with T4 reflex    Candidal esophagitis (HCC)  Comments:  Burning in throat and chest may be candidal origin  Start Mycelex troches    Screen for colon cancer  -     Cologuard          Subjective:      Patient ID: Syble Gilford is a 67 y o  female  Having a persistent chest burning not responding to anything including Prednisone  Cough is non-productive  No positional effect on it  Has some soreness in the throat as well  Completed Breo and unsure if it did a lot of benefit  The following portions of the patient's history were reviewed and updated as appropriate: allergies, current medications, past family history, past medical history, past social history, past surgical history and problem list     Review of Systems   Constitutional: Negative for chills, fatigue and fever  HENT: Positive for sore throat  Negative for drooling and sinus pain  Eyes: Negative for pain  Respiratory: Positive for chest tightness  Cardiovascular: Positive for chest pain  Gastrointestinal: Negative for abdominal pain  Musculoskeletal: Negative for arthralgias and joint swelling  Neurological: Negative for headaches  Hematological: Negative for adenopathy  Does not bruise/bleed easily  Psychiatric/Behavioral: Negative for sleep disturbance  The patient is not nervous/anxious  Objective:      /78 (BP Location: Left arm, Patient Position: Sitting)   Pulse 76   Temp 98 1 °F (36 7 °C) (Tympanic)   Ht 5' 2" (1 575 m)   Wt 82 6 kg (182 lb)   BMI 33 29 kg/m²          Physical Exam   Constitutional: She is oriented to person, place, and time  She appears well-developed and well-nourished  HENT:   Head: Normocephalic and atraumatic  Eyes: Conjunctivae are normal    Neck: Neck supple  Cardiovascular: Normal rate, regular rhythm, normal heart sounds and intact distal pulses  Pulmonary/Chest: Breath sounds normal    Abdominal: Bowel sounds are normal    Musculoskeletal: She exhibits no edema  Neurological: She is oriented to person, place, and time  Psychiatric: She has a normal mood and affect   Her behavior is normal  Judgment and thought content normal

## 2018-03-08 ENCOUNTER — TELEPHONE (OUTPATIENT)
Dept: FAMILY MEDICINE CLINIC | Facility: HOSPITAL | Age: 73
End: 2018-03-08

## 2018-03-08 PROBLEM — B37.81 CANDIDAL ESOPHAGITIS (HCC): Status: ACTIVE | Noted: 2018-03-08

## 2018-03-08 RX ORDER — CLOTRIMAZOLE 10 MG/1
10 LOZENGE ORAL; TOPICAL
Qty: 35 TABLET | Refills: 0 | Status: SHIPPED | OUTPATIENT
Start: 2018-03-08 | End: 2018-05-29

## 2018-03-12 NOTE — TELEPHONE ENCOUNTER
Spoke to Chucky Tirado and she said that she is pretty much back to normal  She will call the Pharm and cancel the med  Said she doesn't feel it's needed

## 2018-03-15 ENCOUNTER — OFFICE VISIT (OUTPATIENT)
Dept: SURGICAL ONCOLOGY | Facility: HOSPITAL | Age: 73
End: 2018-03-15
Payer: COMMERCIAL

## 2018-03-15 VITALS
HEART RATE: 64 BPM | DIASTOLIC BLOOD PRESSURE: 84 MMHG | WEIGHT: 188 LBS | SYSTOLIC BLOOD PRESSURE: 132 MMHG | BODY MASS INDEX: 34.6 KG/M2 | RESPIRATION RATE: 14 BRPM | HEIGHT: 62 IN

## 2018-03-15 DIAGNOSIS — I89.0 LYMPHEDEMA: ICD-10-CM

## 2018-03-15 DIAGNOSIS — R91.1 PULMONARY NODULE SEEN ON IMAGING STUDY: ICD-10-CM

## 2018-03-15 DIAGNOSIS — Z17.1 MALIGNANT NEOPLASM OF RIGHT BREAST IN FEMALE, ESTROGEN RECEPTOR NEGATIVE, UNSPECIFIED SITE OF BREAST (HCC): ICD-10-CM

## 2018-03-15 DIAGNOSIS — Z92.21 HX ANTINEOPLASTIC CHEMOTHERAPY: Primary | ICD-10-CM

## 2018-03-15 DIAGNOSIS — C50.911 MALIGNANT NEOPLASM OF RIGHT BREAST IN FEMALE, ESTROGEN RECEPTOR NEGATIVE, UNSPECIFIED SITE OF BREAST (HCC): ICD-10-CM

## 2018-03-15 PROBLEM — T14.8XXA OPEN WOUND: Status: RESOLVED | Noted: 2018-03-15 | Resolved: 2018-03-15

## 2018-03-15 PROBLEM — N64.52 DISCHARGE FROM LEFT NIPPLE: Status: RESOLVED | Noted: 2018-03-15 | Resolved: 2018-03-15

## 2018-03-15 PROBLEM — D24.9 INTRADUCTAL PAPILLOMA OF BREAST: Status: RESOLVED | Noted: 2018-03-15 | Resolved: 2018-03-15

## 2018-03-15 PROBLEM — R93.89 ABNORMAL CHEST CT: Status: ACTIVE | Noted: 2018-03-15

## 2018-03-15 PROCEDURE — 99214 OFFICE O/P EST MOD 30 MIN: CPT | Performed by: SURGERY

## 2018-03-15 NOTE — PROGRESS NOTES
Surgical Oncology Follow Up       [facility] Marianela Szymanski  [facility] [address]    Martina Guard  1945  [id number]  [address]  [facility] [address] [facility]  [place] [address]    Chief Complaint   Patient presents with    Follow-up     6 month f/u        Assessment/Plan   Diagnoses and all orders for this visit:    Hx antineoplastic chemotherapy    Malignant neoplasm of right breast in female, estrogen receptor negative, unspecified site of breast ([facility] )  -     Mammo diagnostic left w 3d & cad; Future    Lymphedema    Pulmonary nodule seen on imaging study        Advance Care Planning/Advance Directives:  Did not discuss  with the patient  Oncology History:       Malignant neoplasm of unspecified site of right female breast ([facility] )    9/12/2016 Surgery     Right breast biopsy X 3 sites         9/29/2016 Initial Diagnosis     Malignant neoplasm of unspecified site of right female breast ([facility] )       10/28/2016 Surgery     Right breast mastectomy, [facility]  3/16/2017 Surgery     Right chest wall excision of lesion          Chemotherapy     Cytoxan, Taxotere  Dr Honey Shah             History of Present Illness: breast cancer follow up  -Interval History:n/a    Review of Systems:  Review of Systems   Constitutional: Negative  Negative for appetite change and fever  Eyes: Negative  Respiratory: Negative for shortness of breath  Recent bronchitis   Cardiovascular: Negative  Gastrointestinal: Negative  Endocrine: Negative  Genitourinary: Negative  Musculoskeletal: Negative  Negative for arthralgias and myalgias  Skin: Negative  Allergic/Immunologic: Negative  Neurological: Negative  Hematological: Negative  Negative for adenopathy  Does not bruise/bleed easily  Psychiatric/Behavioral: Negative          Patient Active Problem List   Diagnosis    Atrial fibrillation (HCC)    Benign essential hypertension    Bilateral edema of lower extremity    BPPV (benign paroxysmal positional vertigo)    Bursitis of left shoulder    Cardiomyopathy ([address] )    Cervical spinal stenosis    Edema    Homocysteinemia ([address] )    Hypokalemia    Hypothyroidism    Long QT interval    Lymphedema    Malignant neoplasm of unspecified site of right female breast ([address] )    Obesity    Osteopenia    Overactive bladder    Peripheral neuropathy    Vitamin D deficiency    Bronchitis with bronchospasm    Candidal esophagitis (HCC)    Abnormal chest CT    Pulmonary nodule seen on imaging study     Past Medical History:   Diagnosis Date    Abnormal chest CT     last assessed - [id number]    Abnormal glucose     Resolved - [id number]    Arrhythmia     Arthritis     Slight    BCC (basal cell carcinoma of skin)     last assessed - [id number]    Cardiomyopathy [facility])     Cervical spinal stenosis     Discharge from left nipple     last assessed - [id number]    Disease of thyroid gland     hypothyroidism    Full dentures     Upper and Lower    History of atrial fibrillation     Homocysteinemia ([address] )     Hypertension     Hypokalemia     Intraductal papilloma of breast     last assessed - [id number]    Malignant neoplasm of skin     basal cell on face, Moh's surgery    Memory loss     last assessed - [id number]    Obesity     Open wound     last assessed - [id number]    Osteopenia     Overactive bladder     Palmar plantar erythrodysaesthesia     Resolved - [id number]    Pulmonary nodule seen on imaging study     Rosacea     UTI (urinary tract infection)     recently    Vertigo     Vitamin D deficiency      Past Surgical History:   Procedure Laterality Date    APPENDECTOMY      ATRIAL ABLATION SURGERY      last assessed - [id number]   Earna Renae BACK SURGERY  1997    Lumbar spinal stenosis    BREAST BIOPSY      2014?  left breast; 9/12/16 right breast x 3    BREAST SURGERY      needle bx      BREAST SURGERY Right 03/16/2017    excision of right chest wall lesion    CARDIAC ELECTROPHYSIOLOGY STUDY AND ABLATION      CARDIOVERSION      DENTAL SURGERY      HAND SURGERY      Joint replaced with silicone joint 5th finger right hand    HYSTERECTOMY      Complete    LAMINECTOMY      Decompressive up to two lumbar segments    MASTECTOMY Right 10/28/2016    OOPHORECTOMY  1993    MD EXC SKIN MALIG <0 5 CM TRUNK,ARM,LEG Right 3/16/2017    Procedure: EXCISION CHEST WALL LESION; NEEDLE LOC @ 1400;  Surgeon: Susanne Yoder MD;  Location: [facility];  Service: Surgical Oncology    MD INTRAOPERATIVE SENTINEL LYMPH NODE ID W DYE INJECTION Right 10/28/2016    Procedure: BREAST MASTECTOMY WITH SENTINAL LYMPH NODE BIOPSY; LYMPHSCINITIGRAPHY; LYMPHATIC MAPPING; 0800 NUC MED INJECTION;  Surgeon: Susanne Yoder MD;  Location: [facility];  Service: Surgical Oncology    SENTINEL LYMPH NODE BIOPSY Right      Family History   Problem Relation Age of Onset    Coronary artery disease Mother      CABG    Diabetes Mother     Diabetes Sister     Coronary artery disease Brother      CABG    Diabetes Brother     Heart attack Son     Kidney cancer Son 48     Social History     Social History    Marital status: /Civil Union     Spouse name: N/A    Number of children: N/A    Years of education: N/A     Occupational History    Retired      Social History Main Topics    Smoking status: Never Smoker    Smokeless tobacco: Never Used    Alcohol use No    Drug use: No    Sexual activity: Not on file     Other Topics Concern    Not on file     Social History Narrative    Always uses seat belt    Convalescence after chemotherapy       Current Outpatient Prescriptions:     clotrimazole (MYCELEX) 10 mg brendon, Take 1 tablet (10 mg total) by mouth 5 (five) times a day, Disp: 35 tablet, Rfl: 0    cyanocobalamin (VITAMIN B-12) 500 mcg tablet, Take 1 tablet by mouth daily, Disp: , Rfl:     fluticasone furoate-vilanterol (BREO ELLIPTA), Inhale 1 puff daily, Disp: 14 each, Rfl: 0    furosemide (LASIX) 40 mg tablet, Take 1 tablet by mouth daily, Disp: , Rfl:     levothyroxine 150 mcg tablet, Take by mouth, Disp: , Rfl:     meclizine (ANTIVERT) 25 mg tablet, Take 1 tablet by mouth every 8 (eight) hours as needed, Disp: , Rfl:     metoprolol tartrate (LOPRESSOR) 50 mg tablet, Take 1 tablet by mouth 2 (two) times a day, Disp: , Rfl:     Multiple Vitamin (MV-ONE PO), Take 1 capsule by mouth daily, Disp: , Rfl:     Naproxen Sodium (ALEVE) 220 MG CAPS, Take by mouth, Disp: , Rfl:     potassium chloride (MICRO-K) 10 MEQ CR capsule, Take 1 capsule by mouth daily, Disp: , Rfl:     apixaban (ELIQUIS) 5 mg, Take 5 mg by mouth 2 (two) times a day , Disp: , Rfl:   Allergies   Allergen Reactions    Morphine And Related GI Intolerance    Oxycodone-Acetaminophen Vomiting       The following portions of the patient's history were reviewed and updated as appropriate: allergies, current medications, past family history, past medical history, past social history, past surgical history and problem list         Vitals:    03/15/18 0935   BP: 132/84   Pulse: 64   Resp: 14       Physical Exam   Constitutional: She is oriented to person, place, and time  She appears well-developed and well-nourished  HENT:   Head: Normocephalic and atraumatic  Cardiovascular: Normal heart sounds  Pulmonary/Chest: Breath sounds normal  Right breast exhibits skin change (mastectomy scar)  Right breast exhibits no mass and no tenderness  Left breast exhibits no inverted nipple, no mass, no nipple discharge, no skin change and no tenderness  Abdominal: Soft  Musculoskeletal: She exhibits edema (mild right arm)  Decreased ROM right arm   Lymphadenopathy:        Right axillary: No pectoral and no lateral adenopathy present  Left axillary: No pectoral and no lateral adenopathy present  Right: No supraclavicular adenopathy present  Left: No supraclavicular adenopathy present  Neurological: She is alert and oriented to person, place, and time  Psychiatric: She has a normal mood and affect  Results:    Imaging  12/20/2017 cat scan of the chest abdomen pelvis was stable with a pulmonary nodule 4 mm but no evidence of metastatic disease    I reviewed the above imaging data  Discussion/Summary:  51-year-old female status post right mastectomy  There is no evidence of disease based on examination today or recent CT scans  She still has mild swelling in the arm with tightness in the chest wall and slight decreased range of motion of the right arm  I did offer her physical therapy again  She continues to decline  She would like to continue doing the arm exercises at home  I also advised her to continue with the massage  I will make arrangements for her left mammogram due in September  I will see her again at that time or sooner should the need arise

## 2018-03-20 LAB
T4 FREE SERPL-MCNC: 0.7 NG/DL (ref 0.8–1.8)
TSH SERPL-ACNC: 110.25 MIU/L (ref 0.4–4.5)

## 2018-03-21 LAB
ALBUMIN SERPL-MCNC: 3.4 G/DL (ref 3.6–5.1)
ALBUMIN/GLOB SERPL: 1.2 (CALC) (ref 1–2.5)
ALP SERPL-CCNC: 95 U/L (ref 33–130)
ALT SERPL-CCNC: 13 U/L (ref 6–29)
AST SERPL-CCNC: 22 U/L (ref 10–35)
BASOPHILS # BLD AUTO: 32 CELLS/UL (ref 0–200)
BASOPHILS NFR BLD AUTO: 0.6 %
BILIRUB SERPL-MCNC: 1 MG/DL (ref 0.2–1.2)
BUN SERPL-MCNC: 14 MG/DL (ref 7–25)
BUN/CREAT SERPL: 13 (CALC) (ref 6–22)
CALCIUM SERPL-MCNC: 8.7 MG/DL (ref 8.6–10.4)
CHLORIDE SERPL-SCNC: 112 MMOL/L (ref 98–110)
CO2 SERPL-SCNC: 25 MMOL/L (ref 20–31)
CREAT SERPL-MCNC: 1.08 MG/DL (ref 0.6–0.93)
EOSINOPHIL # BLD AUTO: 111 CELLS/UL (ref 15–500)
EOSINOPHIL NFR BLD AUTO: 2.1 %
ERYTHROCYTE [DISTWIDTH] IN BLOOD BY AUTOMATED COUNT: 15 % (ref 11–15)
GLOBULIN SER CALC-MCNC: 2.8 G/DL (CALC) (ref 1.9–3.7)
GLUCOSE SERPL-MCNC: 68 MG/DL (ref 65–99)
HCT VFR BLD AUTO: 38.7 % (ref 35–45)
HGB BLD-MCNC: 13.2 G/DL (ref 11.7–15.5)
LYMPHOCYTES # BLD AUTO: 1012 CELLS/UL (ref 850–3900)
LYMPHOCYTES NFR BLD AUTO: 19.1 %
MCH RBC QN AUTO: 31.9 PG (ref 27–33)
MCHC RBC AUTO-ENTMCNC: 34.1 G/DL (ref 32–36)
MCV RBC AUTO: 93.5 FL (ref 80–100)
MONOCYTES # BLD AUTO: 594 CELLS/UL (ref 200–950)
MONOCYTES NFR BLD AUTO: 11.2 %
NEUTROPHILS # BLD AUTO: 3551 CELLS/UL (ref 1500–7800)
NEUTROPHILS NFR BLD AUTO: 67 %
PLATELET # BLD AUTO: 180 THOUSAND/UL (ref 140–400)
PMV BLD REES-ECKER: 11.3 FL (ref 7.5–12.5)
POTASSIUM SERPL-SCNC: 4 MMOL/L (ref 3.5–5.3)
PROT SERPL-MCNC: 6.2 G/DL (ref 6.1–8.1)
RBC # BLD AUTO: 4.14 MILLION/UL (ref 3.8–5.1)
SL AMB CA27.29 (CHIRON): 14 U/ML
SL AMB EGFR AFRICAN AMERICAN: 59 ML/MIN/1.73M2
SL AMB EGFR NON AFRICAN AMERICAN: 51 ML/MIN/1.73M2
SODIUM SERPL-SCNC: 144 MMOL/L (ref 135–146)
WBC # BLD AUTO: 5.3 THOUSAND/UL (ref 3.8–10.8)

## 2018-04-24 ENCOUNTER — OFFICE VISIT (OUTPATIENT)
Dept: CARDIOLOGY CLINIC | Facility: CLINIC | Age: 73
End: 2018-04-24
Payer: MEDICARE

## 2018-04-24 VITALS
HEIGHT: 62 IN | HEART RATE: 54 BPM | DIASTOLIC BLOOD PRESSURE: 60 MMHG | WEIGHT: 188 LBS | BODY MASS INDEX: 34.6 KG/M2 | SYSTOLIC BLOOD PRESSURE: 110 MMHG

## 2018-04-24 DIAGNOSIS — R06.02 SOB (SHORTNESS OF BREATH): Primary | ICD-10-CM

## 2018-04-24 DIAGNOSIS — I48.19 PERSISTENT ATRIAL FIBRILLATION (HCC): ICD-10-CM

## 2018-04-24 PROCEDURE — 93000 ELECTROCARDIOGRAM COMPLETE: CPT | Performed by: INTERNAL MEDICINE

## 2018-04-24 PROCEDURE — 99213 OFFICE O/P EST LOW 20 MIN: CPT | Performed by: INTERNAL MEDICINE

## 2018-04-24 NOTE — PROGRESS NOTES
Cardiology Follow Up    Ginny Valera  [birth year]  [id number]  [facility]  [address] [place] [address] [address]    1   SOB (shortness of breath)  POCT ECG   2  Persistent atrial fibrillation (HCC)         Interval History:     Patient doing well since ablation about 2 years ago  Recently started being active after quiet sometime- Had exertional intolerance - Hence got worried and came for a visit     According to her, she is cancer-free from her breast cancer      As far as her atrial fibrillation is concerned:  Her shortness of breath has improved very significantly  She is able to walk around the house without any difficulty  She does not complain of any angina like chest pain or chest pressure  She does not complain of any orthopnea, paroxysmal nocturnal dyspnea, leg swelling  She is not complaining of any palpitations, presyncope or syncope      She has undergone a successful ablation of her atrial fibrillation in August 2016    Her history of atrial fibrillation is as follows:  Patient is a very pleasant 66-year-old lady who was found to be in atrial fibrillation in April of 2016  She has not been feeling well for 2-3 months, when an EKG was done and atrial fibrillation identified  She was given a brief trial of dronedarone and cardioverted  She could not tolerate the dronedarone because of side effects  Had gone back into atrial fibrillation      She did feel occasional palpitations and lightheadedness ,orthopnea needing 2 pillows, Episodes of PND  significant exercise intolerance- she was able to walk 4-5 miles in 2015 but could hardly walk a mile  She had difficulty going up flights of steps and she does not walk uphill  She did have an echocardiogram her heart function was reduced       Patient Active Problem List   Diagnosis    Atrial fibrillation ([address] )    Benign essential hypertension    Bilateral edema of lower extremity    BPPV (benign paroxysmal positional vertigo)    Bursitis of left shoulder    Cardiomyopathy ([address] )    Cervical spinal stenosis    Edema    Homocysteinemia (HCC)    Hypokalemia    Hypothyroidism    Long QT interval    Lymphedema    Malignant neoplasm of unspecified site of right female breast ([address] )    Obesity    Osteopenia    Overactive bladder    Peripheral neuropathy    Vitamin D deficiency    Bronchitis with bronchospasm    Candidal esophagitis (HCC)    Abnormal chest CT    Pulmonary nodule seen on imaging study    SOB (shortness of breath)     Past Medical History:   Diagnosis Date    Abnormal chest CT     last assessed - [id number]    Abnormal glucose     Resolved - [id number]    Arrhythmia     Arthritis     Slight    BCC (basal cell carcinoma of skin)     last assessed - [id number]    Cardiomyopathy [facility])     Cervical spinal stenosis     Discharge from left nipple     last assessed - [id number]    Disease of thyroid gland     hypothyroidism    Full dentures     Upper and Lower    History of atrial fibrillation     Homocysteinemia ([facility] )     Hypertension     Hypokalemia     Intraductal papilloma of breast     last assessed - [id number]    Malignant neoplasm of skin     basal cell on face, Moh's surgery    Memory loss     last assessed - [id number]    Obesity     Open wound     last assessed - [id number]    Osteopenia     Overactive bladder     Palmar plantar erythrodysaesthesia     Resolved - [id number]    Pulmonary nodule seen on imaging study     Rosacea     UTI (urinary tract infection)     recently    Vertigo     Vitamin D deficiency      Social History     Social History    Marital status: /Civil Union     Spouse name: N/A    Number of children: N/A    Years of education: N/A     Occupational History    Retired      Social History Main Topics    Smoking status: Never Smoker    Smokeless tobacco: Never Used    Alcohol use No    Drug use: No    Sexual activity: Not on file     Other Topics Concern    Not on file     Social History Narrative    Always uses seat belt    Convalescence after chemotherapy      Family History   Problem Relation Age of Onset    Coronary artery disease Mother      CABG    Diabetes Mother     Diabetes Sister     Coronary artery disease Brother      CABG    Diabetes Brother     Heart attack Son     Kidney cancer Son 48     Past Surgical History:   Procedure Laterality Date    APPENDECTOMY      ATRIAL ABLATION SURGERY      last assessed - [id number]   [facility] BACK SURGERY  1997    Lumbar spinal stenosis    BREAST BIOPSY      2014? left breast; 9/12/16 right breast x 3    BREAST SURGERY      needle bx      BREAST SURGERY Right 03/16/2017    excision of right chest wall lesion    CARDIAC ELECTROPHYSIOLOGY STUDY AND ABLATION      CARDIOVERSION      DENTAL SURGERY      HAND SURGERY      Joint replaced with silicone joint 5th finger right hand    HYSTERECTOMY      Complete    LAMINECTOMY      Decompressive up to two lumbar segments    MASTECTOMY Right 10/28/2016    OOPHORECTOMY  1993    IA EXC SKIN MALIG <0 5 CM TRUNK,ARM,LEG Right 3/16/2017    Procedure: EXCISION CHEST WALL LESION; NEEDLE LOC @ 1400;  Surgeon: Janis Morgan MD;  Location: [facility];  Service: Surgical Oncology    IA INTRAOPERATIVE SENTINEL LYMPH NODE ID W DYE INJECTION Right 10/28/2016    Procedure: BREAST MASTECTOMY WITH SENTINAL LYMPH NODE BIOPSY; LYMPHSCINITIGRAPHY; LYMPHATIC MAPPING; 0800 NUC MED INJECTION;  Surgeon: Janis Morgan MD;  Location: [facility];  Service: Surgical Oncology    SENTINEL LYMPH NODE BIOPSY Right        Current Outpatient Prescriptions:     clotrimazole (MYCELEX) 10 mg brendon, Take 1 tablet (10 mg total) by mouth 5 (five) times a day, Disp: 35 tablet, Rfl: 0    cyanocobalamin (VITAMIN B-12) 500 mcg tablet, Take 1 tablet by mouth daily, Disp: , Rfl:     fluticasone furoate-vilanterol (BREO ELLIPTA), Inhale 1 puff daily, Disp: 14 each, Rfl: 0    furosemide (LASIX) 40 mg tablet, Take 40 mg by mouth 2 (two) times a day  , Disp: , Rfl:     levothyroxine 150 mcg tablet, Take by mouth, Disp: , Rfl:     metoprolol tartrate (LOPRESSOR) 50 mg tablet, Take 1 tablet by mouth 2 (two) times a day, Disp: , Rfl:     Multiple Vitamin (MV-ONE PO), Take 1 capsule by mouth daily, Disp: , Rfl:     Naproxen Sodium (ALEVE) 220 MG CAPS, Take by mouth, Disp: , Rfl:     potassium chloride (MICRO-K) 10 MEQ CR capsule, Take 1 capsule by mouth daily, Disp: , Rfl:     meclizine (ANTIVERT) 25 mg tablet, Take 1 tablet by mouth every 8 (eight) hours as needed, Disp: , Rfl:   Allergies   Allergen Reactions    Morphine And Related GI Intolerance    Oxycodone-Acetaminophen Vomiting       Labs:  Lab Results   Component Value Date     09/07/2017     09/07/2017    K 4 0 09/07/2017    K 4 0 09/07/2017     09/07/2017     09/07/2017    CO2 27 09/07/2017    CO2 27 09/07/2017    BUN 14 03/19/2018    CREATININE 1 08 (H) 03/19/2018    CREATININE 0 97 (H) 09/07/2017    CREATININE 0 97 (H) 09/07/2017    GLUCOSE 89 10/25/2016    GLUCOSE 95 06/19/2015    CALCIUM 8 7 03/19/2018     No results found for: CKTOTAL, CKMB, CKMBINDEX, TROPONINI  Lab Results   Component Value Date    WBC 4 1 09/07/2017    WBC 4 1 09/07/2017    HGB 13 2 03/19/2018    HGB 11 9 09/07/2017    HGB 11 9 09/07/2017    HCT 38 7 03/19/2018    HCT 35 0 09/07/2017    HCT 35 0 09/07/2017    MCV 93 5 03/19/2018    MCV 91 1 09/07/2017    MCV 91 1 09/07/2017     03/19/2018     09/07/2017     09/07/2017     Lab Results   Component Value Date    CHOL 110 09/07/2017    TRIG 69 09/07/2017    HDL 46 (L) 09/07/2017     Imaging: No results found  Review of Systems:  Review of Systems   All other systems reviewed and are negative    As described in my history of present illness        Physical Exam:  Physical Exam   Constitutional: She is oriented to person, place, and time  She appears well-developed and well-nourished  No distress  Not in any distress at the current time   HENT:   Head: Normocephalic and atraumatic  Right Ear: External ear normal    Left Ear: External ear normal    Nose: Nose normal    Mouth/Throat: Uvula is midline and mucous membranes are normal    Posterior pharynx is crowded   Eyes: Conjunctivae, EOM and lids are normal  Pupils are equal, round, and reactive to light  No scleral icterus  No pallor  No cyanosis  No icterus   Neck: Trachea normal and normal range of motion  Neck supple  No JVD present  Carotid bruit is not present  No thyromegaly present  No jugular lymphadenopathy   Cardiovascular: Normal rate, regular rhythm, S1 normal, S2 normal, normal heart sounds, intact distal pulses and normal pulses  PMI is not displaced  Exam reveals no gallop, no S3, no S4 and no friction rub  No murmur heard  Pulmonary/Chest: Effort normal and breath sounds normal  No accessory muscle usage  No respiratory distress  She has no decreased breath sounds  She has no wheezes  She has no rhonchi  She has no rales  She exhibits no tenderness  Abdominal: Soft  Normal appearance and bowel sounds are normal  She exhibits no distension and no mass  There is no splenomegaly or hepatomegaly  There is no tenderness  Musculoskeletal: Normal range of motion  She exhibits no tenderness or deformity  Lymphadenopathy:     She has no cervical adenopathy  Neurological: She is alert and oriented to person, place, and time  Facial symmetry is retained  Extraocular movements are retained  Head neck tongue and palate movement are retained and symmetric   Skin: Skin is intact  No abrasion, no lesion and no rash noted  There is erythema  Nails show no clubbing  Some erythema of face   Psychiatric: She has a normal mood and affect  Her speech is normal and behavior is normal  Thought content normal        Discussion/Summary:    1   Atrial fibrillation, rapid ventricular response, failure of medication/ Post cryo ablation    No significant symptoms  Exertional intolerance,  but only recently started being active     The patient has undergone successful cryoablation of her atrial fibrillation  She is currently in sinus rhythm and symptoms are well controlled    My recommendation to the patient that as follows:  - Anticoagulation -she is off apixaban as was having bleeding from surgical site ; now that in sinus we will avoid the same   - rate control agent - on metoprolol tartrate 50 mg 2 times daily,helps in controlling the PACs  -patient was very symptomatic when in atrial fibrillation  Hence we will know when to restart blood thinner if she were to go back into atrial fibrillation  -she has no obstructive sleep apnea        2  Lower extremity edema  This is chronic  Follow-up with primary physician        3   Long QT  The patient's current QTC is around 460 milliseconds  There is no obvious medication that prolong QT  Avoid hypokalemia as she uses furosemide from time to time    Patient and primary care is being requested to let us know if any new medications are added  We do not want to start any QT prolonging medications in her

## 2018-04-24 NOTE — LETTER
April 29, 2018     MD Nessa Henderson  [address]  [address] [address]    Patient: Andrea Moreira   YOB: 1945   Date of Visit: 4/24/2018       Dear Dr Dmitri Mcmullen: Thank you for referring Duncan Smith to me for evaluation  Below are my notes for this consultation  If you have questions, please do not hesitate to call me  I look forward to following your patient along with you  Sincerely,        Elise Morgan MD        CC: No Recipients  Elise Morgan MD  4/24/2018  3:13 PM  Sign at close encounter                                             Cardiology Follow Up    Andrea Moreira  [birth year]  [id number]  [address]    1   SOB (shortness of breath)  POCT ECG   2  Persistent atrial fibrillation (HCC)         Interval History:     Patient doing well since ablation about 2 years ago  Recently started being active after quiet sometime- Had exertional intolerance - Hence got worried and came for a visit     According to her, she is cancer-free from her breast cancer      As far as her atrial fibrillation is concerned:  Her shortness of breath has improved very significantly  She is able to walk around the house without any difficulty  She does not complain of any angina like chest pain or chest pressure  She does not complain of any orthopnea, paroxysmal nocturnal dyspnea, leg swelling  She is not complaining of any palpitations, presyncope or syncope      She has undergone a successful ablation of her atrial fibrillation in August 2016    Her history of atrial fibrillation is as follows:  Patient is a very pleasant 44-year-old lady who was found to be in atrial fibrillation in April of 2016  She has not been feeling well for 2-3 months, when an EKG was done and atrial fibrillation identified  She was given a brief trial of dronedarone and cardioverted  She could not tolerate the dronedarone because of side effects  Had gone back into atrial fibrillation      She did feel occasional palpitations and lightheadedness ,orthopnea needing 2 pillows, Episodes of PND  significant exercise intolerance- she was able to walk 4-5 miles in 2015 but could hardly walk a mile  She had difficulty going up flights of steps and she does not walk uphill  She did have an echocardiogram her heart function was reduced       Patient Active Problem List   Diagnosis    Atrial fibrillation ([facility] [address] )    Benign essential hypertension    Bilateral edema of lower extremity    BPPV (benign paroxysmal positional vertigo)    Bursitis of left shoulder    Cardiomyopathy ([facility] [address] )    Cervical spinal stenosis    Edema    [facility]stein[facility] ([facility]    Hypokalemia    Hypothyroidism    Long QT interval    Lymphedema    Malignant neoplasm of unspecified site of right female breast ([facility] [address] )    Obesity    Osteopenia    Overactive bladder    Peripheral neuropathy    Vitamin D deficiency    Bronchitis with bronchospasm    Candidal esophagitis (HCC)    Abnormal chest CT    Pulmonary nodule seen on imaging study    SOB (shortness of breath)     Past Medical History:   Diagnosis Date    Abnormal chest CT     last assessed - [id number]    Abnormal glucose     Resolved - [id number]    Arrhythmia     Arthritis     Slight    BCC (basal cell carcinoma of skin)     last assessed - [id number]    Cardiomyopathy [facility])     Cervical spinal stenosis     Discharge from left nipple     last assessed - [id number]    Disease of thyroid gland     hypothyroidism    Full dentures     Upper and Lower    History of atrial fibrillation     Homocysteinemia ([facility] [address] )     Hypertension     Hypokalemia     Intraductal papilloma of breast     last assessed - [id number]    Malignant neoplasm of skin     basal cell on face, Moh's surgery    Memory loss     last assessed - [id number]    Obesity     Open wound     last assessed - [id number]   Dee Cottrell Osteopenia     Overactive bladder     Palmar plantar erythrodysaesthesia     Resolved - [id number]    Pulmonary nodule seen on imaging study     Rosacea     UTI (urinary tract infection)     recently    Vertigo     Vitamin D deficiency      Social History     Social History    Marital status: /Civil Union     Spouse name: N/A    Number of children: N/A    Years of education: N/A     Occupational History    Retired      Social History Main Topics    Smoking status: Never Smoker    Smokeless tobacco: Never Used    Alcohol use No    Drug use: No    Sexual activity: Not on file     Other Topics Concern    Not on file     Social History Narrative    Always uses seat belt    Convalescence after chemotherapy      Family History   Problem Relation Age of Onset    Coronary artery disease Mother      CABG    Diabetes Mother     Diabetes Sister     Coronary artery disease Brother      CABG    Diabetes Brother     Heart attack Son     Kidney cancer Son 48     Past Surgical History:   Procedure Laterality Date    APPENDECTOMY      ATRIAL ABLATION SURGERY      last assessed - [id number]   [facility] BACK SURGERY  1997    Lumbar spinal stenosis    BREAST BIOPSY      2014? left breast; 9/12/16 right breast x 3    BREAST SURGERY      needle bx      BREAST SURGERY Right 03/16/2017    excision of right chest wall lesion    CARDIAC ELECTROPHYSIOLOGY STUDY AND ABLATION      CARDIOVERSION      DENTAL SURGERY      HAND SURGERY      Joint replaced with silicone joint 5th finger right hand    HYSTERECTOMY      Complete    LAMINECTOMY      Decompressive up to two lumbar segments    MASTECTOMY Right 10/28/2016    OOPHORECTOMY  1993    MD EXC SKIN MALIG <0 5 CM TRUNK,ARM,LEG Right 3/16/2017    Procedure: EXCISION CHEST WALL LESION; NEEDLE LOC @ 1400;  Surgeon: Archana Santana MD;  Location:  MAIN OR;  Service: Surgical Oncology    MD INTRAOPERATIVE SENTINEL LYMPH NODE ID W DYE INJECTION Right 10/28/2016 Procedure: BREAST MASTECTOMY WITH SENTINAL LYMPH NODE BIOPSY; LYMPHSCINITIGRAPHY; LYMPHATIC MAPPING; 0800 NUC MED INJECTION;  Surgeon: Rob Meneses MD;  Location: [facility];  Service: Surgical Oncology    SENTINEL LYMPH NODE BIOPSY Right        Current Outpatient Prescriptions:     clotrimazole (MYCELEX) 10 mg brendon, Take 1 tablet (10 mg total) by mouth 5 (five) times a day, Disp: 35 tablet, Rfl: 0    cyanocobalamin (VITAMIN B-12) 500 mcg tablet, Take 1 tablet by mouth daily, Disp: , Rfl:     fluticasone furoate-vilanterol (BREO ELLIPTA), Inhale 1 puff daily, Disp: 14 each, Rfl: 0    furosemide (LASIX) 40 mg tablet, Take 40 mg by mouth 2 (two) times a day  , Disp: , Rfl:     levothyroxine 150 mcg tablet, Take by mouth, Disp: , Rfl:     metoprolol tartrate (LOPRESSOR) 50 mg tablet, Take 1 tablet by mouth 2 (two) times a day, Disp: , Rfl:     Multiple Vitamin (MV-ONE PO), Take 1 capsule by mouth daily, Disp: , Rfl:     Naproxen Sodium (ALEVE) 220 MG CAPS, Take by mouth, Disp: , Rfl:     potassium chloride (MICRO-K) 10 MEQ CR capsule, Take 1 capsule by mouth daily, Disp: , Rfl:     meclizine (ANTIVERT) 25 mg tablet, Take 1 tablet by mouth every 8 (eight) hours as needed, Disp: , Rfl:   Allergies   Allergen Reactions    Morphine And Related GI Intolerance    Oxycodone-Acetaminophen Vomiting       Labs:  Lab Results   Component Value Date     09/07/2017     09/07/2017    K 4 0 09/07/2017    K 4 0 09/07/2017     09/07/2017     09/07/2017    CO2 27 09/07/2017    CO2 27 09/07/2017    BUN 14 03/19/2018    CREATININE 1 08 (H) 03/19/2018    CREATININE 0 97 (H) 09/07/2017    CREATININE 0 97 (H) 09/07/2017    GLUCOSE 89 10/25/2016    GLUCOSE 95 06/19/2015    CALCIUM 8 7 03/19/2018     No results found for: CKTOTAL, CKMB, CKMBINDEX, TROPONINI  Lab Results   Component Value Date    WBC 4 1 09/07/2017    WBC 4 1 09/07/2017    HGB 13 2 03/19/2018    HGB 11 9 09/07/2017    HGB 11 9 09/07/2017 HCT 38 7 03/19/2018    HCT 35 0 09/07/2017    HCT 35 0 09/07/2017    MCV 93 5 03/19/2018    MCV 91 1 09/07/2017    MCV 91 1 09/07/2017     03/19/2018     09/07/2017     09/07/2017     Lab Results   Component Value Date    CHOL 110 09/07/2017    TRIG 69 09/07/2017    HDL 46 (L) 09/07/2017     Imaging: No results found  Review of Systems:  Review of Systems   All other systems reviewed and are negative  As described in my history of present illness        Physical Exam:  Physical Exam   Constitutional: She is oriented to person, place, and time  She appears well-developed and well-nourished  No distress  Not in any distress at the current time   HENT:   Head: Normocephalic and atraumatic  Right Ear: External ear normal    Left Ear: External ear normal    Nose: Nose normal    Mouth/Throat: Uvula is midline and mucous membranes are normal    Posterior pharynx is crowded   Eyes: Conjunctivae, EOM and lids are normal  Pupils are equal, round, and reactive to light  No scleral icterus  No pallor  No cyanosis  No icterus   Neck: Trachea normal and normal range of motion  Neck supple  No JVD present  Carotid bruit is not present  No thyromegaly present  No jugular lymphadenopathy   Cardiovascular: Normal rate, regular rhythm, S1 normal, S2 normal, normal heart sounds, intact distal pulses and normal pulses  PMI is not displaced  Exam reveals no gallop, no S3, no S4 and no friction rub  No murmur heard  Pulmonary/Chest: Effort normal and breath sounds normal  No accessory muscle usage  No respiratory distress  She has no decreased breath sounds  She has no wheezes  She has no rhonchi  She has no rales  She exhibits no tenderness  Abdominal: Soft  Normal appearance and bowel sounds are normal  She exhibits no distension and no mass  There is no splenomegaly or hepatomegaly  There is no tenderness  Musculoskeletal: Normal range of motion  She exhibits no tenderness or deformity  Lymphadenopathy:     She has no cervical adenopathy  Neurological: She is alert and oriented to person, place, and time  Facial symmetry is retained  Extraocular movements are retained  Head neck tongue and palate movement are retained and symmetric   Skin: Skin is intact  No abrasion, no lesion and no rash noted  There is erythema  Nails show no clubbing  Some erythema of face   Psychiatric: She has a normal mood and affect  Her speech is normal and behavior is normal  Thought content normal        Discussion/Summary:    1  Atrial fibrillation, rapid ventricular response, failure of medication/ Post cryo ablation    No significant symptoms  Exertional intolerance,  but only recently started being active     The patient has undergone successful cryoablation of her atrial fibrillation  She is currently in sinus rhythm and symptoms are well controlled    My recommendation to the patient that as follows:  - Anticoagulation -she is off apixaban as was having bleeding from surgical site ; now that in sinus we will avoid the same   - rate control agent - on metoprolol tartrate 50 mg 2 times daily,helps in controlling the PACs  -patient was very symptomatic when in atrial fibrillation  Hence we will know when to restart blood thinner if she were to go back into atrial fibrillation  -she has no obstructive sleep apnea        2  Lower extremity edema  This is chronic  Follow-up with primary physician        3   Long QT  The patient's current QTC is around 460 milliseconds  There is no obvious medication that prolong QT  Avoid hypokalemia as she uses furosemide from time to time    Patient and primary care is being requested to let us know if any new medications are added  We do not want to start any QT prolonging medications in her

## 2018-05-09 ENCOUNTER — OFFICE VISIT (OUTPATIENT)
Dept: HEMATOLOGY ONCOLOGY | Facility: HOSPITAL | Age: 73
End: 2018-05-09
Payer: COMMERCIAL

## 2018-05-09 VITALS
BODY MASS INDEX: 33.68 KG/M2 | TEMPERATURE: 96.8 F | HEART RATE: 56 BPM | DIASTOLIC BLOOD PRESSURE: 64 MMHG | OXYGEN SATURATION: 97 % | SYSTOLIC BLOOD PRESSURE: 108 MMHG | HEIGHT: 62 IN | RESPIRATION RATE: 16 BRPM | WEIGHT: 183 LBS

## 2018-05-09 DIAGNOSIS — C50.919 BREAST CANCER (HCC): Primary | ICD-10-CM

## 2018-05-09 DIAGNOSIS — E03.9 ACQUIRED HYPOTHYROIDISM: Primary | ICD-10-CM

## 2018-05-09 DIAGNOSIS — C50.911 MALIGNANT NEOPLASM OF RIGHT BREAST IN FEMALE, ESTROGEN RECEPTOR NEGATIVE, UNSPECIFIED SITE OF BREAST (HCC): ICD-10-CM

## 2018-05-09 DIAGNOSIS — Z17.1 MALIGNANT NEOPLASM OF RIGHT BREAST IN FEMALE, ESTROGEN RECEPTOR NEGATIVE, UNSPECIFIED SITE OF BREAST (HCC): ICD-10-CM

## 2018-05-09 PROCEDURE — 99214 OFFICE O/P EST MOD 30 MIN: CPT | Performed by: INTERNAL MEDICINE

## 2018-05-09 NOTE — PROGRESS NOTES
Hematology/Oncology Outpatient Follow- up Note  Cindy Porter 67 y o  female MRN: @ Encounter: [id number]        Date:  5/9/2018    Presenting Complaint/Diagnosis : Stage II a triple negative breast cancer  9 lymph nodes were removed all of which were negative for malignancy     HPI:      Previous Hematologic/ Oncologic History:      Surgery in the adjuvant setting  Dose #4 of Taxotere and Cytoxan was supposed to be on 22 February 2017 but she discontinued chemotherapy before then  Then had surgery for a small local recurrence/positive margin near clip in the surgical bed which was positive for 4 mm focus            Malignant neoplasm of unspecified site of right female breast ([facility] )    9/12/2016 Surgery     Right breast biopsy X 3 sites         9/29/2016 Initial Diagnosis     Malignant neoplasm of unspecified site of right female breast ([facility] )       10/28/2016 Surgery     Right breast mastectomy, [facility]  3/16/2017 Surgery     Right chest wall excision of lesion          Chemotherapy     Cytoxan, Taxotere  Dr Ricardo Canales             Current Hematologic/ Oncologic Treatment:      Observation    Interval History:      The patient returns for follow-up visit  She states she is doing well  Her CT scan from [address] [place] [address] had showed no evidence of recurrence or metastatic disease  She did have a small primary nodule which was stable for which follow-up was recommended  She is currently doing well  Denies any nausea denies any vomiting denies any diarrhea  Her imaging 6 months after her last scan will be due when she sees him back in 3-4 months  Otherwise she denies any other complaints  Denies any focal neurological signs or shortness of breath  Her 14 point review of systems today was otherwise negative  Test Results:    Imaging: No results found      Labs:   Lab Results   Component Value Date    WBC 4 1 09/07/2017    WBC 4 1 09/07/2017    HGB 13 2 03/19/2018    HCT 38 7 03/19/2018    MCV 93 5 03/19/2018    PLT 180 03/19/2018     Lab Results   Component Value Date     09/07/2017     09/07/2017    K 4 0 09/07/2017    K 4 0 09/07/2017     09/07/2017     09/07/2017    CO2 27 09/07/2017    CO2 27 09/07/2017    ANIONGAP 8 10/25/2016    BUN 14 03/19/2018    CREATININE 1 08 (H) 03/19/2018    GLUCOSE 89 10/25/2016    CALCIUM 8 7 03/19/2018    AST 24 09/07/2017    AST 24 09/07/2017    ALT 11 09/07/2017    ALT 11 09/07/2017    ALKPHOS 115 09/07/2017    ALKPHOS 115 09/07/2017    PROT 6 1 09/07/2017    PROT 6 1 09/07/2017    BILITOT 0 8 09/07/2017    BILITOT 0 8 09/07/2017    EGFR >60 0 10/25/2016         ROS: As stated in the history of present illness otherwise his 14 point review of systems today was negative        Active Problems:   Patient Active Problem List   Diagnosis    Atrial fibrillation (HCC)    Benign essential hypertension    Bilateral edema of lower extremity    BPPV (benign paroxysmal positional vertigo)    Bursitis of left shoulder    Cardiomyopathy ([address] )    Cervical spinal stenosis    Edema    Homocysteinemia ([address] )    Hypokalemia    Hypothyroidism    Long QT interval    Lymphedema    Malignant neoplasm of unspecified site of right female breast ([address] )    Obesity    Osteopenia    Overactive bladder    Peripheral neuropathy    Vitamin D deficiency    Bronchitis with bronchospasm    Candidal esophagitis (HCC)    Abnormal chest CT    Pulmonary nodule seen on imaging study    SOB (shortness of breath)       Past Medical History:   Past Medical History:   Diagnosis Date    Abnormal chest CT     last assessed - [id number]    Abnormal glucose     Resolved - [id number]    Arrhythmia     Arthritis     Slight    BCC (basal cell carcinoma of skin)     last assessed - [id number]    Cardiomyopathy [facility])     Cervical spinal stenosis     Discharge from left nipple     last assessed - [id number]    Disease of thyroid gland     hypothyroidism    Full dentures     Upper and Lower    History of atrial fibrillation     Homocysteinemia ([facility] )     Hypertension     Hypokalemia     Intraductal papilloma of breast     last assessed - [id number]    Malignant neoplasm of skin     basal cell on face, Moh's surgery    Memory loss     last assessed - [id number]    Obesity     Open wound     last assessed - [id number]    Osteopenia     Overactive bladder     Palmar plantar erythrodysaesthesia     Resolved - [id number]    Pulmonary nodule seen on imaging study     Rosacea     UTI (urinary tract infection)     recently    Vertigo     Vitamin D deficiency        Surgical History:   Past Surgical History:   Procedure Laterality Date    APPENDECTOMY      ATRIAL ABLATION SURGERY      last assessed - [id number]   Leola Hernandez BACK SURGERY  1997    Lumbar spinal stenosis    BREAST BIOPSY      2014? left breast; 9/12/16 right breast x 3    BREAST SURGERY      needle bx      BREAST SURGERY Right 03/16/2017    excision of right chest wall lesion    CARDIAC ELECTROPHYSIOLOGY STUDY AND ABLATION      CARDIOVERSION      DENTAL SURGERY      HAND SURGERY      Joint replaced with silicone joint 5th finger right hand    HYSTERECTOMY      Complete    LAMINECTOMY      Decompressive up to two lumbar segments    MASTECTOMY Right 10/28/2016    OOPHORECTOMY  1993    DE EXC SKIN MALIG <0 5 CM TRUNK,ARM,LEG Right 3/16/2017    Procedure: EXCISION CHEST WALL LESION; NEEDLE LOC @ 1400;  Surgeon: Ara Sandhu MD;  Location:  MAIN OR;  Service: Surgical Oncology    DE INTRAOPERATIVE SENTINEL LYMPH NODE ID W DYE INJECTION Right 10/28/2016    Procedure: BREAST MASTECTOMY WITH SENTINAL LYMPH NODE BIOPSY; LYMPHSCINITIGRAPHY; LYMPHATIC MAPPING; 0800 NUC MED INJECTION;  Surgeon: Ara Sandhu MD;  Location: [facility];  Service: Surgical Oncology    SENTINEL LYMPH NODE BIOPSY Right        Family History:    Family History   Problem Relation Age of Onset    Coronary artery disease Mother      CABG    Diabetes Mother    Leola David Diabetes Sister     Coronary artery disease Brother      CABG    Diabetes Brother     Heart attack Son     Kidney cancer Son 48       Cancer-related family history includes Kidney cancer (age of onset: 48) in her son  Social History:   Social History     Social History    Marital status: /Civil Union     Spouse name: N/A    Number of children: N/A    Years of education: N/A     Occupational History    Retired      Social History Main Topics    Smoking status: Never Smoker    Smokeless tobacco: Never Used    Alcohol use No    Drug use: No    Sexual activity: Not on file     Other Topics Concern    Not on file     Social History Narrative    Always uses seat belt    Convalescence after chemotherapy       Current Medications:   Current Outpatient Prescriptions   Medication Sig Dispense Refill    clotrimazole (MYCELEX) 10 mg brendon Take 1 tablet (10 mg total) by mouth 5 (five) times a day 35 tablet 0    cyanocobalamin (VITAMIN B-12) 500 mcg tablet Take 1 tablet by mouth daily      fluticasone furoate-vilanterol (BREO ELLIPTA) Inhale 1 puff daily 14 each 0    furosemide (LASIX) 40 mg tablet Take 40 mg by mouth 2 (two) times a day        levothyroxine 150 mcg tablet Take by mouth      meclizine (ANTIVERT) 25 mg tablet Take 1 tablet by mouth every 8 (eight) hours as needed      metoprolol tartrate (LOPRESSOR) 50 mg tablet Take 1 tablet by mouth 2 (two) times a day      Multiple Vitamin (MV-ONE PO) Take 1 capsule by mouth daily      Naproxen Sodium (ALEVE) 220 MG CAPS Take by mouth      potassium chloride (MICRO-K) 10 MEQ CR capsule Take 1 capsule by mouth daily       No current facility-administered medications for this visit  Allergies: Allergies   Allergen Reactions    Morphine And Related GI Intolerance    Oxycodone-Acetaminophen Vomiting       Physical Exam:    Body surface area is 1 84 meters squared      Wt Readings from Last 3 Encounters:   05/09/18 83 kg (183 lb) 04/24/18 85 3 kg (188 lb)   03/15/18 85 3 kg (188 lb)        Temp Readings from Last 3 Encounters:   05/09/18 (!) 96 8 °F (36 °C) (Tympanic)   02/28/18 98 1 °F (36 7 °C) (Tympanic)   02/12/18 98 7 °F (37 1 °C) (Tympanic)        BP Readings from Last 3 Encounters:   05/09/18 108/64   04/24/18 110/60   03/15/18 132/84         Pulse Readings from Last 3 Encounters:   05/09/18 56   04/24/18 (!) 54   03/15/18 64         Physical Exam     Constitutional   General appearance: No acute distress, well appearing and well nourished  Eyes   Conjunctiva and lids: No swelling, erythema or discharge  Pupils and irises: Equal, round and reactive to light  Ears, Nose, Mouth, and Throat   External inspection of ears and nose: Normal     Nasal mucosa, septum, and turbinates: Normal without edema or erythema  Oropharynx: Normal with no erythema, edema, exudate or lesions  Pulmonary   Respiratory effort: No increased work of breathing or signs of respiratory distress  Auscultation of lungs: Clear to auscultation  Cardiovascular   Palpation of heart: Normal PMI, no thrills  Auscultation of heart: Normal rate and rhythm, normal S1 and S2, without murmurs  Examination of extremities for edema and/or varicosities: Normal     Carotid pulses: Normal     Abdomen   Abdomen: Non-tender, no masses  Liver and spleen: No hepatomegaly or splenomegaly  Lymphatic   Palpation of lymph nodes in neck: No lymphadenopathy  Musculoskeletal   Gait and station: Normal     Digits and nails: Normal without clubbing or cyanosis  Inspection/palpation of joints, bones, and muscles: Normal     Skin   Skin and subcutaneous tissue: Normal without rashes or lesions  Neurologic   Cranial nerves: Cranial nerves 2-12 intact  Sensation: No sensory loss  Psychiatric   Orientation to person, place, and time: Normal     Mood and affect: Normal         Assessment / Plan:       This is a pleasant 28-year-old female who was diagnosed with a triple negative breast cancer  Her tumor was a T2 N0 M0 triple negative breast cancer with areas of DCIS  It was multifocal  It seemed to be a high-grade tumor  I advised adjuvant chemotherapy  I explained I would use Taxotere and Cytoxan every 3 weeks for 4 cycles  This was because she had negative lymph nodes otherwise they would have gotten an anthracycline-based chemotherapy  The patient agreed  She got 3 cycles and refused any further chemotherapy  Her most recent imaging shows no evidence of recurrence or metastatic disease  She has a small stable 4 mm nodule in her lung  At this point I'll continue her on observation  I'll see her back in 3 months  This will be with blood work And imaging  For now her alkaline phosphatase is normal as are her liver function tests  Tumor marker is also normal SHe saw Dr Ligia Aden her surgeon in March and a mammogram was ordered  Goals and Barriers:  Current Goal:  Prolong Survival from breast cancer  Barriers: None  Patient's Capacity to Self Care:  Patient able to self care  Portions of the record may have been created with voice recognition software   Occasional wrong word or "sound a like" substitutions may have occurred due to the inherent limitations of voice recognition software   Read the chart carefully and recognize, using context, where substitutions have occurred

## 2018-05-11 LAB
SL AMB T4, FREE (DIRECT): 1.48 NG/DL (ref 0.82–1.77)
TSH SERPL DL<=0.005 MIU/L-ACNC: 19.64 UIU/ML (ref 0.45–4.5)

## 2018-05-15 DIAGNOSIS — Z12.11 SCREEN FOR COLON CANCER: Primary | ICD-10-CM

## 2018-05-29 ENCOUNTER — OFFICE VISIT (OUTPATIENT)
Dept: FAMILY MEDICINE CLINIC | Facility: HOSPITAL | Age: 73
End: 2018-05-29
Payer: COMMERCIAL

## 2018-05-29 VITALS
TEMPERATURE: 97.4 F | BODY MASS INDEX: 34.74 KG/M2 | DIASTOLIC BLOOD PRESSURE: 78 MMHG | HEIGHT: 62 IN | HEART RATE: 72 BPM | SYSTOLIC BLOOD PRESSURE: 142 MMHG | RESPIRATION RATE: 20 BRPM | WEIGHT: 188.8 LBS

## 2018-05-29 DIAGNOSIS — I89.0 LYMPHEDEMA: ICD-10-CM

## 2018-05-29 DIAGNOSIS — R06.02 SOB (SHORTNESS OF BREATH): Primary | ICD-10-CM

## 2018-05-29 DIAGNOSIS — R60.9 EDEMA, UNSPECIFIED TYPE: ICD-10-CM

## 2018-05-29 DIAGNOSIS — E87.6 HYPOKALEMIA: ICD-10-CM

## 2018-05-29 DIAGNOSIS — E03.9 ACQUIRED HYPOTHYROIDISM: ICD-10-CM

## 2018-05-29 DIAGNOSIS — I10 BENIGN ESSENTIAL HYPERTENSION: ICD-10-CM

## 2018-05-29 PROCEDURE — 99214 OFFICE O/P EST MOD 30 MIN: CPT | Performed by: FAMILY MEDICINE

## 2018-05-29 RX ORDER — POTASSIUM CHLORIDE 750 MG/1
10 CAPSULE, EXTENDED RELEASE ORAL DAILY
Qty: 90 CAPSULE | Refills: 1 | Status: SHIPPED | OUTPATIENT
Start: 2018-05-29 | End: 2018-11-21 | Stop reason: SDUPTHER

## 2018-05-29 RX ORDER — METOLAZONE 2.5 MG/1
TABLET ORAL
Qty: 20 TABLET | Refills: 1 | Status: SHIPPED | OUTPATIENT
Start: 2018-05-29 | End: 2018-07-21 | Stop reason: SDUPTHER

## 2018-05-29 NOTE — PROGRESS NOTES
Assessment/Plan:         Diagnoses and all orders for this visit:    SOB (shortness of breath)  Comments:  Aim to improve SOB by reducing edema, add Zaroxolyn    Edema, unspecified type  -     metolazone (ZAROXOLYN) 2 5 mg tablet; Take one tablet M-W-F  -     CBC and Platelet; Future  -     CBC and Platelet    Hypokalemia  Comments:  Recheck electrolytes prior to next recheck  Orders:  -     potassium chloride (MICRO-K) 10 MEQ CR capsule; Take 1 capsule (10 mEq total) by mouth daily  -     Magnesium; Future  -     Magnesium    Benign essential hypertension  Comments:  Good BP control  Orders:  -     Comprehensive metabolic panel; Future  -     Comprehensive metabolic panel    Acquired hypothyroidism  Comments:  Improving TSH on increased dose  Lymphedema          Subjective:      Patient ID: Morro Dillard is a 67 y o  female  Feeling SOB with any activity, has to stop after short distances        The following portions of the patient's history were reviewed and updated as appropriate: allergies, current medications, past family history, past medical history, past social history, past surgical history and problem list     Review of Systems   Constitutional: Positive for unexpected weight change  Respiratory: Positive for shortness of breath  Negative for cough  Cardiovascular: Positive for leg swelling  Gastrointestinal: Negative  Musculoskeletal: Positive for arthralgias  Skin: Negative for rash  Allergic/Immunologic: Negative for environmental allergies  Neurological: Negative for tremors, weakness and headaches  Hematological: Negative  Objective:      /78   Pulse 72   Temp (!) 97 4 °F (36 3 °C)   Resp 20   Ht 5' 2" (1 575 m)   Wt 85 6 kg (188 lb 12 8 oz)   BMI 34 53 kg/m²          Physical Exam   Constitutional: She is oriented to person, place, and time  She appears well-developed and well-nourished  Neck: Neck supple     Pulmonary/Chest: Effort normal and breath sounds normal    Musculoskeletal: She exhibits edema  Neurological: She is alert and oriented to person, place, and time  Psychiatric: She has a normal mood and affect  Her behavior is normal  Judgment and thought content normal    Nursing note and vitals reviewed

## 2018-06-05 LAB
ALBUMIN SERPL-MCNC: 4.1 G/DL (ref 3.5–4.8)
ALBUMIN/GLOB SERPL: 1.2 {RATIO} (ref 1.2–2.2)
ALP SERPL-CCNC: 109 IU/L (ref 39–117)
ALT SERPL-CCNC: 12 IU/L (ref 0–32)
AST SERPL-CCNC: 28 IU/L (ref 0–40)
BILIRUB SERPL-MCNC: 1 MG/DL (ref 0–1.2)
BUN SERPL-MCNC: 24 MG/DL (ref 8–27)
BUN/CREAT SERPL: 20 (ref 12–28)
CALCIUM SERPL-MCNC: 9.5 MG/DL (ref 8.7–10.3)
CHLORIDE SERPL-SCNC: 96 MMOL/L (ref 96–106)
CO2 SERPL-SCNC: 32 MMOL/L (ref 18–29)
CREAT SERPL-MCNC: 1.21 MG/DL (ref 0.57–1)
ERYTHROCYTE [DISTWIDTH] IN BLOOD BY AUTOMATED COUNT: 13.6 % (ref 12.3–15.4)
GLOBULIN SER-MCNC: 3.4 G/DL (ref 1.5–4.5)
GLUCOSE SERPL-MCNC: 100 MG/DL (ref 65–99)
HCT VFR BLD AUTO: 41.9 % (ref 34–46.6)
HGB BLD-MCNC: 14.6 G/DL (ref 11.1–15.9)
MAGNESIUM SERPL-MCNC: 2.1 MG/DL (ref 1.6–2.3)
MCH RBC QN AUTO: 31.6 PG (ref 26.6–33)
MCHC RBC AUTO-ENTMCNC: 34.8 G/DL (ref 31.5–35.7)
MCV RBC AUTO: 91 FL (ref 79–97)
PLATELET # BLD AUTO: 210 X10E3/UL (ref 150–379)
POTASSIUM SERPL-SCNC: 3.2 MMOL/L (ref 3.5–5.2)
PROT SERPL-MCNC: 7.5 G/DL (ref 6–8.5)
RBC # BLD AUTO: 4.62 X10E6/UL (ref 3.77–5.28)
SL AMB EGFR AFRICAN AMERICAN: 52 ML/MIN/1.73
SL AMB EGFR NON AFRICAN AMERICAN: 45 ML/MIN/1.73
SODIUM SERPL-SCNC: 145 MMOL/L (ref 134–144)
WBC # BLD AUTO: 6 X10E3/UL (ref 3.4–10.8)

## 2018-06-12 ENCOUNTER — OFFICE VISIT (OUTPATIENT)
Dept: FAMILY MEDICINE CLINIC | Facility: HOSPITAL | Age: 73
End: 2018-06-12
Payer: COMMERCIAL

## 2018-06-12 VITALS
SYSTOLIC BLOOD PRESSURE: 120 MMHG | WEIGHT: 180 LBS | BODY MASS INDEX: 33.13 KG/M2 | TEMPERATURE: 98.7 F | HEART RATE: 68 BPM | DIASTOLIC BLOOD PRESSURE: 64 MMHG | HEIGHT: 62 IN

## 2018-06-12 DIAGNOSIS — I10 BENIGN ESSENTIAL HYPERTENSION: ICD-10-CM

## 2018-06-12 DIAGNOSIS — E87.6 HYPOKALEMIA: ICD-10-CM

## 2018-06-12 DIAGNOSIS — E03.9 ACQUIRED HYPOTHYROIDISM: ICD-10-CM

## 2018-06-12 DIAGNOSIS — R60.0 BILATERAL EDEMA OF LOWER EXTREMITY: Primary | ICD-10-CM

## 2018-06-12 PROCEDURE — 99213 OFFICE O/P EST LOW 20 MIN: CPT | Performed by: FAMILY MEDICINE

## 2018-06-12 NOTE — PROGRESS NOTES
Assessment/Plan:         Diagnoses and all orders for this visit:    Bilateral edema of lower extremity  -     Basic metabolic panel; Future  -     Basic metabolic panel    Benign essential hypertension  Comments:  Excellent BP control    Acquired hypothyroidism  -     TSH, 3rd generation; Future  -     T4, free; Future  -     TSH, 3rd generation  -     T4, free    Hypokalemia    Hypokalemia  Comments:  Recheck electrolytes prior to next recheck          Subjective:      Patient ID: Usman Smith is a 67 y o  female  2 week followup  Did very well with Zaroxolyn and noted decreased edema as a result    Energy is adequate  The following portions of the patient's history were reviewed and updated as appropriate: allergies, current medications, past family history, past medical history, past social history, past surgical history and problem list     Review of Systems   Constitutional: Positive for fatigue  HENT: Negative  Eyes: Negative for visual disturbance  Respiratory: Negative  Cardiovascular: Negative  Gastrointestinal: Negative  Endocrine: Negative  Genitourinary: Negative  Musculoskeletal: Positive for arthralgias and joint swelling  Allergic/Immunologic: Negative  Neurological: Negative  Hematological: Negative  Psychiatric/Behavioral: Negative  Objective:      /64 (BP Location: Left arm, Patient Position: Sitting, Cuff Size: Large)   Pulse 68   Temp 98 7 °F (37 1 °C) (Tympanic)   Ht 5' 2" (1 575 m)   Wt 81 6 kg (180 lb)   BMI 32 92 kg/m²          Physical Exam   Constitutional: She is oriented to person, place, and time  She appears well-developed and well-nourished  Neck: Thyromegaly present  Cardiovascular: Normal rate and regular rhythm  Pulmonary/Chest: Effort normal and breath sounds normal    Musculoskeletal: She exhibits edema  Neurological: She is oriented to person, place, and time     Psychiatric: She has a normal mood and affect  Her behavior is normal  Judgment and thought content normal    Nursing note and vitals reviewed

## 2018-07-14 LAB
BUN SERPL-MCNC: 12 MG/DL (ref 8–27)
BUN/CREAT SERPL: 10 (ref 12–28)
CALCIUM SERPL-MCNC: 9.4 MG/DL (ref 8.7–10.3)
CHLORIDE SERPL-SCNC: 95 MMOL/L (ref 96–106)
CO2 SERPL-SCNC: 31 MMOL/L (ref 20–29)
CREAT SERPL-MCNC: 1.18 MG/DL (ref 0.57–1)
GLUCOSE SERPL-MCNC: 98 MG/DL (ref 65–99)
POTASSIUM SERPL-SCNC: 3.3 MMOL/L (ref 3.5–5.2)
SL AMB EGFR AFRICAN AMERICAN: 53 ML/MIN/1.73
SL AMB EGFR NON AFRICAN AMERICAN: 46 ML/MIN/1.73
SODIUM SERPL-SCNC: 141 MMOL/L (ref 134–144)
T4 FREE SERPL-MCNC: 2.38 NG/DL (ref 0.82–1.77)
TSH SERPL DL<=0.005 MIU/L-ACNC: 0.06 UIU/ML (ref 0.45–4.5)

## 2018-07-17 DIAGNOSIS — R60.9 EDEMA, UNSPECIFIED TYPE: Primary | ICD-10-CM

## 2018-07-21 DIAGNOSIS — R60.9 EDEMA, UNSPECIFIED TYPE: ICD-10-CM

## 2018-07-22 RX ORDER — METOLAZONE 2.5 MG/1
TABLET ORAL
Qty: 20 TABLET | Refills: 1 | Status: SHIPPED | OUTPATIENT
Start: 2018-07-22 | End: 2019-03-07 | Stop reason: ALTCHOICE

## 2018-07-22 RX ORDER — FUROSEMIDE 40 MG/1
TABLET ORAL
Qty: 90 TABLET | Refills: 1 | Status: SHIPPED | OUTPATIENT
Start: 2018-07-22 | End: 2019-01-20 | Stop reason: SDUPTHER

## 2018-08-07 ENCOUNTER — HOSPITAL ENCOUNTER (OUTPATIENT)
Dept: CT IMAGING | Facility: HOSPITAL | Age: 73
Discharge: HOME | End: 2018-08-07
Attending: INTERNAL MEDICINE
Payer: COMMERCIAL

## 2018-08-07 DIAGNOSIS — C50.919 BREAST CANCER (HCC): ICD-10-CM

## 2018-08-07 PROCEDURE — 74177 CT ABD & PELVIS W/CONTRAST: CPT

## 2018-08-07 PROCEDURE — 71260 CT THORAX DX C+: CPT

## 2018-08-07 RX ADMIN — IODIXANOL 90 ML: 320 INJECTION, SOLUTION INTRAVASCULAR at 09:16

## 2018-08-16 LAB
ALBUMIN SERPL-MCNC: 3.2 G/DL (ref 3.6–5.1)
ALBUMIN/GLOB SERPL: 1.1 (CALC) (ref 1–2.5)
ALP SERPL-CCNC: 83 U/L (ref 33–130)
ALT SERPL-CCNC: 10 U/L (ref 6–29)
AST SERPL-CCNC: 22 U/L (ref 10–35)
BASOPHILS # BLD AUTO: 32 CELLS/UL (ref 0–200)
BASOPHILS NFR BLD AUTO: 0.7 %
BILIRUB SERPL-MCNC: 0.9 MG/DL (ref 0.2–1.2)
BUN SERPL-MCNC: 10 MG/DL (ref 7–25)
BUN/CREAT SERPL: 10 (CALC) (ref 6–22)
CALCIUM SERPL-MCNC: 9 MG/DL (ref 8.6–10.4)
CHLORIDE SERPL-SCNC: 109 MMOL/L (ref 98–110)
CO2 SERPL-SCNC: 26 MMOL/L (ref 20–32)
CREAT SERPL-MCNC: 1 MG/DL (ref 0.6–0.93)
EOSINOPHIL # BLD AUTO: 162 CELLS/UL (ref 15–500)
EOSINOPHIL NFR BLD AUTO: 3.6 %
ERYTHROCYTE [DISTWIDTH] IN BLOOD BY AUTOMATED COUNT: 13.4 % (ref 11–15)
GLOBULIN SER CALC-MCNC: 2.8 G/DL (CALC) (ref 1.9–3.7)
GLUCOSE SERPL-MCNC: 85 MG/DL (ref 65–99)
HCT VFR BLD AUTO: 38.4 % (ref 35–45)
HGB BLD-MCNC: 13 G/DL (ref 11.7–15.5)
LYMPHOCYTES # BLD AUTO: 995 CELLS/UL (ref 850–3900)
LYMPHOCYTES NFR BLD AUTO: 22.1 %
MCH RBC QN AUTO: 30.8 PG (ref 27–33)
MCHC RBC AUTO-ENTMCNC: 33.9 G/DL (ref 32–36)
MCV RBC AUTO: 91 FL (ref 80–100)
MONOCYTES # BLD AUTO: 365 CELLS/UL (ref 200–950)
MONOCYTES NFR BLD AUTO: 8.1 %
NEUTROPHILS # BLD AUTO: 2948 CELLS/UL (ref 1500–7800)
NEUTROPHILS NFR BLD AUTO: 65.5 %
PLATELET # BLD AUTO: 170 THOUSAND/UL (ref 140–400)
PMV BLD REES-ECKER: 11.3 FL (ref 7.5–12.5)
POTASSIUM SERPL-SCNC: 3.8 MMOL/L (ref 3.5–5.3)
PROT SERPL-MCNC: 6 G/DL (ref 6.1–8.1)
RBC # BLD AUTO: 4.22 MILLION/UL (ref 3.8–5.1)
SL AMB CA27.29 (CHIRON): 10 U/ML
SL AMB EGFR AFRICAN AMERICAN: 65 ML/MIN/1.73M2
SL AMB EGFR NON AFRICAN AMERICAN: 56 ML/MIN/1.73M2
SODIUM SERPL-SCNC: 143 MMOL/L (ref 135–146)
WBC # BLD AUTO: 4.5 THOUSAND/UL (ref 3.8–10.8)

## 2018-08-23 DIAGNOSIS — E03.9 ACQUIRED HYPOTHYROIDISM: Primary | ICD-10-CM

## 2018-08-23 RX ORDER — LEVOTHYROXINE SODIUM 0.15 MG/1
TABLET ORAL
Qty: 30 TABLET | Refills: 5 | Status: SHIPPED | OUTPATIENT
Start: 2018-08-23 | End: 2019-03-26 | Stop reason: SDUPTHER

## 2018-08-23 RX ORDER — LEVOTHYROXINE SODIUM 0.15 MG/1
TABLET ORAL
Qty: 90 TABLET | Refills: 3 | Status: CANCELLED | OUTPATIENT
Start: 2018-08-23

## 2018-09-04 ENCOUNTER — HOSPITAL ENCOUNTER (OUTPATIENT)
Dept: MAMMOGRAPHY | Facility: CLINIC | Age: 73
Discharge: HOME | End: 2018-09-04
Payer: COMMERCIAL

## 2018-09-04 DIAGNOSIS — C50.911 MALIGNANT NEOPLASM OF RIGHT BREAST IN FEMALE, ESTROGEN RECEPTOR NEGATIVE, UNSPECIFIED SITE OF BREAST (HCC): ICD-10-CM

## 2018-09-04 DIAGNOSIS — Z17.1 MALIGNANT NEOPLASM OF RIGHT BREAST IN FEMALE, ESTROGEN RECEPTOR NEGATIVE, UNSPECIFIED SITE OF BREAST (HCC): ICD-10-CM

## 2018-09-04 PROCEDURE — G0279 TOMOSYNTHESIS, MAMMO: HCPCS

## 2018-09-04 PROCEDURE — 77065 DX MAMMO INCL CAD UNI: CPT

## 2018-09-10 ENCOUNTER — OFFICE VISIT (OUTPATIENT)
Dept: HEMATOLOGY ONCOLOGY | Facility: HOSPITAL | Age: 73
End: 2018-09-10
Payer: COMMERCIAL

## 2018-09-10 VITALS
HEART RATE: 67 BPM | TEMPERATURE: 97.5 F | BODY MASS INDEX: 31.91 KG/M2 | SYSTOLIC BLOOD PRESSURE: 122 MMHG | DIASTOLIC BLOOD PRESSURE: 80 MMHG | HEIGHT: 61 IN | OXYGEN SATURATION: 94 % | WEIGHT: 169 LBS | RESPIRATION RATE: 16 BRPM

## 2018-09-10 DIAGNOSIS — C50.911 MALIGNANT NEOPLASM OF RIGHT BREAST IN FEMALE, ESTROGEN RECEPTOR NEGATIVE, UNSPECIFIED SITE OF BREAST (HCC): Primary | ICD-10-CM

## 2018-09-10 DIAGNOSIS — Z17.1 MALIGNANT NEOPLASM OF RIGHT BREAST IN FEMALE, ESTROGEN RECEPTOR NEGATIVE, UNSPECIFIED SITE OF BREAST (HCC): Primary | ICD-10-CM

## 2018-09-10 PROCEDURE — 99214 OFFICE O/P EST MOD 30 MIN: CPT | Performed by: INTERNAL MEDICINE

## 2018-09-10 NOTE — PROGRESS NOTES
Hematology/Oncology Outpatient Follow- up Note  Dusty Mullins 67 y o  female MRN: @ Encounter: [id number]        Date:  9/10/2018    Presenting Complaint/Diagnosis :     Stage II a triple negative breast cancer  9 lymph nodes were removed all of which were negative for malignancy     Previous Hematologic/ Oncologic History:      Surgery in the adjuvant setting  Dose #4 of Taxotere and Cytoxan was supposed to be on 22 February 2017 but she discontinued chemotherapy before then       Then had surgery for a small local recurrence/positive margin near clip in the surgical bed which was positive for 4 mm focus        Malignant neoplasm of unspecified site of right female breast ([facility] )    9/12/2016 Surgery     Right breast biopsy X 3 sites         9/29/2016 Initial Diagnosis     Malignant neoplasm of unspecified site of right female breast ([facility] )       10/28/2016 Surgery     Right breast mastectomy, SLNB  3/16/2017 Surgery     Right chest wall excision of lesion          Chemotherapy     Cytoxan, Taxotere  Dr Negin Santana             Current Hematologic/ Oncologic Treatment:      Observation    Interval History:      The patient returns for follow-up visit  Her most recent imaging shows no evidence of disease  Her tumor marker is within normal limits  Liver function tests were within normal limits  Albumin is low  CBC with differential was within normal limits  The patient herself is at baseline  Denies any nausea and vomiting denies any diarrhea  The rest of her 14 point review of systems today was negative  Test Results:    Imaging: Mammo Diagnostic Left W 3d & Cad    Result Date: 9/4/2018  Narrative: Patient History: Patient is postmenopausal, has history of cancer in the right breast at age 70, and has 2 history of cancer in the right breast at age 79  No known family history of cancer  Malignant excisional biopsy of the right breast, March 16, 2017  Malignant mastectomy of the right breast, October 28, 2016  Malignant US guidance breast biopsy right (ea add), September 12, 2016  Benign US guided breast biopsy right, September 12, 2016  Mammo Pathology Letter of the right breast, September 12, 2016  Benign WBUS breast guidance of the left breast, March 30, 2010  Patient has never smoked  Patient's BMI is 33 8  Reason for exam: history of breast cancer, mastectomy  Patient status post right mastectomy  No current left breast complaints  Routine follow-up Mammo Diagnostic Left W DBT and CAD: September 4, 2018 - Check In #: [de-identified] CC and MLO view(s) were taken of the left breast  Technologist: Fannie Hodgkin, RT(R)(M) Prior study comparison: September 1, 2017, mammo diagnostic left W DBT and CAD performed at [address] [place] [address]  August 30, 2016, mammo diagnostic bilateral W CAD performed at [address] [place] [address]  June 24, 2014, bilateral OPMA digital scrn mammo w/CAD, performed at [address] [facility]  There are scattered fibroglandular densities  There are benign calcifications in the left breast and there is a biopsy marker clip noted  There is no evidence of developing mass or malignant microcalcification cluster  The skin and nipple contours are normal  IMPRESSION Stable benign findings ACR BI-RADS® Assessments: BiRad:2 - Benign Recommendation: Follow-up diagnostic mammogram of the left breast in 1 year  The patient is scheduled in a reminder system for screening mammography  8-10% of cancers will be missed on mammography  Management of a palpable abnormality must be based on clinical grounds  Patients will be notified of their results via letter from our facility  Accredited by [employer] of [employer] and FDA  Transcription Location: [address] [place], [place] [address]way: [id number] Risk Value(s): MRS : Based on personal and/or family history, consideration of hereditary risk assessment may be warranted        Labs:   Lab Results   Component Value Date WBC 4 5 08/14/2018    HGB 13 0 08/14/2018    HCT 38 4 08/14/2018    MCV 91 0 08/14/2018     08/14/2018     Lab Results   Component Value Date     09/07/2017     09/07/2017    K 4 0 09/07/2017    K 4 0 09/07/2017     08/14/2018    CO2 26 08/14/2018    ANIONGAP 6 06/19/2015    BUN 10 08/14/2018    CREATININE 1 00 (H) 08/14/2018    GLUCOSE 95 06/19/2015    CALCIUM 9 0 08/14/2018    AST 24 09/07/2017    AST 24 09/07/2017    ALT 11 09/07/2017    ALT 11 09/07/2017    ALKPHOS 83 08/14/2018    PROT 6 1 09/07/2017    PROT 6 1 09/07/2017    BILITOT 0 8 09/07/2017    BILITOT 0 8 09/07/2017    EGFR >60 0 10/25/2016         ROS: As stated in the history of present illness otherwise his 14 point review of systems today was negative        Active Problems:   Patient Active Problem List   Diagnosis    Atrial fibrillation (HCC)    Benign essential hypertension    Bilateral edema of lower extremity    BPPV (benign paroxysmal positional vertigo)    Bursitis of left shoulder    Cardiomyopathy ([address] )    Cervical spinal stenosis    Edema    Homocysteinemia ([address] )    Hypokalemia    Hypothyroidism    Long QT interval    Lymphedema    Malignant neoplasm of unspecified site of right female breast ([address] )    Obesity    Osteopenia    Overactive bladder    Peripheral neuropathy    Vitamin D deficiency    Bronchitis with bronchospasm    Candidal esophagitis (HCC)    Abnormal chest CT    Pulmonary nodule seen on imaging study    SOB (shortness of breath)       Past Medical History:   Past Medical History:   Diagnosis Date    Abnormal chest CT     last assessed - [id number]    Abnormal glucose     Resolved - [id number]    Arrhythmia     Arthritis     Slight    BCC (basal cell carcinoma of skin)     last assessed - [id number]    Cardiomyopathy [facility])     Cervical spinal stenosis     Discharge from left nipple     last assessed - [id number]    Disease of thyroid gland     hypothyroidism    Full dentures     Upper and Lower    History of atrial fibrillation     Homocysteinemia ([facility] )     Hypertension     Hypokalemia     Intraductal papilloma of breast     last assessed - [id number]    Malignant neoplasm of skin     basal cell on face, Moh's surgery    Memory loss     last assessed - [id number]    Obesity     Open wound     last assessed - [id number]    Osteopenia     Overactive bladder     Palmar plantar erythrodysaesthesia     Resolved - [id number]    Pulmonary nodule seen on imaging study     Rosacea     UTI (urinary tract infection)     recently    Vertigo     Vitamin D deficiency        Surgical History:   Past Surgical History:   Procedure Laterality Date    APPENDECTOMY      ATRIAL ABLATION SURGERY      last assessed - [id number]   [employer] BACK SURGERY  1997    Lumbar spinal stenosis    BREAST BIOPSY      2014? left breast; 9/12/16 right breast x 3    BREAST SURGERY      needle bx      BREAST SURGERY Right 03/16/2017    excision of right chest wall lesion    CARDIAC ELECTROPHYSIOLOGY STUDY AND ABLATION      CARDIOVERSION      DENTAL SURGERY      HAND SURGERY      Joint replaced with silicone joint 5th finger right hand    HYSTERECTOMY      Complete    LAMINECTOMY      Decompressive up to two lumbar segments    MASTECTOMY Right 10/28/2016    OOPHORECTOMY  1993    CA EXC SKIN MALIG <0 5 CM TRUNK,ARM,LEG Right 3/16/2017    Procedure: EXCISION CHEST WALL LESION; NEEDLE LOC @ 1400;  Surgeon: Vanna Nguyễn MD;  [facility];  Service: Surgical Oncology    CA INTRAOPERATIVE SENTINEL LYMPH NODE ID W DYE INJECTION Right 10/28/2016    Procedure: BREAST MASTECTOMY WITH SENTINAL LYMPH NODE BIOPSY; LYMPHSCINITIGRAPHY; LYMPHATIC MAPPING; 0800 NUC MED INJECTION;  Surgeon: Vanna Nguyễn MD;  [facility];  Service: Surgical Oncology    SENTINEL LYMPH NODE BIOPSY Right        Family History:    Family History   Problem Relation Age of Onset    Coronary artery disease Mother CABG    Diabetes Mother     Diabetes Sister     Coronary artery disease Brother         CABG    Diabetes Brother     Heart attack Son     Kidney cancer Son 48       Cancer-related family history includes Kidney cancer (age of onset: 48) in her son  Social History:   Social History     Social History    Marital status: /Civil Union     Spouse name: N/A    Number of children: N/A    Years of education: N/A     Occupational History    Retired      Social History Main Topics    Smoking status: Never Smoker    Smokeless tobacco: Never Used    Alcohol use No    Drug use: No    Sexual activity: Not on file     Other Topics Concern    Not on file     Social History Narrative    Always uses seat belt    Convalescence after chemotherapy       Current Medications:   Current Outpatient Prescriptions   Medication Sig Dispense Refill    cyanocobalamin (VITAMIN B-12) 500 mcg tablet Take 1 tablet by mouth daily      furosemide (LASIX) 40 mg tablet TAKE 1 TABLET BY MOUTH DAILY AS DIRECTED 90 tablet 1    levothyroxine 150 mcg tablet Take one tablet 4 days a week and 1/2 tablet three days a week 30 tablet 5    metolazone (ZAROXOLYN) 2 5 mg tablet TAKE ONE TABLET M-W-F 20 tablet 1    metoprolol tartrate (LOPRESSOR) 50 mg tablet Take 1 tablet by mouth 2 (two) times a day      Naproxen Sodium (ALEVE) 220 MG CAPS Take by mouth      potassium chloride (MICRO-K) 10 MEQ CR capsule Take 1 capsule (10 mEq total) by mouth daily 90 capsule 1     No current facility-administered medications for this visit  Allergies: Allergies   Allergen Reactions    Morphine And Related GI Intolerance    Oxycodone-Acetaminophen Vomiting       Physical Exam:    Body surface area is 1 76 meters squared      Wt Readings from Last 3 Encounters:   09/10/18 76 7 kg (169 lb)   06/12/18 81 6 kg (180 lb)   05/29/18 85 6 kg (188 lb 12 8 oz)        Temp Readings from Last 3 Encounters:   09/10/18 97 5 °F (36 4 °C) (Tympanic) 06/12/18 98 7 °F (37 1 °C) (Tympanic)   05/29/18 (!) 97 4 °F (36 3 °C)        BP Readings from Last 3 Encounters:   09/10/18 122/80   06/12/18 120/64   05/29/18 142/78         Pulse Readings from Last 3 Encounters:   09/10/18 67   06/12/18 68   05/29/18 72     @LASTSAO2(3)@      Physical Exam     Constitutional   General appearance: No acute distress, well appearing and well nourished  Eyes   Conjunctiva and lids: No swelling, erythema or discharge  Pupils and irises: Equal, round and reactive to light  Ears, Nose, Mouth, and Throat   External inspection of ears and nose: Normal     Nasal mucosa, septum, and turbinates: Normal without edema or erythema  Oropharynx: Normal with no erythema, edema, exudate or lesions  Pulmonary   Respiratory effort: No increased work of breathing or signs of respiratory distress  Auscultation of lungs: Clear to auscultation  Cardiovascular   Palpation of heart: Normal PMI, no thrills  Auscultation of heart: Normal rate and rhythm, normal S1 and S2, without murmurs  Examination of extremities for edema and/or varicosities: Normal     Carotid pulses: Normal     Abdomen   Abdomen: Non-tender, no masses  Liver and spleen: No hepatomegaly or splenomegaly  Lymphatic   Palpation of lymph nodes in neck: No lymphadenopathy  Musculoskeletal   Gait and station: Normal     Digits and nails: Normal without clubbing or cyanosis  Inspection/palpation of joints, bones, and muscles: Normal     Skin   Skin and subcutaneous tissue: Normal without rashes or lesions  Neurologic   Cranial nerves: Cranial nerves 2-12 intact  Sensation: No sensory loss  Psychiatric   Orientation to person, place, and time: Normal     Mood and affect: Normal         Assessment / Plan: This is a pleasant 80-year-old female who was diagnosed with a triple negative breast cancer  Her tumor was a T2 N0 M0 triple negative breast cancer with areas of DCIS   It was multifocal  It seemed to be a high-grade tumor  I advised adjuvant chemotherapy  I explained I would use Taxotere and Cytoxan every 3 weeks for 4 cycles  This was because she had negative lymph nodes otherwise they would have gotten an anthracycline-based chemotherapy  The patient agreed  She got 3 cycles and refused any further chemotherapy  Her most recent imaging shows no evidence of recurrence or metastatic disease  At this point I'll continue her on observation  I'll see her back in 4 months  This will be with blood work  I will repeat her imaging in 8 months  She will continue to get her mammograms and see her surgeon regularly  Goals and Barriers:  Current Goal:  Prolong Survival from Triple negative breast cancer   Barriers: None  Patient's Capacity to Self Care:  Patient  able to self care  Portions of the record may have been created with voice recognition software   Occasional wrong word or "sound a like" substitutions may have occurred due to the inherent limitations of voice recognition software   Read the chart carefully and recognize, using context, where substitutions have occurred

## 2018-09-12 ENCOUNTER — OFFICE VISIT (OUTPATIENT)
Dept: FAMILY MEDICINE CLINIC | Facility: HOSPITAL | Age: 73
End: 2018-09-12
Payer: COMMERCIAL

## 2018-09-12 VITALS
HEIGHT: 61 IN | DIASTOLIC BLOOD PRESSURE: 78 MMHG | WEIGHT: 167.6 LBS | BODY MASS INDEX: 31.64 KG/M2 | SYSTOLIC BLOOD PRESSURE: 120 MMHG | HEART RATE: 80 BPM | TEMPERATURE: 97.5 F

## 2018-09-12 DIAGNOSIS — C50.911 MALIGNANT NEOPLASM OF RIGHT BREAST IN FEMALE, ESTROGEN RECEPTOR NEGATIVE, UNSPECIFIED SITE OF BREAST (HCC): ICD-10-CM

## 2018-09-12 DIAGNOSIS — R63.4 WEIGHT LOSS: ICD-10-CM

## 2018-09-12 DIAGNOSIS — E03.9 ACQUIRED HYPOTHYROIDISM: Primary | ICD-10-CM

## 2018-09-12 DIAGNOSIS — I10 BENIGN ESSENTIAL HYPERTENSION: ICD-10-CM

## 2018-09-12 DIAGNOSIS — Z17.1 MALIGNANT NEOPLASM OF RIGHT BREAST IN FEMALE, ESTROGEN RECEPTOR NEGATIVE, UNSPECIFIED SITE OF BREAST (HCC): ICD-10-CM

## 2018-09-12 PROCEDURE — 99214 OFFICE O/P EST MOD 30 MIN: CPT | Performed by: FAMILY MEDICINE

## 2018-09-12 NOTE — PROGRESS NOTES
Assessment/Plan:         Diagnoses and all orders for this visit:    Acquired hypothyroidism  Comments:  Overreplaced with TSH of  058  Decrease to 4days of 1 tablet a day and 1/2 tablet three days a week  Orders:  -     TSH, 3rd generation with Free T4 reflex; Future  -     TSH, 3rd generation with Free T4 reflex    Benign essential hypertension  Comments:  Very good BP control  Orders:  -     CBC and differential; Future  -     CBC and differential    Malignant neoplasm of right breast in female, estrogen receptor negative, unspecified site of breast ([facility] )  Comments:  F/U with Dr Lavon Steen and Dr Ginger Johnson    Weight loss  -     Basic metabolic panel; Future  -     Basic metabolic panel          Subjective:      Patient ID: Jaspreet Chiu is a 67 y o  female  Feeling well, had good recent mammogram    Had visit with Dr Ginger Johnson, [address]  To see Dr Lavon Steen next week  The following portions of the patient's history were reviewed and updated as appropriate: allergies, current medications, past family history, past medical history, past social history, past surgical history and problem list     Review of Systems   Constitutional: Negative for appetite change, fever and unexpected weight change  HENT: Negative for congestion  Eyes: Negative for visual disturbance  Respiratory: Negative for cough, choking and shortness of breath  Cardiovascular: Negative for chest pain, palpitations and leg swelling  Musculoskeletal: Positive for arthralgias and myalgias  Skin: Negative for rash  Neurological: Negative for facial asymmetry  Hematological: Does not bruise/bleed easily  Psychiatric/Behavioral: Negative for dysphoric mood and sleep disturbance  The patient is not nervous/anxious  All other systems reviewed and are negative          Objective:      /78   Pulse 80   Temp 97 5 °F (36 4 °C)   Ht 5' 1" (1 549 m)   Wt 76 kg (167 lb 9 6 oz)   BMI 31 67 kg/m²          Physical Exam   Constitutional: She is oriented to person, place, and time  She appears well-developed and well-nourished  Neck: Thyromegaly present  Cardiovascular: Normal rate, regular rhythm, normal heart sounds and intact distal pulses  Pulmonary/Chest: Effort normal and breath sounds normal    Musculoskeletal: She exhibits no edema  Neurological: She is alert and oriented to person, place, and time  Skin: No rash noted  Psychiatric: She has a normal mood and affect  Her behavior is normal  Judgment and thought content normal    Nursing note and vitals reviewed

## 2018-09-20 ENCOUNTER — OFFICE VISIT (OUTPATIENT)
Dept: SURGICAL ONCOLOGY | Facility: HOSPITAL | Age: 73
End: 2018-09-20
Payer: COMMERCIAL

## 2018-09-20 VITALS
BODY MASS INDEX: 31.72 KG/M2 | HEIGHT: 61 IN | WEIGHT: 168 LBS | SYSTOLIC BLOOD PRESSURE: 140 MMHG | DIASTOLIC BLOOD PRESSURE: 80 MMHG | HEART RATE: 55 BPM | RESPIRATION RATE: 16 BRPM | TEMPERATURE: 97.9 F

## 2018-09-20 DIAGNOSIS — C50.911 MALIGNANT NEOPLASM OF RIGHT BREAST IN FEMALE, ESTROGEN RECEPTOR NEGATIVE, UNSPECIFIED SITE OF BREAST (HCC): ICD-10-CM

## 2018-09-20 DIAGNOSIS — I89.0 LYMPHEDEMA: Primary | ICD-10-CM

## 2018-09-20 DIAGNOSIS — Z17.1 MALIGNANT NEOPLASM OF RIGHT BREAST IN FEMALE, ESTROGEN RECEPTOR NEGATIVE, UNSPECIFIED SITE OF BREAST (HCC): ICD-10-CM

## 2018-09-20 PROCEDURE — 99215 OFFICE O/P EST HI 40 MIN: CPT | Performed by: SURGERY

## 2018-09-20 NOTE — PROGRESS NOTES
Surgical Oncology Follow Up       [facility] Joauqin Szymanski  [facility] [address]    Tapan Lopez  1945  [id number]  [address]  [facility] [place] [address]  [address] [address]    Chief Complaint   Patient presents with    Breast Cancer     Pt is here for 6 month follow up        Assessment/Plan   Diagnoses and all orders for this visit:    Lymphedema    Malignant neoplasm of right breast in female, estrogen receptor negative, unspecified site of breast [facility])        Advance Care Planning/Advance Directives:  Did not discuss  with the patient  Oncology History:       Malignant neoplasm of unspecified site of right female breast ([facility] )    9/12/2016 Surgery     Right breast biopsy X 3 sites         9/29/2016 Initial Diagnosis     Malignant neoplasm of unspecified site of right female breast ([facility] )       10/28/2016 Surgery     Right breast mastectomy, SLNB  3/16/2017 Surgery     Right chest wall excision of lesion          Chemotherapy     Cytoxan, Taxotere  Dr Sang Wong             History of Present Illness: breast cancer follow up  -Interval History: none    Review of Systems:  Review of Systems   Constitutional: Positive for unexpected weight change (13lb weight loss)  Negative for appetite change and fever  Eyes: Negative  Respiratory: Negative for shortness of breath  Cardiovascular: Negative  Gastrointestinal: Negative  Endocrine: Negative  Genitourinary: Negative  Musculoskeletal: Negative  Negative for arthralgias and myalgias  Skin: Negative  Allergic/Immunologic: Negative  Neurological: Negative  Hematological: Negative  Negative for adenopathy  Does not bruise/bleed easily  Psychiatric/Behavioral: Negative          Patient Active Problem List   Diagnosis    Atrial fibrillation (HCC)    Benign essential hypertension    Bilateral edema of lower extremity    BPPV (benign paroxysmal positional vertigo)    Bursitis of left shoulder    Cardiomyopathy ([facility] )    Cervical spinal stenosis    Edema    Homocysteinemia (HCC)    Hypokalemia    Hypothyroidism    Long QT interval    Lymphedema    Malignant neoplasm of unspecified site of right female breast ([facility] )    Obesity    Osteopenia    Overactive bladder    Peripheral neuropathy    Vitamin D deficiency    Bronchitis with bronchospasm    Candidal esophagitis (HCC)    Abnormal chest CT    Pulmonary nodule seen on imaging study    SOB (shortness of breath)    Weight loss     Past Medical History:   Diagnosis Date    Abnormal chest CT     last assessed - [id number]    Abnormal glucose     Resolved - [id number]    Arrhythmia     Arthritis     Slight    BCC (basal cell carcinoma of skin)     last assessed - [id number]    Cardiomyopathy [facility])     Cervical spinal stenosis     Discharge from left nipple     last assessed - [id number]    Disease of thyroid gland     hypothyroidism    Full dentures     Upper and Lower    History of atrial fibrillation     Homocysteinemia ([facility]     Hypertension     Hypokalemia     Intraductal papilloma of breast     last assessed - [id number]    Malignant neoplasm of skin     basal cell on face, Moh's surgery    Memory loss     last assessed - [id number]    Obesity     Open wound     last assessed - 86NNQ2631    Osteopenia     Overactive bladder     Palmar plantar erythrodysaesthesia     Resolved - [id number]    Pulmonary nodule seen on imaging study     Rosacea     UTI (urinary tract infection)     recently    Vertigo     Vitamin D deficiency      Past Surgical History:   Procedure Laterality Date    APPENDECTOMY      ATRIAL ABLATION SURGERY      last assessed - [id number]   [facility] BACK SURGERY  1997    Lumbar spinal stenosis    BREAST BIOPSY      2014?  left breast; 9/12/16 right breast x 3    BREAST SURGERY      needle bx      BREAST SURGERY Right 03/16/2017    excision of right chest wall lesion    CARDIAC ELECTROPHYSIOLOGY STUDY AND ABLATION      CARDIOVERSION      DENTAL SURGERY      HAND SURGERY      Joint replaced with silicone joint 5th finger right hand    HYSTERECTOMY      Complete    LAMINECTOMY      Decompressive up to two lumbar segments    MASTECTOMY Right 10/28/2016    OOPHORECTOMY  1993    UT EXC SKIN MALIG <0 5 CM TRUNK,ARM,LEG Right 3/16/2017    Procedure: EXCISION CHEST WALL LESION; NEEDLE LOC @ 1400;  Surgeon: Yasmeen Alex MD;  [facility];  Service: Surgical Oncology    UT INTRAOPERATIVE SENTINEL LYMPH NODE ID W DYE INJECTION Right 10/28/2016    Procedure: BREAST MASTECTOMY WITH SENTINAL LYMPH NODE BIOPSY; LYMPHSCINITIGRAPHY; LYMPHATIC MAPPING; 0800 NUC MED INJECTION;  Surgeon: Yasmeen Alex MD;  [facility];  Service: Surgical Oncology    SENTINEL LYMPH NODE BIOPSY Right      Family History   Problem Relation Age of Onset    Coronary artery disease Mother         CABG    Diabetes Mother     Diabetes Sister     Coronary artery disease Brother         CABG    Diabetes Brother     Heart attack Son     Kidney cancer Son 48     Social History     Social History    Marital status: /Civil Union     Spouse name: N/A    Number of children: N/A    Years of education: N/A     Occupational History    Retired      Social History Main Topics    Smoking status: Never Smoker    Smokeless tobacco: Never Used    Alcohol use No    Drug use: No    Sexual activity: Not on file     Other Topics Concern    Not on file     Social History Narrative    Always uses seat belt    Convalescence after chemotherapy       Current Outpatient Prescriptions:     cyanocobalamin (VITAMIN B-12) 500 mcg tablet, Take 1 tablet by mouth daily, Disp: , Rfl:     furosemide (LASIX) 40 mg tablet, TAKE 1 TABLET BY MOUTH DAILY AS DIRECTED, Disp: 90 tablet, Rfl: 1    levothyroxine 150 mcg tablet, Take one tablet 4 days a week and 1/2 tablet three days a week, Disp: 30 tablet, Rfl: 5    metoprolol tartrate (LOPRESSOR) 50 mg tablet, Take 1 tablet by mouth 2 (two) times a day, Disp: , Rfl:     potassium chloride (MICRO-K) 10 MEQ CR capsule, Take 1 capsule (10 mEq total) by mouth daily, Disp: 90 capsule, Rfl: 1    metolazone (ZAROXOLYN) 2 5 mg tablet, TAKE ONE TABLET M-W-F (Patient not taking: Reported on 9/20/2018), Disp: 20 tablet, Rfl: 1    Naproxen Sodium (ALEVE) 220 MG CAPS, Take by mouth, Disp: , Rfl:   Allergies   Allergen Reactions    Morphine And Related GI Intolerance    Oxycodone-Acetaminophen Vomiting       The following portions of the patient's history were reviewed and updated as appropriate: allergies, current medications, past family history, past medical history, past social history, past surgical history and problem list         Vitals:    09/20/18 0956   BP: 140/80   Pulse: 55   Resp: 16   Temp: 97 9 °F (36 6 °C)       Physical Exam   Constitutional: She is oriented to person, place, and time  She appears well-developed and well-nourished  HENT:   Head: Normocephalic and atraumatic  Cardiovascular: Normal heart sounds  Pulmonary/Chest: Breath sounds normal  Right breast exhibits skin change (mastectomy scar) and tenderness (at pec edge)  Right breast exhibits no mass  Left breast exhibits no inverted nipple, no mass, no nipple discharge, no skin change and no tenderness  Abdominal: Soft  Musculoskeletal:   Improved ROM right arm   Lymphadenopathy:        Right axillary: No pectoral and no lateral adenopathy present  Left axillary: No pectoral and no lateral adenopathy present  Right: No supraclavicular adenopathy present  Left: No supraclavicular adenopathy present  Neurological: She is alert and oriented to person, place, and time  Psychiatric: She has a normal mood and affect           Results:      Imaging  09/04/2018 left 3D diagnostic mammogram is benign BI-RADS two density of two  08/07/2018 cat scan of the chest abdomen and pelvis shows a stable 4 mm right upper lobe nodule    I reviewed the above imaging data  Discussion/Summary:  70-year-old female status post right mastectomy for a stage IIA invasive ductal carcinoma of the right breast   There is no evidence of disease based on examination today, contralateral mammogram and recent CT scan of the chest abdomen and pelvis  I will see her again in six months for another exam or sooner should the need arise

## 2018-11-21 DIAGNOSIS — E87.6 HYPOKALEMIA: ICD-10-CM

## 2018-11-21 RX ORDER — POTASSIUM CHLORIDE 750 MG/1
CAPSULE, EXTENDED RELEASE ORAL
Qty: 90 CAPSULE | Refills: 1 | Status: SHIPPED | OUTPATIENT
Start: 2018-11-21 | End: 2019-03-26 | Stop reason: SDUPTHER

## 2019-01-11 ENCOUNTER — TELEPHONE (OUTPATIENT)
Dept: HEMATOLOGY ONCOLOGY | Facility: CLINIC | Age: 74
End: 2019-01-11

## 2019-01-11 NOTE — TELEPHONE ENCOUNTER
Patient cancelled appointment with Dr Ester Valero for 1/14/19 because she states she needs a CT Scan beforehand  She asked if we could reschedule this for her, and then she will call back to reschedule her regular appointment  Patients CT must be scheduled at the [facility] location due to insurance capitation       Patient can be reached late afternoon at [phone]

## 2019-01-11 NOTE — TELEPHONE ENCOUNTER
Please advise pt does not need to have a scan done prior to her appt with Dr Syeda Mckinght on 1/14  After careful investigation, pt was to see us back in 4 months with blood work and 8 months after pt's September visit she would get the scan per Dr Juliette Ram note  I called and informed pt and she was rescheduled with [facility] on 1/21  Lab scripts were mailed

## 2019-01-16 ENCOUNTER — OFFICE VISIT (OUTPATIENT)
Dept: FAMILY MEDICINE CLINIC | Facility: HOSPITAL | Age: 74
End: 2019-01-16
Payer: COMMERCIAL

## 2019-01-16 VITALS
HEIGHT: 63 IN | HEART RATE: 61 BPM | BODY MASS INDEX: 31.43 KG/M2 | WEIGHT: 177.4 LBS | SYSTOLIC BLOOD PRESSURE: 128 MMHG | TEMPERATURE: 97 F | DIASTOLIC BLOOD PRESSURE: 84 MMHG

## 2019-01-16 DIAGNOSIS — E66.09 CLASS 1 OBESITY DUE TO EXCESS CALORIES WITHOUT SERIOUS COMORBIDITY WITH BODY MASS INDEX (BMI) OF 31.0 TO 31.9 IN ADULT: ICD-10-CM

## 2019-01-16 DIAGNOSIS — R73.9 HYPERGLYCEMIA: ICD-10-CM

## 2019-01-16 DIAGNOSIS — R82.90 MALODOROUS URINE: ICD-10-CM

## 2019-01-16 DIAGNOSIS — E03.9 ACQUIRED HYPOTHYROIDISM: ICD-10-CM

## 2019-01-16 DIAGNOSIS — H81.10 BENIGN PAROXYSMAL POSITIONAL VERTIGO, UNSPECIFIED LATERALITY: ICD-10-CM

## 2019-01-16 DIAGNOSIS — E78.2 MIXED HYPERLIPIDEMIA: ICD-10-CM

## 2019-01-16 DIAGNOSIS — C50.911 MALIGNANT NEOPLASM OF RIGHT FEMALE BREAST (HCC): ICD-10-CM

## 2019-01-16 DIAGNOSIS — I10 BENIGN ESSENTIAL HYPERTENSION: ICD-10-CM

## 2019-01-16 DIAGNOSIS — Z00.00 MEDICARE ANNUAL WELLNESS VISIT, SUBSEQUENT: Primary | ICD-10-CM

## 2019-01-16 DIAGNOSIS — C50.911 MALIGNANT NEOPLASM OF RIGHT FEMALE BREAST, UNSPECIFIED ESTROGEN RECEPTOR STATUS, UNSPECIFIED SITE OF BREAST (HCC): ICD-10-CM

## 2019-01-16 PROCEDURE — 99215 OFFICE O/P EST HI 40 MIN: CPT | Performed by: FAMILY MEDICINE

## 2019-01-16 PROCEDURE — 1125F AMNT PAIN NOTED PAIN PRSNT: CPT | Performed by: FAMILY MEDICINE

## 2019-01-16 PROCEDURE — 3079F DIAST BP 80-89 MM HG: CPT | Performed by: FAMILY MEDICINE

## 2019-01-16 PROCEDURE — 3074F SYST BP LT 130 MM HG: CPT | Performed by: FAMILY MEDICINE

## 2019-01-16 PROCEDURE — 1170F FXNL STATUS ASSESSED: CPT | Performed by: FAMILY MEDICINE

## 2019-01-16 PROCEDURE — G0439 PPPS, SUBSEQ VISIT: HCPCS | Performed by: FAMILY MEDICINE

## 2019-01-16 RX ORDER — MECLIZINE HYDROCHLORIDE 25 MG/1
25 TABLET ORAL EVERY 8 HOURS PRN
Qty: 30 TABLET | Refills: 3 | Status: SHIPPED | OUTPATIENT
Start: 2019-01-16 | End: 2019-09-27 | Stop reason: SDUPTHER

## 2019-01-16 NOTE — PATIENT INSTRUCTIONS

## 2019-01-16 NOTE — PROGRESS NOTES
Assessment and Plan:  Problem List Items Addressed This Visit     Benign essential hypertension    Relevant Orders    CBC and differential (Completed)    Comprehensive metabolic panel (Completed)    Magnesium (Completed)    BPPV (benign paroxysmal positional vertigo)    Relevant Medications    meclizine (ANTIVERT) 25 mg tablet    Acquired hypothyroidism    Relevant Orders    TSH, 3rd generation (Completed)    T4, free (Completed)    Malignant neoplasm of unspecified site of right female breast ([facility] )    Relevant Orders    Cancer antigen 27 29 (Completed)    Class 1 obesity due to excess calories without serious comorbidity with body mass index (BMI) of 31 0 to 31 9 in adult    Medicare annual wellness visit, subsequent - Primary    Hyperglycemia    Relevant Orders    HEMOGLOBIN A1C W/ EAG ESTIMATION (Completed)    Mixed hyperlipidemia    Relevant Orders    Lipid Panel with Direct LDL reflex (Completed)        Health Maintenance Due   Topic Date Due    Hepatitis C Screening  1945    INFLUENZA VACCINE  07/01/2018         HPI:  Patient Active Problem List   Diagnosis    Atrial fibrillation ([address] )    Benign essential hypertension    Bilateral edema of lower extremity    BPPV (benign paroxysmal positional vertigo)    Bursitis of left shoulder    Cardiomyopathy ([facility] )    Cervical spinal stenosis    Edema    Homocysteinemia ([facility] )    Hypokalemia    Acquired hypothyroidism    Long QT interval    Lymphedema    Malignant neoplasm of unspecified site of right female breast ([facility] )    Class 1 obesity due to excess calories without serious comorbidity with body mass index (BMI) of 31 0 to 31 9 in adult    Osteopenia    Overactive bladder    Peripheral neuropathy    Vitamin D deficiency    Bronchitis with bronchospasm    Candidal esophagitis (HCC)    Abnormal chest CT    Pulmonary nodule seen on imaging study    SOB (shortness of breath)    Weight loss    Medicare annual wellness visit, subsequent    Hyperglycemia    Mixed hyperlipidemia     Past Medical History:   Diagnosis Date    Abnormal chest CT     last assessed - [id number]    Abnormal glucose     Resolved - [id number]    Arrhythmia     Arthritis     Slight    BCC (basal cell carcinoma of skin)     last assessed - [id number]    Cardiomyopathy [facility])     Cervical spinal stenosis     Discharge from left nipple     last assessed - [id number]    Disease of thyroid gland     hypothyroidism    Full dentures     Upper and Lower    History of atrial fibrillation     Homocysteinemia ([address] )     Hypertension     Hypokalemia     Intraductal papilloma of breast     last assessed - [id number]    Malignant neoplasm of skin     basal cell on face, Moh's surgery    Memory loss     last assessed - [id number]    Obesity     Open wound     last assessed - [id number]    Osteopenia     Overactive bladder     Palmar plantar erythrodysaesthesia     Resolved - [id number]    Pulmonary nodule seen on imaging study     Rosacea     UTI (urinary tract infection)     recently    Vertigo     Vitamin D deficiency      Past Surgical History:   Procedure Laterality Date    APPENDECTOMY      ATRIAL ABLATION SURGERY      last assessed - [id number]   [facility] BACK SURGERY  1997    Lumbar spinal stenosis    BREAST BIOPSY      2014? left breast; 9/12/16 right breast x 3    BREAST SURGERY      needle bx      BREAST SURGERY Right 03/16/2017    excision of right chest wall lesion    CARDIAC ELECTROPHYSIOLOGY STUDY AND ABLATION      CARDIOVERSION      DENTAL SURGERY      HAND SURGERY      Joint replaced with silicone joint 5th finger right hand    HYSTERECTOMY      Complete    LAMINECTOMY      Decompressive up to two lumbar segments    MASTECTOMY Right 10/28/2016    OOPHORECTOMY  1993    NH EXC SKIN MALIG <0 5 CM TRUNK,ARM,LEG Right 3/16/2017    Procedure: EXCISION CHEST WALL LESION; NEEDLE LOC @ 1400;  Surgeon: William Rooney MD;  Location:  MAIN OR;  Service: Surgical Oncology    SD INTRAOPERATIVE SENTINEL LYMPH NODE ID W DYE INJECTION Right 10/28/2016    Procedure: BREAST MASTECTOMY WITH SENTINAL LYMPH NODE BIOPSY; LYMPHSCINITIGRAPHY; LYMPHATIC MAPPING; 0800 NUC MED INJECTION;  Surgeon: Tejal Diop MD;  Location: [facility] Main OR;  Service: Surgical Oncology    SENTINEL LYMPH NODE BIOPSY Right     US BREAST NEEDLE LOC RIGHT Right 3/16/2017    US GUIDANCE BREAST BIOPSY RIGHT EACH ADDITIONAL Right 9/12/2016    US GUIDANCE BREAST BIOPSY RIGHT EACH ADDITIONAL Right 9/12/2016    US GUIDED BREAST BIOPSY RIGHT COMPLETE Right 9/12/2016     Family History   Problem Relation Age of Onset    Coronary artery disease Mother         CABG    Diabetes Mother     Diabetes Sister     Coronary artery disease Brother         CABG    Diabetes Brother     Heart attack Son     Kidney cancer Son 48     History   Smoking Status    Never Smoker   Smokeless Tobacco    Never Used     History   Alcohol Use No      History   Drug Use No         Current Outpatient Prescriptions   Medication Sig Dispense Refill    cyanocobalamin (VITAMIN B-12) 500 mcg tablet Take 1 tablet by mouth daily      furosemide (LASIX) 40 mg tablet TAKE 1 TABLET BY MOUTH DAILY AS DIRECTED 90 tablet 1    levothyroxine 150 mcg tablet Take one tablet 4 days a week and 1/2 tablet three days a week 30 tablet 5    metoprolol tartrate (LOPRESSOR) 50 mg tablet Take 1 tablet by mouth 2 (two) times a day      Naproxen Sodium (ALEVE) 220 MG CAPS Take by mouth      potassium chloride (MICRO-K) 10 MEQ CR capsule TAKE 1 CAPSULE BY MOUTH EVERY DAY 90 capsule 1    meclizine (ANTIVERT) 25 mg tablet Take 1 tablet (25 mg total) by mouth every 8 (eight) hours as needed for dizziness 30 tablet 3    metolazone (ZAROXOLYN) 2 5 mg tablet TAKE ONE TABLET M-W-F (Patient not taking: Reported on 9/20/2018) 20 tablet 1     No current facility-administered medications for this visit        Allergies   Allergen Reactions    Morphine And Related GI Intolerance    Oxycodone-Acetaminophen Vomiting     Immunization History   Administered Date(s) Administered    Influenza 10/23/2007, 11/06/2010, 10/13/2011, 10/17/2012, 10/11/2013, 10/29/2014, 11/08/2016, 10/27/2017    Pneumococcal Conjugate 13-Valent 04/09/2015    Pneumococcal Polysaccharide PPV23 06/29/2017    Tdap 06/14/2016    Zoster 03/08/2013       Patient Care Team:  Florinda Atkinson MD as PCP - General  MD Ld Whaley MD Latrelle Angers, MD    Medicare Screening Tests and Risk Assessments:  Anahi Armas is here for her Subsequent Wellness visit  Health Risk Assessment:  Patient rates overall health as fair  Patient feels that their physical health rating is Slightly worse  Eyesight was rated as Slightly worse  Hearing was rated as Same  Patient feels that their emotional and mental health rating is Same  Pain experienced by patient in the last 7 days has been None  Patient states that she has experienced no weight loss or gain in last 6 months  Emotional/Mental Health:  Patient has been feeling nervous/anxious  PHQ-9 Depression Screening:    Frequency of the following problems over the past two weeks:      1  Little interest or pleasure in doing things: 0 - not at all      2  Feeling down, depressed, or hopeless: 0 - not at all  PHQ-2 Score: 0          Broken Bones/Falls: Fall Risk Assessment:    In the past year, patient has experienced: History of falling in past year     Number of falls: 1 and 2 or more  Patient feels unsteady standing  Patient is not taking medication that can cause feelings of lightheadedness or tiredness  Patient often has no need to rush to the toilet  Bladder/Bowel:  Patient has not leaked urine accidently in the last six months  Patient reports no loss of bowel control  Immunizations:  Patient has not had a flu vaccination within the last year  Patient has received a pneumonia shot  Patient has received a shingles shot    Patient has received tetanus/diphtheria shot  Date of tetanus/diphtheria shot: 6/14/2016    Home Safety:  Patient has trouble with stairs inside or outside of their home  Patient currently reports that there are no safety hazards present in home, working smoke alarms, no working carbon monoxide detectors  Preventative Screenings:   Breast cancer screening performed, 9/4/2018  no colon cancer screen completed, cholesterol screen completed, 9/7/2017  no glaucoma eye exam completed    Nutrition:  Current diet: Regular with servings of the following:    Medications:  Patient is not currently taking any over-the-counter supplements  Patient is able to manage medications  Lifestyle Choices:  Patient reports no tobacco use  Patient has not smoked or used tobacco in the past   Patient reports no alcohol use  Patient drives a vehicle  Patient wears seat belt  Current level of exercise of physical activity described by patient as: No regular exercise  Activities of Daily Living:  Can get out of bed by his or her self, able to dress self, able to make own meals, able to do own shopping, able to bathe self, can do own laundry/housekeeping, can manage own money, pay bills and track expenses    Previous Hospitalizations:  No hospitalization or ED visit in past 12 months        Advanced Directives:  Patient has decided on a power of   Patient has spoken to designated power of   Patient has not completed advanced directive          Preventative Screening/Counseling:      Cardiovascular:      General: Screening Current          Diabetes:      General: Screening Current          Colorectal Cancer:      General: Screening Current          Breast Cancer:      General: Screening Current          Cervical Cancer:      General: Screening Not Indicated          Osteoporosis:      General: Patient Declines          AAA:      General: Screening Not Indicated          Glaucoma:      General: Screening Current          HIV: General: Screening Not Indicated          Hepatitis C:      General: Screening Not Indicated        Advanced Directives:   Patient has living will for healthcare, has durable POA for healthcare, patient has an advanced directive  Information on ACP and/or AD not provided  No 5 wishes given  End of life assessment reviewed with patient  Provider agrees with end of life decisions   Visual Acuity Screening    Right eye Left eye Both eyes   Without correction:      With correction: 20/70 20/40 20/40       Physical Exam:  Review of Systems   Gastrointestinal: Negative for bowel incontinence  Psychiatric/Behavioral: The patient is not nervous/anxious  Vitals:    01/16/19 0955   BP: 128/84   Pulse: 61   Temp: (!) 97 °F (36 1 °C)   Weight: 80 5 kg (177 lb 6 4 oz)   Height: 5' 2 5" (1 588 m)   Body mass index is 31 93 kg/m²      Physical Exam

## 2019-01-16 NOTE — PROGRESS NOTES
Assessment/Plan:         Diagnoses and all orders for this visit:    Medicare annual wellness visit, subsequent    Benign essential hypertension  -     CBC and differential; Future  -     Comprehensive metabolic panel; Future  -     Magnesium; Future  -     CBC and differential  -     Comprehensive metabolic panel  -     Magnesium    Acquired hypothyroidism  -     TSH, 3rd generation; Future  -     T4, free; Future  -     TSH, 3rd generation  -     T4, free    Class 1 obesity due to excess calories without serious comorbidity with body mass index (BMI) of 31 0 to 31 9 in adult    Malignant neoplasm of right female breast, unspecified estrogen receptor status, unspecified site of breast (HCC)  -     Cancer antigen 27 29; Future  -     Cancer antigen 27 29    Hyperglycemia  -     HEMOGLOBIN A1C W/ EAG ESTIMATION; Future  -     HEMOGLOBIN A1C W/ EAG ESTIMATION    Mixed hyperlipidemia  -     Lipid Panel with Direct LDL reflex; Future  -     Lipid Panel with Direct LDL reflex    Benign paroxysmal positional vertigo, unspecified laterality  -     meclizine (ANTIVERT) 25 mg tablet; Take 1 tablet (25 mg total) by mouth every 8 (eight) hours as needed for dizziness          Subjective:      Patient ID: Gerardo Traylor is a 68 y o  female  6 month follow up    Has had several falls due to dizziness  Walking is affected by this also    Does take Meclizine with benefit on occasion  She doesn't have a warning before the dizziness takes her down    Getting cramps in fingers and arms, sometimes at night in the legs  Taking Furosemide about 4 times a week for ongoing edema of lower legs     has spoken to me in confidence  about some of her judgement issues and personality issues    She feels fairly good in her energy    She is having decreased ROM of R UE since her breast surgery and feels it is very gradually increasing in ROM        The following portions of the patient's history were reviewed and updated as appropriate: allergies, current medications, past family history, past medical history, past social history, past surgical history and problem list     Review of Systems   Constitutional: Positive for unexpected weight change  HENT: Positive for sinus pressure  Respiratory: Negative  Cardiovascular: Positive for leg swelling  Gastrointestinal: Negative  Genitourinary: Negative  Musculoskeletal: Positive for arthralgias, gait problem and myalgias  Neurological: Positive for dizziness  Hematological: Negative  Psychiatric/Behavioral: Positive for decreased concentration and sleep disturbance  The patient is nervous/anxious  All other systems reviewed and are negative  Objective:      /84   Pulse 61   Temp (!) 97 °F (36 1 °C)   Ht 5' 2 5" (1 588 m)   Wt 80 5 kg (177 lb 6 4 oz)   BMI 31 93 kg/m²          Physical Exam   Constitutional: She is oriented to person, place, and time  Neck: No thyromegaly present  Cardiovascular: Normal rate and regular rhythm  Pulmonary/Chest: Effort normal and breath sounds normal    Musculoskeletal: She exhibits edema  Right shoulder: She exhibits decreased range of motion and tenderness  Neurological: She is oriented to person, place, and time  Psychiatric: Her behavior is normal  Judgment normal  Her mood appears anxious  Cognition and memory are normal  She exhibits a depressed mood  Nursing note and vitals reviewed  BMI Counseling: Body mass index is 31 93 kg/m²  Discussed the patient's BMI with her  The BMI is above average  BMI counseling and education was provided to the patient  Nutrition recommendations include reducing portion sizes and moderation in carbohydrate intake  Exercise recommendations include exercising 3-5 times per week

## 2019-01-17 LAB
ALBUMIN SERPL-MCNC: 3.8 G/DL (ref 3.5–4.8)
ALBUMIN/GLOB SERPL: 1.4 {RATIO} (ref 1.2–2.2)
ALP SERPL-CCNC: 92 IU/L (ref 39–117)
ALT SERPL-CCNC: 12 IU/L (ref 0–32)
AST SERPL-CCNC: 26 IU/L (ref 0–40)
BASOPHILS # BLD AUTO: 0 X10E3/UL (ref 0–0.2)
BASOPHILS NFR BLD AUTO: 1 %
BILIRUB SERPL-MCNC: 0.5 MG/DL (ref 0–1.2)
BUN SERPL-MCNC: 14 MG/DL (ref 8–27)
BUN/CREAT SERPL: 11 (ref 12–28)
CALCIUM SERPL-MCNC: 9 MG/DL (ref 8.7–10.3)
CANCER AG27-29 SERPL-ACNC: 13.6 U/ML (ref 0–38.6)
CHLORIDE SERPL-SCNC: 105 MMOL/L (ref 96–106)
CHOLEST SERPL-MCNC: 136 MG/DL (ref 100–199)
CO2 SERPL-SCNC: 24 MMOL/L (ref 20–29)
CREAT SERPL-MCNC: 1.22 MG/DL (ref 0.57–1)
EOSINOPHIL # BLD AUTO: 0.1 X10E3/UL (ref 0–0.4)
EOSINOPHIL NFR BLD AUTO: 3 %
ERYTHROCYTE [DISTWIDTH] IN BLOOD BY AUTOMATED COUNT: 14.3 % (ref 12.3–15.4)
EST. AVERAGE GLUCOSE BLD GHB EST-MCNC: 105 MG/DL
GLOBULIN SER-MCNC: 2.8 G/DL (ref 1.5–4.5)
GLUCOSE SERPL-MCNC: 103 MG/DL (ref 65–99)
HBA1C MFR BLD: 5.3 % (ref 4.8–5.6)
HCT VFR BLD AUTO: 38.8 % (ref 34–46.6)
HDLC SERPL-MCNC: 54 MG/DL
HGB BLD-MCNC: 13 G/DL (ref 11.1–15.9)
IMM GRANULOCYTES # BLD: 0 X10E3/UL (ref 0–0.1)
IMM GRANULOCYTES NFR BLD: 0 %
LDLC SERPL CALC-MCNC: 70 MG/DL (ref 0–99)
LDLC/HDLC SERPL: 1.3 RATIO (ref 0–3.2)
LYMPHOCYTES # BLD AUTO: 1.1 X10E3/UL (ref 0.7–3.1)
LYMPHOCYTES NFR BLD AUTO: 30 %
MAGNESIUM SERPL-MCNC: 2.2 MG/DL (ref 1.6–2.3)
MCH RBC QN AUTO: 30.9 PG (ref 26.6–33)
MCHC RBC AUTO-ENTMCNC: 33.5 G/DL (ref 31.5–35.7)
MCV RBC AUTO: 92 FL (ref 79–97)
MONOCYTES # BLD AUTO: 0.3 X10E3/UL (ref 0.1–0.9)
MONOCYTES NFR BLD AUTO: 7 %
NEUTROPHILS # BLD AUTO: 2.2 X10E3/UL (ref 1.4–7)
NEUTROPHILS NFR BLD AUTO: 59 %
PLATELET # BLD AUTO: 184 X10E3/UL (ref 150–379)
POTASSIUM SERPL-SCNC: 4 MMOL/L (ref 3.5–5.2)
PROT SERPL-MCNC: 6.6 G/DL (ref 6–8.5)
RBC # BLD AUTO: 4.21 X10E6/UL (ref 3.77–5.28)
SL AMB EGFR AFRICAN AMERICAN: 51 ML/MIN/1.73
SL AMB EGFR NON AFRICAN AMERICAN: 44 ML/MIN/1.73
SL AMB VLDL CHOLESTEROL CALC: 12 MG/DL (ref 5–40)
SODIUM SERPL-SCNC: 142 MMOL/L (ref 134–144)
T4 FREE SERPL-MCNC: 0.91 NG/DL (ref 0.82–1.77)
TRIGL SERPL-MCNC: 58 MG/DL (ref 0–149)
TSH SERPL DL<=0.005 MIU/L-ACNC: 30.61 UIU/ML (ref 0.45–4.5)
WBC # BLD AUTO: 3.7 X10E3/UL (ref 3.4–10.8)

## 2019-01-17 NOTE — PROGRESS NOTES
Hematology/Oncology Outpatient Follow- up Note  Keshia Mendoza 68 y o  female MRN: @ Encounter: [id number]      Date:  1/21/2019    Presenting Complaint/Diagnosis :  Stage IIA triple negative breast cancer, with 9 negative lymph nodes      HPI:  Tanesha Montgomery is a pleasant 68year old   female, who presents to the office unaccompanied for today's visit  She was diagnosed with triple negative breast cancer  Her tumor was a T2 N0 M0 triple negative breast cancer, with areas of DCIS  It was multifocal   It seemed to be a high-grade tumor  She was advised to have adjuvant chemotherapy  She was scheduled for Taxotere/Cytoxan every 3 weeks for 4 cycles  This was because she had negative lymph nodes, otherwise they would have gotten an anthracycline-based chemotherapy  The patient agreed  She completed 3 cycles and refused any further chemotherapy  Her most recent imaging shows no evidence of recurrence or metastatic disease  She is on observation  Previous Hematologic/ Oncologic History:       Malignant neoplasm of unspecified site of right female breast ([facility] )    9/12/2016 Surgery     Right breast biopsy X 3 sites         9/29/2016 Initial Diagnosis     Malignant neoplasm of unspecified site of right female breast ([facility] )       10/28/2016 Surgery     Right breast mastectomy, SLNB  12/7/2016 - 2/1/2017 Chemotherapy     Taxotere/Cytoxan  Scheduled for every 3 weeks x 4 cycles  Completed 3 cycles under the guidance of Dr Tod Gowers  3/16/2017 Surgery     Right chest wall excision of lesion          Current Hematologic/ Oncologic Treatment:    Observation    Interval History:    Since her last visit with Dr Tod Gowers on 9/10/18, she reports she is doing well  Last mammo was on 9/4/18 with ACR density 2/4  S/P Right Mastectomy with Stable benign findings (Benign calcifications in Left breast), for follow up of Left breast in 1 year  She was last seen by Dr Jerzy Buckley on 9/20/18    She has follow up on 4/4/19 with Dr Zina Timmons  She has minimal Lymphedema of Right arm due to mastectomy  Still sore to touch, but has increased motion in her right arm  It continues to improve  She notes she will get fitted for bra sometime this month  She notes her PCP feels she does not need colonoscopy due to age, and has not had one in the past   She notes leg swelling has been on going since treating with chemotherapy  She is on Lasix, but did not take this this morning due to coming for appointment  She will take this upon arrival home  Neuropathy has improved, but not resolved  It involves fingertips bilaterally, no foot involvement  Cancer Staging:  Cancer Staging  Stage IIA Triple (-) Breast Cancer     Malignant neoplasm of unspecified site of right female breast ([facility] )    9/12/2016 Surgery     Right breast biopsy X 3 sites         9/29/2016 Initial Diagnosis     Malignant neoplasm of unspecified site of right female breast ([facility] )       10/28/2016 Surgery     Right breast mastectomy, SLNB  12/7/2016 - 2/1/2017 Chemotherapy     Taxotere/Cytoxan  Scheduled for every 3 weeks x 4 cycles  Completed 3 cycles under the guidance of Dr Rita Grimes  3/16/2017 Surgery     Right chest wall excision of lesion          Test Results:  Imaging: No results found  Labs:   Lab Results   Component Value Date    WBC 3 7 01/16/2019    HGB 13 0 01/16/2019    HCT 38 8 01/16/2019    MCV 92 01/16/2019     01/16/2019   ANC 2200  Stable      Lab Results   Component Value Date     09/07/2017     09/07/2017    K 4 0 01/16/2019     01/16/2019    CO2 24 01/16/2019    ANIONGAP 6 06/19/2015    BUN 14 01/16/2019    CREATININE 0 97 (H) 09/07/2017    CREATININE 0 97 (H) 09/07/2017    GLUCOSE 95 06/19/2015    CALCIUM 9 0 08/14/2018    AST 24 09/07/2017    AST 24 09/07/2017    ALT 11 09/07/2017    ALT 11 09/07/2017    ALKPHOS 83 08/14/2018    PROT 6 1 09/07/2017    PROT 6 1 09/07/2017    BILITOT 0 8 09/07/2017 BILITOT 0 8 09/07/2017    EGFR >60 0 10/25/2016   Na+ 142, Albumin 3 8  Stable  HgbA1C = 5 3  Triglycerides are WNL  TSH 30 610 Abnormal, T4 0 91 WNL  Will talk to Dr Will Starr regarding results  Ref Range & Units 1/16/19 11:52 AM   CA 27-29 0 0 - 38 6 U/mL 13 6    Stable  Review of Systems   Constitutional: Positive for fatigue (Very little)  Negative for activity change, appetite change, fever and unexpected weight change  HENT: Negative for congestion, nosebleeds, sore throat and trouble swallowing  Eyes: Negative for visual disturbance  Wears glasses  Upcoming Ophthalmologist appt  Respiratory: Negative for cough, chest tightness, shortness of breath and wheezing  Cardiovascular: Positive for leg swelling  Negative for chest pain and palpitations  Gastrointestinal: Negative for abdominal distention, abdominal pain, blood in stool, constipation, diarrhea, nausea and vomiting  Genitourinary: Negative for difficulty urinating, dysuria, hematuria, pelvic pain and vaginal bleeding  Musculoskeletal: Negative for arthralgias, back pain and myalgias  Skin: Negative for pallor and rash  Neurological: Positive for dizziness (BPPV  Meclizine  She has exercises as well from PT ) and numbness (Hands on fingertips only  Toes are fine  )  Negative for weakness and headaches  Hematological: Negative for adenopathy  Bruises/bleeds easily  Psychiatric/Behavioral: Negative for confusion and sleep disturbance  The patient is not nervous/anxious        Active Problems:   Patient Active Problem List   Diagnosis    Atrial fibrillation (HCC)    Benign essential hypertension    Bilateral edema of lower extremity    BPPV (benign paroxysmal positional vertigo)    Bursitis of left shoulder    Cardiomyopathy ([address] )    Cervical spinal stenosis    Edema    Homocysteinemia ([facility] )    Hypokalemia    Acquired hypothyroidism    Long QT interval    Lymphedema    Malignant neoplasm of unspecified site of right female breast ([facility] )    Class 1 obesity due to excess calories without serious comorbidity with body mass index (BMI) of 31 0 to 31 9 in adult    Osteopenia    Overactive bladder    Peripheral neuropathy    Vitamin D deficiency    Bronchitis with bronchospasm    Candidal esophagitis (HCC)    Abnormal chest CT    Pulmonary nodule seen on imaging study    SOB (shortness of breath)    Weight loss    Medicare annual wellness visit, subsequent    Hyperglycemia    Mixed hyperlipidemia     Past Medical History:   Past Medical History:   Diagnosis Date    Abnormal chest CT     last assessed - [id number]    Abnormal glucose     Resolved - [id number]    Arrhythmia     Arthritis     Slight    BCC (basal cell carcinoma of skin)     last assessed - [id number]    Cardiomyopathy [facility])     Cervical spinal stenosis     Discharge from left nipple     last assessed - [id number]    Disease of thyroid gland     hypothyroidism    Full dentures     Upper and Lower    History of atrial fibrillation     Homocysteinemia ([facility] )     Hypertension     Hypokalemia     Intraductal papilloma of breast     last assessed - [id number]    Malignant neoplasm of skin     basal cell on face, Moh's surgery    Memory loss     last assessed - [id number]    Obesity     Open wound     last assessed - [id number]    Osteopenia     Overactive bladder     Palmar plantar erythrodysaesthesia     Resolved - [id number]    Pulmonary nodule seen on imaging study     Rosacea     UTI (urinary tract infection)     recently    Vertigo     Vitamin D deficiency      Surgical History:   Past Surgical History:   Procedure Laterality Date    APPENDECTOMY      ATRIAL ABLATION SURGERY      last assessed - [id number]   [employer] BACK SURGERY  1997    Lumbar spinal stenosis    BREAST BIOPSY      2014? left breast; 9/12/16 right breast x 3    BREAST SURGERY      needle bx      BREAST SURGERY Right 03/16/2017    excision of right chest wall lesion    CARDIAC ELECTROPHYSIOLOGY STUDY AND ABLATION      CARDIOVERSION      DENTAL SURGERY      HAND SURGERY      Joint replaced with silicone joint 5th finger right hand    HYSTERECTOMY      Complete    LAMINECTOMY      Decompressive up to two lumbar segments    MASTECTOMY Right 10/28/2016    OOPHORECTOMY  1993    HI EXC SKIN MALIG <0 5 CM TRUNK,ARM,LEG Right 3/16/2017    Procedure: EXCISION CHEST WALL LESION; NEEDLE LOC @ 1400;  Surgeon: Philippe Estevez MD;  Location: [facility];  Service: Surgical Oncology    HI INTRAOPERATIVE SENTINEL LYMPH NODE ID W DYE INJECTION Right 10/28/2016    Procedure: BREAST MASTECTOMY WITH SENTINAL LYMPH NODE BIOPSY; LYMPHSCINITIGRAPHY; LYMPHATIC MAPPING; 0800 NUC MED INJECTION;  Surgeon: Philippe Estevez MD;  Location: [facility];  Service: Surgical Oncology    SENTINEL LYMPH NODE BIOPSY Right     US BREAST NEEDLE LOC RIGHT Right 3/16/2017    US GUIDANCE BREAST BIOPSY RIGHT EACH ADDITIONAL Right 9/12/2016    US GUIDANCE BREAST BIOPSY RIGHT EACH ADDITIONAL Right 9/12/2016    US GUIDED BREAST BIOPSY RIGHT COMPLETE Right 9/12/2016     Family History:    Family History   Problem Relation Age of Onset    Coronary artery disease Mother         CABG    Diabetes Mother     Heart attack Mother     Diabetes Sister     Parkinsonism Sister     Coronary artery disease Brother         CABG    Diabetes Brother     Heart attack Brother     Heart attack Son     Kidney cancer Son 48    No Known Problems Son     No Known Problems Son     Emphysema Sister     No Known Problems Sister     Heart attack Brother      Cancer-related family history includes Kidney cancer (age of onset: 48) in her son      Social History:   Social History     Social History    Marital status: /Civil Union     Spouse name: N/A    Number of children: N/A    Years of education: N/A     Occupational History    Retired      Social History Main Topics    Smoking status: Never Smoker  Smokeless tobacco: Never Used    Alcohol use No    Drug use: No    Sexual activity: Not on file      Comment: Resides with      Other Topics Concern    Not on file     Social History Narrative    Always uses seat belt    Convalescence after chemotherapy     Current Medications:   Current Outpatient Prescriptions   Medication Sig Dispense Refill    cyanocobalamin (VITAMIN B-12) 500 mcg tablet Take 1 tablet by mouth daily      furosemide (LASIX) 40 mg tablet TAKE 1 TABLET BY MOUTH DAILY AS DIRECTED 90 tablet 1    levothyroxine 150 mcg tablet Take one tablet 4 days a week and 1/2 tablet three days a week 30 tablet 5    meclizine (ANTIVERT) 25 mg tablet Take 1 tablet (25 mg total) by mouth every 8 (eight) hours as needed for dizziness 30 tablet 3    metolazone (ZAROXOLYN) 2 5 mg tablet TAKE ONE TABLET M-W-F 20 tablet 1    metoprolol tartrate (LOPRESSOR) 50 mg tablet Take 1 tablet by mouth 2 (two) times a day      Naproxen Sodium (ALEVE) 220 MG CAPS Take by mouth      potassium chloride (MICRO-K) 10 MEQ CR capsule TAKE 1 CAPSULE BY MOUTH EVERY DAY 90 capsule 1     No current facility-administered medications for this visit  Allergies: Allergies   Allergen Reactions    Morphine And Related GI Intolerance    Oxycodone-Acetaminophen Vomiting     Physical Exam:  Physical Exam   Constitutional: She is oriented to person, place, and time  She appears well-developed and well-nourished  No distress  HENT:   Head: Normocephalic and atraumatic  Mouth/Throat: Oropharynx is clear and moist  No oropharyngeal exudate  Eyes: Pupils are equal, round, and reactive to light  Conjunctivae and EOM are normal  No scleral icterus  Neck: Normal range of motion  Neck supple  No tracheal deviation present  No thyromegaly present  Cardiovascular: Normal rate, regular rhythm and normal heart sounds  Exam reveals no gallop and no friction rub  No murmur heard    Pulmonary/Chest: Effort normal and breath sounds normal  No respiratory distress  She has no wheezes  She has no rales  Abdominal: Soft  Bowel sounds are normal  She exhibits no distension and no mass  There is no hepatosplenomegaly  There is no tenderness  There is no rebound and no guarding  Genitourinary:   Genitourinary Comments: Deferred  Breast exam performed by Dr Alexis Wang  Musculoskeletal: Normal range of motion  She exhibits edema (B/L LE edema  1+ pitting   )  She exhibits no tenderness  Lymphadenopathy:     She has no cervical adenopathy  Right: No inguinal and no supraclavicular adenopathy present  Left: No inguinal and no supraclavicular adenopathy present  Neurological: She is alert and oriented to person, place, and time  Skin: Skin is warm and dry  No rash noted  She is not diaphoretic  No erythema  No pallor  Psychiatric: She has a normal mood and affect  Her behavior is normal       Vitals:    01/21/19 1103   BP: 118/78   Pulse: 67   Resp: 18   Temp: (!) 97 3 °F (36 3 °C)   SpO2: 97%   Stable  Not cold to touch  Assessment / Plan:    1  Stage IIA Malignant neoplasm of right breast in female, estrogen receptor negative, unspecified site of breast ([facility] )  Stage IIA Triple negative Right Breast cancer, with 9 (-) lymph nodes  She had surgery and chemo with Taxotere & Cytoxan, and completed 3 of anticipated 4 cycles  She had tiny local recurrence with positive margin by surgical bed and has remained on observation since this time  She continues to follow with Dr Philippe Estevez and she performs her breast exams  She has upcoming visit on 4/4/19  She is up to date on 3D mammo from 9/4/18  Her CBC, CMP, CA 27 29 are stable  She will repeat these levels prior to 4 month RTO with Dr Ashely Hubbard  She will have CT scan C/A/P at that time as well  We discussed drinking adequate fluids prior to and post CT scan to minimize effect of dye on the kidneys    We will perform test as long as Creatinine is <1 4       2  Other idiopathic Peripheral Neuropathy  Stable, minimal in fingertips only due to prior chemo  No foot involvement  - CBC and differential; Future  - Comprehensive metabolic panel; Future  - Cancer antigen 27 29; Future  - CBC and differential  - Comprehensive metabolic panel  - Cancer antigen 27 29  - CT chest abdomen pelvis w contrast; Future    ECOG PS 0-1    Goals and Barriers:  Current Goal:  Prolong Survival from Stage IIA Breast Cancer  Barriers: None  Patient's Capacity to Self Care:  Patient is able to self care  Total time spent with patient was 30 minutes, of which >50% of the time was spent in direct consultation discussing PMH, current symptoms, and upcoming plan  Portions of the record may have been created with voice recognition software   Occasional wrong word or "sound a like" substitutions may have occurred due to the inherent limitations of voice recognition software   Read the chart carefully and recognize, using context, where substitutions have occurred

## 2019-01-20 DIAGNOSIS — R60.9 EDEMA, UNSPECIFIED TYPE: ICD-10-CM

## 2019-01-20 RX ORDER — FUROSEMIDE 40 MG/1
TABLET ORAL
Qty: 90 TABLET | Refills: 1 | Status: SHIPPED | OUTPATIENT
Start: 2019-01-20 | End: 2020-02-13

## 2019-01-21 ENCOUNTER — OFFICE VISIT (OUTPATIENT)
Dept: HEMATOLOGY ONCOLOGY | Facility: HOSPITAL | Age: 74
End: 2019-01-21
Payer: COMMERCIAL

## 2019-01-21 ENCOUNTER — TELEPHONE (OUTPATIENT)
Dept: FAMILY MEDICINE CLINIC | Facility: HOSPITAL | Age: 74
End: 2019-01-21

## 2019-01-21 VITALS
WEIGHT: 176.2 LBS | DIASTOLIC BLOOD PRESSURE: 78 MMHG | TEMPERATURE: 97.3 F | RESPIRATION RATE: 18 BRPM | SYSTOLIC BLOOD PRESSURE: 118 MMHG | BODY MASS INDEX: 32.42 KG/M2 | HEIGHT: 62 IN | OXYGEN SATURATION: 97 % | HEART RATE: 67 BPM

## 2019-01-21 DIAGNOSIS — C50.911 MALIGNANT NEOPLASM OF RIGHT BREAST IN FEMALE, ESTROGEN RECEPTOR NEGATIVE, UNSPECIFIED SITE OF BREAST (HCC): Primary | ICD-10-CM

## 2019-01-21 DIAGNOSIS — Z17.1 MALIGNANT NEOPLASM OF RIGHT BREAST IN FEMALE, ESTROGEN RECEPTOR NEGATIVE, UNSPECIFIED SITE OF BREAST (HCC): Primary | ICD-10-CM

## 2019-01-21 DIAGNOSIS — R39.89 URINE DISCOLORATION: Primary | ICD-10-CM

## 2019-01-21 DIAGNOSIS — G90.09 OTHER IDIOPATHIC PERIPHERAL AUTONOMIC NEUROPATHY: ICD-10-CM

## 2019-01-21 LAB
SL AMB  POCT GLUCOSE, UA: NORMAL
SL AMB LEUKOCYTE ESTERASE,UA: NEGATIVE
SL AMB POCT BILIRUBIN,UA: NEGATIVE
SL AMB POCT BLOOD,UA: NEGATIVE
SL AMB POCT CLARITY,UA: NORMAL
SL AMB POCT COLOR,UA: NORMAL
SL AMB POCT KETONES,UA: NEGATIVE
SL AMB POCT NITRITE,UA: NEGATIVE
SL AMB POCT PH,UA: 5
SL AMB POCT SPECIFIC GRAVITY,UA: 1.02
SL AMB POCT URINE PROTEIN: 30
SL AMB POCT UROBILINOGEN: 4

## 2019-01-21 PROCEDURE — 99214 OFFICE O/P EST MOD 30 MIN: CPT | Performed by: PHYSICIAN ASSISTANT

## 2019-01-21 PROCEDURE — 81002 URINALYSIS NONAUTO W/O SCOPE: CPT

## 2019-01-21 NOTE — PATIENT INSTRUCTIONS
Please obtain blood work with CBC, CMP, CA27 29 in 4 months prior to Dr Arlette Calderon visit  Also CT scan C/A/P  Drink fluids prior and post CT scan

## 2019-01-21 NOTE — LETTER
January 21, 2019     MD Nessa Camacho  [address] [facility]  [address] [address]    Patient: Galilea Fernandez   YOB: 1945   Date of Visit: 1/21/2019       Dear Dr De La Paz Sat: Thank you for referring Wilfredo Gooden to me for evaluation  Below are my notes for this consultation  If you have questions, please do not hesitate to call me  I look forward to following your patient along with you  Sincerely,        Germania Sanderson PA-C        CC: No Recipients  Saw Clancy  1/21/2019 12:37 PM  Sign at close encounter      Hematology/Oncology Outpatient Follow- up Note  Galilea Fernandez 68 y o  female MRN: @ Encounter: [id number]      Date:  1/21/2019    Presenting Complaint/Diagnosis :  Stage IIA triple negative breast cancer, with 9 negative lymph nodes      HPI:  Marylin Whiteside is a pleasant 68year old   female, who presents to the office unaccompanied for today's visit  She was diagnosed with triple negative breast cancer  Her tumor was a T2 N0 M0 triple negative breast cancer, with areas of DCIS  It was multifocal   It seemed to be a high-grade tumor  She was advised to have adjuvant chemotherapy  She was scheduled for Taxotere/Cytoxan every 3 weeks for 4 cycles  This was because she had negative lymph nodes, otherwise they would have gotten an anthracycline-based chemotherapy  The patient agreed  She completed 3 cycles and refused any further chemotherapy  Her most recent imaging shows no evidence of recurrence or metastatic disease  She is on observation  Previous Hematologic/ Oncologic History:       Malignant neoplasm of unspecified site of right female breast ([facility] [address] )    9/12/2016 Surgery     Right breast biopsy X 3 sites         9/29/2016 Initial Diagnosis     Malignant neoplasm of unspecified site of right female breast ([facility] [address] )       10/28/2016 Surgery     Right breast mastectomy, SLNB  12/7/2016 - 2/1/2017 Chemotherapy     Taxotere/Cytoxan  Scheduled for every 3 weeks x 4 cycles  Completed 3 cycles under the guidance of Dr Sonny Jones  3/16/2017 Surgery     Right chest wall excision of lesion          Current Hematologic/ Oncologic Treatment:    Observation    Interval History:    Since her last visit with Dr Sonny Jones on 9/10/18, she reports she is doing well  Last mammo was on 9/4/18 with ACR density 2/4  S/P Right Mastectomy with Stable benign findings (Benign calcifications in Left breast), for follow up of Left breast in 1 year  She was last seen by Dr Alvin Rice on 9/20/18  She has follow up on 4/4/19 with Dr Lelia Copeland  She has minimal Lymphedema of Right arm due to mastectomy  Still sore to touch, but has increased motion in her right arm  It continues to improve  She notes she will get fitted for bra sometime this month  She notes her PCP feels she does not need colonoscopy due to age, and has not had one in the past   She notes leg swelling has been on going since treating with chemotherapy  She is on Lasix, but did not take this this morning due to coming for appointment  She will take this upon arrival home  Neuropathy has improved, but not resolved  It involves fingertips bilaterally, no foot involvement  Cancer Staging:  Cancer Staging  Stage IIA Triple (-) Breast Cancer     Malignant neoplasm of unspecified site of right female breast ([facility] [address] )    9/12/2016 Surgery     Right breast biopsy X 3 sites         9/29/2016 Initial Diagnosis     Malignant neoplasm of unspecified site of right female breast ([facility] [address] )       10/28/2016 Surgery     Right breast mastectomy, SLNB  12/7/2016 - 2/1/2017 Chemotherapy     Taxotere/Cytoxan  Scheduled for every 3 weeks x 4 cycles  Completed 3 cycles under the guidance of Dr Sonny Jones  3/16/2017 Surgery     Right chest wall excision of lesion          Test Results:  Imaging: No results found      Labs:   Lab Results   Component Value Date    WBC 3 7 01/16/2019    HGB 13 0 01/16/2019    HCT 38 8 01/16/2019    MCV 92 01/16/2019     01/16/2019   ANC 2200  Stable  Lab Results   Component Value Date     09/07/2017     09/07/2017    K 4 0 01/16/2019     01/16/2019    CO2 24 01/16/2019    ANIONGAP 6 06/19/2015    BUN 14 01/16/2019    CREATININE 0 97 (H) 09/07/2017    CREATININE 0 97 (H) 09/07/2017    GLUCOSE 95 06/19/2015    CALCIUM 9 0 08/14/2018    AST 24 09/07/2017    AST 24 09/07/2017    ALT 11 09/07/2017    ALT 11 09/07/2017    ALKPHOS 83 08/14/2018    PROT 6 1 09/07/2017    PROT 6 1 09/07/2017    BILITOT 0 8 09/07/2017    BILITOT 0 8 09/07/2017    EGFR >60 0 10/25/2016   Na+ 142, Albumin 3 8  Stable  HgbA1C = 5 3  Triglycerides are WNL  TSH 30 610 Abnormal, T4 0 91 WNL  Will talk to Dr Juliann Ryan regarding results  Ref Range & Units 1/16/19 11:52 AM   CA 27-29 0 0 - 38 6 U/mL 13 6    Stable  Review of Systems   Constitutional: Positive for fatigue (Very little)  Negative for activity change, appetite change, fever and unexpected weight change  HENT: Negative for congestion, nosebleeds, sore throat and trouble swallowing  Eyes: Negative for visual disturbance  Wears glasses  Upcoming Ophthalmologist appt  Respiratory: Negative for cough, chest tightness, shortness of breath and wheezing  Cardiovascular: Positive for leg swelling  Negative for chest pain and palpitations  Gastrointestinal: Negative for abdominal distention, abdominal pain, blood in stool, constipation, diarrhea, nausea and vomiting  Genitourinary: Negative for difficulty urinating, dysuria, hematuria, pelvic pain and vaginal bleeding  Musculoskeletal: Negative for arthralgias, back pain and myalgias  Skin: Negative for pallor and rash  Neurological: Positive for dizziness (BPPV  Meclizine  She has exercises as well from PT ) and numbness (Hands on fingertips only  Toes are fine  )  Negative for weakness and headaches  Hematological: Negative for adenopathy   Bruises/bleeds easily  Psychiatric/Behavioral: Negative for confusion and sleep disturbance  The patient is not nervous/anxious        Active Problems:   Patient Active Problem List   Diagnosis    Atrial fibrillation (HCC)    Benign essential hypertension    Bilateral edema of lower extremity    BPPV (benign paroxysmal positional vertigo)    Bursitis of left shoulder    Cardiomyopathy ([facility] [address] )    Cervical spinal stenosis    Edema    Homocysteinemia ([facility] [address] )    Hypokalemia    Acquired hypothyroidism    Long QT interval    Lymphedema    Malignant neoplasm of unspecified site of right female breast ([facility] [address] )    Class 1 obesity due to excess calories without serious comorbidity with body mass index (BMI) of 31 0 to 31 9 in adult    Osteopenia    Overactive bladder    Peripheral neuropathy    Vitamin D deficiency    Bronchitis with bronchospasm    Candidal esophagitis (HCC)    Abnormal chest CT    Pulmonary nodule seen on imaging study    SOB (shortness of breath)    Weight loss    Medicare annual wellness visit, subsequent    Hyperglycemia    Mixed hyperlipidemia     Past Medical History:   Past Medical History:   Diagnosis Date    Abnormal chest CT     last assessed - [id number]    Abnormal glucose     Resolved - [id number]    Arrhythmia     Arthritis     Slight    BCC (basal cell carcinoma of skin)     last assessed - [id number]    Cardiomyopathy [facility])     Cervical spinal stenosis     Discharge from left nipple     last assessed - [id number]    Disease of thyroid gland     hypothyroidism    Full dentures     Upper and Lower    History of atrial fibrillation     Homocysteinemia ([facility] [address] )     Hypertension     Hypokalemia     Intraductal papilloma of breast     last assessed - [id number]    Malignant neoplasm of skin     basal cell on face, Moh's surgery    Memory loss     last assessed - [id number]    Obesity     Open wound     last assessed - [id number]    Osteopenia     Overactive bladder     Palmar plantar erythrodysaesthesia     Resolved - [id number]    Pulmonary nodule seen on imaging study     Rosacea     UTI (urinary tract infection)     recently    Vertigo     Vitamin D deficiency      Surgical History:   Past Surgical History:   Procedure Laterality Date    APPENDECTOMY      ATRIAL ABLATION SURGERY      last assessed - [id number]   [facility] BACK SURGERY  1997    Lumbar spinal stenosis    BREAST BIOPSY      2014? left breast; 9/12/16 right breast x 3    BREAST SURGERY      needle bx      BREAST SURGERY Right 03/16/2017    excision of right chest wall lesion    CARDIAC ELECTROPHYSIOLOGY STUDY AND ABLATION      CARDIOVERSION      DENTAL SURGERY      HAND SURGERY      Joint replaced with silicone joint 5th finger right hand    HYSTERECTOMY      Complete    LAMINECTOMY      Decompressive up to two lumbar segments    MASTECTOMY Right 10/28/2016    OOPHORECTOMY  1993    DC EXC SKIN MALIG <0 5 CM TRUNK,ARM,LEG Right 3/16/2017    Procedure: EXCISION CHEST WALL LESION; NEEDLE LOC @ 1400;  Surgeon: Sissy Doty MD;  Location: [facility];  Service: Surgical Oncology    DC INTRAOPERATIVE SENTINEL LYMPH NODE ID W DYE INJECTION Right 10/28/2016    Procedure: BREAST MASTECTOMY WITH SENTINAL LYMPH NODE BIOPSY; LYMPHSCINITIGRAPHY; LYMPHATIC MAPPING; 0800 NUC MED INJECTION;  Surgeon: Sissy Doty MD;  Location: [facility];  Service: Surgical Oncology    SENTINEL LYMPH NODE BIOPSY Right     US BREAST NEEDLE LOC RIGHT Right 3/16/2017    US GUIDANCE BREAST BIOPSY RIGHT EACH ADDITIONAL Right 9/12/2016    US GUIDANCE BREAST BIOPSY RIGHT EACH ADDITIONAL Right 9/12/2016    US GUIDED BREAST BIOPSY RIGHT COMPLETE Right 9/12/2016     Family History:    Family History   Problem Relation Age of Onset    Coronary artery disease Mother         CABG    Diabetes Mother     Heart attack Mother     Diabetes Sister     Parkinsonism Sister     Coronary artery disease Brother         CABG    Diabetes Brother     Heart attack Brother     Heart attack Son     Kidney cancer Son 48    No Known Problems Son     No Known Problems Son     Emphysema Sister     No Known Problems Sister     Heart attack Brother      Cancer-related family history includes Kidney cancer (age of onset: 48) in her son  Social History:   Social History     Social History    Marital status: /Civil Union     Spouse name: N/A    Number of children: N/A    Years of education: N/A     Occupational History    Retired      Social History Main Topics    Smoking status: Never Smoker    Smokeless tobacco: Never Used    Alcohol use No    Drug use: No    Sexual activity: Not on file      Comment: Resides with      Other Topics Concern    Not on file     Social History Narrative    Always uses seat belt    Convalescence after chemotherapy     Current Medications:   Current Outpatient Prescriptions   Medication Sig Dispense Refill    cyanocobalamin (VITAMIN B-12) 500 mcg tablet Take 1 tablet by mouth daily      furosemide (LASIX) 40 mg tablet TAKE 1 TABLET BY MOUTH DAILY AS DIRECTED 90 tablet 1    levothyroxine 150 mcg tablet Take one tablet 4 days a week and 1/2 tablet three days a week 30 tablet 5    meclizine (ANTIVERT) 25 mg tablet Take 1 tablet (25 mg total) by mouth every 8 (eight) hours as needed for dizziness 30 tablet 3    metolazone (ZAROXOLYN) 2 5 mg tablet TAKE ONE TABLET M-W-F 20 tablet 1    metoprolol tartrate (LOPRESSOR) 50 mg tablet Take 1 tablet by mouth 2 (two) times a day      Naproxen Sodium (ALEVE) 220 MG CAPS Take by mouth      potassium chloride (MICRO-K) 10 MEQ CR capsule TAKE 1 CAPSULE BY MOUTH EVERY DAY 90 capsule 1     No current facility-administered medications for this visit  Allergies: Allergies   Allergen Reactions    Morphine And Related GI Intolerance    Oxycodone-Acetaminophen Vomiting     Physical Exam:  Physical Exam   Constitutional: She is oriented to person, place, and time   She appears well-developed and well-nourished  No distress  HENT:   Head: Normocephalic and atraumatic  Mouth/Throat: Oropharynx is clear and moist  No oropharyngeal exudate  Eyes: Pupils are equal, round, and reactive to light  Conjunctivae and EOM are normal  No scleral icterus  Neck: Normal range of motion  Neck supple  No tracheal deviation present  No thyromegaly present  Cardiovascular: Normal rate, regular rhythm and normal heart sounds  Exam reveals no gallop and no friction rub  No murmur heard  Pulmonary/Chest: Effort normal and breath sounds normal  No respiratory distress  She has no wheezes  She has no rales  Abdominal: Soft  Bowel sounds are normal  She exhibits no distension and no mass  There is no hepatosplenomegaly  There is no tenderness  There is no rebound and no guarding  Genitourinary:   Genitourinary Comments: Deferred  Breast exam performed by Dr Timmy Ram  Musculoskeletal: Normal range of motion  She exhibits edema (B/L LE edema  1+ pitting   )  She exhibits no tenderness  Lymphadenopathy:     She has no cervical adenopathy  Right: No inguinal and no supraclavicular adenopathy present  Left: No inguinal and no supraclavicular adenopathy present  Neurological: She is alert and oriented to person, place, and time  Skin: Skin is warm and dry  No rash noted  She is not diaphoretic  No erythema  No pallor  Psychiatric: She has a normal mood and affect  Her behavior is normal       Vitals:    01/21/19 1103   BP: 118/78   Pulse: 67   Resp: 18   Temp: (!) 97 3 °F (36 3 °C)   SpO2: 97%   Stable  Not cold to touch  Assessment / Plan:    1  Stage IIA Malignant neoplasm of right breast in female, estrogen receptor negative, unspecified site of breast ([facility] [address] )  Stage IIA Triple negative Right Breast cancer, with 9 (-) lymph nodes  She had surgery and chemo with Taxotere & Cytoxan, and completed 3 of anticipated 4 cycles    She had tiny local recurrence with positive margin by surgical bed and has remained on observation since this time  She continues to follow with Dr Sharon Escoto and she performs her breast exams  She has upcoming visit on 4/4/19  She is up to date on 3D mammo from 9/4/18  Her CBC, CMP, CA 27 29 are stable  She will repeat these levels prior to 4 month RTO with Dr Andressa Menchaca  She will have CT scan C/A/P at that time as well  We discussed drinking adequate fluids prior to and post CT scan to minimize effect of dye on the kidneys  We will perform test as long as Creatinine is <1 4       2  Other idiopathic Peripheral Neuropathy  Stable, minimal in fingertips only due to prior chemo  No foot involvement  - CBC and differential; Future  - Comprehensive metabolic panel; Future  - Cancer antigen 27 29; Future  - CBC and differential  - Comprehensive metabolic panel  - Cancer antigen 27 29  - CT chest abdomen pelvis w contrast; Future    ECOG PS 0-1    Goals and Barriers:  Current Goal:  Prolong Survival from Stage IIA Breast Cancer  Barriers: None  Patient's Capacity to Self Care:  Patient is able to self care  Total time spent with patient was 30 minutes, of which >50% of the time was spent in direct consultation discussing PMH, current symptoms, and upcoming plan  Portions of the record may have been created with voice recognition software   Occasional wrong word or "sound a like" substitutions may have occurred due to the inherent limitations of voice recognition software   Read the chart carefully and recognize, using context, where substitutions have occurred

## 2019-01-21 NOTE — TELEPHONE ENCOUNTER
PATIENT SAID SHE WAS RETURNING A CALL FOR TEST RESULTS - NO MESSAGES SEEN IN COMPUTER - PLEASE CALL BACK

## 2019-01-24 LAB
BACTERIA UR CULT: ABNORMAL
Lab: ABNORMAL
SL AMB ANTIMICROBIAL SUSCEPTIBILITY: ABNORMAL

## 2019-01-25 DIAGNOSIS — N30.01 ACUTE CYSTITIS WITH HEMATURIA: Primary | ICD-10-CM

## 2019-01-25 RX ORDER — CIPROFLOXACIN 500 MG/1
500 TABLET, FILM COATED ORAL EVERY 12 HOURS SCHEDULED
Qty: 14 TABLET | Refills: 0 | Status: SHIPPED | OUTPATIENT
Start: 2019-01-25 | End: 2019-02-01

## 2019-03-07 ENCOUNTER — OFFICE VISIT (OUTPATIENT)
Dept: CARDIOLOGY CLINIC | Facility: CLINIC | Age: 74
End: 2019-03-07
Payer: COMMERCIAL

## 2019-03-07 VITALS
SYSTOLIC BLOOD PRESSURE: 138 MMHG | WEIGHT: 179.6 LBS | BODY MASS INDEX: 33.05 KG/M2 | DIASTOLIC BLOOD PRESSURE: 74 MMHG | HEIGHT: 62 IN | HEART RATE: 60 BPM

## 2019-03-07 DIAGNOSIS — I07.1 TRICUSPID VALVE INSUFFICIENCY, UNSPECIFIED ETIOLOGY: ICD-10-CM

## 2019-03-07 DIAGNOSIS — I48.91 ATRIAL FIBRILLATION, UNSPECIFIED TYPE (HCC): ICD-10-CM

## 2019-03-07 DIAGNOSIS — R06.02 SOB (SHORTNESS OF BREATH): Primary | ICD-10-CM

## 2019-03-07 DIAGNOSIS — R51.9 NONINTRACTABLE EPISODIC HEADACHE, UNSPECIFIED HEADACHE TYPE: ICD-10-CM

## 2019-03-07 PROCEDURE — 99214 OFFICE O/P EST MOD 30 MIN: CPT | Performed by: NURSE PRACTITIONER

## 2019-03-07 PROCEDURE — 93000 ELECTROCARDIOGRAM COMPLETE: CPT | Performed by: NURSE PRACTITIONER

## 2019-03-07 RX ORDER — METOPROLOL TARTRATE 50 MG/1
50 TABLET, FILM COATED ORAL 2 TIMES DAILY
Qty: 180 TABLET | Refills: 3 | Status: SHIPPED | OUTPATIENT
Start: 2019-03-07 | End: 2019-09-18

## 2019-03-07 RX ORDER — COVID-19 ANTIGEN TEST
1 KIT MISCELLANEOUS AS NEEDED
Start: 2019-03-07 | End: 2021-07-16 | Stop reason: ALTCHOICE

## 2019-03-07 NOTE — LETTER
March 7, 2019     MD Nessa Friedman  [id number] [facility] [address]    Patient: Katarina Morris   YOB: 1945   Date of Visit: 3/7/2019       Dear Dr Jennifer Henriquez: Thank you for referring Harpreet Balbuena to me for evaluation  Below are my notes for this consultation  If you have questions, please do not hesitate to call me  I look forward to following your patient along with you  Sincerely,        SAHDI Campbell        CC: MD Quita Lane CRNP  3/7/2019 12:19 PM  Sign at close encounter  Follow Up Office Visit Note  Katarina Morris   68 y o    female   MRN: [id number]  [facility]  [address] [place]  [id number] [phone]    PCP: Lauren Urbano MD  Electrophysiologist:  Dr Waleska Gordon- could be multifactorial, due to weight, deconditioning, +/- valve disease  PAF, status post AFib ablation August 2016  Had symptomatic palpitations at that time; does not have palpitations currently  HTN  /74  On metoprolol tartrate 50 b i d -refilled  Ejection fraction 50%-TTE 2016  Tricuspid regurgitation, moderate to severe  Chronic lower extremity edema on furosemide 40 BID  Occassionally takes once a day , if out  Off metolazone which she had been on previously  Right breast cancer, status post right mastectomy and chemotherapy withTaxotere/Cytoxan- 2 years ago   Cardiac testing  --YANIV Aug 2016-EF 50%  Moderate to severe tricuspid regurgitation  Right ventricular function low normal   Left atrial enlargement        Summary of recommendations  Will order a transthoracic echocardiogram  Advised Lasix 40 BID, more consistently  Avoid dietary NA  Daily wt- see if weight is changing  Follow up with me 2-3 weeks, after the echo  Follow up will be scheduled with Dr Christine Fleming 2 months              HPI  Harpreet Balbuena is a 67 yo female with a hx of PAF and HTN    In 2016 she underwent an a fib ablation  Her ejection fraction was 50% at that time  She has also had right breast cancer, prior right mastectomy and adjuvant chemotherapy, which she completed in 2016  She has been taking furosemide 4 times a week for ongoing edema of her lower legs  In the past she was on metolazone a couple times a week with improvement in her edema  This she stopped due to dizziness  3/7/19 she presents for cardiology follow-up; she missed her 1 year appt with Dr Janina Claros in January, as no one called her from the office to set it up  +RICHARDS-intermittently so  Not every day  No chest pain- at rest or with exertion  No palpitations  Weight gain of 20 lbs since her appetite returned after Rx for CA  Hs been told she snores, just a little  Had RICHARDS in past (along with palpitations) when she had a fib before  No palpitations now  Tries to avoid Na in her diet  Does not eat out at resturants frequently  No PND/No orthopnea  Some leg edema  Mostly takes Lasix 40 bid except if she is not at home  ) Today, she did not yet take it  Sleeps on 1 pillow  She was mostly concerned that her RICHARDS could have meant the a fib returned, although she admits she has not been having palpitations  Plan: get an updated echo  Daily weights  Avoid Na  F/U me 2-3 weeks; F/U Dr Janina Claros 2 M  Assessment  Diagnoses and all orders for this visit:    SOB (shortness of breath)  -     POCT ECG  -     Echo complete with contrast if indicated; Future    Atrial fibrillation, unspecified type ([address] )  -     Echo complete with contrast if indicated; Future  -     metoprolol tartrate (LOPRESSOR) 50 mg tablet; Take 1 tablet (50 mg total) by mouth 2 (two) times a day    Tricuspid valve insufficiency, unspecified etiology  -     Echo complete with contrast if indicated; Future    Nonintractable episodic headache, unspecified headache type  -     Naproxen Sodium (ALEVE) 220 MG CAPS;  Take 1 capsule (220 mg total) by mouth as needed (prn headache)          Past Medical History:   Diagnosis Date    Abnormal chest CT     last assessed - [id number]    Abnormal glucose     Resolved - [id number]    Arrhythmia     Arthritis     Slight    BCC (basal cell carcinoma of skin)     last assessed - [id number]    Cardiomyopathy [facility])     Cervical spinal stenosis     Discharge from left nipple     last assessed - [id number]    Disease of thyroid gland     hypothyroidism    Full dentures     Upper and Lower    History of atrial fibrillation     Homocysteinemia ([address] )     Hypertension     Hypokalemia     Intraductal papilloma of breast     last assessed - [id number]    Malignant neoplasm of skin     basal cell on face, Moh's surgery    Memory loss     last assessed - [id number]    Obesity     Open wound     last assessed - [id number]    Osteopenia     Overactive bladder     Palmar plantar erythrodysaesthesia     Resolved - [id number]    Pulmonary nodule seen on imaging study     Rosacea     UTI (urinary tract infection)     recently    Vertigo     Vitamin D deficiency        Review of Systems   Constitution: Positive for weight gain  Negative for chills  Cardiovascular: Positive for dyspnea on exertion  Negative for chest pain, claudication, cyanosis, irregular heartbeat, leg swelling, near-syncope, orthopnea, palpitations, paroxysmal nocturnal dyspnea and syncope  Respiratory: Negative for cough and shortness of breath  Gastrointestinal: Negative for heartburn and nausea  Neurological: Negative for dizziness, focal weakness, headaches, light-headedness and weakness  All other systems reviewed and are negative  Allergies   Allergen Reactions    Morphine And Related GI Intolerance    Oxycodone-Acetaminophen Vomiting           Current Outpatient Medications:     cyanocobalamin (VITAMIN B-12) 500 mcg tablet, Take 1 tablet by mouth daily, Disp: , Rfl:     furosemide (LASIX) 40 mg tablet, TAKE 1 TABLET BY MOUTH DAILY AS DIRECTED (Patient taking differently: taking twice daily), Disp: 90 tablet, Rfl: 1    levothyroxine 150 mcg tablet, Take one tablet 4 days a week and 1/2 tablet three days a week, Disp: 30 tablet, Rfl: 5    meclizine (ANTIVERT) 25 mg tablet, Take 1 tablet (25 mg total) by mouth every 8 (eight) hours as needed for dizziness, Disp: 30 tablet, Rfl: 3    metoprolol tartrate (LOPRESSOR) 50 mg tablet, Take 1 tablet (50 mg total) by mouth 2 (two) times a day, Disp: 180 tablet, Rfl: 3    Naproxen Sodium (ALEVE) 220 MG CAPS, Take 1 capsule (220 mg total) by mouth as needed (prn headache), Disp: , Rfl:     potassium chloride (MICRO-K) 10 MEQ CR capsule, TAKE 1 CAPSULE BY MOUTH EVERY DAY, Disp: 90 capsule, Rfl: 1        Social History     Socioeconomic History    Marital status: /Civil Union     Spouse name: Not on file    Number of children: Not on file    Years of education: Not on file    Highest education level: Not on file   Occupational History    Occupation: Retired   Social Needs    Financial resource strain: Not on file    Food insecurity:     Worry: Not on file     Inability: Not on file   [employer] needs:     Medical: Not on file     Non-medical: Not on file   Tobacco Use    Smoking status: Never Smoker    Smokeless tobacco: Never Used   Substance and Sexual Activity    Alcohol use: No    Drug use: No    Sexual activity: Not on file     Comment: Resides with    Lifestyle    Physical activity:     Days per week: Not on file     Minutes per session: Not on file    Stress: Not on file   Relationships    Social connections:     Talks on phone: Not on file     Gets together: Not on file     Attends Buddhism service: Not on file     Active member of club or organization: Not on file     Attends meetings of clubs or organizations: Not on file     Relationship status: Not on file    Intimate partner violence:     Fear of current or ex partner: Not on file     Emotionally abused: Not on file Physically abused: Not on file     Forced sexual activity: Not on file   Other Topics Concern    Not on file   Social History Narrative    Always uses seat belt    Convalescence after chemotherapy       Family History   Problem Relation Age of Onset    Coronary artery disease Mother         CABG    Diabetes Mother     Heart attack Mother     Diabetes Sister     Parkinsonism Sister     Coronary artery disease Brother         CABG    Diabetes Brother     Heart attack Brother     Heart attack Son     Kidney cancer Son 48    No Known Problems Son     No Known Problems Son     Emphysema Sister     No Known Problems Sister     Heart attack Brother        Physical Exam   Constitutional: She is oriented to person, place, and time  No distress  HENT:   Head: Normocephalic and atraumatic  Eyes: Conjunctivae and EOM are normal    Neck: Normal range of motion  Neck supple  Cardiovascular: Normal rate, regular rhythm and intact distal pulses  Murmur heard  Midsystolic murmur is present at the upper right sternal border  Pulmonary/Chest: She has rales  Abdominal: Soft  Bowel sounds are normal    Musculoskeletal: Normal range of motion  She exhibits edema  Neurological: She is alert and oriented to person, place, and time  Skin: Skin is warm and dry  She is not diaphoretic  Psychiatric: She has a normal mood and affect  Nursing note and vitals reviewed  Vitals: Blood pressure 138/74, pulse 60, height 5' 2" (1 575 m), weight 81 5 kg (179 lb 9 6 oz)     Wt Readings from Last 3 Encounters:   03/07/19 81 5 kg (179 lb 9 6 oz)   01/21/19 79 9 kg (176 lb 3 2 oz)   01/16/19 80 5 kg (177 lb 6 4 oz)         Labs & Results:  Lab Results   Component Value Date    WBC 3 7 01/16/2019    HGB 13 0 01/16/2019    HCT 38 8 01/16/2019    MCV 92 01/16/2019     01/16/2019     No results found for: BNP  No components found for: CHEM  No results found for: CKTOTAL, [address], [address] , [facility]  Results for orders placed during the hospital encounter of 16   Echo complete with contrast if indicated    N[facility]ative [facility], [address]  [phone]    Transthoracic Echocardiogram  2D, M-mode, Doppler, and Color Doppler    Study date:  2016    Patient: Santosh Swan  MR number: [id number]  Account number: [de-identified]  : [id number]  Age: 79 years  Gender: Female  Status: Outpatient  Location: [facility] [place]  Height: 62 in  Weight: 198 7 lb  BP: 126/ 80 mmHg    Indications: C/O SOB x 1 months  Diagnoses: I48 0 - Atrial fibrillation, R06 09 - Other forms of dyspnea    Sonographer:  CARLOS Beebe RDCS RVT  Primary Physician:  Florinda Atkinson MD  Referring Physician:  Issac Merlin, MD  Group:  [facility]  Interpreting Physician:  America Alarcon MD    SUMMARY    LEFT VENTRICLE:  Systolic function was mildly reduced  Ejection fraction was estimated in the  range of 45 % to 50 %  There was mild diffuse hypokinesis  Doppler parameters were consistent with elevated ventricular end-diastolic  filling pressure  LEFT ATRIUM:  The atrium was mildly dilated  RIGHT ATRIUM:  The atrium was mildly dilated  MITRAL VALVE:  There was mild annular calcification  There was mild regurgitation  TRICUSPID VALVE:  There was moderate regurgitation  Pulmonary artery systolic pressure was mildly increased  HISTORY: PRIOR HISTORY: Anticoagulant for A-FIb  PROCEDURE: The study was performed in the [facility] [place]  This was a routine study  The transthoracic approach was used  The study  included complete 2D imaging, M-mode, complete spectral Doppler, and color  Doppler  Images were obtained from the parasternal, apical, subcostal, and  suprasternal notch acoustic windows  Image quality was adequate      LEFT VENTRICLE: Size was normal  Systolic function was mildly reduced  Ejection  fraction was estimated in the range of 45 % to 50 %  There was mild diffuse  hypokinesis  Wall thickness was normal  DOPPLER: Transmitral flow pattern:  atrial fibrillation  The study was not technically sufficient to allow  evaluation of LV diastolic function  Doppler parameters were consistent with  elevated ventricular end-diastolic filling pressure  RIGHT VENTRICLE: The size was normal  Systolic function was normal  Wall  thickness was normal     LEFT ATRIUM: The atrium was mildly dilated  RIGHT ATRIUM: The atrium was mildly dilated  MITRAL VALVE: There was mild annular calcification  Valve structure was normal   There was normal leaflet separation  DOPPLER: The transmitral velocity was  within the normal range  There was no evidence for stenosis  There was mild  regurgitation  AORTIC VALVE: The valve was trileaflet  Leaflets exhibited normal cuspal  separation and sclerosis  DOPPLER: Transaortic velocity was within the normal  range  There was no evidence for stenosis  There was no significant  regurgitation  TRICUSPID VALVE: The valve structure was normal  There was normal leaflet  separation  DOPPLER: The transtricuspid velocity was within the normal range  There was no evidence for stenosis  There was moderate regurgitation  Pulmonary  artery systolic pressure was mildly increased  PULMONIC VALVE: Not well visualized  DOPPLER: The transpulmonic velocity was  within the normal range  There was no significant regurgitation  PERICARDIUM: There was no pericardial effusion  The pericardium was normal in  appearance  AORTA: The root exhibited normal size  SYSTEMIC VEINS: IVC: The inferior vena cava was normal in size  PULMONARY VEINS: DOPPLER: There was systolic blunting in the pulmonary vein(s)      SYSTEM MEASUREMENT TABLES    2D  %FS: 18 66 %  Ao Diam: 2 62 cm  EF Biplane: 43 86 %  EF(Teich): 39 %  IVSd: 0 91 cm  LA Area: 20 74 cm2  LA Diam: 4 13 cm  LVEDV MOD A2C: 74 11 ml  LVEDV MOD A4C: 61 78 ml  LVEDV MOD BP: 68 19 ml  LVEF MOD A2C: 49 05 %  LVEF MOD A4C: 41 3 %  LVESV MOD A2C: 37 76 ml  LVESV MOD A4C: 36 27 ml  LVESV MOD BP: 38 28 ml  LVIDd: 4 11 cm  LVIDs: 3 34 cm  LVLd A2C: 6 46 cm  LVLd A4C: 6 3 cm  LVLs A2C: 5 84 cm  LVLs A4C: 5 43 cm  LVPWd: 0 83 cm  RA Area: 17 98 cm2  RVIDd: 3 14 cm  SV MOD A2C: 36 36 ml  SV MOD A4C: 25 51 ml  SV(Teich): 29 15 ml    CW  TR Vmax: 2 8 m/s  TR maxP 29 mmHg    MM  TAPSE: 1 44 cm    [facility] Accredited Echocardiography Laboratory    Prepared and electronically signed by    Juliet Beltran MD  Signed 2016 14:42:12       Results for orders placed during the hospital encounter of 16   YANIV    Narrative [facility]  [facility], [address] [facility]  [phone]    Transesophageal Echocardiogram  2D, Doppler, and Color Doppler    Study date:  12-Aug-2016    Patient: Janelle Sprague  MR number: [id number]  Account number: [de-identified]  : [id number]  Age: 79 years  Gender: Female  Status: Outpatient  Location: Cath lab  Height: 62 in  Weight: 190 lb  BP: 148/ 97 mmHg    Indications: Afib    Diagnoses: I48 0 - Atrial fibrillation    Sonographer:  SOLE Hubbard  Primary Physician:  Lauren Urbano MD  Referring Physician:  Teresa Da Silva MD  Group:  [facility]  Interpreting Physician:  Teresa Da Silva MD    SUMMARY    LEFT VENTRICLE:  Systolic function was mildly reduced  Ejection fraction was estimated to be 50  %  LEFT ATRIUM:  The atrium was dilated  LEFT ATRIAL APPENDAGE:  The appendage was dilated  No thrombus was identified  ATRIAL SEPTUM:  No defect or patent foramen ovale was identified  RIGHT ATRIUM:  The atrium was dilated  MITRAL VALVE:  There was mild regurgitation  AORTIC VALVE:  There was mild regurgitation  TRICUSPID VALVE:  There was moderate to severe regurgitation      PULMONIC VALVE:  There was trace regurgitation  HISTORY: PRIOR HISTORY: Obesity, Afib, Edema, Cardiomyopathy, HTN    PROCEDURE: The procedure was performed in the catheterization laboratory  This  was a routine study  The risks and alternatives of the procedure were explained  to the patient and informed consent was obtained  The transesophageal approach  was used  The study included complete 2D imaging, complete spectral Doppler,  and color Doppler  The heart rate was 118 bpm, at the start of the study  An  adult omniplane probe was inserted by the attending cardiologist  Intubated  with ease  One intubation attempt(s)  There was no blood detected on the probe  Image quality was adequate  MEDICATIONS: Anesthesia  LEFT VENTRICLE: Size was normal  Systolic function was mildly reduced  Ejection  fraction was estimated to be 50 %  This study was inadequate for the evaluation  of regional wall motion  Wall thickness was normal     RIGHT VENTRICLE: The size was normal  Systolic function was low normal     LEFT ATRIUM: The atrium was dilated  APPENDAGE: The appendage was dilated  No  thrombus was identified  ATRIAL SEPTUM: No defect or patent foramen ovale was identified  RIGHT ATRIUM: The atrium was dilated  MITRAL VALVE: Valve structure was normal  There was normal leaflet separation  DOPPLER: The transmitral velocity was within the normal range  There was no  evidence for stenosis  There was mild regurgitation  AORTIC VALVE: The valve was trileaflet  Leaflets exhibited normal thickness and  normal cuspal separation  DOPPLER: Transaortic velocity was within the normal  range  There was no evidence for stenosis  There was mild regurgitation  TRICUSPID VALVE: The valve structure was normal  There was normal leaflet  separation  DOPPLER: There was moderate to severe regurgitation  Estimated peak  PA pressure was 40 mmHg  PULMONIC VALVE: Leaflets exhibited normal thickness, no calcification, and  normal cuspal separation   DOPPLER: There was trace regurgitation  PERICARDIUM: There was no pericardial effusion  AORTA: The root exhibited normal size  The ascending aorta was normal in size  There was no significant atheroma  PULMONARY VEINS: DOPPLER: Doppler flow pattern was normal in the pulmonary  vein(s)  [address] Accredited Echocardiography Laboratory    Prepared and electronically signed by    Brandon Muñoz MD  Signed [id number] 17:28:53         This note was completed in part utilizing [employer] direct voice recognition software  Grammatical errors, random word insertion, spelling mistakes, and incomplete sentences may be an occasional consequence of the system secondary to software limitations, ambient noise and hardware issues  At the time of dictation, efforts were made to edit, clarify and /or correct errors  Please read the chart carefully and recognize, using context, where substitutions have occurred    If you have any questions or concerns about the context, text or information contained within the body of this dictation, please contact myself, the provider, for further clarification

## 2019-03-07 NOTE — PROGRESS NOTES
Follow Up Office Visit Note  Marijo Gottron   68 y o    female   MRN: [id number]  [place] [id number] [address] [id number] [phone]    PCP: Nivia Solano MD  Electrophysiologist:  Dr Maurilio Nguyen- could be multifactorial, due to weight, deconditioning, +/- valve disease  PAF, status post AFib ablation August 2016  Had symptomatic palpitations at that time; does not have palpitations currently  HTN  /74  On metoprolol tartrate 50 b i d -refilled  Ejection fraction 50%-TTE 2016  Tricuspid regurgitation, moderate to severe  Chronic lower extremity edema on furosemide 40 BID  Occassionally takes once a day , if out  Off metolazone which she had been on previously  Right breast cancer, status post right mastectomy and chemotherapy withTaxotere/Cytoxan- 2 years ago   Cardiac testing  --YANIV Aug 2016-EF 50%  Moderate to severe tricuspid regurgitation  Right ventricular function low normal   Left atrial enlargement        Summary of recommendations  Will order a transthoracic echocardiogram  Advised Lasix 40 BID, more consistently  Avoid dietary NA  Daily wt- see if weight is changing  Follow up with me 2-3 weeks, after the echo  Follow up will be scheduled with Dr Justin Batista 2 months              HPI  Norah Sotomayor is a 67 yo female with a hx of PAF and HTN  In 2016 she underwent an a fib ablation  Her ejection fraction was 50% at that time  She has also had right breast cancer, prior right mastectomy and adjuvant chemotherapy, which she completed in 2016  She has been taking furosemide 4 times a week for ongoing edema of her lower legs  In the past she was on metolazone a couple times a week with improvement in her edema  This she stopped due to dizziness  3/7/19 she presents for cardiology follow-up; she missed her 1 year appt with Dr Justin Batista in January, as no one called her from the office to set it up  +RICHARDS-intermittently so  Not every day  No chest pain- at rest or with exertion  No palpitations  Weight gain of 20 lbs since her appetite returned after Rx for CA  Hs been told she snores, just a little  Had RICHARDS in past (along with palpitations) when she had a fib before  No palpitations now  Tries to avoid Na in her diet  Does not eat out at resturants frequently  No PND/No orthopnea  Some leg edema  Mostly takes Lasix 40 bid except if she is not at home  ) Today, she did not yet take it  Sleeps on 1 pillow  She was mostly concerned that her RICHARDS could have meant the a fib returned, although she admits she has not been having palpitations  Plan: get an updated echo  Daily weights  Avoid Na  F/U me 2-3 weeks; F/U Dr Hilda العراقي 2 M  Assessment  Diagnoses and all orders for this visit:    SOB (shortness of breath)  -     POCT ECG  -     Echo complete with contrast if indicated; Future    At[facility], unspecified [facility] ([facility] )  -     Echo complete with contrast if indicated; Future  -     metoprolol tartrate (LOPRESSOR) 50 mg tablet; Take 1 tablet (50 mg total) by mouth 2 (two) times a day    Tricuspid valve insufficiency, unspecified etiology  -     Echo complete with contrast if indicated; Future    Nonintractable episodic headache, unspecified headache type  -     Naproxen Sodium (ALEVE) 220 MG CAPS;  Take 1 capsule (220 mg total) by mouth as needed (prn headache)          Past Medical History:   Diagnosis Date    Abnormal chest CT     last assessed - [id number]    Abnormal glucose     Resolved - [id number]    Arrhythmia     Arthritis     Slight    BCC (basal cell carcinoma of skin)     last assessed - [id number]    Cardiomyopathy [facility])     Cervical spinal stenosis     Discharge from left nipple     last assessed - [id number]    Disease of thyroid gland     hypothyroidism    Full dentures     Upper and Lower    History of atrial fibrillation     [facility]ocysteinemia ([facility]     Hypertension     Hypokalemia  Intraductal papilloma of breast     last assessed - [id number]    Malignant neoplasm of skin     basal cell on face, Moh's surgery    Memory loss     last assessed - [id number]    Obesity     Open wound     last assessed - 12OZQ0513    Osteopenia     Overactive bladder     Palmar plantar erythrodysaesthesia     Resolved - [id number]    Pulmonary nodule seen on imaging study     Rosacea     UTI (urinary tract infection)     recently    Vertigo     Vitamin D deficiency        Review of Systems   Constitution: Positive for weight gain  Negative for chills  Cardiovascular: Positive for dyspnea on exertion  Negative for chest pain, claudication, cyanosis, irregular heartbeat, leg swelling, near-syncope, orthopnea, palpitations, paroxysmal nocturnal dyspnea and syncope  Respiratory: Negative for cough and shortness of breath  Gastrointestinal: Negative for heartburn and nausea  Neurological: Negative for dizziness, focal weakness, headaches, light-headedness and weakness  All other systems reviewed and are negative  Allergies   Allergen Reactions    Morphine And Related GI Intolerance    Oxycodone-Acetaminophen Vomiting           Current Outpatient Medications:     cyanocobalamin (VITAMIN B-12) 500 mcg tablet, Take 1 tablet by mouth daily, Disp: , Rfl:     furosemide (LASIX) 40 mg tablet, TAKE 1 TABLET BY MOUTH DAILY AS DIRECTED (Patient taking differently: taking twice daily), Disp: 90 tablet, Rfl: 1    levothyroxine 150 mcg tablet, Take one tablet 4 days a week and 1/2 tablet three days a week, Disp: 30 tablet, Rfl: 5    meclizine (ANTIVERT) 25 mg tablet, Take 1 tablet (25 mg total) by mouth every 8 (eight) hours as needed for dizziness, Disp: 30 tablet, Rfl: 3    metoprolol tartrate (LOPRESSOR) 50 mg tablet, Take 1 tablet (50 mg total) by mouth 2 (two) times a day, Disp: 180 tablet, Rfl: 3    Naproxen Sodium (ALEVE) 220 MG CAPS, Take 1 capsule (220 mg total) by mouth as needed (prn headache), Disp: , Rfl:     potassium chloride (MICRO-K) 10 MEQ CR capsule, TAKE 1 CAPSULE BY MOUTH EVERY DAY, Disp: 90 capsule, Rfl: 1        Social History     Socioeconomic History    Marital status: /Civil Union     Spouse name: Not on file    Number of children: Not on file    Years of education: Not on file    Highest education level: Not on file   Occupational History    Occupation: Retired   Social Needs    Financial resource strain: Not on file    Food insecurity:     Worry: Not on file     Inability: Not on file   NuoDB needs:     Medical: Not on file     Non-medical: Not on file   Tobacco Use    Smoking status: Never Smoker    Smokeless tobacco: Never Used   Substance and Sexual Activity    Alcohol use: No    Drug use: No    Sexual activity: Not on file     Comment: Resides with    Lifestyle    Physical activity:     Days per week: Not on file     Minutes per session: Not on file    Stress: Not on file   Relationships    Social connections:     Talks on phone: Not on file     Gets together: Not on file     Attends [employer] service: Not on file     Active member of club or organization: Not on file     Attends meetings of clubs or organizations: Not on file     Relationship status: Not on file    Intimate partner violence:     Fear of current or ex partner: Not on file     Emotionally abused: Not on file     Physically abused: Not on file     Forced sexual activity: Not on file   Other Topics Concern    Not on file   Social History Narrative    Always uses seat belt    Convalescence after chemotherapy       Family History   Problem Relation Age of Onset    Coronary artery disease Mother         CABG    Diabetes Mother     Heart attack Mother     Diabetes Sister     Parkinsonism Sister     Coronary artery disease Brother         CABG    Diabetes Brother     Heart attack Brother     Heart attack Son     Kidney cancer Son 48    No Known Problems Son    Marta Leisure No Known Problems Son     Emphysema Sister     No Known Problems Sister     Heart attack Brother        Physical Exam   Constitutional: She is oriented to person, place, and time  No distress  HENT:   Head: Normocephalic and atraumatic  Eyes: Conjunctivae and EOM are normal    Neck: Normal range of motion  Neck supple  Cardiovascular: Normal rate, regular rhythm and intact distal pulses  Murmur heard  Midsystolic murmur is present at the upper right sternal border  Pulmonary/Chest: She has rales  Abdominal: Soft  Bowel sounds are normal    Musculoskeletal: Normal range of motion  She exhibits edema  Neurological: She is alert and oriented to person, place, and time  Skin: Skin is warm and dry  She is not diaphoretic  Psychiatric: She has a normal mood and affect  Nursing note and vitals reviewed  Vitals: Blood pressure 138/74, pulse 60, height 5' 2" (1 575 m), weight 81 5 kg (179 lb 9 6 oz)     Wt Readings from Last 3 Encounters:   19 81 5 kg (179 lb 9 6 oz)   19 79 9 kg (176 lb 3 2 oz)   19 80 5 kg (177 lb 6 4 oz)         Labs & Results:  Lab Results   Component Value Date    WBC 3 7 2019    HGB 13 0 2019    HCT 38 8 2019    MCV 92 2019     2019     No results found for: BNP  No components found for: CHEM  No results found for: Harlene Miracle, CKMBINDEX  Results for orders placed during the hospital encounter of 16   Echo complete with contrast if indicated    Narrative [address]  [phone]    Transthoracic Echocardiogram  2D, M-mode, Doppler, and Color Doppler    Study date:  2016    Patient: David Cochran  MR number: [id number]  Account number: [de-identified]  : [id number]  Age: 79 years  Gender: Female  Status: Outpatient  Location: [address] [place] [address] [facility]  Height: 62 in  Weight: 198 7 lb  BP: 126/ 80 mmHg    Indications: C/O SOB x 1 months  Diagnoses: I48 0 - Atrial fibrillation, R06 09 - Other forms of dyspnea    Sonographer:  CARLOS Israel RDCS RVT  Primary Physician:  Yesy Harris MD  Referring Physician:  Mady Zapata MD  Group:  [facility]  Interpreting Physician:  Misty Cobb MD    SUMMARY    LEFT VENTRICLE:  Systolic function was mildly reduced  Ejection fraction was estimated in the  range of 45 % to 50 %  There was mild diffuse hypokinesis  Doppler parameters were consistent with elevated ventricular end-diastolic  filling pressure  LEFT ATRIUM:  The atrium was mildly dilated  RIGHT ATRIUM:  The atrium was mildly dilated  MITRAL VALVE:  There was mild annular calcification  There was mild regurgitation  TRICUSPID VALVE:  There was moderate regurgitation  Pulmonary artery systolic pressure was mildly increased  HISTORY: PRIOR HISTORY: Anticoagulant for A-FIb  PROCEDURE: The study was performed in the [facility]  This was a routine study  The transthoracic approach was used  The study  included complete 2D imaging, M-mode, complete spectral Doppler, and color  Doppler  Images were obtained from the parasternal, apical, subcostal, and  suprasternal notch acoustic windows  Image quality was adequate  LEFT VENTRICLE: Size was normal  Systolic function was mildly reduced  Ejection  fraction was estimated in the range of 45 % to 50 %  There was mild diffuse  hypokinesis  Wall thickness was normal  DOPPLER: Transmitral flow pattern:  atrial fibrillation  The study was not technically sufficient to allow  evaluation of LV diastolic function  Doppler parameters were consistent with  elevated ventricular end-diastolic filling pressure  RIGHT VENTRICLE: The size was normal  Systolic function was normal  Wall  thickness was normal     LEFT ATRIUM: The atrium was mildly dilated      RIGHT ATRIUM: The atrium was mildly dilated  MITRAL VALVE: There was mild annular calcification  Valve structure was normal   There was normal leaflet separation  DOPPLER: The transmitral velocity was  within the normal range  There was no evidence for stenosis  There was mild  regurgitation  AORTIC VALVE: The valve was trileaflet  Leaflets exhibited normal cuspal  separation and sclerosis  DOPPLER: Transaortic velocity was within the normal  range  There was no evidence for stenosis  There was no significant  regurgitation  TRICUSPID VALVE: The valve structure was normal  There was normal leaflet  separation  DOPPLER: The transtricuspid velocity was within the normal range  There was no evidence for stenosis  There was moderate regurgitation  Pulmonary  artery systolic pressure was mildly increased  PULMONIC VALVE: Not well visualized  DOPPLER: The transpulmonic velocity was  within the normal range  There was no significant regurgitation  PERICARDIUM: There was no pericardial effusion  The pericardium was normal in  appearance  AORTA: The root exhibited normal size  SYSTEMIC VEINS: IVC: The inferior vena cava was normal in size  PULMONARY VEINS: DOPPLER: There was systolic blunting in the pulmonary vein(s)      SYSTEM MEASUREMENT TABLES    2D  %FS: 18 66 %  Ao Diam: 2 62 cm  EF Biplane: 43 86 %  EF(Teich): 39 %  IVSd: 0 91 cm  LA Area: 20 74 cm2  LA Diam: 4 13 cm  LVEDV MOD A2C: 74 11 ml  LVEDV MOD A4C: 61 78 ml  LVEDV MOD BP: 68 19 ml  LVEF MOD A2C: 49 05 %  LVEF MOD A4C: 41 3 %  LVESV MOD A2C: 37 76 ml  LVESV MOD A4C: 36 27 ml  LVESV MOD BP: 38 28 ml  LVIDd: 4 11 cm  LVIDs: 3 34 cm  LVLd A2C: 6 46 cm  LVLd A4C: 6 3 cm  LVLs A2C: 5 84 cm  LVLs A4C: 5 43 cm  LVPWd: 0 83 cm  RA Area: 17 98 cm2  RVIDd: 3 14 cm  SV MOD A2C: 36 36 ml  SV MOD A4C: 25 51 ml  SV(Teich): 29 15 ml    CW  TR Vmax: 2 8 m/s  TR maxP 29 mmHg    MM  TAPSE: 1 44 cm    [employer] Accredited Echocardiography Laboratory    Prepared and electronically signed by    Hanna Boo MD  Signed 2016 14:42:12       Results for orders placed during the hospital encounter of 16   YANIV    Narrative [address]  [facility], [address] [facility]  [phone]    Transesophageal Echocardiogram  2D, Doppler, and Color Doppler    Study date:  12-Aug-2016    Patient: Daivd Yancey  MR number: [id number]  Account number: [de-identified]  : [id number]  Age: 79 years  Gender: Female  Status: Outpatient  Location: Cath lab  Height: 62 in  Weight: 190 lb  BP: 148/ 97 mmHg    Indications: Afib    Diagnoses: I48 0 - Atrial fibrillation    Sonographer:  SOLE Mix  Primary Physician:  Gee Dias MD  Referring Physician:  Ramin Mendez MD  [facility]  [facility]  Interpreting Physician:  Ramin Mendez MD    SUMMARY    LEFT VENTRICLE:  Systolic function was mildly reduced  Ejection fraction was estimated to be 50  %  LEFT ATRIUM:  The atrium was dilated  LEFT ATRIAL APPENDAGE:  The appendage was dilated  No thrombus was identified  ATRIAL SEPTUM:  No defect or patent foramen ovale was identified  RIGHT ATRIUM:  The atrium was dilated  MITRAL VALVE:  There was mild regurgitation  AORTIC VALVE:  There was mild regurgitation  TRICUSPID VALVE:  There was moderate to severe regurgitation  PULMONIC VALVE:  There was trace regurgitation  HISTORY: PRIOR HISTORY: Obesity, Afib, Edema, Cardiomyopathy, HTN    PROCEDURE: The procedure was performed in the catheterization laboratory  This  was a routine study  The risks and alternatives of the procedure were explained  to the patient and informed consent was obtained  The transesophageal approach  was used  The study included complete 2D imaging, complete spectral Doppler,  and color Doppler  The heart rate was 118 bpm, at the start of the study   An  adult omniplane probe was inserted by the attending cardiologist  Intubated  with ease  One intubation attempt(s)  There was no blood detected on the probe  Image quality was adequate  MEDICATIONS: Anesthesia  LEFT VENTRICLE: Size was normal  Systolic function was mildly reduced  Ejection  fraction was estimated to be 50 %  This study was inadequate for the evaluation  of regional wall motion  Wall thickness was normal     RIGHT VENTRICLE: The size was normal  Systolic function was low normal     LEFT ATRIUM: The atrium was dilated  APPENDAGE: The appendage was dilated  No  thrombus was identified  ATRIAL SEPTUM: No defect or patent foramen ovale was identified  RIGHT ATRIUM: The atrium was dilated  MITRAL VALVE: Valve structure was normal  There was normal leaflet separation  DOPPLER: The transmitral velocity was within the normal range  There was no  evidence for stenosis  There was mild regurgitation  AORTIC VALVE: The valve was trileaflet  Leaflets exhibited normal thickness and  normal cuspal separation  DOPPLER: Transaortic velocity was within the normal  range  There was no evidence for stenosis  There was mild regurgitation  TRICUSPID VALVE: The valve structure was normal  There was normal leaflet  separation  DOPPLER: There was moderate to severe regurgitation  Estimated peak  PA pressure was 40 mmHg  PULMONIC VALVE: Leaflets exhibited normal thickness, no calcification, and  normal cuspal separation  DOPPLER: There was trace regurgitation  PERICARDIUM: There was no pericardial effusion  AORTA: The root exhibited normal size  The ascending aorta was normal in size  There was no significant atheroma  PULMONARY VEINS: DOPPLER: Doppler flow pattern was normal in the pulmonary  vein(s)  [address] Accredited Echocardiography Laboratory    Prepared and electronically signed by    Paulina Strange MD  Signed [id number] 17:28:53         This note was completed in part utilizing [employer] direct voice recognition software  Grammatical errors, random word insertion, spelling mistakes, and incomplete sentences may be an occasional consequence of the system secondary to software limitations, ambient noise and hardware issues  At the time of dictation, efforts were made to edit, clarify and /or correct errors  Please read the chart carefully and recognize, using context, where substitutions have occurred    If you have any questions or concerns about the context, text or information contained within the body of this dictation, please contact myself, the provider, for further clarification

## 2019-03-25 ENCOUNTER — TELEPHONE (OUTPATIENT)
Dept: FAMILY MEDICINE CLINIC | Facility: HOSPITAL | Age: 74
End: 2019-03-25

## 2019-03-25 DIAGNOSIS — E87.6 HYPOKALEMIA: ICD-10-CM

## 2019-03-25 DIAGNOSIS — E03.9 ACQUIRED HYPOTHYROIDISM: ICD-10-CM

## 2019-03-25 RX ORDER — LEVOTHYROXINE SODIUM 0.15 MG/1
TABLET ORAL
Qty: 30 TABLET | Refills: 5 | Status: CANCELLED | OUTPATIENT
Start: 2019-03-25

## 2019-03-25 RX ORDER — POTASSIUM CHLORIDE 750 MG/1
10 CAPSULE, EXTENDED RELEASE ORAL DAILY
Qty: 90 CAPSULE | Refills: 1 | Status: CANCELLED | OUTPATIENT
Start: 2019-03-25

## 2019-03-26 DIAGNOSIS — E87.6 HYPOKALEMIA: ICD-10-CM

## 2019-03-26 DIAGNOSIS — E03.9 ACQUIRED HYPOTHYROIDISM: ICD-10-CM

## 2019-03-26 RX ORDER — LEVOTHYROXINE SODIUM 0.15 MG/1
TABLET ORAL
Qty: 30 TABLET | Refills: 5 | Status: SHIPPED | OUTPATIENT
Start: 2019-03-26 | End: 2019-07-16 | Stop reason: SDUPTHER

## 2019-03-26 RX ORDER — POTASSIUM CHLORIDE 750 MG/1
10 CAPSULE, EXTENDED RELEASE ORAL DAILY
Qty: 90 CAPSULE | Refills: 1 | Status: SHIPPED | OUTPATIENT
Start: 2019-03-26 | End: 2019-09-18 | Stop reason: SDUPTHER

## 2019-04-02 ENCOUNTER — HOSPITAL ENCOUNTER (OUTPATIENT)
Dept: NON INVASIVE DIAGNOSTICS | Facility: CLINIC | Age: 74
Discharge: HOME | End: 2019-04-02
Payer: COMMERCIAL

## 2019-04-02 DIAGNOSIS — I48.91 ATRIAL FIBRILLATION, UNSPECIFIED TYPE (HCC): ICD-10-CM

## 2019-04-02 DIAGNOSIS — R06.02 SOB (SHORTNESS OF BREATH): ICD-10-CM

## 2019-04-02 DIAGNOSIS — I07.1 TRICUSPID VALVE INSUFFICIENCY, UNSPECIFIED ETIOLOGY: ICD-10-CM

## 2019-04-02 PROCEDURE — 93306 TTE W/DOPPLER COMPLETE: CPT

## 2019-04-03 PROCEDURE — 93306 TTE W/DOPPLER COMPLETE: CPT | Performed by: INTERNAL MEDICINE

## 2019-04-04 ENCOUNTER — OFFICE VISIT (OUTPATIENT)
Dept: SURGICAL ONCOLOGY | Facility: HOSPITAL | Age: 74
End: 2019-04-04
Payer: COMMERCIAL

## 2019-04-04 VITALS
DIASTOLIC BLOOD PRESSURE: 60 MMHG | HEART RATE: 76 BPM | HEIGHT: 62 IN | BODY MASS INDEX: 32.76 KG/M2 | SYSTOLIC BLOOD PRESSURE: 108 MMHG | TEMPERATURE: 97 F | WEIGHT: 178 LBS | RESPIRATION RATE: 14 BRPM

## 2019-04-04 DIAGNOSIS — Z85.3 PERSONAL HISTORY OF ADENOCARCINOMA OF BREAST: ICD-10-CM

## 2019-04-04 DIAGNOSIS — Z85.3 ENCOUNTER FOR FOLLOW-UP SURVEILLANCE OF BREAST CANCER: Primary | ICD-10-CM

## 2019-04-04 DIAGNOSIS — Z08 ENCOUNTER FOR FOLLOW-UP SURVEILLANCE OF BREAST CANCER: Primary | ICD-10-CM

## 2019-04-04 DIAGNOSIS — N63.0 PERSISTENT NODULARITY OF BREAST: ICD-10-CM

## 2019-04-04 PROBLEM — Z12.39 BREAST CANCER SCREENING, HIGH RISK PATIENT: Status: ACTIVE | Noted: 2019-04-04

## 2019-04-04 PROBLEM — I89.0 LYMPHEDEMA: Status: RESOLVED | Noted: 2017-06-30 | Resolved: 2019-04-04

## 2019-04-04 PROCEDURE — 99214 OFFICE O/P EST MOD 30 MIN: CPT | Performed by: SURGERY

## 2019-04-16 ENCOUNTER — OFFICE VISIT (OUTPATIENT)
Dept: CARDIOLOGY CLINIC | Facility: CLINIC | Age: 74
End: 2019-04-16
Payer: COMMERCIAL

## 2019-04-16 VITALS
BODY MASS INDEX: 32.79 KG/M2 | SYSTOLIC BLOOD PRESSURE: 126 MMHG | HEART RATE: 56 BPM | WEIGHT: 178.2 LBS | DIASTOLIC BLOOD PRESSURE: 68 MMHG | HEIGHT: 62 IN

## 2019-04-16 DIAGNOSIS — R06.00 DOE (DYSPNEA ON EXERTION): ICD-10-CM

## 2019-04-16 DIAGNOSIS — R60.0 BILATERAL EDEMA OF LOWER EXTREMITY: ICD-10-CM

## 2019-04-16 DIAGNOSIS — R06.02 SOB (SHORTNESS OF BREATH): Primary | ICD-10-CM

## 2019-04-16 DIAGNOSIS — I48.19 PERSISTENT ATRIAL FIBRILLATION (HCC): ICD-10-CM

## 2019-04-16 DIAGNOSIS — E78.2 MIXED HYPERLIPIDEMIA: ICD-10-CM

## 2019-04-16 DIAGNOSIS — I10 BENIGN ESSENTIAL HYPERTENSION: ICD-10-CM

## 2019-04-16 PROCEDURE — 99213 OFFICE O/P EST LOW 20 MIN: CPT | Performed by: NURSE PRACTITIONER

## 2019-04-30 ENCOUNTER — HOSPITAL ENCOUNTER (OUTPATIENT)
Dept: NON INVASIVE DIAGNOSTICS | Facility: CLINIC | Age: 74
Discharge: HOME | End: 2019-04-30
Payer: COMMERCIAL

## 2019-04-30 DIAGNOSIS — R06.00 DOE (DYSPNEA ON EXERTION): ICD-10-CM

## 2019-04-30 DIAGNOSIS — I48.19 PERSISTENT ATRIAL FIBRILLATION (HCC): ICD-10-CM

## 2019-04-30 PROCEDURE — 78452 HT MUSCLE IMAGE SPECT MULT: CPT

## 2019-04-30 PROCEDURE — A9502 TC99M TETROFOSMIN: HCPCS

## 2019-04-30 PROCEDURE — 93225 XTRNL ECG REC<48 HRS REC: CPT

## 2019-04-30 PROCEDURE — 93226 XTRNL ECG REC<48 HR SCAN A/R: CPT

## 2019-04-30 PROCEDURE — 93017 CV STRESS TEST TRACING ONLY: CPT

## 2019-04-30 RX ADMIN — REGADENOSON 0.4 MG: 0.08 INJECTION, SOLUTION INTRAVENOUS at 09:41

## 2019-05-01 ENCOUNTER — TELEPHONE (OUTPATIENT)
Dept: CARDIOLOGY CLINIC | Facility: CLINIC | Age: 74
End: 2019-05-01

## 2019-05-01 PROCEDURE — 93018 CV STRESS TEST I&R ONLY: CPT | Performed by: INTERNAL MEDICINE

## 2019-05-01 PROCEDURE — 78452 HT MUSCLE IMAGE SPECT MULT: CPT | Performed by: INTERNAL MEDICINE

## 2019-05-01 PROCEDURE — 93016 CV STRESS TEST SUPVJ ONLY: CPT | Performed by: INTERNAL MEDICINE

## 2019-05-03 PROCEDURE — 93227 XTRNL ECG REC<48 HR R&I: CPT | Performed by: INTERNAL MEDICINE

## 2019-05-15 ENCOUNTER — HOSPITAL ENCOUNTER (OUTPATIENT)
Dept: CT IMAGING | Facility: HOSPITAL | Age: 74
Discharge: HOME | End: 2019-05-15
Payer: COMMERCIAL

## 2019-05-15 DIAGNOSIS — C50.911 MALIGNANT NEOPLASM OF RIGHT BREAST IN FEMALE, ESTROGEN RECEPTOR NEGATIVE, UNSPECIFIED SITE OF BREAST (HCC): ICD-10-CM

## 2019-05-15 DIAGNOSIS — Z17.1 MALIGNANT NEOPLASM OF RIGHT BREAST IN FEMALE, ESTROGEN RECEPTOR NEGATIVE, UNSPECIFIED SITE OF BREAST (HCC): ICD-10-CM

## 2019-05-15 LAB
ALBUMIN SERPL-MCNC: 3.7 G/DL (ref 3.5–4.8)
ALBUMIN/GLOB SERPL: 1.2 {RATIO} (ref 1.2–2.2)
ALP SERPL-CCNC: 107 IU/L (ref 39–117)
ALT SERPL-CCNC: 9 IU/L (ref 0–32)
AST SERPL-CCNC: 24 IU/L (ref 0–40)
BASOPHILS # BLD AUTO: 0 X10E3/UL (ref 0–0.2)
BASOPHILS NFR BLD AUTO: 1 %
BILIRUB SERPL-MCNC: 0.8 MG/DL (ref 0–1.2)
BUN SERPL-MCNC: 14 MG/DL (ref 8–27)
BUN/CREAT SERPL: 13 (ref 12–28)
CALCIUM SERPL-MCNC: 9.2 MG/DL (ref 8.7–10.3)
CANCER AG27-29 SERPL-ACNC: 10.9 U/ML (ref 0–38.6)
CHLORIDE SERPL-SCNC: 107 MMOL/L (ref 96–106)
CO2 SERPL-SCNC: 25 MMOL/L (ref 20–29)
CREAT SERPL-MCNC: 1.07 MG/DL (ref 0.57–1)
EOSINOPHIL # BLD AUTO: 0.1 X10E3/UL (ref 0–0.4)
EOSINOPHIL NFR BLD AUTO: 3 %
ERYTHROCYTE [DISTWIDTH] IN BLOOD BY AUTOMATED COUNT: 14.2 % (ref 12.3–15.4)
GLOBULIN SER-MCNC: 3 G/DL (ref 1.5–4.5)
GLUCOSE SERPL-MCNC: 91 MG/DL (ref 65–99)
HCT VFR BLD AUTO: 39 % (ref 34–46.6)
HGB BLD-MCNC: 13.2 G/DL (ref 11.1–15.9)
IMM GRANULOCYTES # BLD: 0 X10E3/UL (ref 0–0.1)
IMM GRANULOCYTES NFR BLD: 0 %
LYMPHOCYTES # BLD AUTO: 1.1 X10E3/UL (ref 0.7–3.1)
LYMPHOCYTES NFR BLD AUTO: 28 %
MCH RBC QN AUTO: 29.9 PG (ref 26.6–33)
MCHC RBC AUTO-ENTMCNC: 33.8 G/DL (ref 31.5–35.7)
MCV RBC AUTO: 88 FL (ref 79–97)
MONOCYTES # BLD AUTO: 0.4 X10E3/UL (ref 0.1–0.9)
MONOCYTES NFR BLD AUTO: 11 %
NEUTROPHILS # BLD AUTO: 2.3 X10E3/UL (ref 1.4–7)
NEUTROPHILS NFR BLD AUTO: 57 %
PLATELET # BLD AUTO: 169 X10E3/UL (ref 150–379)
POTASSIUM SERPL-SCNC: 4.6 MMOL/L (ref 3.5–5.2)
PROT SERPL-MCNC: 6.7 G/DL (ref 6–8.5)
RBC # BLD AUTO: 4.41 X10E6/UL (ref 3.77–5.28)
SL AMB EGFR AFRICAN AMERICAN: 60 ML/MIN/1.73
SL AMB EGFR NON AFRICAN AMERICAN: 52 ML/MIN/1.73
SODIUM SERPL-SCNC: 144 MMOL/L (ref 134–144)
WBC # BLD AUTO: 4 X10E3/UL (ref 3.4–10.8)

## 2019-05-15 PROCEDURE — 74177 CT ABD & PELVIS W/CONTRAST: CPT

## 2019-05-15 PROCEDURE — 71260 CT THORAX DX C+: CPT

## 2019-05-15 RX ADMIN — IOHEXOL 100 ML: 350 INJECTION, SOLUTION INTRAVENOUS at 11:36

## 2019-05-22 ENCOUNTER — OFFICE VISIT (OUTPATIENT)
Dept: HEMATOLOGY ONCOLOGY | Facility: HOSPITAL | Age: 74
End: 2019-05-22
Payer: COMMERCIAL

## 2019-05-22 VITALS
SYSTOLIC BLOOD PRESSURE: 114 MMHG | DIASTOLIC BLOOD PRESSURE: 62 MMHG | WEIGHT: 176 LBS | OXYGEN SATURATION: 96 % | HEIGHT: 62 IN | BODY MASS INDEX: 32.39 KG/M2 | HEART RATE: 61 BPM | RESPIRATION RATE: 16 BRPM | TEMPERATURE: 97.7 F

## 2019-05-22 DIAGNOSIS — C50.911 MALIGNANT NEOPLASM OF RIGHT BREAST IN FEMALE, ESTROGEN RECEPTOR NEGATIVE, UNSPECIFIED SITE OF BREAST (HCC): Primary | ICD-10-CM

## 2019-05-22 DIAGNOSIS — Z17.1 MALIGNANT NEOPLASM OF RIGHT BREAST IN FEMALE, ESTROGEN RECEPTOR NEGATIVE, UNSPECIFIED SITE OF BREAST (HCC): Primary | ICD-10-CM

## 2019-05-22 PROCEDURE — 99214 OFFICE O/P EST MOD 30 MIN: CPT | Performed by: INTERNAL MEDICINE

## 2019-05-23 ENCOUNTER — OFFICE VISIT (OUTPATIENT)
Dept: CARDIOLOGY CLINIC | Facility: CLINIC | Age: 74
End: 2019-05-23
Payer: COMMERCIAL

## 2019-05-23 VITALS
DIASTOLIC BLOOD PRESSURE: 66 MMHG | WEIGHT: 176.6 LBS | SYSTOLIC BLOOD PRESSURE: 110 MMHG | HEART RATE: 50 BPM | HEIGHT: 62 IN | BODY MASS INDEX: 32.5 KG/M2

## 2019-05-23 DIAGNOSIS — E03.9 ACQUIRED HYPOTHYROIDISM: ICD-10-CM

## 2019-05-23 DIAGNOSIS — I48.19 PERSISTENT ATRIAL FIBRILLATION (HCC): Primary | ICD-10-CM

## 2019-05-23 DIAGNOSIS — I10 BENIGN ESSENTIAL HYPERTENSION: ICD-10-CM

## 2019-05-23 PROCEDURE — 93000 ELECTROCARDIOGRAM COMPLETE: CPT | Performed by: INTERNAL MEDICINE

## 2019-05-23 PROCEDURE — 99213 OFFICE O/P EST LOW 20 MIN: CPT | Performed by: INTERNAL MEDICINE

## 2019-05-23 RX ORDER — FLECAINIDE ACETATE 50 MG/1
50 TABLET ORAL 2 TIMES DAILY
Qty: 60 TABLET | Refills: 5 | Status: SHIPPED | OUTPATIENT
Start: 2019-05-23 | End: 2019-09-18 | Stop reason: DRUGHIGH

## 2019-05-26 PROBLEM — E03.8 OTHER SPECIFIED HYPOTHYROIDISM: Status: ACTIVE | Noted: 2019-05-26

## 2019-05-29 ENCOUNTER — CLINICAL SUPPORT (OUTPATIENT)
Dept: CARDIOLOGY CLINIC | Facility: CLINIC | Age: 74
End: 2019-05-29
Payer: COMMERCIAL

## 2019-05-29 VITALS
BODY MASS INDEX: 32.65 KG/M2 | HEIGHT: 62 IN | WEIGHT: 177.4 LBS | SYSTOLIC BLOOD PRESSURE: 110 MMHG | DIASTOLIC BLOOD PRESSURE: 60 MMHG | HEART RATE: 54 BPM

## 2019-05-29 DIAGNOSIS — I48.19 PERSISTENT ATRIAL FIBRILLATION (HCC): Primary | ICD-10-CM

## 2019-05-29 PROCEDURE — 93000 ELECTROCARDIOGRAM COMPLETE: CPT | Performed by: INTERNAL MEDICINE

## 2019-07-01 ENCOUNTER — TELEPHONE (OUTPATIENT)
Dept: CARDIOLOGY CLINIC | Facility: CLINIC | Age: 74
End: 2019-07-01

## 2019-07-01 NOTE — TELEPHONE ENCOUNTER
Pt called asking to schedule a loop implant? I see that she saw you in the office on 05/23 and I did not view any notes recommending the loop implant  Also their is no order for it  If you would like to proceed with a loop implant what company would you like to use?     Thank you

## 2019-07-02 DIAGNOSIS — I48.19 PERSISTENT ATRIAL FIBRILLATION (HCC): Primary | ICD-10-CM

## 2019-07-03 ENCOUNTER — TELEPHONE (OUTPATIENT)
Dept: CARDIOLOGY CLINIC | Facility: CLINIC | Age: 74
End: 2019-07-03

## 2019-07-03 DIAGNOSIS — I48.19 PERSISTENT ATRIAL FIBRILLATION (HCC): Primary | ICD-10-CM

## 2019-07-03 NOTE — TELEPHONE ENCOUNTER
Patient schedule for loop implant per Dr Emmett Phelps at [facility] on 07/08/2019

## 2019-07-03 NOTE — TELEPHONE ENCOUNTER
Her Loop on 7/8/19 has to be cancelled  It is not a covered service  It is considered experimental   This msg came from prior Authorization department  Wenceslao Dawkins  So Im wonder if will be another option you want to try for this patient

## 2019-07-09 NOTE — TELEPHONE ENCOUNTER
Walk is approved by her insurance  Any form of event monitor      Do I see the patient 1 more time and try to justify need for device

## 2019-07-11 NOTE — TELEPHONE ENCOUNTER
I spoke with Dr Burleson Camera should have a 30 day event recorder  I will order this and send the info to [employer]

## 2019-07-16 ENCOUNTER — OFFICE VISIT (OUTPATIENT)
Dept: FAMILY MEDICINE CLINIC | Facility: HOSPITAL | Age: 74
End: 2019-07-16
Payer: COMMERCIAL

## 2019-07-16 VITALS
SYSTOLIC BLOOD PRESSURE: 120 MMHG | HEIGHT: 62 IN | DIASTOLIC BLOOD PRESSURE: 68 MMHG | BODY MASS INDEX: 32.57 KG/M2 | WEIGHT: 177 LBS | TEMPERATURE: 96.7 F | HEART RATE: 59 BPM

## 2019-07-16 DIAGNOSIS — I10 BENIGN ESSENTIAL HYPERTENSION: ICD-10-CM

## 2019-07-16 DIAGNOSIS — R73.9 HYPERGLYCEMIA: ICD-10-CM

## 2019-07-16 DIAGNOSIS — I42.8 OTHER CARDIOMYOPATHY (HCC): ICD-10-CM

## 2019-07-16 DIAGNOSIS — N39.0 URINARY TRACT INFECTION WITHOUT HEMATURIA, SITE UNSPECIFIED: ICD-10-CM

## 2019-07-16 DIAGNOSIS — E78.2 MIXED HYPERLIPIDEMIA: ICD-10-CM

## 2019-07-16 DIAGNOSIS — R60.0 BILATERAL EDEMA OF LOWER EXTREMITY: Primary | ICD-10-CM

## 2019-07-16 DIAGNOSIS — I48.20 CHRONIC ATRIAL FIBRILLATION (HCC): ICD-10-CM

## 2019-07-16 DIAGNOSIS — D05.11 DUCTAL CARCINOMA IN SITU (DCIS) OF RIGHT BREAST: ICD-10-CM

## 2019-07-16 DIAGNOSIS — E03.9 ACQUIRED HYPOTHYROIDISM: ICD-10-CM

## 2019-07-16 PROCEDURE — 99214 OFFICE O/P EST MOD 30 MIN: CPT | Performed by: FAMILY MEDICINE

## 2019-07-16 PROCEDURE — 1036F TOBACCO NON-USER: CPT | Performed by: FAMILY MEDICINE

## 2019-07-16 PROCEDURE — 3074F SYST BP LT 130 MM HG: CPT | Performed by: FAMILY MEDICINE

## 2019-07-16 PROCEDURE — 1160F RVW MEDS BY RX/DR IN RCRD: CPT | Performed by: FAMILY MEDICINE

## 2019-07-16 PROCEDURE — 3008F BODY MASS INDEX DOCD: CPT | Performed by: FAMILY MEDICINE

## 2019-07-16 PROCEDURE — 3078F DIAST BP <80 MM HG: CPT | Performed by: FAMILY MEDICINE

## 2019-07-16 RX ORDER — LEVOTHYROXINE SODIUM 0.15 MG/1
TABLET ORAL
Qty: 90 TABLET | Refills: 3 | Status: SHIPPED | OUTPATIENT
Start: 2019-07-16 | End: 2020-07-17

## 2019-07-16 NOTE — PROGRESS NOTES
Assessment/Plan:         Diagnoses and all orders for this visit:    Bilateral edema of lower extremity  Comments:  Advise regular use on compression stockings    Mixed hyperlipidemia    Hyperglycemia    Chronic atrial fibrillation (HCC)  Comments:  Hoping to have loop recorder     Benign essential hypertension  Comments:  Excellent BP control    Acquired hypothyroidism  Comments:  Adequate control on current dose  Orders:  -     TSH, 3rd generation with Free T4 reflex; Future  -     TSH, 3rd generation with Free T4 reflex  -     levothyroxine 150 mcg tablet; Take one tablet 4 days a week and 1/2 tablet three days a week    Ductal carcinoma in situ (DCIS) of right breast  Comments:  F/U Dr Loco Kirby and Dr Opal Márquez    Urinary tract infection without hematuria, site unspecified  -     UA/M w/rflx Culture, Routine    Other cardiomyopathy ([facility] )          Subjective:      Patient ID: Usman Smith is a 68 y o  female  6 month follow up  Has been trying to get loop recorder but isnt authorized  Considering move to [place]    Woke a few times at night with chest discomfort  She can tell when she goes in and out of a fib  The following portions of the patient's history were reviewed and updated as appropriate: allergies, current medications, past family history, past medical history, past social history, past surgical history and problem list     Review of Systems   Constitutional: Negative for fatigue and unexpected weight change  HENT: Negative  Respiratory: Positive for shortness of breath  Cardiovascular: Positive for palpitations and leg swelling  Genitourinary: Negative  Musculoskeletal: Negative  Hematological: Negative  Psychiatric/Behavioral: Negative  All other systems reviewed and are negative          Objective:      /68   Pulse 59   Temp (!) 96 7 °F (35 9 °C)   Ht 5' 2" (1 575 m)   Wt 80 3 kg (177 lb)   BMI 32 37 kg/m²          Physical Exam   Constitutional: She appears well-developed and well-nourished  Cardiovascular: Normal rate and regular rhythm  Pulmonary/Chest: Effort normal and breath sounds normal    Musculoskeletal: She exhibits edema  Psychiatric: She has a normal mood and affect  Her behavior is normal    Nursing note and vitals reviewed

## 2019-08-09 ENCOUNTER — CLINICAL SUPPORT (OUTPATIENT)
Dept: CARDIOLOGY CLINIC | Facility: CLINIC | Age: 74
End: 2019-08-09

## 2019-08-09 DIAGNOSIS — I48.91 ATRIAL FIBRILLATION, UNSPECIFIED TYPE (HCC): ICD-10-CM

## 2019-08-09 PROCEDURE — RECHECK

## 2019-08-09 NOTE — PROGRESS NOTES
Matteo Montes De Oca is here today under the direction of  Dr Sujatha Banks for an [facility] Monitor for 30 days, Dx Afib  Monitor explained and applied  Advised to call the [facility] when she needs more patches

## 2019-09-05 ENCOUNTER — HOSPITAL ENCOUNTER (OUTPATIENT)
Dept: MAMMOGRAPHY | Facility: CLINIC | Age: 74
Discharge: HOME | End: 2019-09-05
Payer: COMMERCIAL

## 2019-09-05 VITALS — WEIGHT: 180 LBS | HEIGHT: 62 IN | BODY MASS INDEX: 33.13 KG/M2

## 2019-09-05 DIAGNOSIS — N63.0 PERSISTENT NODULARITY OF BREAST: ICD-10-CM

## 2019-09-05 PROCEDURE — G0279 TOMOSYNTHESIS, MAMMO: HCPCS

## 2019-09-05 PROCEDURE — 77065 DX MAMMO INCL CAD UNI: CPT

## 2019-09-12 ENCOUNTER — CLINICAL SUPPORT (OUTPATIENT)
Dept: CARDIOLOGY CLINIC | Facility: CLINIC | Age: 74
End: 2019-09-12
Payer: COMMERCIAL

## 2019-09-12 DIAGNOSIS — I48.19 PERSISTENT ATRIAL FIBRILLATION (HCC): ICD-10-CM

## 2019-09-12 PROCEDURE — 93228 REMOTE 30 DAY ECG REV/REPORT: CPT | Performed by: INTERNAL MEDICINE

## 2019-09-13 ENCOUNTER — TELEPHONE (OUTPATIENT)
Dept: CARDIOLOGY CLINIC | Facility: CLINIC | Age: 74
End: 2019-09-13

## 2019-09-13 NOTE — TELEPHONE ENCOUNTER
Per Dr Cleveland García, please have pt     1) increase her flecainide to 100 mg BID  2) start Eliquis 2 5 mg BID  3) change metoprolol tartrate to metoprolol succinate  I called the patient-she will start with the she will  samples of Eliquis at the [facility] office, since she is out and down in that area  She just refilled her metoprolol tartrate for a 90 day supply, so will be using that up

## 2019-09-15 NOTE — RESULT ENCOUNTER NOTE
Normal Device Function  Patient does have brief episodes of AFib  AFib load is less than 1%  However has episodes up to 2 hours    Recommend to restart anticoagulation Eliquis  Continue flecainide 50 mg 2 times daily  Change to metoprolol succinate    Set up follow-up with me

## 2019-09-16 NOTE — TELEPHONE ENCOUNTER
I checked on the patient-she has been having more shortness of breath, similar to prior to her ablation  She has an upcoming office visit with you on 10/31/19

## 2019-09-17 NOTE — TELEPHONE ENCOUNTER
Per Dr Wood Chavez,  "please have patient come in sooner and double book within this week if possible"  Spoke to -Kalani left a message for pt to call back

## 2019-09-18 ENCOUNTER — OFFICE VISIT (OUTPATIENT)
Dept: CARDIOLOGY CLINIC | Facility: CLINIC | Age: 74
End: 2019-09-18
Payer: COMMERCIAL

## 2019-09-18 VITALS
WEIGHT: 177.7 LBS | DIASTOLIC BLOOD PRESSURE: 70 MMHG | SYSTOLIC BLOOD PRESSURE: 118 MMHG | BODY MASS INDEX: 32.7 KG/M2 | HEIGHT: 62 IN | HEART RATE: 53 BPM

## 2019-09-18 DIAGNOSIS — I48.91 ATRIAL FIBRILLATION, UNSPECIFIED TYPE (HCC): Primary | ICD-10-CM

## 2019-09-18 DIAGNOSIS — Z85.3 PERSONAL HISTORY OF ADENOCARCINOMA OF BREAST: ICD-10-CM

## 2019-09-18 DIAGNOSIS — I10 BENIGN ESSENTIAL HYPERTENSION: ICD-10-CM

## 2019-09-18 DIAGNOSIS — E66.09 CLASS 1 OBESITY DUE TO EXCESS CALORIES WITHOUT SERIOUS COMORBIDITY WITH BODY MASS INDEX (BMI) OF 31.0 TO 31.9 IN ADULT: ICD-10-CM

## 2019-09-18 DIAGNOSIS — E03.8 OTHER SPECIFIED HYPOTHYROIDISM: ICD-10-CM

## 2019-09-18 DIAGNOSIS — Z79.01 CURRENT USE OF LONG TERM ANTICOAGULATION: ICD-10-CM

## 2019-09-18 DIAGNOSIS — E87.6 HYPOKALEMIA: ICD-10-CM

## 2019-09-18 DIAGNOSIS — R60.9 EDEMA, UNSPECIFIED TYPE: ICD-10-CM

## 2019-09-18 PROCEDURE — 3078F DIAST BP <80 MM HG: CPT | Performed by: INTERNAL MEDICINE

## 2019-09-18 PROCEDURE — 99214 OFFICE O/P EST MOD 30 MIN: CPT | Performed by: INTERNAL MEDICINE

## 2019-09-18 PROCEDURE — 93000 ELECTROCARDIOGRAM COMPLETE: CPT | Performed by: INTERNAL MEDICINE

## 2019-09-18 PROCEDURE — 3074F SYST BP LT 130 MM HG: CPT | Performed by: INTERNAL MEDICINE

## 2019-09-18 RX ORDER — METOPROLOL SUCCINATE 50 MG/1
50 TABLET, EXTENDED RELEASE ORAL DAILY
Qty: 90 TABLET | Refills: 3 | Status: SHIPPED | OUTPATIENT
Start: 2019-09-18 | End: 2020-12-11

## 2019-09-18 RX ORDER — FLECAINIDE ACETATE 100 MG/1
100 TABLET ORAL 2 TIMES DAILY
Qty: 180 TABLET | Refills: 3 | Status: SHIPPED | OUTPATIENT
Start: 2019-09-18 | End: 2020-12-11

## 2019-09-18 RX ORDER — POTASSIUM CHLORIDE 750 MG/1
CAPSULE, EXTENDED RELEASE ORAL
Qty: 90 CAPSULE | Refills: 1 | Status: SHIPPED | OUTPATIENT
Start: 2019-09-18 | End: 2020-04-19

## 2019-09-18 NOTE — PROGRESS NOTES
Cardiology Follow Up    Christine Jenkins  1945  [id number]  [facility]  [address] [address] [phone]    1  Atrial fibrillation, unspecified type (HCC)  POCT ECG    flecainide (TAMBOCOR) 100 mg tablet    apixaban (ELIQUIS) 5 mg    metoprolol succinate (TOPROL-XL) 50 mg 24 hr tablet   2  Benign essential hypertension     3  Class 1 obesity due to excess calories without serious comorbidity with body mass index (BMI) of 31 0 to 31 9 in adult     4  Personal history of adenocarcinoma of breast     5  Other specified hypothyroidism     6  Edema, unspecified type     7  Current use of long term anticoagulation         Interval History:  Patient doing well since ablation about 3 years ago  There has been brief episodes of palpitations  A 30 day event monitor was done - there has been 2 hours of atrial fibrillation during that      Her recent nuclear study was normal     According to her, she is cancer-free from her breast cancer     As far as her atrial fibrillation is concerned:  Her shortness of breath has improved very significantly   She is able to walk around the house without any difficulty  She does not complain of any angina like chest pain or chest pressure  She does not complain of any orthopnea, paroxysmal nocturnal dyspnea, leg swelling  She is not complaining of any palpitations, presyncope or syncope      Her past medical history is as follows  She has undergone a successful ablation of her atrial fibrillation in August 2016     Her history of atrial fibrillation is as follows:  Patient is a very pleasant 40-year-old lady who was found to be in atrial fibrillation in April of 2016  She has not been feeling well for 2-3 months, when an EKG was done and atrial fibrillation identified  She was given a brief trial of dronedarone and cardioverted  She could not tolerate the dronedarone because of side effects  Had gone back into atrial fibrillation        She did feel occasional palpitations and lightheadedness ,orthopnea needing 2 pillows, Episodes of PND  significant exercise intolerance- she was able to walk 4-5 miles in 2015 but could hardly walk a mile  She had difficulty going up flights of steps and she does not walk uphill  She did have an echocardiogram her heart function was reduced            Patient Active Problem List   Diagnosis    Atrial fibrillation ([place] [address] )    Benign essential hypertension    Bilateral edema of lower extremity    BPPV (benign paroxysmal positional vertigo)    Bursitis of left shoulder    Cardiomyopathy ([place] [address] )    Cervical spinal stenosis    Edema    Homocysteinemia ([place] [address] )    Hypokalemia    Acquired hypothyroidism    Long QT interval    Personal history of adenocarcinoma of breast    Class 1 obesity due to excess calories without serious comorbidity with body mass index (BMI) of 31 0 to 31 9 in adult    Osteopenia    Overactive bladder    Peripheral neuropathy    Vitamin D deficiency    Bronchitis with bronchospasm    Candidal esophagitis (HCC)    Abnormal chest CT    Pulmonary nodule seen on imaging study    SOB (shortness of breath)    Weight loss    Medicare annual wellness visit, subsequent    Hyperglycemia    Mixed hyperlipidemia    Other idiopathic peripheral autonomic neuropathy    Persistent nodularity of breast    Encounter for follow-up surveillance of breast cancer    Other specified hypothyroidism    Ductal carcinoma in situ (DCIS) of right breast    Current use of long term anticoagulation     Past Medical History:   Diagnosis Date    Abnormal chest CT     last assessed - [id number]    Abnormal glucose     Resolved - [id number]    Arrhythmia     Arthritis     Slight    BCC (basal cell carcinoma of skin)     last assessed - [id number]    Breast cancer ([place] [address] ) Right    2016    Cardiomyopathy ([place] [address] )     Cervical spinal stenosis     Discharge from left nipple     last assessed - [id number]    Disease of thyroid gland     hypothyroidism    Full dentures     Upper and Lower    History of atrial fibrillation     History of chemotherapy     Homocysteinemia ([facility] [address] )     Hypertension     Hypokalemia     Intraductal papilloma of breast     last assessed - [id number]    Malignant neoplasm of skin     basal cell on face, Moh's surgery    Memory loss     last assessed - [id number]    Obesity     Open wound     last assessed - [id number]    Osteopenia     Overactive bladder     Palmar plantar erythrodysaesthesia     Resolved - [id number]    Pulmonary nodule seen on imaging study     Rosacea     UTI (urinary tract infection)     recently    Vertigo     Vitamin D deficiency      Social History     Socioeconomic History    Marital status: /Civil Union     Spouse name: Not on file    Number of children: Not on file    Years of education: Not on file    Highest education level: Not on file   Occupational History    Occupation: Retired   Social Needs    Financial resource strain: Not on file    Food insecurity:     Worry: Not on file     Inability: Not on file   Victorious needs:     Medical: Not on file     Non-medical: Not on file   Tobacco Use    Smoking status: Never Smoker    Smokeless tobacco: Never Used   Substance and Sexual Activity    Alcohol use: No    Drug use: No    Sexual activity: Not on file     Comment: Resides with    Lifestyle    Physical activity:     Days per week: Not on file     Minutes per session: Not on file    Stress: Not on file   Relationships    Social connections:     Talks on phone: Not on file     Gets together: Not on file     Attends [employer] service: Not on file     Active member of club or organization: Not on file     Attends meetings of clubs or organizations: Not on file     Relationship status: Not on file    Intimate partner violence:     Fear of current or ex partner: Not on file     Emotionally abused: Not on file     Physically abused: Not on file     Forced sexual activity: Not on file   Other Topics Concern    Not on file   Social History Narrative    Always uses seat belt    Convalescence after chemotherapy      Family History   Problem Relation Age of Onset    Coronary artery disease Mother         CABG    Diabetes Mother     Heart attack Mother     Diabetes Sister     Parkinsonism Sister     Coronary artery disease Brother         CABG    Diabetes Brother     Heart attack Brother     Heart attack Son     Kidney cancer Son 48    No Known Problems Son     No Known Problems Son     Emphysema Sister     No Known Problems Sister     Heart attack Brother     No Known Problems Brother     No Known Problems Maternal Grandmother     No Known Problems Maternal Grandfather     No Known Problems Paternal Grandmother     No Known Problems Paternal Grandfather     No Known Problems Paternal Aunt     No Known Problems Paternal Aunt     No Known Problems Paternal Aunt     No Known Problems Paternal Aunt      Past Surgical History:   Procedure Laterality Date    APPENDECTOMY      ATRIAL ABLATION SURGERY      last assessed - [id number]    BACK SURGERY  1997    Lumbar spinal stenosis    BREAST BIOPSY      2014? left breast; 9/12/16 right breast x 3    BREAST BIOPSY Left 03/30/2010    BREAST SURGERY      needle bx      BREAST SURGERY Right 03/16/2017    excision of right chest wall lesion    CARDIAC ELECTROPHYSIOLOGY STUDY AND ABLATION      CARDIOVERSION      DENTAL SURGERY      HAND SURGERY      Joint replaced with silicone joint 5th finger right hand    HYSTERECTOMY  1993    Complete    LAMINECTOMY      Decompressive up to two lumbar segments    MASTECTOMY Right 10/28/2016    OOPHORECTOMY  1993    NV EXC SKIN MALIG <0 5 CM TRUNK,ARM,LEG Right 3/16/2017    Procedure: EXCISION CHEST WALL LESION; NEEDLE LOC @ 1400;  Surgeon: Natividad Garcia MD;  Location: [facility];  Service: Surgical Oncology    ME INTRAOPERATIVE SENTINEL LYMPH NODE ID W DYE INJECTION Right 10/28/2016    Procedure: BREAST MASTECTOMY WITH SENTINAL LYMPH NODE BIOPSY; LYMPHSCINITIGRAPHY; LYMPHATIC MAPPING; 0800 NUC MED INJECTION;  Surgeon: Elijah Alex MD;  Location: [facility];  Service: Surgical Oncology    SENTINEL LYMPH NODE BIOPSY Right     US BREAST NEEDLE LOC RIGHT Right 3/16/2017    US GUIDANCE BREAST BIOPSY RIGHT EACH ADDITIONAL Right 9/12/2016    US GUIDANCE BREAST BIOPSY RIGHT EACH ADDITIONAL Right 9/12/2016    US GUIDED BREAST BIOPSY RIGHT COMPLETE Right 9/12/2016       Current Outpatient Medications:     cyanocobalamin (VITAMIN B-12) 500 mcg tablet, Take 1 tablet by mouth daily, Disp: , Rfl:     furosemide (LASIX) 40 mg tablet, TAKE 1 TABLET BY MOUTH DAILY AS DIRECTED, Disp: 90 tablet, Rfl: 1    levothyroxine 150 mcg tablet, Take one tablet 4 days a week and 1/2 tablet three days a week, Disp: 90 tablet, Rfl: 3    meclizine (ANTIVERT) 25 mg tablet, Take 1 tablet (25 mg total) by mouth every 8 (eight) hours as needed for dizziness, Disp: 30 tablet, Rfl: 3    Naproxen Sodium (ALEVE) 220 MG CAPS, Take 1 capsule (220 mg total) by mouth as needed (prn headache), Disp: , Rfl:     potassium chloride (MICRO-K) 10 MEQ CR capsule, TAKE 1 CAPSULE BY MOUTH EVERY DAY, Disp: 90 capsule, Rfl: 1    apixaban (ELIQUIS) 5 mg, Take 1 tablet (5 mg total) by mouth 2 (two) times a day, Disp: 180 tablet, Rfl: 3    flecainide (TAMBOCOR) 100 mg tablet, Take 1 tablet (100 mg total) by mouth 2 (two) times a day, Disp: 180 tablet, Rfl: 3    metoprolol succinate (TOPROL-XL) 50 mg 24 hr tablet, Take 1 tablet (50 mg total) by mouth daily, Disp: 90 tablet, Rfl: 3  Allergies   Allergen Reactions    Morphine And Related GI Intolerance    Oxycodone-Acetaminophen Vomiting       Labs:  Lab Results   Component Value Date     09/07/2017     09/07/2017    K 4 6 05/14/2019     (H) 05/14/2019 CO2 25 05/14/2019    BUN 14 05/14/2019    CREATININE 1 07 (H) 05/14/2019    CREATININE 0 97 (H) 09/07/2017    CREATININE 0 97 (H) 09/07/2017    GLUCOSE 95 06/19/2015    CALCIUM 9 0 08/14/2018     No results found for: CKTOTAL, CKMB, CKMBINDEX, TROPONINI  Lab Results   Component Value Date    WBC 4 0 05/14/2019    WBC 4 1 09/07/2017    WBC 4 1 09/07/2017    HGB 13 2 05/14/2019    HGB 11 9 09/07/2017    HGB 11 9 09/07/2017    HCT 39 0 05/14/2019    HCT 35 0 09/07/2017    HCT 35 0 09/07/2017    MCV 88 05/14/2019    MCV 91 1 09/07/2017    MCV 91 1 09/07/2017     05/14/2019     09/07/2017     09/07/2017     Lab Results   Component Value Date    CHOL 110 09/07/2017    TRIG 58 01/16/2019    HDL 54 01/16/2019     Imaging:   Mammo Diagnostic Left W 3d & Cad  Result Date: 9/5/2019  Narrative: DIAGNOSIS: Persistent nodularity of breast RELEVANT HISTORY: Family Breast Cancer History: No known family history of breast cancer  Family Medical History: No known relevant family medical history  Personal History: No known relevant hormone history  Surgical history includes breast biopsy, mastectomy, hysterectomy, and oophorectomy  Medical history includes breast cancer and history of chemotherapy  COMPARISONS: Prior mammograms dated: 09/04/2018, 09/01/2017, 03/16/2017, 09/12/2016, 09/12/2016, 08/30/2016, and 06/24/2014 INDICATION: Tammy Balbuena is a 68 y o  female presenting for history of breast cancer  TECHNIQUE: The current study was evaluated with Computer Aided Detection  TISSUE DENSITY: There are scattered areas of fibroglandular density  RISK ASSESSMENT: 5 Year Tyrer-Cuzick: No Score 10 Year Tyrer-Cuzick: No Score Lifetime Tyrer-Cuzick: No Score FINDINGS: There are no suspicious masses, grouped microcalcifications or areas of architectural distortion  The skin and nipple areolar complex are unremarkable  Biopsy marker clip is noted  Benign-appearing calcifications are noted     Impression:  No evidence of malignancy  ASSESSMENT/BI-RADS CATEGORY: Left: 2 - Benign Overall: 2 - Benign RECOMMENDATION:      - Diagnostic mammogram in 1 year for the left breast  Workstation ID: [id number]    Holter - 4/30/2019  IMPRESSION:  1  Predominantly normal sinus rhythm throughout the monitoring interval with brief paroxysms of atrial fibrillation noted   The longest interval of time in atrial fibrillation was about 1 minutes and 30 seconds   The ventricular response to atrial fibrillation was rapid   None of the arrhythmia was associated with symptoms  2  Low-density of nonsustained premature supraventricular ectopy  3  No significant bradycardia  4  No symptoms in general noted during the monitoring interval   5  Clinical correlation is advised                  NM stress test , April 2019  SUMMARY:  -  Stress results: There was no chest pain during stress  -  ECG conclusions: The stress ECG was negative for ischemia and normal   -  Perfusion imaging: There were no perfusion defects   -  Gated SPECT: The calculated left ventricular ejection fraction was 72 %  There was no left ventricular regional abnormality  IMPRESSIONS: Normal study after pharmacologic vasodilation without reproduction of symptoms  Myocardial perfusion imaging was normal at rest and with stress          30 day  event monitor          Review of Systems:  Review of Systems   All other systems reviewed and are negative  As described in my history of present illness        Physical Exam:  Physical Exam   Constitutional: She is oriented to person, place, and time  She appears well-developed and well-nourished  No distress  Not in any distress at the current time   HENT:   Head: Normocephalic and atraumatic  Right Ear: External ear normal    Left Ear: External ear normal    Nose: Nose normal    Mouth/Throat: Uvula is midline and mucous membranes are normal    Posterior pharynx is crowded   Eyes: Pupils are equal, round, and reactive to light  Conjunctivae, EOM and lids are normal  No scleral icterus  No pallor  No cyanosis  No icterus   Neck: Trachea normal and normal range of motion  Neck supple  No JVD present  Carotid bruit is not present  No thyromegaly present  No jugular lymphadenopathy   Cardiovascular: Normal rate, regular rhythm, S1 normal, S2 normal, normal heart sounds, intact distal pulses and normal pulses  PMI is not displaced  Exam reveals no gallop, no S3, no S4 and no friction rub  No murmur heard  Pulmonary/Chest: Effort normal and breath sounds normal  No accessory muscle usage  No respiratory distress  She has no decreased breath sounds  She has no wheezes  She has no rhonchi  She has no rales  She exhibits no tenderness  Abdominal: Soft  Normal appearance and bowel sounds are normal  She exhibits no distension and no mass  There is no splenomegaly or hepatomegaly  There is no tenderness  Musculoskeletal: Normal range of motion  She exhibits edema  She exhibits no tenderness or deformity  Lymphadenopathy:     She has no cervical adenopathy  Neurological: She is alert and oriented to person, place, and time  Facial symmetry is retained  Extraocular movements are retained  Head neck tongue and palate movement are retained and symmetric   Skin: Skin is intact  No abrasion, no lesion and no rash noted  No erythema  Nails show no clubbing  Psychiatric: She has a normal mood and affect  Her speech is normal and behavior is normal  Thought content normal    Vitals reviewed  Discussion/Summary:  1   Atrial fibrillation, rapid ventricular response, failure of medication/ Post cryo ablation     No significant symptoms  The patient has undergone successful cryoablation of her atrial fibrillation  She is currently in sinus rhythm and symptoms are well controlled     Recent event monitor, 2 hr  atrial fibrillation in 30 days of monitoring     My recommendation to the patient that as follows:  - Anticoagulation -start apixaban 5 mg 2 times daily  - rate control agent -metoprolol succinate 50 mg 2 times daily  -antiarrhythmic agent-start flecainide 100 mg 2 times daily,  -she has no obstructive sleep apnea           2  Lower extremity edema  This is chronic  Follow-up with primary physician           3  Long QT  The patient's current QTC is around 460 milliseconds  There is no obvious medication that prolong QT  Avoid hypokalemia as she uses furosemide from time to time     Patient and primary care is being requested to let us know if any new medications are added  We do not want to start any QT prolonging medications in her      4  Hypothyroidism  Continue levothyroxine  Follow up with TSH  Need to avoid hyperthyroidism        5  Long-term anticoagulation  Restarted on Eliquis      6  Obesity  BMI is 32 5  AFib risk increases with BMI more than 30  Every 1 increase over 30 is associated with  5% increase in AFib  Stressed importance of diet to lose weight      7  Deconditioning   Patient has not been active  This may be responsible for her fatigue  Requested to increase activity level gradually both aerobic and muscle training      8   Breast cancer  Post surgery and chemotherapy        Summary of my recommendation  Patient is being started on Eliquis, metoprolol succinate, flecainide  Will follow up with Dr Yuri Jj in [facility]

## 2019-09-18 NOTE — LETTER
September 18, 2019     MD Nessa Marr  [address]  [address] [facility] [address] [address]    Patient: Nonda Console   YOB: 1945   Date of Visit: 9/18/2019       Dear Dr Marcus Swanson: Thank you for referring Corine Lee to me for evaluation  Below are my notes for this consultation  If you have questions, please do not hesitate to call me  I look forward to following your patient along with you  Sincerely,        Alberto Penn MD        CC: MD Alberto Miller MD  9/18/2019 10:53 PM  Sign at close encounter                                             Cardiology Follow Up    Nonda Console  1945  [id number]  [address]  [address]  [phone]    1  Atrial fibrillation, unspecified type (HCC)  POCT ECG    flecainide (TAMBOCOR) 100 mg tablet    apixaban (ELIQUIS) 5 mg    metoprolol succinate (TOPROL-XL) 50 mg 24 hr tablet   2  Benign essential hypertension     3  Class 1 obesity due to excess calories without serious comorbidity with body mass index (BMI) of 31 0 to 31 9 in adult     4  Personal history of adenocarcinoma of breast     5  Other specified hypothyroidism     6  Edema, unspecified type     7  Current use of long term anticoagulation         Interval History:  Patient doing well since ablation about 3 years ago  There has been brief episodes of palpitations  A 30 day event monitor was done - there has been 2 hours of atrial fibrillation during that      Her recent nuclear study was normal     According to her, she is cancer-free from her breast cancer     As far as her atrial fibrillation is concerned:  Her shortness of breath has improved very significantly   She is able to walk around the house without any difficulty  She does not complain of any angina like chest pain or chest pressure  She does not complain of any orthopnea, paroxysmal nocturnal dyspnea, leg swelling  She is not complaining of any palpitations, presyncope or syncope      Her past medical history is as follows  She has undergone a successful ablation of her atrial fibrillation in August 2016     Her history of atrial fibrillation is as follows:  Patient is a very pleasant 77-year-old lady who was found to be in atrial fibrillation in April of 2016  She has not been feeling well for 2-3 months, when an EKG was done and atrial fibrillation identified  She was given a brief trial of dronedarone and cardioverted  She could not tolerate the dronedarone because of side effects  Had gone back into atrial fibrillation        She did feel occasional palpitations and lightheadedness ,orthopnea needing 2 pillows, Episodes of PND  significant exercise intolerance- she was able to walk 4-5 miles in 2015 but could hardly walk a mile  She had difficulty going up flights of steps and she does not walk uphill  She did have an echocardiogram her heart function was reduced            Patient Active Problem List   Diagnosis    Atrial fibrillation ([facility] )    Benign essential hypertension    Bilateral edema of lower extremity    BPPV (benign paroxysmal positional vertigo)    Bursitis of left shoulder    Cardiomyopathy ([address]    Cervical spinal stenosis    Edema    Homocysteinemia ([address]    Hypokalemia    Acquired hypothyroidism    Long QT interval    Personal history of adenocarcinoma of breast    Class 1 obesity due to excess calories without serious comorbidity with body mass index (BMI) of 31 0 to 31 9 in adult    Osteopenia    Overactive bladder    Peripheral neuropathy    Vitamin D deficiency    Bronchitis with bronchospasm    Candidal esophagitis (HCC)    Abnormal chest CT    Pulmonary nodule seen on imaging study    SOB (shortness of breath)    Weight loss    Medicare annual wellness visit, subsequent    Hyperglycemia    Mixed hyperlipidemia    Other idiopathic peripheral autonomic neuropathy    Persistent nodularity of breast    Encounter for follow-up surveillance of breast cancer    Other specified hypothyroidism    Ductal carcinoma in situ (DCIS) of right breast    Current use of long term anticoagulation     Past Medical History:   Diagnosis Date    Abnormal chest CT     last assessed - [id number]    Abnormal glucose     Resolved - [id number]    Arrhythmia     Arthritis     Slight    BCC (basal cell carcinoma of skin)     last assessed - [id number]    Breast cancer ([facility] ) Right    2016    Cardiomyopathy ([facility] )     Cervical spinal stenosis     Discharge from left nipple     last assessed - [id number]    Disease of thyroid gland     hypothyroidism    Full dentures     Upper and Lower    History of atrial fibrillation     History of chemotherapy     Homocysteinemia ([facility] )     Hypertension     Hypokalemia     Intraductal papilloma of breast     last assessed - [id number]    Malignant neoplasm of skin     basal cell on face, Moh's surgery    Memory loss     last assessed - [id number]    Obesity     Open wound     last assessed - [id number]    Osteopenia     Overactive bladder     Palmar plantar erythrodysaesthesia     Resolved - [id number]    Pulmonary nodule seen on imaging study     Rosacea     UTI (urinary tract infection)     recently    Vertigo     Vitamin D deficiency      Social History     Socioeconomic History    Marital status: /Civil Union     Spouse name: Not on file    Number of children: Not on file    Years of education: Not on file    Highest education level: Not on file   Occupational History    Occupation: Retired   Social Needs    Financial resource strain: Not on file    Food insecurity:     Worry: Not on file     Inability: Not on file   [employer] needs:     Medical: Not on file     Non-medical: Not on file   Tobacco Use    Smoking status: Never Smoker    Smokeless tobacco: Never Used   Substance and Sexual Activity    Alcohol use: No    Drug use: No    Sexual activity: Not on file     Comment: Resides with    Lifestyle    Physical activity:     Days per week: Not on file     Minutes per session: Not on file    Stress: Not on file   Relationships    Social connections:     Talks on phone: Not on file     Gets together: Not on file     Attends Mandaeism service: Not on file     Active member of club or organization: Not on file     Attends meetings of clubs or organizations: Not on file     Relationship status: Not on file    Intimate partner violence:     Fear of current or ex partner: Not on file     Emotionally abused: Not on file     Physically abused: Not on file     Forced sexual activity: Not on file   Other Topics Concern    Not on file   Social History Narrative    Always uses seat belt    Convalescence after chemotherapy      Family History   Problem Relation Age of Onset    Coronary artery disease Mother         CABG   [facility] Diabetes Mother     Heart attack Mother     Diabetes Sister     Parkinsonism Sister     Coronary artery disease Brother         CABG    Diabetes Brother     Heart attack Brother     Heart attack Son     Kidney cancer Son 48    No Known Problems Son     No Known Problems Son     Emphysema Sister     No Known Problems Sister     Heart attack Brother     No Known Problems Brother     No Known Problems Maternal Grandmother     No Known Problems Maternal Grandfather     No Known Problems Paternal Grandmother     No Known Problems Paternal Grandfather     No Known Problems Paternal Aunt     No Known Problems Paternal Aunt     No Known Problems Paternal Aunt     No Known Problems Paternal Aunt      Past Surgical History:   Procedure Laterality Date    APPENDECTOMY      ATRIAL ABLATION SURGERY      last assessed - [id number]    BACK SURGERY  1997    Lumbar spinal stenosis    BREAST BIOPSY      2014?  left breast; 9/12/16 right breast x 3    BREAST BIOPSY Left 03/30/2010    BREAST SURGERY      needle bx      BREAST SURGERY Right 03/16/2017    excision of right chest wall lesion    CARDIAC ELECTROPHYSIOLOGY STUDY AND ABLATION      CARDIOVERSION      DENTAL SURGERY      HAND SURGERY      Joint replaced with silicone joint 5th finger right hand    HYSTERECTOMY  1993    Complete    LAMINECTOMY      Decompressive up to two lumbar segments    MASTECTOMY Right 10/28/2016    OOPHORECTOMY  1993    OK EXC SKIN MALIG <0 5 CM TRUNK,ARM,LEG Right 3/16/2017    Procedure: EXCISION CHEST WALL LESION; NEEDLE LOC @ 1400;  Surgeon: Kobe Gonzalez MD;  Location: [facility];  Service: Surgical Oncology    OK INTRAOPERATIVE SENTINEL LYMPH NODE ID W DYE INJECTION Right 10/28/2016    Procedure: BREAST MASTECTOMY WITH SENTINAL LYMPH NODE BIOPSY; LYMPHSCINITIGRAPHY; LYMPHATIC MAPPING; 0800 NUC MED INJECTION;  Surgeon: Kobe Gonzalez MD;  Location: [facility];  Service: Surgical Oncology    SENTINEL LYMPH NODE BIOPSY Right     US BREAST NEEDLE LOC RIGHT Right 3/16/2017    US GUIDANCE BREAST BIOPSY RIGHT EACH ADDITIONAL Right 9/12/2016    US GUIDANCE BREAST BIOPSY RIGHT EACH ADDITIONAL Right 9/12/2016    US GUIDED BREAST BIOPSY RIGHT COMPLETE Right 9/12/2016       Current Outpatient Medications:     cyanocobalamin (VITAMIN B-12) 500 mcg tablet, Take 1 tablet by mouth daily, Disp: , Rfl:     furosemide (LASIX) 40 mg tablet, TAKE 1 TABLET BY MOUTH DAILY AS DIRECTED, Disp: 90 tablet, Rfl: 1    levothyroxine 150 mcg tablet, Take one tablet 4 days a week and 1/2 tablet three days a week, Disp: 90 tablet, Rfl: 3    meclizine (ANTIVERT) 25 mg tablet, Take 1 tablet (25 mg total) by mouth every 8 (eight) hours as needed for dizziness, Disp: 30 tablet, Rfl: 3    Naproxen Sodium (ALEVE) 220 MG CAPS, Take 1 capsule (220 mg total) by mouth as needed (prn headache), Disp: , Rfl:     potassium chloride (MICRO-K) 10 MEQ CR capsule, TAKE 1 CAPSULE BY MOUTH EVERY DAY, Disp: 90 capsule, Rfl: 1    apixaban (ELIQUIS) 5 mg, Take 1 tablet (5 mg total) by mouth 2 (two) times a day, Disp: 180 tablet, Rfl: 3    flecainide (TAMBOCOR) 100 mg tablet, Take 1 tablet (100 mg total) by mouth 2 (two) times a day, Disp: 180 tablet, Rfl: 3    metoprolol succinate (TOPROL-XL) 50 mg 24 hr tablet, Take 1 tablet (50 mg total) by mouth daily, Disp: 90 tablet, Rfl: 3  Allergies   Allergen Reactions    Morphine And Related GI Intolerance    Oxycodone-Acetaminophen Vomiting       Labs:  Lab Results   Component Value Date     09/07/2017     09/07/2017    K 4 6 05/14/2019     (H) 05/14/2019    CO2 25 05/14/2019    BUN 14 05/14/2019    CREATININE 1 07 (H) 05/14/2019    CREATININE 0 97 (H) 09/07/2017    CREATININE 0 97 (H) 09/07/2017    GLUCOSE 95 06/19/2015    CALCIUM 9 0 08/14/2018     No results found for: CKTOTAL, CKMB, CKMBINDEX, TROPONINI  Lab Results   Component Value Date    WBC 4 0 05/14/2019    WBC 4 1 09/07/2017    WBC 4 1 09/07/2017    HGB 13 2 05/14/2019    HGB 11 9 09/07/2017    HGB 11 9 09/07/2017    HCT 39 0 05/14/2019    HCT 35 0 09/07/2017    HCT 35 0 09/07/2017    MCV 88 05/14/2019    MCV 91 1 09/07/2017    MCV 91 1 09/07/2017     05/14/2019     09/07/2017     09/07/2017     Lab Results   Component Value Date    CHOL 110 09/07/2017    TRIG 58 01/16/2019    HDL 54 01/16/2019     Imaging:   Mammo Diagnostic Left W 3d & Cad  Result Date: 9/5/2019  Narrative: DIAGNOSIS: Persistent nodularity of breast RELEVANT HISTORY: Family Breast Cancer History: No known family history of breast cancer  Family Medical History: No known relevant family medical history  Personal History: No known relevant hormone history  Surgical history includes breast biopsy, mastectomy, hysterectomy, and oophorectomy  Medical history includes breast cancer and history of chemotherapy   COMPARISONS: Prior mammograms dated: 09/04/2018, 09/01/2017, 03/16/2017, 09/12/2016, 09/12/2016, 08/30/2016, and 06/24/2014 INDICATION: Bowen Kimi Marie Quintero is a 68 y o  female presenting for history of breast cancer  TECHNIQUE: The current study was evaluated with Computer Aided Detection  TISSUE DENSITY: There are scattered areas of fibroglandular density  RISK ASSESSMENT: 5 Year Tyrer-Cuzick: No Score 10 Year Tyrer-Cuzick: No Score Lifetime Tyrer-Cuzick: No Score FINDINGS: There are no suspicious masses, grouped microcalcifications or areas of architectural distortion  The skin and nipple areolar complex are unremarkable  Biopsy marker clip is noted  Benign-appearing calcifications are noted  Impression:  No evidence of malignancy  ASSESSMENT/BI-RADS CATEGORY: Left: 2 - Benign Overall: 2 - Benign RECOMMENDATION:      - Diagnostic mammogram in 1 year for the left breast  Workstation ID: [id number]    Holter - 4/30/2019  IMPRESSION:  1  Predominantly normal sinus rhythm throughout the monitoring interval with brief paroxysms of atrial fibrillation noted   The longest interval of time in atrial fibrillation was about 1 minutes and 30 seconds   The ventricular response to atrial fibrillation was rapid   None of the arrhythmia was associated with symptoms  2  Low-density of nonsustained premature supraventricular ectopy  3  No significant bradycardia  4  No symptoms in general noted during the monitoring interval   5  Clinical correlation is advised                  NM stress test , April 2019  SUMMARY:  -  Stress results: There was no chest pain during stress  -  ECG conclusions: The stress ECG was negative for ischemia and normal   -  Perfusion imaging: There were no perfusion defects   -  Gated SPECT: The calculated left ventricular ejection fraction was 72 %  There was no left ventricular regional abnormality  IMPRESSIONS: Normal study after pharmacologic vasodilation without reproduction of symptoms   Myocardial perfusion imaging was normal at rest and with stress            Review of Systems:  Review of Systems   All other systems reviewed and are negative  As described in my history of present illness        Physical Exam:  Physical Exam   Constitutional: She is oriented to person, place, and time  She appears well-developed and well-nourished  No distress  Not in any distress at the current time   HENT:   Head: Normocephalic and atraumatic  Right Ear: External ear normal    Left Ear: External ear normal    Nose: Nose normal    Mouth/Throat: Uvula is midline and mucous membranes are normal    Posterior pharynx is crowded   Eyes: Pupils are equal, round, and reactive to light  Conjunctivae, EOM and lids are normal  No scleral icterus  No pallor  No cyanosis  No icterus   Neck: Trachea normal and normal range of motion  Neck supple  No JVD present  Carotid bruit is not present  No thyromegaly present  No jugular lymphadenopathy   Cardiovascular: Normal rate, regular rhythm, S1 normal, S2 normal, normal heart sounds, intact distal pulses and normal pulses  PMI is not displaced  Exam reveals no gallop, no S3, no S4 and no friction rub  No murmur heard  Pulmonary/Chest: Effort normal and breath sounds normal  No accessory muscle usage  No respiratory distress  She has no decreased breath sounds  She has no wheezes  She has no rhonchi  She has no rales  She exhibits no tenderness  Abdominal: Soft  Normal appearance and bowel sounds are normal  She exhibits no distension and no mass  There is no splenomegaly or hepatomegaly  There is no tenderness  Musculoskeletal: Normal range of motion  She exhibits edema  She exhibits no tenderness or deformity  Lymphadenopathy:     She has no cervical adenopathy  Neurological: She is alert and oriented to person, place, and time  Facial symmetry is retained  Extraocular movements are retained  Head neck tongue and palate movement are retained and symmetric   Skin: Skin is intact  No abrasion, no lesion and no rash noted  No erythema  Nails show no clubbing     Psychiatric: She has a normal mood and affect  Her speech is normal and behavior is normal  Thought content normal    Vitals reviewed  Discussion/Summary:  1  Atrial fibrillation, rapid ventricular response, failure of medication/ Post cryo ablation     No significant symptoms  The patient has undergone successful cryoablation of her atrial fibrillation  She is currently in sinus rhythm and symptoms are well controlled     Recent event monitor, 2 hr  atrial fibrillation in 30 days of monitoring     My recommendation to the patient that as follows:  - Anticoagulation -start apixaban 5 mg 2 times daily  - rate control agent -metoprolol succinate 50 mg 2 times daily  -antiarrhythmic agent-start flecainide 100 mg 2 times daily,  -she has no obstructive sleep apnea           2  Lower extremity edema  This is chronic  Follow-up with primary physician           3  Long QT  The patient's current QTC is around 460 milliseconds  There is no obvious medication that prolong QT  Avoid hypokalemia as she uses furosemide from time to time     Patient and primary care is being requested to let us know if any new medications are added  We do not want to start any QT prolonging medications in her      4  Hypothyroidism  Continue levothyroxine  Follow up with TSH  Need to avoid hyperthyroidism        5  Long-term anticoagulation  Restarted on Eliquis      6  Obesity  BMI is 32 5  AFib risk increases with BMI more than 30  Every 1 increase over 30 is associated with  5% increase in AFib  Stressed importance of diet to lose weight      7  Deconditioning   Patient has not been active  This may be responsible for her fatigue  Requested to increase activity level gradually both aerobic and muscle training      8   Breast cancer  Post surgery and chemotherapy        Summary of my recommendation  Patient is being started on Eliquis, metoprolol succinate, flecainide  Will follow up with Dr Rosalinda Antony in [facility]

## 2019-09-27 DIAGNOSIS — H81.10 BENIGN PAROXYSMAL POSITIONAL VERTIGO, UNSPECIFIED LATERALITY: ICD-10-CM

## 2019-09-27 RX ORDER — MECLIZINE HYDROCHLORIDE 25 MG/1
25 TABLET ORAL EVERY 8 HOURS PRN
Qty: 30 TABLET | Refills: 3 | Status: SHIPPED | OUTPATIENT
Start: 2019-09-27 | End: 2021-02-12 | Stop reason: SDUPTHER

## 2019-11-21 ENCOUNTER — TELEPHONE (OUTPATIENT)
Dept: HEMATOLOGY ONCOLOGY | Facility: CLINIC | Age: 74
End: 2019-11-21

## 2019-11-21 DIAGNOSIS — I48.19 PERSISTENT ATRIAL FIBRILLATION (HCC): ICD-10-CM

## 2019-11-21 RX ORDER — FLECAINIDE ACETATE 50 MG/1
TABLET ORAL
Qty: 180 TABLET | Refills: 1 | Status: SHIPPED | OUTPATIENT
Start: 2019-11-21 | End: 2019-12-13 | Stop reason: ALTCHOICE

## 2019-11-21 NOTE — TELEPHONE ENCOUNTER
Called and informed the patient that she has an appt with Naseem Pisano on 11/25 at [place] [address]

## 2019-11-21 NOTE — TELEPHONE ENCOUNTER
Devin Wood called to ask whether to keep her 1:40 appointment on Nov 25th with Dr Evin Gordon, because her insurance denied her cat scan  You can leave message if no anawer on [phone]

## 2019-12-13 ENCOUNTER — CONSULT (OUTPATIENT)
Dept: CARDIOLOGY CLINIC | Facility: CLINIC | Age: 74
End: 2019-12-13
Payer: COMMERCIAL

## 2019-12-13 VITALS
SYSTOLIC BLOOD PRESSURE: 110 MMHG | WEIGHT: 174 LBS | BODY MASS INDEX: 32.02 KG/M2 | DIASTOLIC BLOOD PRESSURE: 62 MMHG | HEIGHT: 62 IN | HEART RATE: 49 BPM

## 2019-12-13 DIAGNOSIS — I48.91 ATRIAL FIBRILLATION, UNSPECIFIED TYPE (HCC): Primary | ICD-10-CM

## 2019-12-13 LAB
BASOPHILS # BLD AUTO: 0 X10E3/UL (ref 0–0.2)
BASOPHILS NFR BLD AUTO: 1 %
EOSINOPHIL # BLD AUTO: 0.1 X10E3/UL (ref 0–0.4)
EOSINOPHIL NFR BLD AUTO: 3 %
ERYTHROCYTE [DISTWIDTH] IN BLOOD BY AUTOMATED COUNT: 15.4 % (ref 12.3–15.4)
HCT VFR BLD AUTO: 38.8 % (ref 34–46.6)
HGB BLD-MCNC: 13.3 G/DL (ref 11.1–15.9)
IMM GRANULOCYTES # BLD: 0 X10E3/UL (ref 0–0.1)
IMM GRANULOCYTES NFR BLD: 0 %
LYMPHOCYTES # BLD AUTO: 1.2 X10E3/UL (ref 0.7–3.1)
LYMPHOCYTES NFR BLD AUTO: 27 %
MCH RBC QN AUTO: 30.4 PG (ref 26.6–33)
MCHC RBC AUTO-ENTMCNC: 34.3 G/DL (ref 31.5–35.7)
MCV RBC AUTO: 89 FL (ref 79–97)
MONOCYTES # BLD AUTO: 0.4 X10E3/UL (ref 0.1–0.9)
MONOCYTES NFR BLD AUTO: 9 %
NEUTROPHILS # BLD AUTO: 2.5 X10E3/UL (ref 1.4–7)
NEUTROPHILS NFR BLD AUTO: 60 %
PLATELET # BLD AUTO: 175 X10E3/UL (ref 150–450)
RBC # BLD AUTO: 4.38 X10E6/UL (ref 3.77–5.28)
WBC # BLD AUTO: 4.3 X10E3/UL (ref 3.4–10.8)

## 2019-12-13 PROCEDURE — 93000 ELECTROCARDIOGRAM COMPLETE: CPT | Performed by: INTERNAL MEDICINE

## 2019-12-13 PROCEDURE — 99213 OFFICE O/P EST LOW 20 MIN: CPT | Performed by: INTERNAL MEDICINE

## 2019-12-13 NOTE — PROGRESS NOTES
Patient reports doing better since her last visit  She states her palpitations have discontinued however she does experience occasional shortness of breath with exertion  She does admit experiencing vertigo and LE edema (right more then left) however she is continues to take Lasix and elevate her legs  Patient denies chest pain, palpitations, pre syncope, or syncope

## 2019-12-13 NOTE — PROGRESS NOTES
EPS Consultation Follow-up Evaluation - Patricia Martin 76 y o  female MRN: [id number]    Interval History:     Patient reports doing better since her last visit with Dr Albert Sanabria  She states her palpitations have discontinued however she does experience occasional shortness of breath with exertion  She does admit experiencing vertigo and LE edema (right more then left) which have been chronic for the past several years however she continues to take Lasix and elevate her legs       Patient denies chest pain, palpitations, pre syncope, or syncope  Prior History: It was a pleasure to see Patricia Martin in our arrhythmia clinic at [place] [address]  As you know she is a 76 y  o   woman with history of lower extremity edema, long QT (460 millisecond), hypothyroidism, obesity, history of breast cancer status post surgery and chemotherapy, atrial fibrillation status post ablation with Dr Albert Sanabria 3 years ago now with few paroxysmal episodes  She was last seen in the office with Dr Albert Sanabria on September 18 2019  She was started on Eliquis, metoprolol succinate and flecainide after event monitor showed 2 hour of atrial fibrillation        Past Medical History:  Past Medical History:   Diagnosis Date    Abnormal chest CT     last assessed - [id number]    Abnormal glucose     Resolved - [id number]    Arrhythmia     Arthritis     Slight    BCC (basal cell carcinoma of skin)     last assessed - [id number]    Breast cancer [facility]) Right    2016    Cardiomyopathy [facility])     Cervical spinal stenosis     Discharge from left nipple     last assessed - [id number]    Disease of thyroid gland     hypothyroidism    Full dentures     Upper and Lower    History of atrial fibrillation     History of chemotherapy     [facility]ocysteinemia ([facility] [address] )     Hypertension     Hypokalemia     Intraductal papilloma of breast     last assessed - [id number]    Malignant neoplasm of skin     basal cell on face, Moh's surgery    Memory loss     last assessed - [id number]    Obesity     Open wound     last assessed - 01Jun2017    Osteopenia     Overactive bladder     Palmar plantar erythrodysaesthesia     Resolved - [id number]    Pulmonary nodule seen on imaging study     Rosacea     UTI (urinary tract infection)     recently    Vertigo     Vitamin D deficiency        Medications:      Current Outpatient Medications:     apixaban (ELIQUIS) 5 mg, Take 1 tablet (5 mg total) by mouth 2 (two) times a day, Disp: 180 tablet, Rfl: 3    cyanocobalamin (VITAMIN B-12) 500 mcg tablet, Take 1 tablet by mouth daily, Disp: , Rfl:     flecainide (TAMBOCOR) 100 mg tablet, Take 1 tablet (100 mg total) by mouth 2 (two) times a day, Disp: 180 tablet, Rfl: 3    flecainide (TAMBOCOR) 50 mg tablet, TAKE 1 TABLET BY MOUTH TWICE A DAY, Disp: 180 tablet, Rfl: 1    furosemide (LASIX) 40 mg tablet, TAKE 1 TABLET BY MOUTH DAILY AS DIRECTED, Disp: 90 tablet, Rfl: 1    levothyroxine 150 mcg tablet, Take one tablet 4 days a week and 1/2 tablet three days a week, Disp: 90 tablet, Rfl: 3    meclizine (ANTIVERT) 25 mg tablet, Take 1 tablet (25 mg total) by mouth every 8 (eight) hours as needed for dizziness, Disp: 30 tablet, Rfl: 3    metoprolol succinate (TOPROL-XL) 50 mg 24 hr tablet, Take 1 tablet (50 mg total) by mouth daily, Disp: 90 tablet, Rfl: 3    Naproxen Sodium (ALEVE) 220 MG CAPS, Take 1 capsule (220 mg total) by mouth as needed (prn headache), Disp: , Rfl:     potassium chloride (MICRO-K) 10 MEQ CR capsule, TAKE 1 CAPSULE BY MOUTH EVERY DAY, Disp: 90 capsule, Rfl: 1     Family History   Problem Relation Age of Onset    Coronary artery disease Mother         CABG    Diabetes Mother     Heart attack Mother     Diabetes Sister     Parkinsonism Sister     Coronary artery disease Brother         CABG    Diabetes Brother     Heart attack Brother     Heart attack Son     Kidney cancer Son 48    No Known Problems Son     No Known Problems Son     Emphysema Sister     No Known Problems Sister     Heart attack Brother     No Known Problems Brother     No Known Problems Maternal Grandmother     No Known Problems Maternal Grandfather     No Known Problems Paternal Grandmother     No Known Problems Paternal Grandfather     No Known Problems Paternal Aunt     No Known Problems Paternal Aunt     No Known Problems Paternal Aunt     No Known Problems Paternal Aunt      Social History     Socioeconomic History    Marital status: /Civil Union     Spouse name: Not on file    Number of children: Not on file    Years of education: Not on file    Highest education level: Not on file   Occupational History    Occupation: Retired   Social Needs    Financial resource strain: Not on file    Food insecurity:     Worry: Not on file     Inability: Not on file   [employer] needs:     Medical: Not on file     Non-medical: Not on file   Tobacco Use    Smoking status: Never Smoker    Smokeless tobacco: Never Used   Substance and Sexual Activity    Alcohol use: No    Drug use: No    Sexual activity: Not on file     Comment: Resides with    Lifestyle    Physical activity:     Days per week: Not on file     Minutes per session: Not on file    Stress: Not on file   Relationships    Social connections:     Talks on phone: Not on file     Gets together: Not on file     Attends Sabianism service: Not on file     Active member of club or organization: Not on file     Attends meetings of clubs or organizations: Not on file     Relationship status: Not on file    Intimate partner violence:     Fear of current or ex partner: Not on file     Emotionally abused: Not on file     Physically abused: Not on file     Forced sexual activity: Not on file   Other Topics Concern    Not on file   Social History Narrative    Always uses seat belt    Convalescence after chemotherapy     Social History     Tobacco Use   Smoking Status Never Smoker Smokeless Tobacco Never Used     Social History     Substance and Sexual Activity   Alcohol Use No       Review of Systems   Constitution: Negative for chills and fever  HENT: Negative  Eyes: Negative for blurred vision and double vision  Cardiovascular: Positive for leg swelling  Negative for chest pain, dyspnea on exertion, near-syncope, orthopnea, palpitations, paroxysmal nocturnal dyspnea and syncope  Respiratory: Negative for cough and sputum production  Endocrine: Negative  Skin: Negative  Negative for rash  Musculoskeletal: Negative  Negative for arthritis and joint pain  Gastrointestinal: Negative for abdominal pain, nausea and vomiting  Genitourinary: Negative  Neurological: Positive for vertigo  Negative for dizziness and light-headedness  Psychiatric/Behavioral: Negative  The patient is not nervous/anxious  Objective:     Vitals: Blood pressure 110/62, pulse (!) 49, height 5' 2" (1 575 m), weight 78 9 kg (174 lb)  , Body mass index is 31 83 kg/m²  ,        Physical Exam:    GEN: Wing Hunter appears well, alert and oriented x 3, pleasant and cooperative   HEENT: pupils equal, round, and reactive to light; extraocular muscles intact  NECK: supple, no carotid bruits   HEART: regular rhythm, normal S1 and S2, no murmurs, clicks, gallops or rubs   LUNGS: clear to auscultation bilaterally; no wheezes, rales, or rhonchi   ABDOMEN: normal bowel sounds, soft, no tenderness, no distention  EXTREMITIES: peripheral pulses normal; no clubbing, cyanosis, +2 edema  NEURO: no focal findings   SKIN: normal without suspicious lesions on exposed skin      Labs & Results:  Below is the patient's most recent value for Albumin, ALT, AST, BUN, Calcium, Chloride, Cholesterol, CO2, Creatinine, GFR, Glucose, HDL, Hematocrit, Hemoglobin, Hemoglobin A1C, LDL, Magnesium, Phosphorus, Platelets, Potassium, PSA, Sodium, Triglycerides, and WBC     Lab Results   Component Value Date    ALT 9 2019    AST 24 2019    BUN 14 2019    CALCIUM 9 0 2018     (H) 2019    CHOL 110 2017    CO2 25 2019    CREATININE 1 07 (H) 2019    HDL 54 2019    HCT 39 0 2019    HGB 13 2 2019    HGBA1C 5 3 2019    MG 2 2 2019     2019    K 4 6 2019     2017     2017    TRIG 58 2019    WBC 4 0 2019     Note: for a comprehensive list of the patient's lab results, access the Results Review activity  Cardiac testing:     I personally reviewed the ECG performed in the clinic on 19  It reveals sinus bradycardia at 49 bpm  QRS normal at 80 ms    Echocardiograms:  Results for orders placed during the hospital encounter of 19   Echo complete with contrast if indicated    Narrative [address] [place] [address]    Transthoracic Echocardiogram  2D, M-mode, Doppler, and Color Doppler    Study date:  2019    Patient: Elke Isidro  MR number: [id number]  Account number: [de-identified]  : [id number]  Age: 68 years  Gender: Female  Status: Outpatient  Location: [address] [place] [address]  Height: 62 in  Weight: 178 6 lb  BP: 138/ 74 mmHg    Indications: Shortness of breath, lower exremity edema, PAF  Diagnoses: I48 0 - Atrial fibrillation, R06 02 - Shortness of breath    Sonographer:  CARLOS Traylor RD RVT  Primary Physician:  Alexandra Antonio MD  Referring Physician:  SHADI Hammond  Group:  [facility]  Interpreting Physician:  Andrea Salazar MD    SUMMARY    LEFT VENTRICLE:  Systolic function was normal  Ejection fraction was estimated to be 65 %  There were no regional wall motion abnormalities  Doppler parameters were consistent with abnormal left ventricular relaxation (grade 1 diastolic dysfunction)  LEFT ATRIUM:  The atrium was mildly dilated      MITRAL VALVE:  There was mild annular calcification  There was mild regurgitation  AORTIC VALVE:  There was mild regurgitation  TRICUSPID VALVE:  There was mild to moderate regurgitation  HISTORY: PRIOR HISTORY: Right breast cancer, right mastectomy  PRIOR PROCEDURES: Arrhythmia ablation  PROCEDURE: The study was performed in the [facility]  This was a routine study  The transthoracic approach was used  The study included complete 2D imaging, M-mode, complete spectral Doppler, and color Doppler  Images were obtained from the parasternal, apical, subcostal, and suprasternal notch acoustic windows  Image quality was adequate  LEFT VENTRICLE: Size was normal  Systolic function was normal  Ejection fraction was estimated to be 65 %  There were no regional wall motion abnormalities  Wall thickness was normal  DOPPLER: Doppler parameters were consistent with  abnormal left ventricular relaxation (grade 1 diastolic dysfunction)  RIGHT VENTRICLE: The size was normal  Systolic function was normal  Wall thickness was normal     LEFT ATRIUM: The atrium was mildly dilated  RIGHT ATRIUM: Size was normal     MITRAL VALVE: There was mild annular calcification  Valve structure was normal  There was normal leaflet separation  DOPPLER: The transmitral velocity was within the normal range  There was no evidence for stenosis  There was mild  regurgitation  AORTIC VALVE: The valve was trileaflet  Leaflets exhibited normal thickness, normal cuspal separation, and sclerosis  DOPPLER: Transaortic velocity was within the normal range  There was no evidence for stenosis  There was mild  regurgitation  TRICUSPID VALVE: The valve structure was normal  There was normal leaflet separation  DOPPLER: The transtricuspid velocity was within the normal range  There was no evidence for stenosis  There was mild to moderate regurgitation  Pulmonary  artery systolic pressure was mildly increased      PULMONIC VALVE: Leaflets exhibited normal thickness, no calcification, and normal cuspal separation  DOPPLER: The transpulmonic velocity was within the normal range  There was no significant regurgitation  PERICARDIUM: There was no pericardial effusion  The pericardium was normal in appearance  AORTA: The root exhibited normal size  SYSTEMIC VEINS: IVC: The inferior vena cava was normal in size  PULMONARY VEINS: DOPPLER: There was systolic blunting in the pulmonary vein(s)      SYSTEM MEASUREMENT TABLES    2D  %FS: 31 66 %  Ao Diam: 2 23 cm  EDV(Teich): 105 22 ml  EF(Teich): 59 57 %  ESV(Cube): 34 34 ml  ESV(Teich): 42 54 ml  IVSd: 0 81 cm  LA Area: 21 18 cm2  LA Diam: 3 68 cm  LVEDV MOD A4C: 79 17 ml  LVEF MOD A4C: 66 73 %  LVESV MOD A4C: 26 34 ml  LVIDd: 4 76 cm  LVIDs: 3 25 cm  LVLd A4C: 6 9 cm  LVLs A4C: 5 58 cm  LVPWd: 0 76 cm  RA Area: 13 38 cm2  RV Diam : 3 39 cm  SI(Cube): 40 02 ml/m2  SI(Teich): 34 25 ml/m2  SV MOD A4C: 52 83 ml  SV(Cube): 73 23 ml  SV(Teich): 62 68 ml    CW  AR Dec Screven: 2 07 m/s2  AR Dec Time: 2636 88 ms  AR PHT: 764 69 ms  AR Vmax: 4 56 m/s  AR maxP 02 mmHg  TR Vmax: 3 1 m/s  TR maxP 41 mmHg    MM  TAPSE: 2 23 cm    PW  E': 0 06 m/s  E/E': 15 62  MV A Obed: 0 32 m/s  MV Dec Screven: 2 53 m/s2  MV DecT: 397 07 ms  MV E Obed: 1 m/s  MV E/A Ratio: 3 16    [employer] Accredited Echocardiography Laboratory    Prepared and electronically signed by    Zoila Juárez MD  Signed 2019 12:44:18       Results for orders placed during the hospital encounter of 16   YANIV    Narrative [address]  [facility], [address] [place] [address]  [phone]    Transesophageal Echocardiogram  2D, Doppler, and Color Doppler    Study date:  12-Aug-2016    Patient: Fay Tolliver  MR number: [id number]  Account number: [de-identified]  : [id number]  Age: 79 years  Gender: Female  Status: Outpatient  Location: Cath lab  Height: 62 in  Weight: 190 lb  BP: 148/ 97 mmHg    Indications: Afib    Diagnoses: I48 0 - Atrial fibrillation    Sonographer:  SOLE Meng  Primary Physician:  Jean Koehler MD  Referring Physician:  Josefina Gordillo MD  Group:  [facility]  Interpreting Physician:  Josefina Gordillo MD    SUMMARY    LEFT VENTRICLE:  Systolic function was mildly reduced  Ejection fraction was estimated to be 50  %  LEFT ATRIUM:  The atrium was dilated  LEFT ATRIAL APPENDAGE:  The appendage was dilated  No thrombus was identified  ATRIAL SEPTUM:  No defect or patent foramen ovale was identified  RIGHT ATRIUM:  The atrium was dilated  MITRAL VALVE:  There was mild regurgitation  AORTIC VALVE:  There was mild regurgitation  TRICUSPID VALVE:  There was moderate to severe regurgitation  PULMONIC VALVE:  There was trace regurgitation  HISTORY: PRIOR HISTORY: Obesity, Afib, Edema, Cardiomyopathy, HTN    PROCEDURE: The procedure was performed in the catheterization laboratory  This  was a routine study  The risks and alternatives of the procedure were explained  to the patient and informed consent was obtained  The transesophageal approach  was used  The study included complete 2D imaging, complete spectral Doppler,  and color Doppler  The heart rate was 118 bpm, at the start of the study  An  adult omniplane probe was inserted by the attending cardiologist  Intubated  with ease  One intubation attempt(s)  There was no blood detected on the probe  Image quality was adequate  MEDICATIONS: Anesthesia  LEFT VENTRICLE: Size was normal  Systolic function was mildly reduced  Ejection  fraction was estimated to be 50 %  This study was inadequate for the evaluation  of regional wall motion  Wall thickness was normal     RIGHT VENTRICLE: The size was normal  Systolic function was low normal     LEFT ATRIUM: The atrium was dilated  APPENDAGE: The appendage was dilated  No  thrombus was identified      ATRIAL SEPTUM: No defect or patent foramen ovale was identified  RIGHT ATRIUM: The atrium was dilated  MITRAL VALVE: Valve structure was normal  There was normal leaflet separation  DOPPLER: The transmitral velocity was within the normal range  There was no  evidence for stenosis  There was mild regurgitation  AORTIC VALVE: The valve was trileaflet  Leaflets exhibited normal thickness and  normal cuspal separation  DOPPLER: Transaortic velocity was within the normal  range  There was no evidence for stenosis  There was mild regurgitation  TRICUSPID VALVE: The valve structure was normal  There was normal leaflet  separation  DOPPLER: There was moderate to severe regurgitation  Estimated peak  PA pressure was 40 mmHg  PULMONIC VALVE: Leaflets exhibited normal thickness, no calcification, and  normal cuspal separation  DOPPLER: There was trace regurgitation  PERICARDIUM: There was no pericardial effusion  AORTA: The root exhibited normal size  The ascending aorta was normal in size  There was no significant atheroma  PULMONARY VEINS: DOPPLER: Doppler flow pattern was normal in the pulmonary  vein(s)  [address] Accredited Echocardiography Laboratory    Prepared and electronically signed by    Artem Caba MD  Signed [id number] 17:28:53         Catheterizations:   No results found for this or any previous visit      Stress Tests:  Results for orders placed during the hospital encounter of 19   NM myocardial perfusion spect (stress and/or rest)    [address]    Stress Gated SPECT Myocardial Perfusion Imaging after Regadenoson    Patient: Louis Maciel  MR number: [id number]  Account number: [de-identified]  : 1945  Age: 68 years  Gender: Female  Status: Outpatient  Location: [facility]  Height: 62 in  Weight: 178 lb  BP: 141/ 80 mmHg    Allergies: MORPHINE AND RELATED, OXYCODONE-ACETAMINOPHEN    Diagnosis: R06 09 - Other forms of dyspnea    Primary Physician:  Arnaldo Kemp MD  RN:  Vandana Clark RN  Referring Physician:  SHADI Pollock  Technician:  Bushra Saini  Group:  [facility]  Report Prepared By[de-identified]  Vandana Clark RN  Interpreting Physician:  Brianna Arzola MD    INDICATIONS: Detection of coronary artery disease  HISTORY: Hx right breast CA The patient is a 68year old  female  Chest pain status: chest pain  Other symptoms: dyspnea and lower extremity edema  Coronary artery disease risk factors: dyslipidemia, hypertension, family history  of premature coronary artery disease, and post-menopausal state  Cardiovascular history: arrhythmia(hx atrial fibrillation/PAF) Medications: include a beta blocker, a diuretic, and thyroid medications  Previous test results: abnormal ECG  PHYSICAL EXAM: Baseline physical exam screening: no wheezes audible  REST ECG: Sinus bradycardia ST segments and T waves were normal  The ECG showed occasional premature atrial contractions  PROCEDURE: The study was performed in the the [facility]  A regadenoson infusion pharmacologic stress test was performed  Gated SPECT myocardial perfusion imaging was performed during stress  Systolic blood  pressure was 141 mmHg, at the start of the study  Diastolic blood pressure was 80 mmHg, at the start of the study  The heart rate was 57 bpm, at the start of the study  Regadenoson protocol:  HR bpm SBP mmHg DBP mmHg Symptoms  Baseline 57 141 80 none  Immediate 100 146 82 dyspnea, fatigue  1 min 98 155 86 subsiding  2 min 69 153 85 none  The patient also performed low level exercise  STRESS SUMMARY: Duration of pharmacologic stress was 3 min and 0 sec  Maximal heart rate during stress was 101 bpm  The rate-pressure product for the peak heart rate and blood pressure was 60983  There was no chest pain during stress   The  stress test was terminated due to protocol completion  Pre oxygen saturation: 99 %  Peak oxygen saturation: 95 %  The stress ECG was negative for ischemia and normal  There were no stress arrhythmias or conduction abnormalities  ISOTOPE ADMINISTRATION:  The radiopharmaceutical was injected at the peak effect of pharmacologic stress  PERFUSION DEFECTS:  -  There were no perfusion defects  GATED SPECT:  The calculated left ventricular ejection fraction was 72 %  There was no left ventricular regional abnormality  SUMMARY:  -  Stress results: There was no chest pain during stress  -  ECG conclusions: The stress ECG was negative for ischemia and normal   -  Perfusion imaging: There were no perfusion defects   -  Gated SPECT: The calculated left ventricular ejection fraction was 72 %  There was no left ventricular regional abnormality  IMPRESSIONS: Normal study after pharmacologic vasodilation without reproduction of symptoms  Myocardial perfusion imaging was normal at rest and with stress  Prepared and signed by    Casper Fernandez MD  Signed 05/01/2019 09:21:35         Holter monitor -   No results found for this or any previous visit  Results for orders placed during the hospital encounter of 04/30/19   Holter monitor - 24 hour    Narrative INDICATIONS:  Paroxysmal atrial fibrillation status post prior ablation    DESCRIPTION OF FINDINGS:  The patient was monitored for a total of 24 hours  The patient was   predominantly noted to be in normal sinus rhythm with paroxysmal atrial   fibrillation throughout the study  The average heart rate was 69 beats   per minute  The heart rate ranged from a low of 46 beats per minute in   a m  at 954 to a maximum of 122 beats per minute in a m  at 930  Ventricular ectopic activity consisted of 127 (0 1 % of total beats)  There was no sustained or nonsustained ventricular tachycardia  Supraventricular ectopic activity consisted of 2441 (2 4 % of total   beats)  Patient was noted to have 45 SVT runs  The longest run  occurred   at 4:57 p m and lasted for 1 minutes and 30 seconds  The run demonstrated   atrial fibrillation with a rapid ventricular response up to a rate of 170  When the run broke there was a 1 second interval then a junctional complex   and then sinus rhythm  There were multiple other episodes of brief   paroxysmal atrial fibrillation with rapid ventricular response  One run   was nine complexes  One run was 15 complexes  There were no significant pauses  The longest R-R interval was 1 6   seconds  There was no evidence of advanced degree heart block  All there were no symptoms reported during the monitoring interval         Impression 1  Predominantly normal sinus rhythm throughout the monitoring interval   with brief paroxysms of atrial fibrillation noted  The longest interval   of time in atrial fibrillation was about 1 minutes and 30 seconds  The   ventricular response to atrial fibrillation was rapid  None of the   arrhythmia was associated with symptoms  2  Low-density of nonsustained premature supraventricular ectopy  3  No significant bradycardia  4  No symptoms in general noted during the monitoring interval   5  Clinical correlation is advised  ASSESSMENT:  1  HTN  - BP normotensive   2  Obesity  3  Adenocarcinoma of breast s/p chemotherapy  - No evidence of recurrence   4  Hypothyroidism  -  Free T4 normal in January 2019  5  LE edema, chronic  - Taking diuretics   6  Paroxysmal atrial fibrillation s/p ablation 3 years ago  Now brief episodes       SUMMARY:    In summary, Ms Bonnie Leyva is a 76 y  o  woman with paroxysmal atrial fibrillation s/p ablation 3 years ago, now with palpitations and recurrence noted on cardiac event monitor  She was started on metoprolol and flecainide two months ago  She has normal QRS on ECG today's visit  She has not had any more palpitations since starting flecainide   We discussed different ways to monitor for atrial fibrillation, including routine ECGs, periodic cardiac event monitor or implantable loop monitor  After answering questions regarding risk and benefits, she opted to proceed with loop monitor placed  Our office will be in touch with the patient to schedule the procedure  She is welcome to call our office for any questions or concerns  Thank you for allowing me to participate in the care of this patient  Please do not hesitate to contact me for any questions  PLAN:  Schedule loop placement  Continue current medications       Scribe Attestation    I,:   Radha Ureña am acting as a scribe while in the presence of the attending physician :        I,:   Verito Bella MD personally performed the services described in this documentation    as scribed in my presence :

## 2019-12-14 LAB
ALBUMIN SERPL-MCNC: 3.8 G/DL (ref 3.5–4.8)
ALBUMIN/GLOB SERPL: 1.4 {RATIO} (ref 1.2–2.2)
ALP SERPL-CCNC: 98 IU/L (ref 39–117)
ALT SERPL-CCNC: 11 IU/L (ref 0–32)
AST SERPL-CCNC: 25 IU/L (ref 0–40)
BILIRUB SERPL-MCNC: 0.8 MG/DL (ref 0–1.2)
BUN SERPL-MCNC: 13 MG/DL (ref 8–27)
BUN/CREAT SERPL: 11 (ref 12–28)
CALCIUM SERPL-MCNC: 9.2 MG/DL (ref 8.7–10.3)
CANCER AG27-29 SERPL-ACNC: 15.6 U/ML (ref 0–38.6)
CHLORIDE SERPL-SCNC: 104 MMOL/L (ref 96–106)
CO2 SERPL-SCNC: 25 MMOL/L (ref 20–29)
CREAT SERPL-MCNC: 1.18 MG/DL (ref 0.57–1)
GLOBULIN SER-MCNC: 2.8 G/DL (ref 1.5–4.5)
GLUCOSE SERPL-MCNC: 85 MG/DL (ref 65–99)
POTASSIUM SERPL-SCNC: 4.3 MMOL/L (ref 3.5–5.2)
PROT SERPL-MCNC: 6.6 G/DL (ref 6–8.5)
SL AMB EGFR AFRICAN AMERICAN: 53 ML/MIN/1.73
SL AMB EGFR NON AFRICAN AMERICAN: 46 ML/MIN/1.73
SODIUM SERPL-SCNC: 144 MMOL/L (ref 134–144)

## 2019-12-17 NOTE — TELEPHONE ENCOUNTER
Dr Matt Hines we received an e-mail from you regard this patient needs a loop implant, this patient was referred by Dr Cleveland García in July for loop implant, insurance ([employer]) didn't approved the service considered it just experimental     [place] there's any way to check if after patient have an event monitor for 28 days the insurance will approved a loop recorder service?

## 2019-12-18 ENCOUNTER — OFFICE VISIT (OUTPATIENT)
Dept: HEMATOLOGY ONCOLOGY | Facility: HOSPITAL | Age: 74
End: 2019-12-18
Payer: COMMERCIAL

## 2019-12-18 VITALS
DIASTOLIC BLOOD PRESSURE: 60 MMHG | RESPIRATION RATE: 16 BRPM | TEMPERATURE: 96.1 F | BODY MASS INDEX: 32.57 KG/M2 | OXYGEN SATURATION: 97 % | WEIGHT: 177 LBS | SYSTOLIC BLOOD PRESSURE: 112 MMHG | HEART RATE: 61 BPM | HEIGHT: 62 IN

## 2019-12-18 DIAGNOSIS — C50.811 MALIGNANT NEOPLASM OF OVERLAPPING SITES OF RIGHT BREAST IN FEMALE, ESTROGEN RECEPTOR NEGATIVE (HCC): Primary | ICD-10-CM

## 2019-12-18 DIAGNOSIS — Z17.1 MALIGNANT NEOPLASM OF OVERLAPPING SITES OF RIGHT BREAST IN FEMALE, ESTROGEN RECEPTOR NEGATIVE (HCC): Primary | ICD-10-CM

## 2019-12-18 DIAGNOSIS — C50.911 RECURRENT MALIGNANT NEOPLASM OF RIGHT BREAST (HCC): ICD-10-CM

## 2019-12-18 PROCEDURE — 99214 OFFICE O/P EST MOD 30 MIN: CPT | Performed by: INTERNAL MEDICINE

## 2019-12-18 NOTE — PROGRESS NOTES
Hematology/Oncology Outpatient Follow- up Note  Idalia Look 76 y o  female MRN: @ Encounter: [id number]        Date:  12/18/2019    Presenting Complaint/Diagnosis : Stage IIA triple negative breast cancer, with 9 negative lymph nodes  HPI:    Robby Monae is a pleasant 68year old   female, who presents to the office unaccompanied for today's visit   She was diagnosed with triple negative breast cancer  Eden Chi tumor was a T2 N0 M0 triple negative breast cancer, with areas of DCIS   It was multifocal   It seemed to be a high-grade tumor  She was advised to have adjuvant chemotherapy   She was scheduled for Taxotere/Cytoxan every 3 weeks for 4 cycles  This was because she had negative lymph nodes, otherwise they would have gotten an anthracycline-based chemotherapy   The patient agreed  She completed 3 cycles and refused any further chemotherapy   She is on observation  Previous Hematologic/ Oncologic History:       Personal history of adenocarcinoma of breast    9/12/2016 Surgery     Right breast biopsy X 3 sites      9/29/2016 Initial Diagnosis     Malignant neoplasm of unspecified site of right female breast ([facility] [address] )      10/28/2016 Surgery     Right breast mastectomy, SLNB  12/7/2016 - 2/1/2017 Chemotherapy     Taxotere/Cytoxan  Scheduled for every 3 weeks x 4 cycles  Completed 3 cycles under the guidance of Dr Karen Puente  3/16/2017 Surgery     Right chest wall excision of lesion         Current Hematologic/ Oncologic Treatment:      Observation    Interval History:    The patient returns for follow-up visit  She is doing well  Her insurance company denied her CAT scan  We will reorder it for 4 months secondary to fact that she had triple negative breast cancer and as she had a documented recurrence which had to be resected  She did not tolerate her adjuvant chemotherapy very well also did not completed  She would be considered high risk  She states she is doing well   Denies any nausea denies any vomiting denies any diarrhea  Her blood work is within acceptable limits  Creatinine is elevated but this is chronic  The rest of her 14 point review of systems today was negative  Test Results:    Imaging: No results found  Labs:   Lab Results   Component Value Date    WBC 4 3 12/13/2019    HGB 13 3 12/13/2019    HCT 38 8 12/13/2019    MCV 89 12/13/2019     12/13/2019     Lab Results   Component Value Date     09/07/2017     09/07/2017    K 4 3 12/13/2019     12/13/2019    CO2 25 12/13/2019    ANIONGAP 6 06/19/2015    BUN 13 12/13/2019    CREATININE 1 18 (H) 12/13/2019    GLUCOSE 95 06/19/2015    CALCIUM 9 0 08/14/2018    AST 25 12/13/2019    ALT 11 12/13/2019    ALKPHOS 83 08/14/2018    PROT 6 1 09/07/2017    PROT 6 1 09/07/2017    BILITOT 0 8 09/07/2017    BILITOT 0 8 09/07/2017    EGFR >60 0 10/25/2016       ROS: As stated in the history of present illness otherwise his 14 point review of systems today was negative        Active Problems:   Patient Active Problem List   Diagnosis    Atrial fibrillation (HCC)    Benign essential hypertension    Bilateral edema of lower extremity    BPPV (benign paroxysmal positional vertigo)    Bursitis of left shoulder    Cardiomyopathy ([address] )    Cervical spinal stenosis    Edema    Homocysteinemia ([address] )    Hypokalemia    Acquired hypothyroidism    Long QT interval    Personal history of adenocarcinoma of breast    Class 1 obesity due to excess calories without serious comorbidity with body mass index (BMI) of 31 0 to 31 9 in adult    Osteopenia    Overactive bladder    Peripheral neuropathy    Vitamin D deficiency    Bronchitis with bronchospasm    Candidal esophagitis (HCC)    Abnormal chest CT    Pulmonary nodule seen on imaging study    SOB (shortness of breath)    Weight loss    Medicare annual wellness visit, subsequent    Hyperglycemia    Mixed hyperlipidemia    Other idiopathic peripheral autonomic neuropathy    Persistent nodularity of breast    Encounter for follow-up surveillance of breast cancer    Other specified hypothyroidism    Ductal carcinoma in situ (DCIS) of right breast    Current use of long term anticoagulation       Past Medical History:   Past Medical History:   Diagnosis Date    Abnormal chest CT     last assessed - [id number]    Abnormal glucose     Resolved - [id number]    Arrhythmia     Arthritis     Slight    BCC (basal cell carcinoma of skin)     last assessed - [id number]    [facility] ) Right    2016    Cardiomyopathy [facility])     Cervical spinal stenosis     Discharge from left nipple     last assessed - [id number]    Disease of thyroid gland     hypothyroidism    Full dentures     Upper and Lower    History of atrial fibrillation     History of chemotherapy     Homocysteinemia ([facility] )     Hypertension     Hypokalemia     Intraductal papilloma of breast     last assessed - [id number]    Malignant neoplasm of skin     basal cell on face, Moh's surgery    Memory loss     last assessed - [id number]    Obesity     Open wound     last assessed - [id number]    Osteopenia     Overactive bladder     Palmar plantar erythrodysaesthesia     Resolved - [id number]    Pulmonary nodule seen on imaging study     Rosacea     UTI (urinary tract infection)     recently    Vertigo     Vitamin D deficiency        Surgical History:   Past Surgical History:   Procedure Laterality Date    APPENDECTOMY      ATRIAL ABLATION SURGERY      last assessed - [id number]   [facility] BACK SURGERY  1997    Lumbar spinal stenosis    BREAST BIOPSY      2014? left breast; 9/12/16 right breast x 3    BREAST BIOPSY Left 03/30/2010    BREAST SURGERY      needle bx      BREAST SURGERY Right 03/16/2017    excision of right chest wall lesion    CARDIAC ELECTROPHYSIOLOGY STUDY AND ABLATION      CARDIOVERSION      DENTAL SURGERY      HAND SURGERY      Joint replaced with silicone joint 5th finger right hand    HYSTERECTOMY  1993    Complete    LAMINECTOMY      Decompressive up to two lumbar segments    MASTECTOMY Right 10/28/2016    OOPHORECTOMY  1993    OK EXC SKIN MALIG <0 5 CM TRUNK,ARM,LEG Right 3/16/2017    Procedure: EXCISION CHEST WALL LESION; NEEDLE LOC @ 1400;  Surgeon: Berta Carver MD;  Location: [facility];  Service: Surgical Oncology    OK INTRAOPERATIVE SENTINEL LYMPH NODE ID W DYE INJECTION Right 10/28/2016    Procedure: BREAST MASTECTOMY WITH SENTINAL LYMPH NODE BIOPSY; LYMPHSCINITIGRAPHY; LYMPHATIC MAPPING; 0800 NUC MED INJECTION;  Surgeon: Berta Carver MD;  Location: [facility];  Service: Surgical Oncology    SENTINEL LYMPH NODE BIOPSY Right     US BREAST NEEDLE LOC RIGHT Right 3/16/2017    US GUIDANCE BREAST BIOPSY RIGHT EACH ADDITIONAL Right 9/12/2016    US GUIDANCE BREAST BIOPSY RIGHT EACH ADDITIONAL Right 9/12/2016    US GUIDED BREAST BIOPSY RIGHT COMPLETE Right 9/12/2016       Family History:    Family History   Problem Relation Age of Onset    Coronary artery disease Mother         CABG    Diabetes Mother     Heart attack Mother     Diabetes Sister     Parkinsonism Sister     Coronary artery disease Brother         CABG    Diabetes Brother     Heart attack Brother     Heart attack Son     Kidney cancer Son 48    No Known Problems Son     No Known Problems Son     Emphysema Sister     No Known Problems Sister     Heart attack Brother     No Known Problems Brother     No Known Problems Maternal Grandmother     No Known Problems Maternal Grandfather     No Known Problems Paternal Grandmother     No Known Problems Paternal Grandfather     No Known Problems Paternal Aunt     No Known Problems Paternal Aunt     No Known Problems Paternal Aunt     No Known Problems Paternal Aunt        Cancer-related family history includes Kidney cancer (age of onset: 48) in her son      Social History:   Social History     Socioeconomic History    Marital status: /Civil Union     Spouse name: Not on file    Number of children: Not on file    Years of education: Not on file    Highest education level: Not on file   Occupational History    Occupation: Retired   Social Needs    Financial resource strain: Not on file    Food insecurity:     Worry: Not on file     Inability: Not on file   Portalarium needs:     Medical: Not on file     Non-medical: Not on file   Tobacco Use    Smoking status: Never Smoker    Smokeless tobacco: Never Used   Substance and Sexual Activity    Alcohol use: No    Drug use: No    Sexual activity: Not on file     Comment: Resides with    Lifestyle    Physical activity:     Days per week: Not on file     Minutes per session: Not on file    Stress: Not on file   Relationships    Social connections:     Talks on phone: Not on file     Gets together: Not on file     Attends [employer] service: Not on file     Active member of club or organization: Not on file     Attends meetings of clubs or organizations: Not on file     Relationship status: Not on file    Intimate partner violence:     Fear of current or ex partner: Not on file     Emotionally abused: Not on file     Physically abused: Not on file     Forced sexual activity: Not on file   Other Topics Concern    Not on file   Social History Narrative    Always uses seat belt    Convalescence after chemotherapy       Current Medications:   Current Outpatient Medications   Medication Sig Dispense Refill    apixaban (ELIQUIS) 5 mg Take 1 tablet (5 mg total) by mouth 2 (two) times a day 180 tablet 3    cyanocobalamin (VITAMIN B-12) 500 mcg tablet Take 1 tablet by mouth daily      flecainide (TAMBOCOR) 100 mg tablet Take 1 tablet (100 mg total) by mouth 2 (two) times a day 180 tablet 3    furosemide (LASIX) 40 mg tablet TAKE 1 TABLET BY MOUTH DAILY AS DIRECTED 90 tablet 1    levothyroxine 150 mcg tablet Take one tablet 4 days a week and 1/2 tablet three days a week 90 tablet 3    meclizine (ANTIVERT) 25 mg tablet Take 1 tablet (25 mg total) by mouth every 8 (eight) hours as needed for dizziness 30 tablet 3    metoprolol succinate (TOPROL-XL) 50 mg 24 hr tablet Take 1 tablet (50 mg total) by mouth daily 90 tablet 3    Naproxen Sodium (ALEVE) 220 MG CAPS Take 1 capsule (220 mg total) by mouth as needed (prn headache)      potassium chloride (MICRO-K) 10 MEQ CR capsule TAKE 1 CAPSULE BY MOUTH EVERY DAY 90 capsule 1     No current facility-administered medications for this visit  Allergies: Allergies   Allergen Reactions    Morphine And Related GI Intolerance    Oxycodone-Acetaminophen Vomiting       Physical Exam:    Body surface area is 1 81 meters squared  Wt Readings from Last 3 Encounters:   12/18/19 80 3 kg (177 lb)   12/13/19 78 9 kg (174 lb)   09/18/19 80 6 kg (177 lb 11 2 oz)        Temp Readings from Last 3 Encounters:   12/18/19 (!) 96 1 °F (35 6 °C) (Tympanic)   07/16/19 (!) 96 7 °F (35 9 °C)   05/22/19 97 7 °F (36 5 °C) (Tympanic)        BP Readings from Last 3 Encounters:   12/18/19 112/60   12/13/19 110/62   09/18/19 118/70         Pulse Readings from Last 3 Encounters:   12/18/19 61   12/13/19 (!) 49   09/18/19 (!) 53        Physical Exam     Constitutional   General appearance: No acute distress, well appearing and well nourished  Eyes   Conjunctiva and lids: No swelling, erythema or discharge  Pupils and irises: Equal, round and reactive to light  Ears, Nose, Mouth, and Throat   External inspection of ears and nose: Normal     Nasal mucosa, septum, and turbinates: Normal without edema or erythema  Oropharynx: Normal with no erythema, edema, exudate or lesions  Pulmonary   Respiratory effort: No increased work of breathing or signs of respiratory distress  Auscultation of lungs: Clear to auscultation  Cardiovascular   Palpation of heart: Normal PMI, no thrills      Auscultation of heart: Normal rate and rhythm, normal S1 and S2, without murmurs  Examination of extremities for edema and/or varicosities: Normal     Carotid pulses: Normal     Abdomen   Abdomen: Non-tender, no masses  Liver and spleen: No hepatomegaly or splenomegaly  Lymphatic   Palpation of lymph nodes in neck: No lymphadenopathy  Musculoskeletal   Gait and station: Normal     Digits and nails: Normal without clubbing or cyanosis  Inspection/palpation of joints, bones, and muscles: Normal     Skin   Skin and subcutaneous tissue: Normal without rashes or lesions  Neurologic   Cranial nerves: Cranial nerves 2-12 intact  Sensation: No sensory loss  Psychiatric   Orientation to person, place, and time: Normal     Mood and affect: Normal         Assessment / Plan:      The patient is a pleasant 22-year-old female with Stage IIA Triple negative Right Breast cancer, with 9 (-) lymph nodes   She had surgery and chemo with Taxotere & Cytoxan, and completed 3 of anticipated 4 cycles   She had tiny local recurrence with positive margin in the surgical bed Which was resected and has remained on observation since this time   She continues to follow with Dr Aria Alonso and she performs her breast exams  Her most recent imaging From May 2019 is stable  I'll see her back in 4 months with repeat blood work and imaging  Until then if she has any question she will call her office  She knows she has to get blood work prior to her CAT scan  Again the CAT scan is being ordered because she had triple negative breast cancer with a local recurrence and did not tolerate all of her adjuvant chemotherapy so could not complete it  Hopefully the insurance company will approve the next CAT scan as it will be approximately a year from her last one  Goals and Barriers:  Current Goal:  Prolong Survival from Triple negative breast cancer  Barriers: None  Patient's Capacity to Self Care:  Patient able to self care      Portions of the record may have been created with voice recognition software   Occasional wrong word or "sound a like" substitutions may have occurred due to the inherent limitations of voice recognition software   Read the chart carefully and recognize, using context, where substitutions have occurred

## 2019-12-20 NOTE — TELEPHONE ENCOUNTER
Ron, since you were involved with this wanted for forward to you  Do we just need to schedule her for an MCOT

## 2019-12-20 NOTE — TELEPHONE ENCOUNTER
Dr Benjy Rand, we have the all clear to get the event monitor done at this point, however, according to [facility], this insurance does not cover loop implants  I will get the pt scheduled for the event monitor  30 days correct? Thank you

## 2019-12-31 ENCOUNTER — TELEPHONE (OUTPATIENT)
Dept: CARDIOLOGY CLINIC | Facility: CLINIC | Age: 74
End: 2019-12-31

## 2019-12-31 NOTE — TELEPHONE ENCOUNTER
Per Dr Jerson Farrell, pt needs another event monitor since the Loop implant is not covered by her insurance  Thank you

## 2020-01-02 ENCOUNTER — OFFICE VISIT (OUTPATIENT)
Dept: SURGICAL ONCOLOGY | Facility: HOSPITAL | Age: 75
End: 2020-01-02
Payer: COMMERCIAL

## 2020-01-02 VITALS
WEIGHT: 180 LBS | HEART RATE: 78 BPM | TEMPERATURE: 96.8 F | BODY MASS INDEX: 33.13 KG/M2 | SYSTOLIC BLOOD PRESSURE: 120 MMHG | DIASTOLIC BLOOD PRESSURE: 80 MMHG | RESPIRATION RATE: 18 BRPM | HEIGHT: 62 IN

## 2020-01-02 DIAGNOSIS — Z85.3 ENCOUNTER FOR FOLLOW-UP SURVEILLANCE OF BREAST CANCER: Primary | ICD-10-CM

## 2020-01-02 DIAGNOSIS — Z85.3 PERSONAL HISTORY OF ADENOCARCINOMA OF BREAST: ICD-10-CM

## 2020-01-02 DIAGNOSIS — I89.0 LYMPHEDEMA: ICD-10-CM

## 2020-01-02 DIAGNOSIS — Z08 ENCOUNTER FOR FOLLOW-UP SURVEILLANCE OF BREAST CANCER: Primary | ICD-10-CM

## 2020-01-02 PROCEDURE — 99213 OFFICE O/P EST LOW 20 MIN: CPT | Performed by: SURGERY

## 2020-01-02 NOTE — PROGRESS NOTES
Surgical Oncology Follow Up       [address] SURGICAL ONCOLOGY [address]  [place] [address]    Brianna Washington  1945  [id number]  [address] SURGICAL Lendanya Johnson  [address] [address]    Chief Complaint   Patient presents with    Follow-up       Assessment/Plan   Diagnoses and all orders for this visit:    Encounter for follow-up surveillance of breast cancer    Lymphedema  -     Ambulatory referral to Physical Therapy; Future    Personal history of adenocarcinoma of breast        Advance Care Planning/Advance Directives:  Discussed disease status, cancer treatment plans and/or cancer treatment goals with the patient  Oncology History:       Personal history of adenocarcinoma of breast    9/12/2016 Surgery     Right breast biopsy X 3 sites      9/29/2016 Initial Diagnosis     Malignant neoplasm of unspecified site of right female breast ([facility] [address] )      10/28/2016 Surgery     Right breast mastectomy, SLNB  12/7/2016 - 2/1/2017 Chemotherapy     Taxotere/Cytoxan  Scheduled for every 3 weeks x 4 cycles  Completed 3 cycles under the guidance of Dr Denise Moya  3/16/2017 Surgery     Right chest wall excision of lesion         History of Present Illness:  Breast cancer follow-up, reports no concerns  -Interval History:  Recent mammogram    Review of Systems:  Review of Systems   Constitutional: Negative  Negative for appetite change and fever  Eyes: Negative  Respiratory: Negative for shortness of breath  Cardiovascular: Negative  Gastrointestinal: Negative  Endocrine: Negative  Genitourinary: Negative  Musculoskeletal: Negative  Negative for arthralgias and myalgias  Skin: Negative  Allergic/Immunologic: Negative  Neurological: Negative  Hematological: Negative  Negative for adenopathy  Does not bruise/bleed easily  Psychiatric/Behavioral: Negative          Patient Active Problem List   Diagnosis    Atrial fibrillation (HCC)    Benign essential hypertension    Bilateral edema of lower extremity    BPPV (benign paroxysmal positional vertigo)    Bursitis of left shoulder    Cardiomyopathy ([address] )    Cervical spinal stenosis    Edema    Homocysteinemia ([address] )    Hypokalemia    Acquired hypothyroidism    Long QT interval    Lymphedema    Personal history of adenocarcinoma of breast    Class 1 obesity due to excess calories without serious comorbidity with body mass index (BMI) of 31 0 to 31 9 in adult    Osteopenia    Overactive bladder    Peripheral neuropathy    Vitamin D deficiency    Bronchitis with bronchospasm    Candidal esophagitis (HCC)    Abnormal chest CT    Pulmonary nodule seen on imaging study    SOB (shortness of breath)    Weight loss    Medicare annual wellness visit, subsequent    Hyperglycemia    Mixed hyperlipidemia    Other idiopathic peripheral autonomic neuropathy    Persistent nodularity of breast    Encounter for follow-up surveillance of breast cancer    Other specified hypothyroidism    Current use of long term anticoagulation     Past Medical History:   Diagnosis Date    Abnormal chest CT     last assessed - [id number]    Abnormal glucose     Resolved - [id number]    Arrhythmia     Arthritis     Slight    BCC (basal cell carcinoma of skin)     last assessed - [id number]    Cardiomyopathy [facility])     Cervical spinal stenosis     Discharge from left nipple     last assessed - [id number]    Disease of thyroid gland     hypothyroidism    Full dentures     Upper and Lower    History of atrial fibrillation     Homocysteinemia ([address] )     Hypertension     Hypokalemia     Intraductal papilloma of breast     last assessed - [id number]    Malignant neoplasm of skin     basal cell on face, Moh's surgery    Memory loss     last assessed - [id number]    Obesity     Open wound last assessed - [id number]    Osteopenia     Overactive bladder     Palmar plantar erythrodysaesthesia     Resolved - [id number]    Pulmonary nodule seen on imaging study     Rosacea     UTI (urinary tract infection)     recently    Vertigo     Vitamin D deficiency      Past Surgical History:   Procedure Laterality Date    APPENDECTOMY      ATRIAL ABLATION SURGERY      last assessed - [id number]   Sedrick Flower BACK SURGERY  1997    Lumbar spinal stenosis    BREAST BIOPSY      2014? left breast; 9/12/16 right breast x 3    BREAST BIOPSY Left 03/30/2010    BREAST SURGERY      needle bx      BREAST SURGERY Right 03/16/2017    excision of right chest wall lesion    CARDIAC ELECTROPHYSIOLOGY STUDY AND ABLATION      CARDIOVERSION      DENTAL SURGERY      HAND SURGERY      Joint replaced with silicone joint 5th finger right hand    HYSTERECTOMY  1993    Complete    LAMINECTOMY      Decompressive up to two lumbar segments    MASTECTOMY Right 10/28/2016    OOPHORECTOMY  1993    IA EXC SKIN MALIG <0 5 CM TRUNK,ARM,LEG Right 3/16/2017    Procedure: EXCISION CHEST WALL LESION; NEEDLE LOC @ 1400;  Surgeon: Conner English MD;  [facility];  Service: Surgical Oncology    IA INTRAOPERATIVE SENTINEL LYMPH NODE ID W DYE INJECTION Right 10/28/2016    Procedure: BREAST MASTECTOMY WITH SENTINAL LYMPH NODE BIOPSY; LYMPHSCINITIGRAPHY; LYMPHATIC MAPPING; 0800 NUC MED INJECTION;  Surgeon: Conner English MD;  [facility];  Service: Surgical Oncology    SENTINEL LYMPH NODE BIOPSY Right     US BREAST NEEDLE LOC RIGHT Right 3/16/2017    US GUIDANCE BREAST BIOPSY RIGHT EACH ADDITIONAL Right 9/12/2016    US GUIDANCE BREAST BIOPSY RIGHT EACH ADDITIONAL Right 9/12/2016    US GUIDED BREAST BIOPSY RIGHT COMPLETE Right 9/12/2016     Family History   Problem Relation Age of Onset    Coronary artery disease Mother         CABG    Diabetes Mother     Heart attack Mother     Diabetes Sister     Parkinsonism Sister  Coronary artery disease Brother         CABG    Diabetes Brother     Heart attack Brother     Heart attack Son     Kidney cancer Son 48    No Known Problems Son     No Known Problems Son     Emphysema Sister     No Known Problems Sister     Heart attack Brother     No Known Problems Brother     No Known Problems Maternal Grandmother     No Known Problems Maternal Grandfather     No Known Problems Paternal Grandmother     No Known Problems Paternal Grandfather     No Known Problems Paternal Aunt     No Known Problems Paternal Aunt     No Known Problems Paternal Aunt     No Known Problems Paternal Aunt      Social History     Socioeconomic History    Marital status: /Civil Union     Spouse name: Not on file    Number of children: Not on file    Years of education: Not on file    Highest education level: Not on file   Occupational History    Occupation: Retired   Social Needs    Financial resource strain: Not on file    Food insecurity:     Worry: Not on file     Inability: Not on file   Essential Viewing needs:     Medical: Not on file     Non-medical: Not on file   Tobacco Use    Smoking status: Never Smoker    Smokeless tobacco: Never Used   Substance and Sexual Activity    Alcohol use: No    Drug use: No    Sexual activity: Not on file     Comment: Resides with    Lifestyle    Physical activity:     Days per week: Not on file     Minutes per session: Not on file    Stress: Not on file   Relationships    Social connections:     Talks on phone: Not on file     Gets together: Not on file     Attends [employer] service: Not on file     Active member of club or organization: Not on file     Attends meetings of clubs or organizations: Not on file     Relationship status: Not on file    Intimate partner violence:     Fear of current or ex partner: Not on file     Emotionally abused: Not on file     Physically abused: Not on file     Forced sexual activity: Not on file Other Topics Concern    Not on file   Social History Narrative    Always uses seat belt    Convalescence after chemotherapy       Current Outpatient Medications:     apixaban (ELIQUIS) 5 mg, Take 1 tablet (5 mg total) by mouth 2 (two) times a day, Disp: 180 tablet, Rfl: 3    cyanocobalamin (VITAMIN B-12) 500 mcg tablet, Take 1 tablet by mouth daily, Disp: , Rfl:     flecainide (TAMBOCOR) 100 mg tablet, Take 1 tablet (100 mg total) by mouth 2 (two) times a day, Disp: 180 tablet, Rfl: 3    furosemide (LASIX) 40 mg tablet, TAKE 1 TABLET BY MOUTH DAILY AS DIRECTED, Disp: 90 tablet, Rfl: 1    levothyroxine 150 mcg tablet, Take one tablet 4 days a week and 1/2 tablet three days a week, Disp: 90 tablet, Rfl: 3    meclizine (ANTIVERT) 25 mg tablet, Take 1 tablet (25 mg total) by mouth every 8 (eight) hours as needed for dizziness, Disp: 30 tablet, Rfl: 3    metoprolol succinate (TOPROL-XL) 50 mg 24 hr tablet, Take 1 tablet (50 mg total) by mouth daily, Disp: 90 tablet, Rfl: 3    Naproxen Sodium (ALEVE) 220 MG CAPS, Take 1 capsule (220 mg total) by mouth as needed (prn headache), Disp: , Rfl:     potassium chloride (MICRO-K) 10 MEQ CR capsule, TAKE 1 CAPSULE BY MOUTH EVERY DAY, Disp: 90 capsule, Rfl: 1  Allergies   Allergen Reactions    Morphine And Related GI Intolerance    Oxycodone-Acetaminophen Vomiting       The following portions of the patient's history were reviewed and updated as appropriate: allergies, current medications, past family history, past medical history, past social history, past surgical history and problem list         Vitals:    01/02/20 1053   BP: 120/80   Pulse: 78   Resp: 18   Temp: (!) 96 8 °F (36 °C)       Physical Exam   Constitutional: She is oriented to person, place, and time  She appears well-developed and well-nourished  HENT:   Head: Normocephalic and atraumatic  Cardiovascular: Normal heart sounds     Pulmonary/Chest: Breath sounds normal  Right breast exhibits skin change (Mastectomy scar)  Right breast exhibits no mass and no tenderness  Left breast exhibits no inverted nipple, no mass, no nipple discharge, no skin change and no tenderness  Tightness at the right lateral pectoralis edge   Abdominal: Soft  Musculoskeletal:   Mild to moderate lymphedema of the right arm   Lymphadenopathy:        Right axillary: No pectoral and no lateral adenopathy present  Left axillary: No pectoral and no lateral adenopathy present  Right: No supraclavicular adenopathy present  Left: No supraclavicular adenopathy present  Neurological: She is alert and oriented to person, place, and time  Psychiatric: She has a normal mood and affect  Results:  Labs:      Imaging  09/05/2019 left 3D diagnostic mammogram was benign BI-RADS two with a density of two    I reviewed the above imaging data  Discussion/Summary:  66-year-old female status post right mastectomy for a stage IIA triple 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  She does have tightness at the right pectoralis edge as well as lymphedema of the right arm  I am referring her to physical therapy for this  There is otherwise no evidence of disease based on her exam today or her recent contralateral mammogram   I will therefore see her again in six months or sooner should the need arise

## 2020-01-15 LAB
APPEARANCE UR: CLEAR
BACTERIA URNS QL MICRO: NORMAL
BILIRUB UR QL STRIP: NEGATIVE
COLOR UR: YELLOW
EPI CELLS #/AREA URNS HPF: NORMAL /HPF (ref 0–10)
GLUCOSE UR QL: NEGATIVE
HGB UR QL STRIP: NEGATIVE
KETONES UR QL STRIP: NEGATIVE
LEUKOCYTE ESTERASE UR QL STRIP: NEGATIVE
MICRO URNS: NORMAL
MICRO URNS: NORMAL
MUCOUS THREADS URNS QL MICRO: PRESENT
NITRITE UR QL STRIP: NEGATIVE
PH UR STRIP: 6 [PH] (ref 5–7.5)
PROT UR QL STRIP: NEGATIVE
RBC #/AREA URNS HPF: NORMAL /HPF (ref 0–2)
SL AMB URINALYSIS REFLEX: NORMAL
SP GR UR: 1.01 (ref 1–1.03)
TSH SERPL DL<=0.005 MIU/L-ACNC: 0.37 UIU/ML (ref 0.45–4.5)
UROBILINOGEN UR STRIP-ACNC: 1 MG/DL (ref 0.2–1)
WBC #/AREA URNS HPF: NORMAL /HPF (ref 0–5)

## 2020-01-17 ENCOUNTER — OFFICE VISIT (OUTPATIENT)
Dept: FAMILY MEDICINE CLINIC | Facility: HOSPITAL | Age: 75
End: 2020-01-17
Payer: COMMERCIAL

## 2020-01-17 VITALS
TEMPERATURE: 96 F | HEIGHT: 62 IN | DIASTOLIC BLOOD PRESSURE: 70 MMHG | HEART RATE: 60 BPM | WEIGHT: 179 LBS | BODY MASS INDEX: 32.94 KG/M2 | SYSTOLIC BLOOD PRESSURE: 112 MMHG

## 2020-01-17 DIAGNOSIS — I10 BENIGN ESSENTIAL HYPERTENSION: ICD-10-CM

## 2020-01-17 DIAGNOSIS — I89.0 LYMPHEDEMA: ICD-10-CM

## 2020-01-17 DIAGNOSIS — Z00.00 MEDICARE ANNUAL WELLNESS VISIT, SUBSEQUENT: Primary | ICD-10-CM

## 2020-01-17 DIAGNOSIS — E03.9 ACQUIRED HYPOTHYROIDISM: ICD-10-CM

## 2020-01-17 DIAGNOSIS — Z79.01 CURRENT USE OF LONG TERM ANTICOAGULATION: ICD-10-CM

## 2020-01-17 DIAGNOSIS — E66.09 CLASS 1 OBESITY DUE TO EXCESS CALORIES WITHOUT SERIOUS COMORBIDITY WITH BODY MASS INDEX (BMI) OF 32.0 TO 32.9 IN ADULT: ICD-10-CM

## 2020-01-17 DIAGNOSIS — I48.11 LONGSTANDING PERSISTENT ATRIAL FIBRILLATION (HCC): ICD-10-CM

## 2020-01-17 DIAGNOSIS — C50.911 RECURRENT MALIGNANT NEOPLASM OF RIGHT BREAST (HCC): ICD-10-CM

## 2020-01-17 PROCEDURE — G0439 PPPS, SUBSEQ VISIT: HCPCS | Performed by: FAMILY MEDICINE

## 2020-01-17 PROCEDURE — 1170F FXNL STATUS ASSESSED: CPT | Performed by: FAMILY MEDICINE

## 2020-01-17 PROCEDURE — 1125F AMNT PAIN NOTED PAIN PRSNT: CPT | Performed by: FAMILY MEDICINE

## 2020-01-17 PROCEDURE — 3074F SYST BP LT 130 MM HG: CPT | Performed by: FAMILY MEDICINE

## 2020-01-17 PROCEDURE — 3725F SCREEN DEPRESSION PERFORMED: CPT | Performed by: FAMILY MEDICINE

## 2020-01-17 PROCEDURE — 99214 OFFICE O/P EST MOD 30 MIN: CPT | Performed by: FAMILY MEDICINE

## 2020-01-17 PROCEDURE — 3078F DIAST BP <80 MM HG: CPT | Performed by: FAMILY MEDICINE

## 2020-01-17 PROCEDURE — 1036F TOBACCO NON-USER: CPT | Performed by: FAMILY MEDICINE

## 2020-01-17 NOTE — PROGRESS NOTES
Assessment and Plan:     Problem List Items Addressed This Visit        Endocrine    Acquired hypothyroidism       Cardiovascular and Mediastinum    Atrial fibrillation ([facility] [address] )    Benign essential hypertension       Other    Lymphedema    Relevant Orders    CompreFit (below knee) 20-30 mmHg    Class 1 obesity due to excess calories without serious comorbidity with body mass index (BMI) of 32 0 to 32 9 in adult    Medicare annual wellness visit, subsequent - Primary    Current use of long term anticoagulation    Recurrent malignant neoplasm of right breast ([facility] [address] )          Falls Plan of Care: balance, strength, and gait training instructions were provided  Preventive health issues were discussed with patient, and age appropriate screening tests were ordered as noted in patient's After Visit Summary  Personalized health advice and appropriate referrals for health education or preventive services given if needed, as noted in patient's After Visit Summary       History of Present Illness:     Patient presents for Medicare Annual Wellness visit    Patient Care Team:  Radha Dumont MD as PCP - MD Pawel Hicks MD Una Stamps, MD     Problem List:     Patient Active Problem List   Diagnosis    Atrial fibrillation [facility])    Benign essential hypertension    Bilateral edema of lower extremity    BPPV (benign paroxysmal positional vertigo)    Bursitis of left shoulder    Cardiomyopathy ([facility] [address] )    Cervical spinal stenosis    Edema    [facility]    Hypokalemia    Acquired hypothyroidism    Long QT interval    Lymphedema    Personal history of adenocarcinoma of breast    Class 1 obesity due to excess calories without serious comorbidity with body mass index (BMI) of 32 0 to 32 9 in adult    Osteopenia    Overactive bladder    Peripheral neuropathy    Vitamin D deficiency    Bronchitis with bronchospasm    Candidal esophagitis ([facility] [address] )    Abnormal chest CT    Pulmonary nodule seen on imaging study    SOB (shortness of breath)    Weight loss    Medicare annual wellness visit, subsequent    Hyperglycemia    Mixed hyperlipidemia    Other idiopathic peripheral autonomic neuropathy    Persistent nodularity of breast    Encounter for follow-up surveillance of breast cancer    Other specified hypothyroidism    Current use of long term anticoagulation    Recurrent malignant neoplasm of right breast [facility])      Past Medical and Surgical History:     Past Medical History:   Diagnosis Date    Abnormal chest CT     last assessed - [id number]    Abnormal glucose     Resolved - [id number]    Arrhythmia     Arthritis     Slight    BCC (basal cell carcinoma of skin)     last assessed - [id number]    Cardiomyopathy [facility])     Cervical spinal stenosis     Discharge from left nipple     last assessed - [id number]    Disease of thyroid gland     hypothyroidism    Full dentures     Upper and Lower    History of atrial fibrillation     Homocysteinemia ([facility] [address] )     Hypertension     Hypokalemia     Intraductal papilloma of breast     last assessed - [id number]    Malignant neoplasm of skin     basal cell on face, Moh's surgery    Memory loss     last assessed - [id number]    Obesity     Open wound     last assessed - [id number]    Osteopenia     Overactive bladder     Palmar plantar erythrodysaesthesia     Resolved - [id number]    Pulmonary nodule seen on imaging study     Rosacea     UTI (urinary tract infection)     recently    Vertigo     Vitamin D deficiency      Past Surgical History:   Procedure Laterality Date    APPENDECTOMY      ATRIAL ABLATION SURGERY      last assessed - [id number]   Brendaann Frankel BACK SURGERY  1997    Lumbar spinal stenosis    BREAST BIOPSY      2014? left breast; 9/12/16 right breast x 3    BREAST BIOPSY Left 03/30/2010    BREAST SURGERY      needle bx      BREAST SURGERY Right 03/16/2017    excision of right chest wall lesion    CARDIAC ELECTROPHYSIOLOGY STUDY AND ABLATION      CARDIOVERSION      DENTAL SURGERY      HAND SURGERY      Joint replaced with silicone joint 5th finger right hand    HYSTERECTOMY  1993    Complete    LAMINECTOMY      Decompressive up to two lumbar segments    MASTECTOMY Right 10/28/2016    OOPHORECTOMY  1993    MS EXC SKIN MALIG <0 5 CM TRUNK,ARM,LEG Right 3/16/2017    Procedure: EXCISION CHEST WALL LESION; NEEDLE LOC @ 1400;  Surgeon: Emir Whitfield MD;  [facility];  Service: Surgical Oncology    MS INTRAOPERATIVE SENTINEL LYMPH NODE ID W DYE INJECTION Right 10/28/2016    Procedure: BREAST MASTECTOMY WITH SENTINAL LYMPH NODE BIOPSY; LYMPHSCINITIGRAPHY; LYMPHATIC MAPPING; 0800 NUC MED INJECTION;  Surgeon: Emir Whitfield MD;  [facility];  Service: Surgical Oncology    SENTINEL LYMPH NODE BIOPSY Right     US BREAST NEEDLE LOC RIGHT Right 3/16/2017    US GUIDANCE BREAST BIOPSY RIGHT EACH ADDITIONAL Right 9/12/2016    US GUIDANCE BREAST BIOPSY RIGHT EACH ADDITIONAL Right 9/12/2016    US GUIDED BREAST BIOPSY RIGHT COMPLETE Right 9/12/2016      Family History:     Family History   Problem Relation Age of Onset    Coronary artery disease Mother         CABG    Diabetes Mother     Heart attack Mother     Diabetes Sister     Parkinsonism Sister     Coronary artery disease Brother         CABG    Diabetes Brother     Heart attack Brother     Heart attack Son     Kidney cancer Son 48    No Known Problems Son     No Known Problems Son     Emphysema Sister     No Known Problems Sister     Heart attack Brother     No Known Problems Brother     No Known Problems Maternal Grandmother     No Known Problems Maternal Grandfather     No Known Problems Paternal Grandmother     No Known Problems Paternal Grandfather     No Known Problems Paternal Aunt     No Known Problems Paternal Aunt     No Known Problems Paternal Aunt     No Known Problems Paternal Aunt       Social History:     Social History Socioeconomic History    Marital status: /Civil Union     Spouse name: None    Number of children: None    Years of education: None    Highest education level: None   Occupational History    Occupation: Retired   Social Needs    Financial resource strain: None    Food insecurity:     Worry: None     Inability: None    Transportation needs:     Medical: None     Non-medical: None   Tobacco Use    Smoking status: Never Smoker    Smokeless tobacco: Never Used   Substance and Sexual Activity    Alcohol use: No    Drug use: No    Sexual activity: None     Comment: Resides with    Lifestyle    Physical activity:     Days per week: None     Minutes per session: None    Stress: None   Relationships    Social connections:     Talks on phone: None     Gets together: None     Attends Faith service: None     Active member of club or organization: None     Attends meetings of clubs or organizations: None     Relationship status: None    Intimate partner violence:     Fear of current or ex partner: None     Emotionally abused: None     Physically abused: None     Forced sexual activity: None   Other Topics Concern    None   Social History Narrative    Always uses seat belt    Convalescence after chemotherapy       Medications and Allergies:     Current Outpatient Medications   Medication Sig Dispense Refill    apixaban (ELIQUIS) 5 mg Take 1 tablet (5 mg total) by mouth 2 (two) times a day 180 tablet 3    cyanocobalamin (VITAMIN B-12) 500 mcg tablet Take 1 tablet by mouth daily      flecainide (TAMBOCOR) 100 mg tablet Take 1 tablet (100 mg total) by mouth 2 (two) times a day 180 tablet 3    furosemide (LASIX) 40 mg tablet TAKE 1 TABLET BY MOUTH DAILY AS DIRECTED 90 tablet 1    levothyroxine 150 mcg tablet Take one tablet 4 days a week and 1/2 tablet three days a week 90 tablet 3    meclizine (ANTIVERT) 25 mg tablet Take 1 tablet (25 mg total) by mouth every 8 (eight) hours as needed for dizziness 30 tablet 3    metoprolol succinate (TOPROL-XL) 50 mg 24 hr tablet Take 1 tablet (50 mg total) by mouth daily 90 tablet 3    Naproxen Sodium (ALEVE) 220 MG CAPS Take 1 capsule (220 mg total) by mouth as needed (prn headache)      potassium chloride (MICRO-K) 10 MEQ CR capsule TAKE 1 CAPSULE BY MOUTH EVERY DAY 90 capsule 1     No current facility-administered medications for this visit  Allergies   Allergen Reactions    Morphine And Related GI Intolerance    Oxycodone-Acetaminophen Vomiting      Immunizations:     Immunization History   Administered Date(s) Administered    INFLUENZA 10/23/2007, 11/06/2010, 10/13/2011, 10/17/2012, 10/11/2013, 10/29/2014, 11/08/2016, 10/27/2017    Pneumococcal Conjugate 13-Valent 04/09/2015    Pneumococcal Polysaccharide PPV23 06/29/2017    Tdap 06/14/2016    Zoster 03/08/2013      Health Maintenance:         Topic Date Due    Hepatitis C Screening  1945    DXA SCAN  07/17/2020 (Originally 6/24/2016)    MAMMOGRAM  09/05/2020    CRC Screening: Colonoscopy  04/04/2027     There are no preventive care reminders to display for this patient  Medicare Health Risk Assessment:     /70   Pulse 60   Temp (!) 96 °F (35 6 °C)   Ht 5' 2" (1 575 m)   Wt 81 2 kg (179 lb)   BMI 32 74 kg/m²      Cherylene Brooke is here for her Subsequent Wellness visit  Last Medicare Wellness visit information reviewed, patient interviewed and updates made to the record today  Health Risk Assessment:   Patient rates overall health as fair  Patient feels that their physical health rating is same  Eyesight was rated as slightly worse  Hearing was rated as slightly worse  Patient feels that their emotional and mental health rating is same  Pain experienced in the last 7 days has been none  Patient states that she has experienced no weight loss or gain in last 6 months  Depression Screening:   PHQ-2 Score: 1      Fall Risk Screening:    In the past year, patient has experienced: history of falling in past year    Number of falls: 2 or more  Injured during fall?: No    Feels unsteady when standing or walking?: Yes    Worried about falling?: Yes      Urinary Incontinence Screening:   Patient has not leaked urine accidently in the last six months  Home Safety:  Patient does not have trouble with stairs inside or outside of their home  Patient has working smoke alarms and has working carbon monoxide detector  Home safety hazards include: none  Nutrition:   Current diet is Regular  Medications:   Patient is currently taking over-the-counter supplements  OTC medications include: see medication list  Patient is able to manage medications  Activities of Daily Living (ADLs)/Instrumental Activities of Daily Living (IADLs):   Walk and transfer into and out of bed and chair?: Yes  Dress and groom yourself?: Yes    Bathe or shower yourself?: Yes    Feed yourself?  Yes  Do your laundry/housekeeping?: Yes  Manage your money, pay your bills and track your expenses?: Yes  Make your own meals?: Yes    Do your own shopping?: Yes    Previous Hospitalizations:   Any hospitalizations or ED visits within the last 12 months?: No      Advance Care Planning:   Living will: No    Durable POA for healthcare: No    Advanced directive: No    Advanced directive counseling given: No    Five wishes given: No    Patient declined ACP directive: No      Cognitive Screening:   Provider or family/friend/caregiver concerned regarding cognition?: No    PREVENTIVE SCREENINGS      Cardiovascular Screening:    General: History Lipid Disorder and Screening Current      Diabetes Screening:     General: Screening Current      Colorectal Cancer Screening:     General: Screening Current      Breast Cancer Screening:     General: History Breast Cancer      Cervical Cancer Screening:    General: Screening Not Indicated      Osteoporosis Screening:    General: Screening Current      Abdominal Aortic Aneurysm (AAA) Screening:        General: Screening Not Indicated      Lung Cancer Screening:     General: Screening Not Indicated      Hepatitis C Screening:    General: Risks and Benefits Discussed    Hep C Screening Accepted: Yes        Radha Dumont MD

## 2020-01-17 NOTE — PROGRESS NOTES
Assessment/Plan:         Diagnoses and all orders for this visit:    Medicare annual wellness visit, subsequent    Current use of long term anticoagulation  Comments:  Continue Eliquis    Benign essential hypertension  Comments:  Excellent Bp control    Longstanding persistent atrial fibrillation  Comments:  Continue Flecainide per cardiologist    Acquired hypothyroidism  Comments:  Excellent thyroid replacement on current dose  Class 1 obesity due to excess calories without serious comorbidity with body mass index (BMI) of 32 0 to 32 9 in adult    Lymphedema  -     CompreFit (below knee) 20-30 mmHg    Recurrent malignant neoplasm of right breast (HCC)  Comments:  BARBARA          Subjective:      Patient ID: Melissa Velasco is a 76 y o  female  6 month follow up  Saw Dr Elena Wells in F/U for breast cancer, ordered PT for tight pectoral muscle  Waiting for monitor order but loop recorder denied    Energy has been adequate    No recent illness or injury    Bothered by edema of both LE's, agreeable to trial compression stockings      The following portions of the patient's history were reviewed and updated as appropriate: allergies, current medications, past family history, past medical history, past social history, past surgical history and problem list     Review of Systems   HENT: Negative  Respiratory: Negative  Cardiovascular: Positive for leg swelling  Negative for chest pain and palpitations  Musculoskeletal: Negative  Neurological: Positive for dizziness  Hematological: Negative  Psychiatric/Behavioral: Negative  All other systems reviewed and are negative  Objective:      /70   Pulse 60   Temp (!) 96 °F (35 6 °C)   Ht 5' 2" (1 575 m)   Wt 81 2 kg (179 lb)   BMI 32 74 kg/m²          Physical Exam   Constitutional: She is oriented to person, place, and time  She appears well-developed and well-nourished  Neck: No thyromegaly present     Cardiovascular: Normal rate, regular rhythm, normal heart sounds and intact distal pulses  Pulmonary/Chest: Effort normal and breath sounds normal    Musculoskeletal: She exhibits edema  Neurological: She is alert and oriented to person, place, and time  Skin: No rash noted  Psychiatric: She has a normal mood and affect  Her behavior is normal  Judgment and thought content normal    Nursing note and vitals reviewed  BMI Counseling: Body mass index is 32 74 kg/m²  The BMI is above normal  Nutrition recommendations include reducing portion sizes and moderation in carbohydrate intake  Exercise recommendations include exercising 3-5 times per week

## 2020-01-17 NOTE — PATIENT INSTRUCTIONS
Medicare Preventive Visit Patient Instructions  Thank you for completing your Welcome to Medicare Visit or Medicare Annual Wellness Visit today  Your next wellness visit will be due in one year (1/17/2021)  The screening/preventive services that you may require over the next 5-10 years are detailed below  Some tests may not apply to you based off risk factors and/or age  Screening tests ordered at today's visit but not completed yet may show as past due  Also, please note that scanned in results may not display below  Preventive Screenings:  Service Recommendations Previous Testing/Comments   Colorectal Cancer Screening  * Colonoscopy    * Fecal Occult Blood Test (FOBT)/Fecal Immunochemical Test (FIT)  * Fecal DNA/Cologuard Test  * Flexible Sigmoidoscopy Age: 54-65 years old   Colonoscopy: every 10 years (may be performed more frequently if at higher risk)  OR  FOBT/FIT: every 1 year  OR  Cologuard: every 3 years  OR  Sigmoidoscopy: every 5 years  Screening may be recommended earlier than age 48 if at higher risk for colorectal cancer  Also, an individualized decision between you and your healthcare provider will decide whether screening between the ages of 74-80 would be appropriate  Colonoscopy: 04/04/2017  FOBT/FIT: Not on file  Cologuard: Not on file  Sigmoidoscopy: Not on file    Screening Current     Breast Cancer Screening Age: 36 years old  Frequency: every 1-2 years  Not required if history of left and right mastectomy Mammogram: 09/05/2019    History Breast Cancer   Cervical Cancer Screening Between the ages of 21-29, pap smear recommended once every 3 years  Between the ages of 33-67, can perform pap smear with HPV co-testing every 5 years     Recommendations may differ for women with a history of total hysterectomy, cervical cancer, or abnormal pap smears in past  Pap Smear: Not on file    Screening Not Indicated   Hepatitis C Screening Once for adults born between 1945 and 1965  More frequently in patients at high risk for Hepatitis C Hep C Antibody: Not on file       Diabetes Screening 1-2 times per year if you're at risk for diabetes or have pre-diabetes Fasting glucose: No results in last 5 years   A1C: 5 3 %    Screening Current   Cholesterol Screening Once every 5 years if you don't have a lipid disorder  May order more often based on risk factors  Lipid panel: 01/16/2019    Screening Not Indicated  History Lipid Disorder     Other Preventive Screenings Covered by Medicare:  1  Abdominal Aortic Aneurysm (AAA) Screening: covered once if your at risk  You're considered to be at risk if you have a family history of AAA  2  Lung Cancer Screening: covers low dose CT scan once per year if you meet all of the following conditions: (1) Age 50-69; (2) No signs or symptoms of lung cancer; (3) Current smoker or have quit smoking within the last 15 years; (4) You have a tobacco smoking history of at least 30 pack years (packs per day multiplied by number of years you smoked); (5) You get a written order from a healthcare provider  3  Glaucoma Screening: covered annually if you're considered high risk: (1) You have diabetes OR (2) Family history of glaucoma OR (3)  aged 48 and older OR (3)  American aged 72 and older  3  Osteoporosis Screening: covered every 2 years if you meet one of the following conditions: (1) You're estrogen deficient and at risk for osteoporosis based off medical history and other findings; (2) Have a vertebral abnormality; (3) On glucocorticoid therapy for more than 3 months; (4) Have primary hyperparathyroidism; (5) On osteoporosis medications and need to assess response to drug therapy  · Last bone density test (DXA Scan): 06/24/2014   5  HIV Screening: covered annually if you're between the age of 15-65  Also covered annually if you are younger than 13 and older than 72 with risk factors for HIV infection   For pregnant patients, it is covered up to 3 times per pregnancy  Immunizations:  Immunization Recommendations   Influenza Vaccine Annual influenza vaccination during flu season is recommended for all persons aged >= 6 months who do not have contraindications   Pneumococcal Vaccine (Prevnar and Pneumovax)  * Prevnar = PCV13  * Pneumovax = PPSV23   Adults 25-60 years old: 1-3 doses may be recommended based on certain risk factors  Adults 72 years old: Prevnar (PCV13) vaccine recommended followed by Pneumovax (PPSV23) vaccine  If already received PPSV23 since turning 65, then PCV13 recommended at least one year after PPSV23 dose  Hepatitis B Vaccine 3 dose series if at intermediate or high risk (ex: diabetes, end stage renal disease, liver disease)   Tetanus (Td) Vaccine - COST NOT COVERED BY MEDICARE PART B Following completion of primary series, a booster dose should be given every 10 years to maintain immunity against tetanus  Td may also be given as tetanus wound prophylaxis  Tdap Vaccine - COST NOT COVERED BY MEDICARE PART B Recommended at least once for all adults  For pregnant patients, recommended with each pregnancy  Shingles Vaccine (Shingrix) - COST NOT COVERED BY MEDICARE PART B  2 shot series recommended in those aged 48 and above     Health Maintenance Due:      Topic Date Due    Hepatitis C Screening  1945    DXA SCAN  07/17/2020 (Originally 6/24/2016)    MAMMOGRAM  09/05/2020    CRC Screening: Colonoscopy  04/04/2027     Immunizations Due:  There are no preventive care reminders to display for this patient  Advance Directives   What are advance directives? Advance directives are legal documents that state your wishes and plans for medical care  These plans are made ahead of time in case you lose your ability to make decisions for yourself  Advance directives can apply to any medical decision, such as the treatments you want, and if you want to donate organs  What are the types of advance directives?   There are many types of advance directives, and each state has rules about how to use them  You may choose a combination of any of the following:  · Living will: This is a written record of the treatment you want  You can also choose which treatments you do not want, which to limit, and which to stop at a certain time  This includes surgery, medicine, IV fluid, and tube feedings  · Durable power of  for healthcare [facility]): This is a written record that states who you want to make healthcare choices for you when you are unable to make them for yourself  This person, called a proxy, is usually a family member or a friend  You may choose more than 1 proxy  · Do not resuscitate (DNR) order:  A DNR order is used in case your heart stops beating or you stop breathing  It is a request not to have certain forms of treatment, such as CPR  A DNR order may be included in other types of advance directives  · Medical directive: This covers the care that you want if you are in a coma, near death, or unable to make decisions for yourself  You can list the treatments you want for each condition  Treatment may include pain medicine, surgery, blood transfusions, dialysis, IV or tube feedings, and a ventilator (breathing machine)  · Values history: This document has questions about your views, beliefs, and how you feel and think about life  This information can help others choose the care that you would choose  Why are advance directives important? An advance directive helps you control your care  Although spoken wishes may be used, it is better to have your wishes written down  Spoken wishes can be misunderstood, or not followed  Treatments may be given even if you do not want them  An advance directive may make it easier for your family to make difficult choices about your care  Fall Prevention    Fall prevention  includes ways to make your home and other areas safer  It also includes ways you can move more carefully to prevent a fall   Health conditions that cause changes in your blood pressure, vision, or muscle strength and coordination may increase your risk for falls  Medicines may also increase your risk for falls if they make you dizzy, weak, or sleepy  Fall prevention tips:   · Stand or sit up slowly  · Use assistive devices as directed  · Wear shoes that fit well and have soles that   · Wear a personal alarm  · Stay active  · Manage your medical conditions  Home Safety Tips:  · Add items to prevent falls in the bathroom  · Keep paths clear  · Install bright lights in your home  · Keep items you use often on shelves within reach  · Paint or place reflective tape on the edges of your stairs  Weight Management   Why it is important to manage your weight:  Being overweight increases your risk of health conditions such as heart disease, high blood pressure, type 2 diabetes, and certain types of cancer  It can also increase your risk for osteoarthritis, sleep apnea, and other respiratory problems  Aim for a slow, steady weight loss  Even a small amount of weight loss can lower your risk of health problems  How to lose weight safely:  A safe and healthy way to lose weight is to eat fewer calories and get regular exercise  You can lose up about 1 pound a week by decreasing the number of calories you eat by 500 calories each day  Healthy meal plan for weight management:  A healthy meal plan includes a variety of foods, contains fewer calories, and helps you stay healthy  A healthy meal plan includes the following:  · Eat whole-grain foods more often  A healthy meal plan should contain fiber  Fiber is the part of grains, fruits, and vegetables that is not broken down by your body  Whole-grain foods are healthy and provide extra fiber in your diet  Some examples of whole-grain foods are whole-wheat breads and pastas, oatmeal, brown rice, and bulgur  · Eat a variety of vegetables every day    Include dark, leafy greens such as spinach, kale, umberto greens, and mustard greens  Eat yellow and orange vegetables such as carrots, sweet potatoes, and winter squash  · Eat a variety of fruits every day  Choose fresh or canned fruit (canned in its own juice or light syrup) instead of juice  Fruit juice has very little or no fiber  · Eat low-fat dairy foods  Drink fat-free (skim) milk or 1% milk  Eat fat-free yogurt and low-fat cottage cheese  Try low-fat cheeses such as mozzarella and other reduced-fat cheeses  · Choose meat and other protein foods that are low in fat  Choose beans or other legumes such as split peas or lentils  Choose fish, skinless poultry (chicken or turkey), or lean cuts of red meat (beef or pork)  Before you cook meat or poultry, cut off any visible fat  · Use less fat and oil  Try baking foods instead of frying them  Add less fat, such as margarine, sour cream, regular salad dressing and mayonnaise to foods  Eat fewer high-fat foods  Some examples of high-fat foods include french fries, doughnuts, ice cream, and cakes  · Eat fewer sweets  Limit foods and drinks that are high in sugar  This includes candy, cookies, regular soda, and sweetened drinks  Exercise:  Exercise at least 30 minutes per day on most days of the week  Some examples of exercise include walking, biking, dancing, and swimming  You can also fit in more physical activity by taking the stairs instead of the elevator or parking farther away from stores  Ask your healthcare provider about the best exercise plan for you  © Copyright [employer] 2018 Information is for End User's use only and may not be sold, redistributed or otherwise used for commercial purposes  All illustrations and images included in CareNotes® are the copyrighted property of [employer]  or [facility] for Adults   AMBULATORY CARE:   A wellness visit  is when you see your healthcare provider to get screened for health problems  You can also get advice on how to stay healthy  Write down your questions so you remember to ask them  Ask your healthcare provider how often you should have a wellness visit  What happens at a wellness visit:  Your healthcare provider will ask about your health, and your family history of health problems  This includes high blood pressure, heart disease, and cancer  He or she will ask if you have symptoms that concern you, if you smoke, and about your mood  You may also be asked about your intake of medicines, supplements, food, and alcohol  Any of the following may be done:  · Your weight  will be checked  Your height may also be checked so your body mass index (BMI) can be calculated  Your BMI shows if you are at a healthy weight  · Your blood pressure  and heart rate will be checked  Your temperature may also be checked  · Blood and urine tests  may be done  Blood tests may be done to check your cholesterol levels  Abnormal cholesterol levels increase your risk for heart disease and stroke  You may also need a blood or urine test to check for diabetes if you are at increased risk  Urine tests may be done to look for signs of an infection or kidney disease  · A physical exam  includes checking your heartbeat and lungs with a stethoscope  Your healthcare provider may also check your skin to look for sun damage  · Screening tests  may be recommended  A screening test is done to check for diseases that may not cause symptoms  The screening tests you may need depend on your age, gender, family history, and lifestyle habits  For example, colorectal screening may be recommended if you are 48years old or older  Screening tests you need if you are a woman:   · A Pap smear  is used to screen for cervical cancer  Pap smears are usually done every 3 to 5 years depending on your age   You may need them more often if you have had abnormal Pap smear test results in the past  Ask your healthcare provider how often you should have a Pap smear  · A mammogram  is an x-ray of your breasts to screen for breast cancer  Experts recommend mammograms every 2 years starting at age 48 years  You may need a mammogram at age 52 years or younger if you have an increased risk for breast cancer  Talk to your healthcare provider about when you should start having mammograms and how often you need them  Vaccines you may need:   · Get an influenza vaccine  every year  The influenza vaccine protects you from the flu  Several types of viruses cause the flu  The viruses change over time, so new vaccines are made each year  · Get a tetanus-diphtheria (Td) booster vaccine  every 10 years  This vaccine protects you against tetanus and diphtheria  Tetanus is a severe infection that may cause painful muscle spasms and lockjaw  Diphtheria is a severe bacterial infection that causes a thick covering in the back of your mouth and throat  · Get a human papillomavirus (HPV) vaccine  if you are female and aged 23 to 32 or male 23 to 24 and never received it  This vaccine protects you from HPV infection  HPV is the most common infection spread by sexual contact  HPV may also cause vaginal, penile, and anal cancers  · Get a pneumococcal vaccine  if you are aged 72 years or older  The pneumococcal vaccine is an injection given to protect you from pneumococcal disease  Pneumococcal disease is an infection caused by pneumococcal bacteria  The infection may cause pneumonia, meningitis, or an ear infection  · Get a shingles vaccine  if you are aged 61 or older, even if you have had shingles before  The shingles vaccine is an injection to protect you from the varicella-zoster virus  This is the same virus that causes chickenpox  Shingles is a painful rash that develops in people who had chickenpox or have been exposed to the virus  How to eat healthy:  My Plate is a model for planning healthy meals   It shows the types and amounts of foods that should go on your plate  Fruits and vegetables make up about half of your plate, and grains and protein make up the other half  A serving of dairy is included on the side of your plate  The amount of calories and serving sizes you need depends on your age, gender, weight, and height  Examples of healthy foods are listed below:  · Eat a variety of vegetables  such as dark green, red, and orange vegetables  You can also include canned vegetables low in sodium (salt) and frozen vegetables without added butter or sauces  · Eat a variety of fresh fruits , canned fruit in 100% juice, frozen fruit, and dried fruit  · Include whole grains  At least half of the grains you eat should be whole grains  Examples include whole-wheat bread, wheat pasta, brown rice, and whole-grain cereals such as oatmeal     · Eat a variety of protein foods such as seafood (fish and shellfish), lean meat, and poultry without skin (turkey and chicken)  Examples of lean meats include pork leg, shoulder, or tenderloin, and beef round, sirloin, tenderloin, and extra lean ground beef  Other protein foods include eggs and egg substitutes, beans, peas, soy products, nuts, and seeds  · Choose low-fat dairy products such as skim or 1% milk or low-fat yogurt, cheese, and cottage cheese  · Limit unhealthy fats  such as butter, hard margarine, and shortening  Exercise:  Exercise at least 30 minutes per day on most days of the week  Some examples of exercise include walking, biking, dancing, and swimming  You can also fit in more physical activity by taking the stairs instead of the elevator or parking farther away from stores  Include muscle strengthening activities 2 days each week  Regular exercise provides many health benefits  It helps you manage your weight, and decreases your risk for type 2 diabetes, heart disease, stroke, and high blood pressure  Exercise can also help improve your mood   Ask your healthcare provider about the best exercise plan for you  General health and safety guidelines:   · Do not smoke  Nicotine and other chemicals in cigarettes and cigars can cause lung damage  Ask your healthcare provider for information if you currently smoke and need help to quit  E-cigarettes or smokeless tobacco still contain nicotine  Talk to your healthcare provider before you use these products  · Limit alcohol  A drink of alcohol is 12 ounces of beer, 5 ounces of wine, or 1½ ounces of liquor  · Lose weight, if needed  Being overweight increases your risk of certain health conditions  These include heart disease, high blood pressure, type 2 diabetes, and certain types of cancer  · Protect your skin  Do not sunbathe or use tanning beds  Use sunscreen with a SPF 15 or higher  Apply sunscreen at least 15 minutes before you go outside  Reapply sunscreen every 2 hours  Wear protective clothing, hats, and sunglasses when you are outside  · Drive safely  Always wear your seatbelt  Make sure everyone in your car wears a seatbelt  A seatbelt can save your life if you are in an accident  Do not use your cell phone when you are driving  This could distract you and cause an accident  Pull over if you need to make a call or send a text message  · Practice safe sex  Use latex condoms if are sexually active and have more than one partner  Your healthcare provider may recommend screening tests for sexually transmitted infections (STIs)  · Wear helmets, lifejackets, and protective gear  Always wear a helmet when you ride a bike or motorcycle, go skiing, or play sports that could cause a head injury  Wear protective equipment when you play sports  Wear a lifejacket when you are on a boat or doing water sports  © 2017 [facility] Information is for End User's use only and may not be sold, redistributed or otherwise used for commercial purposes   All illustrations and images included in CareNotes® are the copyrighted property of [employer]  or Kashif Sotelo  The above information is an  only  It is not intended as medical advice for individual conditions or treatments  Talk to your doctor, nurse or pharmacist before following any medical regimen to see if it is safe and effective for you

## 2020-02-13 DIAGNOSIS — R60.9 EDEMA, UNSPECIFIED TYPE: ICD-10-CM

## 2020-02-13 RX ORDER — FUROSEMIDE 40 MG/1
TABLET ORAL
Qty: 90 TABLET | Refills: 1 | Status: SHIPPED | OUTPATIENT
Start: 2020-02-13 | End: 2020-08-05

## 2020-03-02 NOTE — TELEPHONE ENCOUNTER
She does not need auth for her Event Monitor and it is a covered service based on medical necessity per Amy Lazaro at [address] [address]  Ref# [id number]    Can you please put an order in for this so I can put the info in     Thanks

## 2020-03-03 DIAGNOSIS — I48.11 LONGSTANDING PERSISTENT ATRIAL FIBRILLATION (HCC): Primary | ICD-10-CM

## 2020-03-04 NOTE — TELEPHONE ENCOUNTER
Pt will schedule to get her  MCOT patch applied  Also, at the patient request, I gave her this info:  Ref#  [id number]   To check on what she may owe for the monitor

## 2020-03-05 ENCOUNTER — CLINICAL SUPPORT (OUTPATIENT)
Dept: CARDIOLOGY CLINIC | Facility: CLINIC | Age: 75
End: 2020-03-05
Payer: COMMERCIAL

## 2020-03-05 DIAGNOSIS — I48.11 LONGSTANDING PERSISTENT ATRIAL FIBRILLATION (HCC): Primary | ICD-10-CM

## 2020-03-05 PROCEDURE — 99211 OFF/OP EST MAY X REQ PHY/QHP: CPT | Performed by: INTERNAL MEDICINE

## 2020-03-05 PROCEDURE — 3078F DIAST BP <80 MM HG: CPT | Performed by: INTERNAL MEDICINE

## 2020-03-05 PROCEDURE — 3074F SYST BP LT 130 MM HG: CPT | Performed by: INTERNAL MEDICINE

## 2020-03-05 PROCEDURE — 4040F PNEUMOC VAC/ADMIN/RCVD: CPT | Performed by: INTERNAL MEDICINE

## 2020-03-05 NOTE — PROGRESS NOTES
[facility] [place] is here today under the direction of Dr Amy Choi  Patch applied and all instructions for use given to patient in office

## 2020-04-06 ENCOUNTER — CLINICAL SUPPORT (OUTPATIENT)
Dept: CARDIOLOGY CLINIC | Facility: CLINIC | Age: 75
End: 2020-04-06
Payer: COMMERCIAL

## 2020-04-06 DIAGNOSIS — I48.11 LONGSTANDING PERSISTENT ATRIAL FIBRILLATION (HCC): ICD-10-CM

## 2020-04-06 PROCEDURE — 93228 REMOTE 30 DAY ECG REV/REPORT: CPT | Performed by: INTERNAL MEDICINE

## 2020-04-10 ENCOUNTER — TELEPHONE (OUTPATIENT)
Dept: CARDIOLOGY CLINIC | Facility: CLINIC | Age: 75
End: 2020-04-10

## 2020-04-16 ENCOUNTER — TELEMEDICINE (OUTPATIENT)
Dept: CARDIOLOGY CLINIC | Facility: CLINIC | Age: 75
End: 2020-04-16
Payer: COMMERCIAL

## 2020-04-16 VITALS — WEIGHT: 182 LBS | BODY MASS INDEX: 33.49 KG/M2 | HEIGHT: 62 IN

## 2020-04-16 DIAGNOSIS — R06.00 DYSPNEA ON EXERTION: Primary | ICD-10-CM

## 2020-04-16 DIAGNOSIS — I10 BENIGN ESSENTIAL HYPERTENSION: ICD-10-CM

## 2020-04-16 DIAGNOSIS — E66.09 CLASS 1 OBESITY DUE TO EXCESS CALORIES WITHOUT SERIOUS COMORBIDITY WITH BODY MASS INDEX (BMI) OF 31.0 TO 31.9 IN ADULT: ICD-10-CM

## 2020-04-16 DIAGNOSIS — I48.19 PERSISTENT ATRIAL FIBRILLATION (HCC): ICD-10-CM

## 2020-04-16 PROCEDURE — 99214 OFFICE O/P EST MOD 30 MIN: CPT | Performed by: INTERNAL MEDICINE

## 2020-04-19 DIAGNOSIS — E87.6 HYPOKALEMIA: ICD-10-CM

## 2020-04-19 RX ORDER — POTASSIUM CHLORIDE 750 MG/1
CAPSULE, EXTENDED RELEASE ORAL
Qty: 90 CAPSULE | Refills: 1 | Status: SHIPPED | OUTPATIENT
Start: 2020-04-19 | End: 2021-02-07

## 2020-06-12 ENCOUNTER — TELEPHONE (OUTPATIENT)
Dept: CARDIOLOGY CLINIC | Facility: CLINIC | Age: 75
End: 2020-06-12

## 2020-06-12 ENCOUNTER — HOSPITAL ENCOUNTER (OUTPATIENT)
Dept: NON INVASIVE DIAGNOSTICS | Age: 75
Discharge: HOME | End: 2020-06-12
Payer: COMMERCIAL

## 2020-06-12 DIAGNOSIS — R06.00 DYSPNEA ON EXERTION: ICD-10-CM

## 2020-06-12 PROCEDURE — 93321 DOPPLER ECHO F-UP/LMTD STD: CPT | Performed by: INTERNAL MEDICINE

## 2020-06-12 PROCEDURE — 93308 TTE F-UP OR LMTD: CPT | Performed by: INTERNAL MEDICINE

## 2020-06-12 PROCEDURE — 93325 DOPPLER ECHO COLOR FLOW MAPG: CPT | Performed by: INTERNAL MEDICINE

## 2020-06-12 PROCEDURE — 93308 TTE F-UP OR LMTD: CPT

## 2020-06-15 ENCOUNTER — OFFICE VISIT (OUTPATIENT)
Dept: CARDIOLOGY CLINIC | Facility: CLINIC | Age: 75
End: 2020-06-15
Payer: COMMERCIAL

## 2020-06-15 VITALS
HEART RATE: 57 BPM | HEIGHT: 62 IN | DIASTOLIC BLOOD PRESSURE: 70 MMHG | WEIGHT: 182 LBS | BODY MASS INDEX: 33.49 KG/M2 | SYSTOLIC BLOOD PRESSURE: 102 MMHG

## 2020-06-15 DIAGNOSIS — R06.00 DYSPNEA ON EXERTION: ICD-10-CM

## 2020-06-15 DIAGNOSIS — I48.19 PERSISTENT ATRIAL FIBRILLATION (HCC): Primary | ICD-10-CM

## 2020-06-15 LAB
BASOPHILS # BLD AUTO: 0 X10E3/UL (ref 0–0.2)
BASOPHILS NFR BLD AUTO: 1 %
EOSINOPHIL # BLD AUTO: 0.1 X10E3/UL (ref 0–0.4)
EOSINOPHIL NFR BLD AUTO: 2 %
ERYTHROCYTE [DISTWIDTH] IN BLOOD BY AUTOMATED COUNT: 13.1 % (ref 11.7–15.4)
HCT VFR BLD AUTO: 39.9 % (ref 34–46.6)
HGB BLD-MCNC: 13.3 G/DL (ref 11.1–15.9)
IMM GRANULOCYTES # BLD: 0 X10E3/UL (ref 0–0.1)
IMM GRANULOCYTES NFR BLD: 0 %
LYMPHOCYTES # BLD AUTO: 1.1 X10E3/UL (ref 0.7–3.1)
LYMPHOCYTES NFR BLD AUTO: 23 %
MCH RBC QN AUTO: 30 PG (ref 26.6–33)
MCHC RBC AUTO-ENTMCNC: 33.3 G/DL (ref 31.5–35.7)
MCV RBC AUTO: 90 FL (ref 79–97)
MONOCYTES # BLD AUTO: 0.5 X10E3/UL (ref 0.1–0.9)
MONOCYTES NFR BLD AUTO: 11 %
NEUTROPHILS # BLD AUTO: 3.1 X10E3/UL (ref 1.4–7)
NEUTROPHILS NFR BLD AUTO: 63 %
PLATELET # BLD AUTO: 187 X10E3/UL (ref 150–450)
RBC # BLD AUTO: 4.44 X10E6/UL (ref 3.77–5.28)
WBC # BLD AUTO: 4.9 X10E3/UL (ref 3.4–10.8)

## 2020-06-15 PROCEDURE — 3078F DIAST BP <80 MM HG: CPT | Performed by: INTERNAL MEDICINE

## 2020-06-15 PROCEDURE — 1160F RVW MEDS BY RX/DR IN RCRD: CPT | Performed by: INTERNAL MEDICINE

## 2020-06-15 PROCEDURE — 99213 OFFICE O/P EST LOW 20 MIN: CPT | Performed by: INTERNAL MEDICINE

## 2020-06-15 PROCEDURE — 3074F SYST BP LT 130 MM HG: CPT | Performed by: INTERNAL MEDICINE

## 2020-06-16 LAB
ALBUMIN SERPL-MCNC: 3.6 G/DL (ref 3.7–4.7)
ALBUMIN/GLOB SERPL: 1.3 {RATIO} (ref 1.2–2.2)
ALP SERPL-CCNC: 114 IU/L (ref 39–117)
ALT SERPL-CCNC: 9 IU/L (ref 0–32)
AST SERPL-CCNC: 20 IU/L (ref 0–40)
BILIRUB SERPL-MCNC: 1 MG/DL (ref 0–1.2)
BUN SERPL-MCNC: 10 MG/DL (ref 8–27)
BUN/CREAT SERPL: 10 (ref 12–28)
CALCIUM SERPL-MCNC: 9 MG/DL (ref 8.7–10.3)
CHLORIDE SERPL-SCNC: 103 MMOL/L (ref 96–106)
CO2 SERPL-SCNC: 26 MMOL/L (ref 20–29)
CREAT SERPL-MCNC: 1.03 MG/DL (ref 0.57–1)
GLOBULIN SER-MCNC: 2.7 G/DL (ref 1.5–4.5)
GLUCOSE SERPL-MCNC: 94 MG/DL (ref 65–99)
POTASSIUM SERPL-SCNC: 4.3 MMOL/L (ref 3.5–5.2)
PROT SERPL-MCNC: 6.3 G/DL (ref 6–8.5)
SL AMB EGFR AFRICAN AMERICAN: 62 ML/MIN/1.73
SL AMB EGFR NON AFRICAN AMERICAN: 54 ML/MIN/1.73
SODIUM SERPL-SCNC: 144 MMOL/L (ref 134–144)

## 2020-06-16 PROCEDURE — 93000 ELECTROCARDIOGRAM COMPLETE: CPT | Performed by: INTERNAL MEDICINE

## 2020-06-17 ENCOUNTER — HOSPITAL ENCOUNTER (OUTPATIENT)
Dept: NON INVASIVE DIAGNOSTICS | Age: 75
Discharge: HOME | End: 2020-06-17
Payer: COMMERCIAL

## 2020-06-17 ENCOUNTER — HOSPITAL ENCOUNTER (OUTPATIENT)
Dept: CT IMAGING | Facility: HOSPITAL | Age: 75
Discharge: HOME | End: 2020-06-17
Attending: INTERNAL MEDICINE
Payer: COMMERCIAL

## 2020-06-17 DIAGNOSIS — Z17.1 MALIGNANT NEOPLASM OF OVERLAPPING SITES OF RIGHT BREAST IN FEMALE, ESTROGEN RECEPTOR NEGATIVE (HCC): ICD-10-CM

## 2020-06-17 DIAGNOSIS — C50.811 MALIGNANT NEOPLASM OF OVERLAPPING SITES OF RIGHT BREAST IN FEMALE, ESTROGEN RECEPTOR NEGATIVE (HCC): ICD-10-CM

## 2020-06-17 DIAGNOSIS — C50.911 RECURRENT MALIGNANT NEOPLASM OF RIGHT BREAST (HCC): ICD-10-CM

## 2020-06-17 DIAGNOSIS — R06.00 DYSPNEA ON EXERTION: ICD-10-CM

## 2020-06-17 LAB — CANCER AG27-29 SERPL-ACNC: 12.2 U/ML (ref 0–38.6)

## 2020-06-17 PROCEDURE — 93018 CV STRESS TEST I&R ONLY: CPT | Performed by: INTERNAL MEDICINE

## 2020-06-17 PROCEDURE — 93016 CV STRESS TEST SUPVJ ONLY: CPT | Performed by: INTERNAL MEDICINE

## 2020-06-17 PROCEDURE — 71260 CT THORAX DX C+: CPT

## 2020-06-17 PROCEDURE — 93017 CV STRESS TEST TRACING ONLY: CPT

## 2020-06-17 PROCEDURE — 74177 CT ABD & PELVIS W/CONTRAST: CPT

## 2020-06-17 RX ADMIN — IOHEXOL 100 ML: 350 INJECTION, SOLUTION INTRAVENOUS at 10:19

## 2020-06-18 LAB
CHEST PAIN STATEMENT: NORMAL
MAX DIASTOLIC BP: 72 MMHG
MAX HEART RATE: 97 BPM
MAX PREDICTED HEART RATE: 146 BPM
MAX. SYSTOLIC BP: 120 MMHG
PROTOCOL NAME: NORMAL
REASON FOR TERMINATION: NORMAL
TARGET HR FORMULA: NORMAL
TEST INDICATION: NORMAL
TIME IN EXERCISE PHASE: NORMAL

## 2020-06-24 ENCOUNTER — OFFICE VISIT (OUTPATIENT)
Dept: HEMATOLOGY ONCOLOGY | Facility: HOSPITAL | Age: 75
End: 2020-06-24
Payer: COMMERCIAL

## 2020-06-24 VITALS
HEIGHT: 62 IN | RESPIRATION RATE: 16 BRPM | TEMPERATURE: 97.5 F | DIASTOLIC BLOOD PRESSURE: 62 MMHG | BODY MASS INDEX: 32.57 KG/M2 | OXYGEN SATURATION: 96 % | HEART RATE: 65 BPM | SYSTOLIC BLOOD PRESSURE: 126 MMHG | WEIGHT: 177 LBS

## 2020-06-24 DIAGNOSIS — C50.811 MALIGNANT NEOPLASM OF OVERLAPPING SITES OF RIGHT BREAST IN FEMALE, ESTROGEN RECEPTOR NEGATIVE (HCC): Primary | ICD-10-CM

## 2020-06-24 DIAGNOSIS — Z17.1 MALIGNANT NEOPLASM OF OVERLAPPING SITES OF RIGHT BREAST IN FEMALE, ESTROGEN RECEPTOR NEGATIVE (HCC): Primary | ICD-10-CM

## 2020-06-24 PROCEDURE — 3008F BODY MASS INDEX DOCD: CPT | Performed by: INTERNAL MEDICINE

## 2020-06-24 PROCEDURE — 99214 OFFICE O/P EST MOD 30 MIN: CPT | Performed by: INTERNAL MEDICINE

## 2020-06-24 PROCEDURE — 1036F TOBACCO NON-USER: CPT | Performed by: INTERNAL MEDICINE

## 2020-06-24 PROCEDURE — 3074F SYST BP LT 130 MM HG: CPT | Performed by: INTERNAL MEDICINE

## 2020-06-24 PROCEDURE — 3078F DIAST BP <80 MM HG: CPT | Performed by: INTERNAL MEDICINE

## 2020-06-24 PROCEDURE — 1160F RVW MEDS BY RX/DR IN RCRD: CPT | Performed by: INTERNAL MEDICINE

## 2020-06-24 PROCEDURE — 4040F PNEUMOC VAC/ADMIN/RCVD: CPT | Performed by: INTERNAL MEDICINE

## 2020-07-17 DIAGNOSIS — E03.9 ACQUIRED HYPOTHYROIDISM: ICD-10-CM

## 2020-07-17 RX ORDER — LEVOTHYROXINE SODIUM 0.15 MG/1
TABLET ORAL
Qty: 90 TABLET | Refills: 3 | Status: SHIPPED | OUTPATIENT
Start: 2020-07-17 | End: 2020-12-27 | Stop reason: SDUPTHER

## 2020-07-29 ENCOUNTER — OFFICE VISIT (OUTPATIENT)
Dept: FAMILY MEDICINE CLINIC | Facility: HOSPITAL | Age: 75
End: 2020-07-29
Payer: COMMERCIAL

## 2020-07-29 VITALS
DIASTOLIC BLOOD PRESSURE: 70 MMHG | SYSTOLIC BLOOD PRESSURE: 118 MMHG | WEIGHT: 175 LBS | TEMPERATURE: 98.5 F | HEIGHT: 62 IN | HEART RATE: 56 BPM | BODY MASS INDEX: 32.2 KG/M2

## 2020-07-29 DIAGNOSIS — I10 BENIGN ESSENTIAL HYPERTENSION: Primary | ICD-10-CM

## 2020-07-29 DIAGNOSIS — Z79.01 CURRENT USE OF LONG TERM ANTICOAGULATION: ICD-10-CM

## 2020-07-29 DIAGNOSIS — E03.9 ACQUIRED HYPOTHYROIDISM: ICD-10-CM

## 2020-07-29 DIAGNOSIS — H25.013 CORTICAL AGE-RELATED CATARACT OF BOTH EYES: ICD-10-CM

## 2020-07-29 DIAGNOSIS — E66.09 CLASS 1 OBESITY DUE TO EXCESS CALORIES WITHOUT SERIOUS COMORBIDITY WITH BODY MASS INDEX (BMI) OF 32.0 TO 32.9 IN ADULT: ICD-10-CM

## 2020-07-29 DIAGNOSIS — E78.2 MIXED HYPERLIPIDEMIA: ICD-10-CM

## 2020-07-29 DIAGNOSIS — I48.19 PERSISTENT ATRIAL FIBRILLATION (HCC): ICD-10-CM

## 2020-07-29 DIAGNOSIS — R06.00 DYSPNEA ON EXERTION: ICD-10-CM

## 2020-07-29 DIAGNOSIS — R73.9 HYPERGLYCEMIA: ICD-10-CM

## 2020-07-29 DIAGNOSIS — Z12.31 ENCOUNTER FOR SCREENING MAMMOGRAM FOR BREAST CANCER: ICD-10-CM

## 2020-07-29 DIAGNOSIS — C50.911 RECURRENT MALIGNANT NEOPLASM OF RIGHT BREAST (HCC): ICD-10-CM

## 2020-07-29 PROBLEM — R06.09 DYSPNEA ON EXERTION: Status: ACTIVE | Noted: 2020-07-29

## 2020-07-29 PROBLEM — E03.8 OTHER SPECIFIED HYPOTHYROIDISM: Status: RESOLVED | Noted: 2019-05-26 | Resolved: 2020-07-29

## 2020-07-29 PROCEDURE — 3008F BODY MASS INDEX DOCD: CPT | Performed by: INTERNAL MEDICINE

## 2020-07-29 PROCEDURE — 99214 OFFICE O/P EST MOD 30 MIN: CPT | Performed by: FAMILY MEDICINE

## 2020-07-29 RX ORDER — CYCLOSPORINE 0.5 MG/ML
1 EMULSION OPHTHALMIC EVERY 12 HOURS
COMMUNITY
Start: 2020-07-20

## 2020-07-29 NOTE — PROGRESS NOTES
Assessment/Plan:         Diagnoses and all orders for this visit:    Benign essential hypertension  Comments:  Excellent BP control    Acquired hypothyroidism  Comments:  Clinically euthyroid, needs TSH recheck  Orders:  -     TSH, 3rd generation with Free T4 reflex; Future  -     TSH, 3rd generation with Free T4 reflex    Recurrent malignant neoplasm of right breast [facility])  Comments:  BARBARA, F/U with Dr Arlette Calderon and Dr Micha Hutchinson    Dyspnea on exertion  Comments:  Per request of Dr Ameena Mathew, refer to  Pulmonolgist  Orders:  -     Ambulatory referral to Pulmonology; Future    Current use of long term anticoagulation    Mixed hyperlipidemia  -     Lipid Panel with Direct LDL reflex; Future  -     Lipid Panel with Direct LDL reflex    Hyperglycemia  -     HEMOGLOBIN A1C W/ EAG ESTIMATION; Future  -     HEMOGLOBIN A1C W/ EAG ESTIMATION    Cortical age-related cataract of both eyes  Comments: Will be medically clear per todays visit when she is ready for surgeries  Class 1 obesity due to excess calories without serious comorbidity with body mass index (BMI) of 32 0 to 32 9 in adult    Persistent atrial fibrillation (HCC)  Comments:  F/U Dr Ameena Mathew    Encounter for screening mammogram for breast cancer  -     Cancel: Mammo screening bilateral w 3d & cad; Future    Other orders  -     RESTASIS 0 05 % ophthalmic emulsion; Administer 1 drop to both eyes every 12 (twelve) hours          Subjective:      Patient ID: Valentino Ireland is a 76 y o  female  6 month follow up  Seen with  in visit  No COVID concerns    On Restasis for dry eyes  Likely will get cataracts in September  Is supposed to see Pulmonologist for dyspnea and OOB lesa in humid weather    Had extensive cardiac testing    Having some evenings with restless legs    Medication list reviewed and revised      The following portions of the patient's history were reviewed and updated as appropriate: allergies, current medications, past family history, past medical history, past social history, past surgical history and problem list     Review of Systems   Constitutional: Negative for unexpected weight change  HENT: Positive for congestion  Respiratory: Positive for chest tightness and shortness of breath  Cardiovascular: Positive for palpitations and leg swelling  Gastrointestinal: Negative  Genitourinary: Negative  Musculoskeletal: Positive for arthralgias, back pain and gait problem  Hematological: Negative  Psychiatric/Behavioral: Positive for sleep disturbance  The patient is nervous/anxious  All other systems reviewed and are negative  Objective:      /70   Pulse 56   Temp 98 5 °F (36 9 °C)   Ht 5' 2" (1 575 m)   Wt 79 4 kg (175 lb)   BMI 32 01 kg/m²          Physical Exam  Vitals and nursing note reviewed  Constitutional:       Appearance: Normal appearance  Neck:      Vascular: No carotid bruit  Cardiovascular:      Rate and Rhythm: Normal rate  Rhythm irregular  Pulmonary:      Effort: Pulmonary effort is normal       Breath sounds: Normal breath sounds  Musculoskeletal:      Right lower leg: Edema present  Left lower leg: Edema present  Lymphadenopathy:      Cervical: No cervical adenopathy  Skin:     Findings: No rash  Neurological:      General: No focal deficit present  Mental Status: She is alert and oriented to person, place, and time  Psychiatric:         Mood and Affect: Mood normal          Behavior: Behavior normal          Thought Content:  Thought content normal          Judgment: Judgment normal

## 2020-08-05 DIAGNOSIS — R60.9 EDEMA, UNSPECIFIED TYPE: ICD-10-CM

## 2020-08-05 RX ORDER — FUROSEMIDE 40 MG/1
TABLET ORAL
Qty: 90 TABLET | Refills: 1 | Status: SHIPPED | OUTPATIENT
Start: 2020-08-05 | End: 2021-02-12 | Stop reason: SDUPTHER

## 2020-08-12 ENCOUNTER — TRANSCRIBE ORDERS (OUTPATIENT)
Dept: ADMINISTRATIVE | Facility: HOSPITAL | Age: 75
End: 2020-08-12

## 2020-08-12 DIAGNOSIS — Z12.31 ENCOUNTER FOR SCREENING MAMMOGRAM FOR HIGH-RISK PATIENT: Primary | ICD-10-CM

## 2020-09-08 ENCOUNTER — HOSPITAL ENCOUNTER (OUTPATIENT)
Dept: MAMMOGRAPHY | Facility: CLINIC | Age: 75
Discharge: HOME | End: 2020-09-08
Payer: COMMERCIAL

## 2020-09-08 VITALS — WEIGHT: 175 LBS | TEMPERATURE: 96.9 F | HEIGHT: 62 IN | BODY MASS INDEX: 32.2 KG/M2

## 2020-09-08 DIAGNOSIS — C50.911 MALIGNANT NEOPLASM OF RIGHT FEMALE BREAST, UNSPECIFIED ESTROGEN RECEPTOR STATUS, UNSPECIFIED SITE OF BREAST (HCC): ICD-10-CM

## 2020-09-08 PROCEDURE — 77065 DX MAMMO INCL CAD UNI: CPT

## 2020-09-08 PROCEDURE — G0279 TOMOSYNTHESIS, MAMMO: HCPCS

## 2020-09-30 ENCOUNTER — CONSULT (OUTPATIENT)
Dept: PULMONOLOGY | Facility: HOSPITAL | Age: 75
End: 2020-09-30
Payer: COMMERCIAL

## 2020-09-30 VITALS
TEMPERATURE: 97.1 F | OXYGEN SATURATION: 98 % | HEART RATE: 60 BPM | WEIGHT: 176.4 LBS | RESPIRATION RATE: 18 BRPM | BODY MASS INDEX: 32.46 KG/M2 | SYSTOLIC BLOOD PRESSURE: 120 MMHG | HEIGHT: 62 IN | DIASTOLIC BLOOD PRESSURE: 72 MMHG

## 2020-09-30 DIAGNOSIS — Z85.3 PERSONAL HISTORY OF ADENOCARCINOMA OF BREAST: ICD-10-CM

## 2020-09-30 DIAGNOSIS — R06.00 DYSPNEA ON EXERTION: ICD-10-CM

## 2020-09-30 DIAGNOSIS — R06.00 DOE (DYSPNEA ON EXERTION): Primary | ICD-10-CM

## 2020-09-30 PROCEDURE — 1036F TOBACCO NON-USER: CPT | Performed by: INTERNAL MEDICINE

## 2020-09-30 PROCEDURE — 3078F DIAST BP <80 MM HG: CPT | Performed by: INTERNAL MEDICINE

## 2020-09-30 PROCEDURE — 99204 OFFICE O/P NEW MOD 45 MIN: CPT | Performed by: INTERNAL MEDICINE

## 2020-09-30 NOTE — ASSESSMENT & PLAN NOTE
The etiology of her dyspnea on exertion is not entirely clear but is likely multifactorial   Her weight is likely a contributing factor and she would benefit from weight loss  To evaluate whether she has underlying lung disease as a contributing factor, she will undergo complete PFTs with 6 minutes walk test   This will help determine if she requires additional imaging or workup from a pulmonary perspective  It would be unusual for her to have developed an underlying obstructive lung disease at her age and she does not have symptoms to suggest bronchospasm or cough that would benefit from inhaler therapy  If there is diffusion impairment and/or oxygen desaturation, would need to consider pulmonary hypertension as a possible factor  Her most recent echocardiogram commented on mild elevation in PA pressures  Patient has underlying cardiac disease which could be a contributing factor, though appears to be well controlled at this time  Her edema seems to be at baseline and she is currently in normal sinus rhythm  She will have ongoing cardiology follow-up  Finally, with her history of breast cancer, prior chemotherapy can contribute to lung disease, though I see no evidence to suggest interstitial changes on most recent CT  This was done without high-resolution imaging which would be necessary to definitively rule out ILD  I will call her after PFT results are available to determine next appropriate course of action

## 2020-09-30 NOTE — PROGRESS NOTES
Pulmonary Outpatient Consultation Note   Vish Rizzo 76 y o  female MRN: [id number]  9/30/2020    Referring provider: Dr Dang Sheridan  /  Dr Domi Barker     Assessment/Plan:      Dyspnea on exertion  The etiology of her dyspnea on exertion is not entirely clear but is likely multifactorial   Her weight is likely a contributing factor and she would benefit from weight loss  To evaluate whether she has underlying lung disease as a contributing factor, she will undergo complete PFTs with 6 minutes walk test   This will help determine if she requires additional imaging or workup from a pulmonary perspective  It would be unusual for her to have developed an underlying obstructive lung disease at her age and she does not have symptoms to suggest bronchospasm or cough that would benefit from inhaler therapy  If there is diffusion impairment and/or oxygen desaturation, would need to consider pulmonary hypertension as a possible factor  Her most recent echocardiogram commented on mild elevation in PA pressures  Patient has underlying cardiac disease which could be a contributing factor, though appears to be well controlled at this time  Her edema seems to be at baseline and she is currently in normal sinus rhythm  She will have ongoing cardiology follow-up  Finally, with her history of breast cancer, prior chemotherapy can contribute to lung disease, though I see no evidence to suggest interstitial changes on most recent CT  This was done without high-resolution imaging which would be necessary to definitively rule out ILD  I will call her after PFT results are available to determine next appropriate course of action  Visit orders:    Diagnoses and all orders for this visit:    RICHARDS (dyspnea on exertion)  -     Complete PFT with post Bronchodilator and Six Minute walk;  Future    Dyspnea on exertion    Personal history of adenocarcinoma of breast        History of Present Illness   HPI:  Vish Rizzo is a 76 y o  female who is here today for evaluation regarding dyspnea on exertion  Patient reports having shortness of breath with exertion for at least the past year  She finds that it is getting worse over time  She has some mild dyspnea at rest that she describes as not being able to take a deep enough breath  She has no associated cough, wheeze or sputum production  She has never been diagnosed with asthma or emphysema  She had a few bouts of bronchitis over the years but nothing that required hospitalization  She has lower extremity edema which has been fairly well controlled with diuretic therapy  She has a history of atrial fibrillation but has been in normal sinus rhythm for the recent past     She has a history of breast cancer, treated with chemotherapy (Taxotere/Cytoxan x3 cycles), completed in February 2017  She then underwent surgical resection  She did not require radiation therapy  During the course of her cancer treatment, she lost 50 lb, but has gained most of it back  She denies history of arthralgias or myalgias  She has some mild dysphagia which she is planning to talk with her primary care physician about next week  She has occasional vertigo and takes meclizine  She denies syncopal events  She denies chest pain or palpitations  She has no pets at home  Her occupation was making [employer], but had no significant exposures  She had secondhand smoke exposure as a child, but is a lifelong never smoker  Review of Systems   Constitutional: Negative for chills, fever and unexpected weight change  HENT: Positive for trouble swallowing  Negative for postnasal drip and sore throat  Eyes: Negative for visual disturbance  Respiratory:        As noted in HPI   Cardiovascular: Positive for leg swelling  Negative for chest pain  Gastrointestinal: Negative for abdominal pain, diarrhea and vomiting  Genitourinary: Negative for difficulty urinating     Musculoskeletal: Negative  Skin: Negative for rash  Neurological: Negative for headaches  Hematological: Negative for adenopathy  Psychiatric/Behavioral: Negative  All other systems reviewed and are negative  Historical Information   Past Medical History:   Diagnosis Date    Abnormal chest CT     last assessed - [id number]    Abnormal glucose     Resolved - [id number]    Arrhythmia     Arthritis     Slight    BCC (basal cell carcinoma of skin)     last assessed - [id number]    Breast cancer ([facility] [address] ) 09/12/2016    rt    Cardiomyopathy ([facility] )     Cervical spinal stenosis     Discharge from left nipple     last assessed - [id number]    Disease of thyroid gland     hypothyroidism    Full dentures     Upper and Lower    History of atrial fibrillation     Homocysteinemia ([facility] )     Hypertension     Hypokalemia     Intraductal papilloma of breast     last assessed - [id number]    Malignant neoplasm of skin     basal cell on face, Moh's surgery    Memory loss     last assessed - [id number]    Obesity     Open wound     last assessed - [id number]    Osteopenia     Overactive bladder     Palmar plantar erythrodysaesthesia     Resolved - [id number]    Pulmonary nodule seen on imaging study     Rosacea     UTI (urinary tract infection)     recently    Vertigo     Vitamin D deficiency      Past Surgical History:   Procedure Laterality Date    APPENDECTOMY      ATRIAL ABLATION SURGERY      last assessed - [id number]   Exie Driggs BACK SURGERY  1997    Lumbar spinal stenosis    BREAST BIOPSY      2014? left breast; 9/12/16 right breast x 3    BREAST BIOPSY Left 03/30/2010    BREAST SURGERY      needle bx      BREAST SURGERY Right 03/16/2017    excision of right chest wall lesion    CARDIAC ELECTROPHYSIOLOGY STUDY AND ABLATION      CARDIOVERSION      DENTAL SURGERY      HAND SURGERY      Joint replaced with silicone joint 5th finger right hand    HYSTERECTOMY  1993    Complete    LAMINECTOMY      Decompressive up to two lumbar segments    MASTECTOMY Right 10/28/2016    OOPHORECTOMY  1993    AK EXC SKIN MALIG <0 5 CM TRUNK,ARM,LEG Right 3/16/2017    Procedure: EXCISION CHEST WALL LESION; NEEDLE LOC @ 1400;  Surgeon: Rhoda Bautista MD;  Location: [facility];  Service: Surgical Oncology    AK INTRAOPERATIVE SENTINEL LYMPH NODE ID W DYE INJECTION Right 10/28/2016    Procedure: BREAST MASTECTOMY WITH SENTINAL LYMPH NODE BIOPSY; LYMPHSCINITIGRAPHY; LYMPHATIC MAPPING; 0800 NUC MED INJECTION;  Surgeon: Rhoda Bautista MD;  Location: [facility];  Service: Surgical Oncology    SENTINEL LYMPH NODE BIOPSY Right     US BREAST NEEDLE LOC RIGHT Right 3/16/2017    US GUIDANCE BREAST BIOPSY RIGHT EACH ADDITIONAL Right 9/12/2016    US GUIDANCE BREAST BIOPSY RIGHT EACH ADDITIONAL Right 9/12/2016    US GUIDED BREAST BIOPSY RIGHT COMPLETE Right 9/12/2016     Family History   Problem Relation Age of Onset    Coronary artery disease Mother         CABG    Diabetes Mother     Heart attack Mother     Diabetes Sister     Parkinsonism Sister     Coronary artery disease Brother         CABG    Diabetes Brother     Heart attack Brother     Heart attack Son     Kidney cancer Son 48    No Known Problems Son     No Known Problems Son     Emphysema Sister     No Known Problems Sister     Heart attack Brother     No Known Problems Brother     No Known Problems Maternal Grandmother     No Known Problems Maternal Grandfather     No Known Problems Paternal Grandmother     No Known Problems Paternal Grandfather     No Known Problems Paternal Aunt     No Known Problems Paternal Aunt     No Known Problems Paternal Aunt     No Known Problems Paternal Aunt        Social History     Tobacco Use   Smoking Status Never Smoker   Smokeless Tobacco Never Used       Meds/Allergies     Current Outpatient Medications:     apixaban (ELIQUIS) 5 mg, Take 1 tablet (5 mg total) by mouth 2 (two) times a day, Disp: 180 tablet, Rfl: 3    cyanocobalamin (VITAMIN B-12) 500 mcg tablet, Take 1 tablet by mouth daily, Disp: , Rfl:     flecainide (TAMBOCOR) 100 mg tablet, Take 1 tablet (100 mg total) by mouth 2 (two) times a day, Disp: 180 tablet, Rfl: 3    furosemide (LASIX) 40 mg tablet, TAKE 1 TABLET BY MOUTH EVERY DAY AS DIRECTED, Disp: 90 tablet, Rfl: 1    levothyroxine 150 mcg tablet, TAKE ONE TABLET 4 DAYS A WEEK AND 1/2 TABLET THREE DAYS A WEEK, Disp: 90 tablet, Rfl: 3    metoprolol succinate (TOPROL-XL) 50 mg 24 hr tablet, Take 1 tablet (50 mg total) by mouth daily, Disp: 90 tablet, Rfl: 3    potassium chloride (MICRO-K) 10 MEQ CR capsule, TAKE 1 CAPSULE BY MOUTH EVERY DAY, Disp: 90 capsule, Rfl: 1    RESTASIS 0 05 % ophthalmic emulsion, Administer 1 drop to both eyes every 12 (twelve) hours, Disp: , Rfl:     meclizine (ANTIVERT) 25 mg tablet, Take 1 tablet (25 mg total) by mouth every 8 (eight) hours as needed for dizziness (Patient not taking: Reported on 9/30/2020), Disp: 30 tablet, Rfl: 3    Naproxen Sodium (ALEVE) 220 MG CAPS, Take 1 capsule (220 mg total) by mouth as needed (prn headache) (Patient not taking: Reported on 9/30/2020), Disp: , Rfl:   Allergies   Allergen Reactions    Morphine And Related GI Intolerance    Oxycodone-Acetaminophen Vomiting       Vitals: Blood pressure 120/72, pulse 60, temperature (!) 97 1 °F (36 2 °C), temperature source Tympanic, resp  rate 18, height 5' 2" (1 575 m), weight 80 kg (176 lb 6 4 oz), SpO2 98 %  , Body mass index is 32 26 kg/m²  Oxygen Therapy  SpO2: 98 %    Physical Exam  Constitutional:       General: She is not in acute distress  HENT:      Head: Normocephalic  Mouth/Throat:      Pharynx: No oropharyngeal exudate  Eyes:      General: No scleral icterus  Pupils: Pupils are equal, round, and reactive to light  Neck:      Musculoskeletal: Neck supple  Vascular: No JVD  Cardiovascular:      Rate and Rhythm: Normal rate and regular rhythm  Pulmonary:      Breath sounds:  No wheezing or rhonchi  Abdominal:      Palpations: Abdomen is soft  Tenderness: There is no abdominal tenderness  Musculoskeletal:      Right lower leg: Edema present  Left lower leg: Edema present  Lymphadenopathy:      Cervical: No cervical adenopathy  Skin:     General: Skin is warm and dry  Neurological:      Mental Status: She is alert and oriented to person, place, and time  Labs: I have personally reviewed pertinent lab results  Lab Results   Component Value Date    WBC 4 9 06/15/2020    HGB 13 3 06/15/2020    HCT 39 9 06/15/2020    MCV 90 06/15/2020     06/15/2020     Lab Results   Component Value Date    GLUCOSE 95 06/19/2015    CALCIUM 9 0 08/14/2018     09/07/2017     09/07/2017    K 4 3 06/15/2020    CO2 26 06/15/2020     06/15/2020    BUN 10 06/15/2020    CREATININE 1 03 (H) 06/15/2020     No results found for: IGE  Lab Results   Component Value Date    ALT 9 06/15/2020    AST 20 06/15/2020    ALKPHOS 83 08/14/2018    BILITOT 0 8 09/07/2017    BILITOT 0 8 09/07/2017       Imaging and other studies: I have personally reviewed pertinent reports  and I have personally reviewed pertinent films in PACS  CT of the chest performed on 6/17/20 shows biapical scarring which is stable  There is a 3 mm right upper lobe pulmonary nodule stable going back to 2016    Other Studies: I have personally reviewed pertinent reports      Echocardiogram performed on 6/12/20 shows an EF of 65%, normal RV size and systolic function, PA pressure mildly increased (direct pressure not provided)

## 2020-10-19 ENCOUNTER — HOSPITAL ENCOUNTER (OUTPATIENT)
Dept: PULMONOLOGY | Facility: HOSPITAL | Age: 75
Discharge: HOME | End: 2020-10-19
Attending: INTERNAL MEDICINE
Payer: COMMERCIAL

## 2020-10-19 DIAGNOSIS — R06.00 DOE (DYSPNEA ON EXERTION): ICD-10-CM

## 2020-10-19 PROCEDURE — 94729 DIFFUSING CAPACITY: CPT | Performed by: INTERNAL MEDICINE

## 2020-10-19 PROCEDURE — 94761 N-INVAS EAR/PLS OXIMETRY MLT: CPT

## 2020-10-19 PROCEDURE — 94726 PLETHYSMOGRAPHY LUNG VOLUMES: CPT

## 2020-10-19 PROCEDURE — 94618 PULMONARY STRESS TESTING: CPT | Performed by: INTERNAL MEDICINE

## 2020-10-19 PROCEDURE — 94010 BREATHING CAPACITY TEST: CPT

## 2020-10-19 PROCEDURE — 94726 PLETHYSMOGRAPHY LUNG VOLUMES: CPT | Performed by: INTERNAL MEDICINE

## 2020-10-19 PROCEDURE — 94010 BREATHING CAPACITY TEST: CPT | Performed by: INTERNAL MEDICINE

## 2020-10-19 PROCEDURE — 94729 DIFFUSING CAPACITY: CPT

## 2020-10-29 ENCOUNTER — TELEPHONE (OUTPATIENT)
Dept: PULMONOLOGY | Facility: CLINIC | Age: 75
End: 2020-10-29

## 2020-10-29 DIAGNOSIS — R94.2 DIFFUSION CAPACITY OF LUNG (DL), DECREASED: ICD-10-CM

## 2020-10-29 DIAGNOSIS — R06.02 SOB (SHORTNESS OF BREATH): Primary | ICD-10-CM

## 2020-11-09 ENCOUNTER — HOSPITAL ENCOUNTER (OUTPATIENT)
Dept: CT IMAGING | Facility: HOSPITAL | Age: 75
Discharge: HOME | End: 2020-11-09
Payer: COMMERCIAL

## 2020-11-09 DIAGNOSIS — R94.2 DIFFUSION CAPACITY OF LUNG (DL), DECREASED: ICD-10-CM

## 2020-11-09 DIAGNOSIS — R06.02 SOB (SHORTNESS OF BREATH): ICD-10-CM

## 2020-11-09 PROCEDURE — 71250 CT THORAX DX C-: CPT

## 2020-11-09 PROCEDURE — G1004 CDSM NDSC: HCPCS

## 2020-11-13 ENCOUNTER — TELEPHONE (OUTPATIENT)
Dept: PULMONOLOGY | Facility: CLINIC | Age: 75
End: 2020-11-13

## 2020-11-18 ENCOUNTER — IMMUNIZATIONS (OUTPATIENT)
Dept: FAMILY MEDICINE CLINIC | Facility: HOSPITAL | Age: 75
End: 2020-11-18
Payer: COMMERCIAL

## 2020-11-18 DIAGNOSIS — Z23 ENCOUNTER FOR IMMUNIZATION: ICD-10-CM

## 2020-11-18 PROCEDURE — G0008 ADMIN INFLUENZA VIRUS VAC: HCPCS | Performed by: FAMILY MEDICINE

## 2020-11-18 PROCEDURE — 90662 IIV NO PRSV INCREASED AG IM: CPT | Performed by: FAMILY MEDICINE

## 2020-12-07 ENCOUNTER — TELEPHONE (OUTPATIENT)
Dept: HEMATOLOGY ONCOLOGY | Facility: MEDICAL CENTER | Age: 75
End: 2020-12-07

## 2020-12-11 ENCOUNTER — OFFICE VISIT (OUTPATIENT)
Dept: CARDIOLOGY CLINIC | Facility: CLINIC | Age: 75
End: 2020-12-11
Payer: COMMERCIAL

## 2020-12-11 VITALS
HEART RATE: 48 BPM | WEIGHT: 175 LBS | SYSTOLIC BLOOD PRESSURE: 124 MMHG | HEIGHT: 62 IN | DIASTOLIC BLOOD PRESSURE: 64 MMHG | BODY MASS INDEX: 32.2 KG/M2 | OXYGEN SATURATION: 97 %

## 2020-12-11 DIAGNOSIS — R06.00 DYSPNEA ON EXERTION: ICD-10-CM

## 2020-12-11 DIAGNOSIS — I48.91 ATRIAL FIBRILLATION, UNSPECIFIED TYPE (HCC): ICD-10-CM

## 2020-12-11 DIAGNOSIS — I48.19 PERSISTENT ATRIAL FIBRILLATION (HCC): Primary | ICD-10-CM

## 2020-12-11 PROCEDURE — 3074F SYST BP LT 130 MM HG: CPT | Performed by: INTERNAL MEDICINE

## 2020-12-11 PROCEDURE — 3008F BODY MASS INDEX DOCD: CPT | Performed by: INTERNAL MEDICINE

## 2020-12-11 PROCEDURE — 1160F RVW MEDS BY RX/DR IN RCRD: CPT | Performed by: INTERNAL MEDICINE

## 2020-12-11 PROCEDURE — 3078F DIAST BP <80 MM HG: CPT | Performed by: INTERNAL MEDICINE

## 2020-12-11 PROCEDURE — 99215 OFFICE O/P EST HI 40 MIN: CPT | Performed by: INTERNAL MEDICINE

## 2020-12-11 RX ORDER — FLECAINIDE ACETATE 100 MG/1
50 TABLET ORAL 2 TIMES DAILY
Qty: 60 TABLET | Refills: 3
Start: 2020-12-11 | End: 2021-02-12 | Stop reason: SDUPTHER

## 2020-12-11 RX ORDER — METOPROLOL SUCCINATE 25 MG/1
12.5 TABLET, EXTENDED RELEASE ORAL DAILY
Qty: 30 TABLET | Refills: 3 | Status: SHIPPED | OUTPATIENT
Start: 2020-12-11 | End: 2022-06-02

## 2020-12-13 PROCEDURE — 93000 ELECTROCARDIOGRAM COMPLETE: CPT | Performed by: INTERNAL MEDICINE

## 2020-12-21 LAB
ALBUMIN SERPL-MCNC: 3.5 G/DL (ref 3.7–4.7)
ALBUMIN/GLOB SERPL: 1.2 {RATIO} (ref 1.2–2.2)
ALP SERPL-CCNC: 104 IU/L (ref 39–117)
ALT SERPL-CCNC: 8 IU/L (ref 0–32)
AST SERPL-CCNC: 23 IU/L (ref 0–40)
BASOPHILS # BLD AUTO: 0 X10E3/UL (ref 0–0.2)
BASOPHILS NFR BLD AUTO: 1 %
BILIRUB SERPL-MCNC: 0.9 MG/DL (ref 0–1.2)
BUN SERPL-MCNC: 16 MG/DL (ref 8–27)
BUN/CREAT SERPL: 14 (ref 12–28)
CALCIUM SERPL-MCNC: 9.2 MG/DL (ref 8.7–10.3)
CHLORIDE SERPL-SCNC: 105 MMOL/L (ref 96–106)
CHOLEST SERPL-MCNC: 124 MG/DL (ref 100–199)
CO2 SERPL-SCNC: 26 MMOL/L (ref 20–29)
CREAT SERPL-MCNC: 1.14 MG/DL (ref 0.57–1)
EOSINOPHIL # BLD AUTO: 0.1 X10E3/UL (ref 0–0.4)
EOSINOPHIL NFR BLD AUTO: 3 %
ERYTHROCYTE [DISTWIDTH] IN BLOOD BY AUTOMATED COUNT: 13.7 % (ref 11.7–15.4)
EST. AVERAGE GLUCOSE BLD GHB EST-MCNC: 111 MG/DL
GLOBULIN SER-MCNC: 3 G/DL (ref 1.5–4.5)
GLUCOSE SERPL-MCNC: 90 MG/DL (ref 65–99)
HBA1C MFR BLD: 5.5 % (ref 4.8–5.6)
HCT VFR BLD AUTO: 39.2 % (ref 34–46.6)
HDLC SERPL-MCNC: 41 MG/DL
HGB BLD-MCNC: 13.1 G/DL (ref 11.1–15.9)
IMM GRANULOCYTES # BLD: 0 X10E3/UL (ref 0–0.1)
IMM GRANULOCYTES NFR BLD: 0 %
LDLC SERPL CALC-MCNC: 66 MG/DL (ref 0–99)
LDLC/HDLC SERPL: 1.6 RATIO (ref 0–3.2)
LYMPHOCYTES # BLD AUTO: 1 X10E3/UL (ref 0.7–3.1)
LYMPHOCYTES NFR BLD AUTO: 27 %
MCH RBC QN AUTO: 29.4 PG (ref 26.6–33)
MCHC RBC AUTO-ENTMCNC: 33.4 G/DL (ref 31.5–35.7)
MCV RBC AUTO: 88 FL (ref 79–97)
MONOCYTES # BLD AUTO: 0.4 X10E3/UL (ref 0.1–0.9)
MONOCYTES NFR BLD AUTO: 9 %
NEUTROPHILS # BLD AUTO: 2.3 X10E3/UL (ref 1.4–7)
NEUTROPHILS NFR BLD AUTO: 60 %
PLATELET # BLD AUTO: 168 X10E3/UL (ref 150–450)
POTASSIUM SERPL-SCNC: 4.4 MMOL/L (ref 3.5–5.2)
PROT SERPL-MCNC: 6.5 G/DL (ref 6–8.5)
RBC # BLD AUTO: 4.45 X10E6/UL (ref 3.77–5.28)
SL AMB EGFR AFRICAN AMERICAN: 54 ML/MIN/1.73
SL AMB EGFR NON AFRICAN AMERICAN: 47 ML/MIN/1.73
SL AMB VLDL CHOLESTEROL CALC: 17 MG/DL (ref 5–40)
SODIUM SERPL-SCNC: 141 MMOL/L (ref 134–144)
TRIGL SERPL-MCNC: 88 MG/DL (ref 0–149)
TSH SERPL DL<=0.005 MIU/L-ACNC: 0.14 UIU/ML (ref 0.45–4.5)
WBC # BLD AUTO: 3.8 X10E3/UL (ref 3.4–10.8)

## 2020-12-22 LAB — CANCER AG27-29 SERPL-ACNC: 12.3 U/ML (ref 0–38.6)

## 2020-12-27 DIAGNOSIS — E03.9 ACQUIRED HYPOTHYROIDISM: ICD-10-CM

## 2020-12-27 RX ORDER — LEVOTHYROXINE SODIUM 0.15 MG/1
150 TABLET ORAL DAILY
Qty: 90 TABLET | Refills: 3 | Status: SHIPPED | OUTPATIENT
Start: 2020-12-27 | End: 2021-12-30

## 2020-12-28 ENCOUNTER — TELEPHONE (OUTPATIENT)
Dept: HEMATOLOGY ONCOLOGY | Facility: CLINIC | Age: 75
End: 2020-12-28

## 2020-12-30 ENCOUNTER — OFFICE VISIT (OUTPATIENT)
Dept: HEMATOLOGY ONCOLOGY | Facility: HOSPITAL | Age: 75
End: 2020-12-30
Payer: COMMERCIAL

## 2020-12-30 VITALS
DIASTOLIC BLOOD PRESSURE: 68 MMHG | RESPIRATION RATE: 18 BRPM | HEART RATE: 73 BPM | OXYGEN SATURATION: 98 % | TEMPERATURE: 97.8 F | WEIGHT: 177.2 LBS | HEIGHT: 62 IN | SYSTOLIC BLOOD PRESSURE: 120 MMHG | BODY MASS INDEX: 32.61 KG/M2

## 2020-12-30 DIAGNOSIS — Z17.1 MALIGNANT NEOPLASM OF OVERLAPPING SITES OF RIGHT BREAST IN FEMALE, ESTROGEN RECEPTOR NEGATIVE (HCC): Primary | ICD-10-CM

## 2020-12-30 DIAGNOSIS — C50.811 MALIGNANT NEOPLASM OF OVERLAPPING SITES OF RIGHT BREAST IN FEMALE, ESTROGEN RECEPTOR NEGATIVE (HCC): Primary | ICD-10-CM

## 2020-12-30 PROCEDURE — 1036F TOBACCO NON-USER: CPT | Performed by: NURSE PRACTITIONER

## 2020-12-30 PROCEDURE — 3008F BODY MASS INDEX DOCD: CPT | Performed by: NURSE PRACTITIONER

## 2020-12-30 PROCEDURE — 1160F RVW MEDS BY RX/DR IN RCRD: CPT | Performed by: NURSE PRACTITIONER

## 2020-12-30 PROCEDURE — 3078F DIAST BP <80 MM HG: CPT | Performed by: NURSE PRACTITIONER

## 2020-12-30 PROCEDURE — 99214 OFFICE O/P EST MOD 30 MIN: CPT | Performed by: NURSE PRACTITIONER

## 2020-12-30 PROCEDURE — 3074F SYST BP LT 130 MM HG: CPT | Performed by: NURSE PRACTITIONER

## 2021-01-22 ENCOUNTER — IMMUNIZATIONS (OUTPATIENT)
Dept: FAMILY MEDICINE CLINIC | Facility: HOSPITAL | Age: 76
End: 2021-01-22

## 2021-01-22 DIAGNOSIS — Z23 ENCOUNTER FOR IMMUNIZATION: Primary | ICD-10-CM

## 2021-01-22 PROCEDURE — 0011A SARS-COV-2 / COVID-19 MRNA VACCINE (MODERNA) 100 MCG: CPT

## 2021-01-22 PROCEDURE — 91301 SARS-COV-2 / COVID-19 MRNA VACCINE (MODERNA) 100 MCG: CPT

## 2021-01-29 ENCOUNTER — OFFICE VISIT (OUTPATIENT)
Dept: FAMILY MEDICINE CLINIC | Facility: HOSPITAL | Age: 76
End: 2021-01-29
Payer: COMMERCIAL

## 2021-01-29 VITALS
SYSTOLIC BLOOD PRESSURE: 130 MMHG | HEIGHT: 63 IN | DIASTOLIC BLOOD PRESSURE: 86 MMHG | WEIGHT: 177 LBS | TEMPERATURE: 97.3 F | HEART RATE: 103 BPM | BODY MASS INDEX: 31.36 KG/M2

## 2021-01-29 DIAGNOSIS — I10 BENIGN ESSENTIAL HYPERTENSION: ICD-10-CM

## 2021-01-29 DIAGNOSIS — Z00.00 MEDICARE ANNUAL WELLNESS VISIT, SUBSEQUENT: Primary | ICD-10-CM

## 2021-01-29 DIAGNOSIS — E03.9 ACQUIRED HYPOTHYROIDISM: ICD-10-CM

## 2021-01-29 DIAGNOSIS — E78.2 MIXED HYPERLIPIDEMIA: ICD-10-CM

## 2021-01-29 DIAGNOSIS — I89.0 LYMPHEDEMA: ICD-10-CM

## 2021-01-29 DIAGNOSIS — I48.19 PERSISTENT ATRIAL FIBRILLATION (HCC): ICD-10-CM

## 2021-01-29 DIAGNOSIS — C50.911 RECURRENT MALIGNANT NEOPLASM OF RIGHT BREAST (HCC): ICD-10-CM

## 2021-01-29 DIAGNOSIS — Z79.01 CURRENT USE OF LONG TERM ANTICOAGULATION: ICD-10-CM

## 2021-01-29 DIAGNOSIS — R73.9 HYPERGLYCEMIA: ICD-10-CM

## 2021-01-29 DIAGNOSIS — E66.09 CLASS 1 OBESITY DUE TO EXCESS CALORIES WITHOUT SERIOUS COMORBIDITY WITH BODY MASS INDEX (BMI) OF 31.0 TO 31.9 IN ADULT: ICD-10-CM

## 2021-01-29 PROCEDURE — 99214 OFFICE O/P EST MOD 30 MIN: CPT | Performed by: FAMILY MEDICINE

## 2021-01-29 PROCEDURE — 3288F FALL RISK ASSESSMENT DOCD: CPT | Performed by: FAMILY MEDICINE

## 2021-01-29 PROCEDURE — 3725F SCREEN DEPRESSION PERFORMED: CPT | Performed by: FAMILY MEDICINE

## 2021-01-29 PROCEDURE — 1036F TOBACCO NON-USER: CPT | Performed by: FAMILY MEDICINE

## 2021-01-29 PROCEDURE — 1170F FXNL STATUS ASSESSED: CPT | Performed by: FAMILY MEDICINE

## 2021-01-29 PROCEDURE — 1125F AMNT PAIN NOTED PAIN PRSNT: CPT | Performed by: FAMILY MEDICINE

## 2021-01-29 PROCEDURE — G0439 PPPS, SUBSEQ VISIT: HCPCS | Performed by: FAMILY MEDICINE

## 2021-01-29 PROCEDURE — 3075F SYST BP GE 130 - 139MM HG: CPT | Performed by: FAMILY MEDICINE

## 2021-01-29 PROCEDURE — 3079F DIAST BP 80-89 MM HG: CPT | Performed by: FAMILY MEDICINE

## 2021-01-29 PROCEDURE — 1160F RVW MEDS BY RX/DR IN RCRD: CPT | Performed by: FAMILY MEDICINE

## 2021-01-29 NOTE — PATIENT INSTRUCTIONS
Medicare Preventive Visit Patient Instructions  Thank you for completing your Welcome to Medicare Visit or Medicare Annual Wellness Visit today  Your next wellness visit will be due in one year (1/29/2022)  The screening/preventive services that you may require over the next 5-10 years are detailed below  Some tests may not apply to you based off risk factors and/or age  Screening tests ordered at today's visit but not completed yet may show as past due  Also, please note that scanned in results may not display below  Preventive Screenings:  Service Recommendations Previous Testing/Comments   Colorectal Cancer Screening  * Colonoscopy    * Fecal Occult Blood Test (FOBT)/Fecal Immunochemical Test (FIT)  * Fecal DNA/Cologuard Test  * Flexible Sigmoidoscopy Age: 54-65 years old   Colonoscopy: every 10 years (may be performed more frequently if at higher risk)  OR  FOBT/FIT: every 1 year  OR  Cologuard: every 3 years  OR  Sigmoidoscopy: every 5 years  Screening may be recommended earlier than age 48 if at higher risk for colorectal cancer  Also, an individualized decision between you and your healthcare provider will decide whether screening between the ages of 74-80 would be appropriate  Colonoscopy: 04/04/2017  FOBT/FIT: Not on file  Cologuard: Not on file  Sigmoidoscopy: Not on file    Screening Current     Breast Cancer Screening Age: 36 years old  Frequency: every 1-2 years  Not required if history of left and right mastectomy Mammogram: 09/08/2020    History Breast Cancer   Cervical Cancer Screening Between the ages of 21-29, pap smear recommended once every 3 years  Between the ages of 33-67, can perform pap smear with HPV co-testing every 5 years     Recommendations may differ for women with a history of total hysterectomy, cervical cancer, or abnormal pap smears in past  Pap Smear: Not on file    Screening Not Indicated   Hepatitis C Screening Once for adults born between 1945 and 1965  More frequently in patients at high risk for Hepatitis C Hep C Antibody: Not on file       Diabetes Screening 1-2 times per year if you're at risk for diabetes or have pre-diabetes Fasting glucose: No results in last 5 years   A1C: 5 5 %    Screening Current   Cholesterol Screening Once every 5 years if you don't have a lipid disorder  May order more often based on risk factors  Lipid panel: 12/21/2020    Screening Not Indicated  History Lipid Disorder     Other Preventive Screenings Covered by Medicare:  1  Abdominal Aortic Aneurysm (AAA) Screening: covered once if your at risk  You're considered to be at risk if you have a family history of AAA  2  Lung Cancer Screening: covers low dose CT scan once per year if you meet all of the following conditions: (1) Age 50-69; (2) No signs or symptoms of lung cancer; (3) Current smoker or have quit smoking within the last 15 years; (4) You have a tobacco smoking history of at least 30 pack years (packs per day multiplied by number of years you smoked); (5) You get a written order from a healthcare provider  3  Glaucoma Screening: covered annually if you're considered high risk: (1) You have diabetes OR (2) Family history of glaucoma OR (3)  aged 48 and older OR (3)  American aged 72 and older  3  Osteoporosis Screening: covered every 2 years if you meet one of the following conditions: (1) You're estrogen deficient and at risk for osteoporosis based off medical history and other findings; (2) Have a vertebral abnormality; (3) On glucocorticoid therapy for more than 3 months; (4) Have primary hyperparathyroidism; (5) On osteoporosis medications and need to assess response to drug therapy  · Last bone density test (DXA Scan): 06/24/2014   5  HIV Screening: covered annually if you're between the age of 15-65  Also covered annually if you are younger than 13 and older than 72 with risk factors for HIV infection   For pregnant patients, it is covered up to 3 times per pregnancy  Immunizations:  Immunization Recommendations   Influenza Vaccine Annual influenza vaccination during flu season is recommended for all persons aged >= 6 months who do not have contraindications   Pneumococcal Vaccine (Prevnar and Pneumovax)  * Prevnar = PCV13  * Pneumovax = PPSV23   Adults 25-60 years old: 1-3 doses may be recommended based on certain risk factors  Adults 72 years old: Prevnar (PCV13) vaccine recommended followed by Pneumovax (PPSV23) vaccine  If already received PPSV23 since turning 65, then PCV13 recommended at least one year after PPSV23 dose  Hepatitis B Vaccine 3 dose series if at intermediate or high risk (ex: diabetes, end stage renal disease, liver disease)   Tetanus (Td) Vaccine - COST NOT COVERED BY MEDICARE PART B Following completion of primary series, a booster dose should be given every 10 years to maintain immunity against tetanus  Td may also be given as tetanus wound prophylaxis  Tdap Vaccine - COST NOT COVERED BY MEDICARE PART B Recommended at least once for all adults  For pregnant patients, recommended with each pregnancy  Shingles Vaccine (Shingrix) - COST NOT COVERED BY MEDICARE PART B  2 shot series recommended in those aged 48 and above     Health Maintenance Due:      Topic Date Due    Hepatitis C Screening  1945    DXA SCAN  07/29/2021 (Originally 6/24/2016)    Colorectal Cancer Screening  04/04/2027     Immunizations Due:  There are no preventive care reminders to display for this patient  Advance Directives   What are advance directives? Advance directives are legal documents that state your wishes and plans for medical care  These plans are made ahead of time in case you lose your ability to make decisions for yourself  Advance directives can apply to any medical decision, such as the treatments you want, and if you want to donate organs  What are the types of advance directives?   There are many types of advance directives, and each state has rules about how to use them  You may choose a combination of any of the following:  · Living will: This is a written record of the treatment you want  You can also choose which treatments you do not want, which to limit, and which to stop at a certain time  This includes surgery, medicine, IV fluid, and tube feedings  · Durable power of  for healthcare [facility]): This is a written record that states who you want to make healthcare choices for you when you are unable to make them for yourself  This person, called a proxy, is usually a family member or a friend  You may choose more than 1 proxy  · Do not resuscitate (DNR) order:  A DNR order is used in case your heart stops beating or you stop breathing  It is a request not to have certain forms of treatment, such as CPR  A DNR order may be included in other types of advance directives  · Medical directive: This covers the care that you want if you are in a coma, near death, or unable to make decisions for yourself  You can list the treatments you want for each condition  Treatment may include pain medicine, surgery, blood transfusions, dialysis, IV or tube feedings, and a ventilator (breathing machine)  · Values history: This document has questions about your views, beliefs, and how you feel and think about life  This information can help others choose the care that you would choose  Why are advance directives important? An advance directive helps you control your care  Although spoken wishes may be used, it is better to have your wishes written down  Spoken wishes can be misunderstood, or not followed  Treatments may be given even if you do not want them  An advance directive may make it easier for your family to make difficult choices about your care  Fall Prevention    Fall prevention  includes ways to make your home and other areas safer  It also includes ways you can move more carefully to prevent a fall   Health conditions that cause changes in your blood pressure, vision, or muscle strength and coordination may increase your risk for falls  Medicines may also increase your risk for falls if they make you dizzy, weak, or sleepy  Fall prevention tips:   · Stand or sit up slowly  · Use assistive devices as directed  · Wear shoes that fit well and have soles that   · Wear a personal alarm  · Stay active  · Manage your medical conditions  Home Safety Tips:  · Add items to prevent falls in the bathroom  · Keep paths clear  · Install bright lights in your home  · Keep items you use often on shelves within reach  · Paint or place reflective tape on the edges of your stairs  Weight Management   Why it is important to manage your weight:  Being overweight increases your risk of health conditions such as heart disease, high blood pressure, type 2 diabetes, and certain types of cancer  It can also increase your risk for osteoarthritis, sleep apnea, and other respiratory problems  Aim for a slow, steady weight loss  Even a small amount of weight loss can lower your risk of health problems  How to lose weight safely:  A safe and healthy way to lose weight is to eat fewer calories and get regular exercise  You can lose up about 1 pound a week by decreasing the number of calories you eat by 500 calories each day  Healthy meal plan for weight management:  A healthy meal plan includes a variety of foods, contains fewer calories, and helps you stay healthy  A healthy meal plan includes the following:  · Eat whole-grain foods more often  A healthy meal plan should contain fiber  Fiber is the part of grains, fruits, and vegetables that is not broken down by your body  Whole-grain foods are healthy and provide extra fiber in your diet  Some examples of whole-grain foods are whole-wheat breads and pastas, oatmeal, brown rice, and bulgur  · Eat a variety of vegetables every day    Include dark, leafy greens such as spinach, kale, umberto greens, and mustard greens  Eat yellow and orange vegetables such as carrots, sweet potatoes, and winter squash  · Eat a variety of fruits every day  Choose fresh or canned fruit (canned in its own juice or light syrup) instead of juice  Fruit juice has very little or no fiber  · Eat low-fat dairy foods  Drink fat-free (skim) milk or 1% milk  Eat fat-free yogurt and low-fat cottage cheese  Try low-fat cheeses such as mozzarella and other reduced-fat cheeses  · Choose meat and other protein foods that are low in fat  Choose beans or other legumes such as split peas or lentils  Choose fish, skinless poultry (chicken or turkey), or lean cuts of red meat (beef or pork)  Before you cook meat or poultry, cut off any visible fat  · Use less fat and oil  Try baking foods instead of frying them  Add less fat, such as margarine, sour cream, regular salad dressing and mayonnaise to foods  Eat fewer high-fat foods  Some examples of high-fat foods include french fries, doughnuts, ice cream, and cakes  · Eat fewer sweets  Limit foods and drinks that are high in sugar  This includes candy, cookies, regular soda, and sweetened drinks  Exercise:  Exercise at least 30 minutes per day on most days of the week  Some examples of exercise include walking, biking, dancing, and swimming  You can also fit in more physical activity by taking the stairs instead of the elevator or parking farther away from stores  Ask your healthcare provider about the best exercise plan for you  © Copyright [employer] 2018 Information is for End User's use only and may not be sold, redistributed or otherwise used for commercial purposes  All illustrations and images included in CareNotes® are the copyrighted property of [employer]  or Retroficiency Visit for Adults   AMBULATORY CARE:   A wellness visit  is when you see your healthcare provider to get screened for health problems   Your healthcare provider will also give you advice on how to stay healthy  Write down your questions so you remember to ask them  Ask your healthcare provider how often you should have a wellness visit  What happens at a wellness visit:  Your healthcare provider will ask about your health, and your family history of health problems  This includes high blood pressure, heart disease, and cancer  He or she will ask if you have symptoms that concern you, if you smoke, and about your mood  You may also be asked about your intake of medicines, supplements, food, and alcohol  Any of the following may be done:  · Your weight  will be checked  Your height may also be checked so your body mass index (BMI) can be calculated  Your BMI shows if you are at a healthy weight  · Your blood pressure  and heart rate will be checked  Your temperature may also be checked  · Blood and urine tests  may be done  Blood tests may be done to check your cholesterol levels  Abnormal cholesterol levels increase your risk for heart disease and stroke  You may also need a blood or urine test to check for diabetes if you are at increased risk  Urine tests may be done to look for signs of an infection or kidney disease  · A physical exam  includes checking your heartbeat and lungs with a stethoscope  Your healthcare provider may also check your skin to look for sun damage  · Screening tests  may be recommended  A screening test is done to check for diseases that may not cause symptoms  The screening tests you may need depend on your age, gender, family history, and lifestyle habits  For example, colorectal screening may be recommended if you are 48years old or older  Screening tests you need if you are a woman:   · A Pap smear  is used to screen for cervical cancer  Pap smears are usually done every 3 to 5 years depending on your age   You may need them more often if you have had abnormal Pap smear test results in the past  Ask your healthcare provider how often you should have a Pap smear  · A mammogram  is an x-ray of your breasts to screen for breast cancer  Experts recommend mammograms every 2 years starting at age 48 years  You may need a mammogram at age 52 years or younger if you have an increased risk for breast cancer  Talk to your healthcare provider about when you should start having mammograms and how often you need them  Vaccines you may need:   · Get an influenza vaccine  every year  The influenza vaccine protects you from the flu  Several types of viruses cause the flu  The viruses change over time, so new vaccines are made each year  · Get a tetanus-diphtheria (Td) booster vaccine  every 10 years  This vaccine protects you against tetanus and diphtheria  Tetanus is a severe infection that may cause painful muscle spasms and lockjaw  Diphtheria is a severe bacterial infection that causes a thick covering in the back of your mouth and throat  · Get a human papillomavirus (HPV) vaccine  if you are female and aged 23 to 32 or male 23 to 24 and never received it  This vaccine protects you from HPV infection  HPV is the most common infection spread by sexual contact  HPV may also cause vaginal, penile, and anal cancers  · Get a pneumococcal vaccine  if you are aged 72 years or older  The pneumococcal vaccine is an injection given to protect you from pneumococcal disease  Pneumococcal disease is an infection caused by pneumococcal bacteria  The infection may cause pneumonia, meningitis, or an ear infection  · Get a shingles vaccine  if you are 60 or older, even if you have had shingles before  The shingles vaccine is an injection to protect you from the varicella-zoster virus  This is the same virus that causes chickenpox  Shingles is a painful rash that develops in people who had chickenpox or have been exposed to the virus  How to eat healthy:  My Plate is a model for planning healthy meals   It shows the types and amounts of foods that should go on your plate  Fruits and vegetables make up about half of your plate, and grains and protein make up the other half  A serving of dairy is included on the side of your plate  The amount of calories and serving sizes you need depends on your age, gender, weight, and height  Examples of healthy foods are listed below:  · Eat a variety of vegetables  such as dark green, red, and orange vegetables  You can also include canned vegetables low in sodium (salt) and frozen vegetables without added butter or sauces  · Eat a variety of fresh fruits , canned fruit in 100% juice, frozen fruit, and dried fruit  · Include whole grains  At least half of the grains you eat should be whole grains  Examples include whole-wheat bread, wheat pasta, brown rice, and whole-grain cereals such as oatmeal     · Eat a variety of protein foods such as seafood (fish and shellfish), lean meat, and poultry without skin (turkey and chicken)  Examples of lean meats include pork leg, shoulder, or tenderloin, and beef round, sirloin, tenderloin, and extra lean ground beef  Other protein foods include eggs and egg substitutes, beans, peas, soy products, nuts, and seeds  · Choose low-fat dairy products such as skim or 1% milk or low-fat yogurt, cheese, and cottage cheese  · Limit unhealthy fats  such as butter, hard margarine, and shortening  Exercise:  Exercise at least 30 minutes per day on most days of the week  Some examples of exercise include walking, biking, dancing, and swimming  You can also fit in more physical activity by taking the stairs instead of the elevator or parking farther away from stores  Include muscle strengthening activities 2 days each week  Regular exercise provides many health benefits  It helps you manage your weight, and decreases your risk for type 2 diabetes, heart disease, stroke, and high blood pressure  Exercise can also help improve your mood   Ask your healthcare provider about the best exercise plan for you  General health and safety guidelines:   · Do not smoke  Nicotine and other chemicals in cigarettes and cigars can cause lung damage  Ask your healthcare provider for information if you currently smoke and need help to quit  E-cigarettes or smokeless tobacco still contain nicotine  Talk to your healthcare provider before you use these products  · Limit alcohol  A drink of alcohol is 12 ounces of beer, 5 ounces of wine, or 1½ ounces of liquor  · Lose weight, if needed  Being overweight increases your risk of certain health conditions  These include heart disease, high blood pressure, type 2 diabetes, and certain types of cancer  · Protect your skin  Do not sunbathe or use tanning beds  Use sunscreen with a SPF 15 or higher  Apply sunscreen at least 15 minutes before you go outside  Reapply sunscreen every 2 hours  Wear protective clothing, hats, and sunglasses when you are outside  · Drive safely  Always wear your seatbelt  Make sure everyone in your car wears a seatbelt  A seatbelt can save your life if you are in an accident  Do not use your cell phone when you are driving  This could distract you and cause an accident  Pull over if you need to make a call or send a text message  · Practice safe sex  Use latex condoms if are sexually active and have more than one partner  Your healthcare provider may recommend screening tests for sexually transmitted infections (STIs)  · Wear helmets, lifejackets, and protective gear  Always wear a helmet when you ride a bike or motorcycle, go skiing, or play sports that could cause a head injury  Wear protective equipment when you play sports  Wear a lifejacket when you are on a boat or doing water sports  © Copyright [address] Information is for End User's use only and may not be sold, redistributed or otherwise used for commercial purposes   All illustrations and images included in CareNotes® are the copyrighted property of [employer]  or [facility] Denise Palencia   The above information is an  only  It is not intended as medical advice for individual conditions or treatments  Talk to your doctor, nurse or pharmacist before following any medical regimen to see if it is safe and effective for you

## 2021-01-29 NOTE — PROGRESS NOTES
Assessment and Plan:     Problem List Items Addressed This Visit        Endocrine    Acquired hypothyroidism       Cardiovascular and Mediastinum    Atrial fibrillation ([facility] )    Benign essential hypertension       Other    Lymphedema    Class 1 obesity due to excess calories without serious comorbidity with body mass index (BMI) of 31 0 to 31 9 in adult    Hyperglycemia    Mixed hyperlipidemia    Current use of long term anticoagulation    Recurrent malignant neoplasm of right breast ([facility] )      Other Visit Diagnoses     Medicare annual wellness visit, subsequent    -  Primary        BMI Counseling: Body mass index is 31 86 kg/m²  The BMI is above normal  Nutrition recommendations include decreasing portion sizes, moderation in carbohydrate intake and reducing intake of cholesterol  Exercise recommendations include strength training exercises  No pharmacotherapy was ordered  Preventive health issues were discussed with patient, and age appropriate screening tests were ordered as noted in patient's After Visit Summary  Personalized health advice and appropriate referrals for health education or preventive services given if needed, as noted in patient's After Visit Summary       History of Present Illness:     Patient presents for Medicare Annual Wellness visit    Patient Care Team:  Jose Beckett MD as PCP - General  Arta Clara, MD Zane Heimlich, MD Mcarthur Spike, MD     Problem List:     Patient Active Problem List   Diagnosis    Atrial fibrillation [facility])    Benign essential hypertension    Bilateral edema of lower extremity    BPPV (benign paroxysmal positional vertigo)    Bursitis of left shoulder    Cardiomyopathy ([facility] )    Cervical spinal stenosis    Edema    Homocysteinemia ([facility] )    Hypokalemia    Acquired hypothyroidism    Long QT interval    Lymphedema    Personal history of adenocarcinoma of breast    Class 1 obesity due to excess calories without serious comorbidity with body mass index (BMI) of 31 0 to 31 9 in adult    Osteopenia    Overactive bladder    Peripheral neuropathy    Vitamin D deficiency    Bronchitis with bronchospasm    Candidal esophagitis (HCC)    Abnormal chest CT    Pulmonary nodule seen on imaging study    SOB (shortness of breath)    Weight loss    Encounter for screening mammogram for breast cancer    Hyperglycemia    Mixed hyperlipidemia    Other idiopathic peripheral autonomic neuropathy    Persistent nodularity of breast    Encounter for follow-up surveillance of breast cancer    Current use of long term anticoagulation    Recurrent malignant neoplasm of right breast (HCC)    RICHARDS (dyspnea on exertion)    Cortical age-related cataract of both eyes      Past Medical and Surgical History:     Past Medical History:   Diagnosis Date    Abnormal chest CT     last assessed - [id number]    Abnormal glucose     Resolved - [id number]    Arrhythmia     Arthritis     Slight    BCC (basal cell carcinoma of skin)     last assessed - [id number]    Breast cancer ([facility] ) 09/12/2016    rt    Cardiomyopathy ([facility] )     Cervical spinal stenosis     Discharge from left nipple     last assessed - [id number]    Disease of thyroid gland     hypothyroidism    Full dentures     Upper and Lower    History of atrial fibrillation     Homocysteinemia ([facility] )     Hypertension     Hypokalemia     Intraductal papilloma of breast     last assessed - [id number]    Malignant neoplasm of skin     basal cell on face, Moh's surgery    Memory loss     last assessed - [id number]    Obesity     Open wound     last assessed - [id number]    Osteopenia     Overactive bladder     Palmar plantar erythrodysaesthesia     Resolved - [id number]    Pulmonary nodule seen on imaging study     Rosacea     UTI (urinary tract infection)     recently    Vertigo     Vitamin D deficiency      Past Surgical History:   Procedure Laterality Date    APPENDECTOMY      ATRIAL ABLATION SURGERY last assessed - [id number]   [facility] SURGERY  1997    Lumbar spinal stenosis    BREAST BIOPSY      2014? left breast; 9/12/16 right breast x 3    BREAST BIOPSY Left 03/30/2010    BREAST SURGERY      needle bx      BREAST SURGERY Right 03/16/2017    excision of right chest wall lesion    CARDIAC ELECTROPHYSIOLOGY STUDY AND ABLATION      CARDIOVERSION      DENTAL SURGERY      HAND SURGERY      Joint replaced with silicone joint 5th finger right hand    HYSTERECTOMY  1993    Complete    LAMINECTOMY      Decompressive up to two lumbar segments    MASTECTOMY Right 10/28/2016    OOPHORECTOMY  1993    MA EXC SKIN MALIG <0 5 CM TRUNK,ARM,LEG Right 3/16/2017    Procedure: EXCISION CHEST WALL LESION; NEEDLE LOC @ 1400;  Surgeon: Emir Whitfield MD;  Location: [facility];  Service: Surgical Oncology    MA INTRAOPERATIVE SENTINEL LYMPH NODE ID W DYE INJECTION Right 10/28/2016    Procedure: BREAST MASTECTOMY WITH SENTINAL LYMPH NODE BIOPSY; LYMPHSCINITIGRAPHY; LYMPHATIC MAPPING; 0800 NUC MED INJECTION;  Surgeon: Emir Whitfield MD;  Location: [facility];  Service: Surgical Oncology    SENTINEL LYMPH NODE BIOPSY Right     US BREAST NEEDLE LOC RIGHT Right 3/16/2017    US GUIDANCE BREAST BIOPSY RIGHT EACH ADDITIONAL Right 9/12/2016    US GUIDANCE BREAST BIOPSY RIGHT EACH ADDITIONAL Right 9/12/2016    US GUIDED BREAST BIOPSY RIGHT COMPLETE Right 9/12/2016      Family History:     Family History   Problem Relation Age of Onset    Coronary artery disease Mother         CABG    Diabetes Mother     Heart attack Mother     Diabetes Sister     Parkinsonism Sister     Coronary artery disease Brother         CABG    Diabetes Brother     Heart attack Brother     Heart attack Son     Kidney cancer Son 48    No Known Problems Son     No Known Problems Son     Emphysema Sister     No Known Problems Sister     Heart attack Brother     No Known Problems Brother     No Known Problems Maternal Grandmother     No Known Problems Maternal Grandfather     No Known Problems Paternal Grandmother     No Known Problems Paternal Grandfather     No Known Problems Paternal Aunt     No Known Problems Paternal Aunt     No Known Problems Paternal Aunt     No Known Problems Paternal Aunt       Social History:        Social History     Socioeconomic History    Marital status: /Civil Union     Spouse name: None    Number of children: None    Years of education: None    Highest education level: None   Occupational History    Occupation: Retired   Social Needs    Financial resource strain: None    Food insecurity     Worry: None     Inability: None    Transportation needs     Medical: None     Non-medical: None   Tobacco Use    Smoking status: Never Smoker    Smokeless tobacco: Never Used   Substance and Sexual Activity    Alcohol use: No    Drug use: No    Sexual activity: None     Comment: Resides with    Lifestyle    Physical activity     Days per week: None     Minutes per session: None    Stress: None   Relationships    Social connections     Talks on phone: None     Gets together: None     Attends Oriental orthodox service: None     Active member of club or organization: None     Attends meetings of clubs or organizations: None     Relationship status: None    Intimate partner violence     Fear of current or ex partner: None     Emotionally abused: None     Physically abused: None     Forced sexual activity: None   Other Topics Concern    None   Social History Narrative    Always uses seat belt    Convalescence after chemotherapy      Medications and Allergies:     Current Outpatient Medications   Medication Sig Dispense Refill    apixaban (ELIQUIS) 5 mg Take 1 tablet (5 mg total) by mouth 2 (two) times a day 180 tablet 3    cyanocobalamin (VITAMIN B-12) 500 mcg tablet Take 1 tablet by mouth daily      flecainide (TAMBOCOR) 100 mg tablet Take 0 5 tablets (50 mg total) by mouth 2 (two) times a day 60 tablet 3  furosemide (LASIX) 40 mg tablet TAKE 1 TABLET BY MOUTH EVERY DAY AS DIRECTED 90 tablet 1    levothyroxine 150 mcg tablet Take 1 tablet (150 mcg total) by mouth daily Take 1 tablet 3 days a week and 1/2 tablet 4  Days a week 90 tablet 3    meclizine (ANTIVERT) 25 mg tablet Take 1 tablet (25 mg total) by mouth every 8 (eight) hours as needed for dizziness 30 tablet 3    metoprolol succinate (TOPROL-XL) 25 mg 24 hr tablet Take 0 5 tablets (12 5 mg total) by mouth daily 30 tablet 3    Naproxen Sodium (ALEVE) 220 MG CAPS Take 1 capsule (220 mg total) by mouth as needed (prn headache)      potassium chloride (MICRO-K) 10 MEQ CR capsule TAKE 1 CAPSULE BY MOUTH EVERY DAY 90 capsule 1    RESTASIS 0 05 % ophthalmic emulsion Administer 1 drop to both eyes every 12 (twelve) hours       No current facility-administered medications for this visit  Allergies   Allergen Reactions    Morphine And Related GI Intolerance    Oxycodone-Acetaminophen Vomiting      Immunizations:     Immunization History   Administered Date(s) Administered    INFLUENZA 10/23/2007, 11/06/2010, 10/13/2011, 10/17/2012, 10/11/2013, 10/29/2014, 11/08/2016, 10/27/2017    Influenza, high dose seasonal 0 7 mL 11/18/2020    Pneumococcal Conjugate 13-Valent 04/09/2015    Pneumococcal Polysaccharide PPV23 06/29/2017    SARS-CoV-2 / COVID-19 mRNA IM ([facility]) 01/22/2021    Tdap 06/14/2016    Zoster 03/08/2013      Health Maintenance:         Topic Date Due    Hepatitis C Screening  1945    DXA SCAN  07/29/2021 (Originally 6/24/2016)    Colorectal Cancer Screening  04/04/2027     There are no preventive care reminders to display for this patient  Medicare Health Risk Assessment:     /86   Pulse 103   Temp (!) 97 3 °F (36 3 °C)   Ht 5' 2 5" (1 588 m)   Wt 80 3 kg (177 lb)   BMI 31 86 kg/m²      Luis M Lund is here for her Subsequent Wellness visit   Last Medicare Wellness visit information reviewed, patient interviewed and updates made to the record today  Health Risk Assessment:   Patient rates overall health as good  Patient feels that their physical health rating is slightly better  Eyesight was rated as slightly worse  Hearing was rated as same  Patient feels that their emotional and mental health rating is same  Pain experienced in the last 7 days has been none  Patient states that she has experienced no weight loss or gain in last 6 months  Depression Screening:   PHQ-2 Score: 2      Fall Risk Screening: In the past year, patient has experienced: history of falling in past year    Number of falls: 2 or more  Injured during fall?: No    Feels unsteady when standing or walking?: Yes    Worried about falling?: Yes      Urinary Incontinence Screening:   Patient has not leaked urine accidently in the last six months  Home Safety:  Patient has trouble with stairs inside or outside of their home  Patient has working smoke alarms and has working carbon monoxide detector  Home safety hazards include: none  Nutrition:   Current diet is Regular  Medications:   Patient is currently taking over-the-counter supplements  OTC medications include: see medication list  Patient is able to manage medications  Activities of Daily Living (ADLs)/Instrumental Activities of Daily Living (IADLs):   Walk and transfer into and out of bed and chair?: Yes  Dress and groom yourself?: Yes    Bathe or shower yourself?: Yes    Feed yourself?  Yes  Do your laundry/housekeeping?: Yes  Manage your money, pay your bills and track your expenses?: Yes  Make your own meals?: Yes    Do your own shopping?: Yes    Previous Hospitalizations:   Any hospitalizations or ED visits within the last 12 months?: No      Advance Care Planning:   Living will: No    Durable POA for healthcare: No    Advanced directive: No    Advanced directive counseling given: No    Five wishes given: No    Patient declined ACP directive: No      Cognitive Screening:   Provider or family/friend/caregiver concerned regarding cognition?: No    PREVENTIVE SCREENINGS      Cardiovascular Screening:    General: Screening Not Indicated and History Lipid Disorder      Diabetes Screening:     General: Screening Current      Colorectal Cancer Screening:     General: Screening Current      Breast Cancer Screening:     General: History Breast Cancer      Cervical Cancer Screening:    General: Screening Not Indicated      Osteoporosis Screening:    General: Screening Current      Abdominal Aortic Aneurysm (AAA) Screening:        General: Screening Not Indicated      Lung Cancer Screening:     General: Screening Not Indicated      Hepatitis C Screening:    General: Risks and Benefits Discussed    Hep C Screening Accepted: Yes        Sigrid Peck MD

## 2021-01-29 NOTE — PROGRESS NOTES
Assessment/Plan:         Diagnoses and all orders for this visit:    Medicare annual wellness visit, subsequent    Benign essential hypertension  Comments:  very good BP control    Acquired hypothyroidism  Comments:  Dose adjusted for low TSH    Lymphedema    Class 1 obesity due to excess calories without serious comorbidity with body mass index (BMI) of 31 0 to 31 9 in adult    Mixed hyperlipidemia    Hyperglycemia    Recurrent malignant neoplasm of right breast [facility])  Comments:  F/U with Dr Concepción De Oliveira    Current use of long term anticoagulation    Persistent atrial fibrillation (HCC)  Comments:  F/U Dr Dora Smyth          Subjective:      Patient ID: Mikel Farley is a 76 y o  female  6 month followup  No recent illness or injury  Had COVID vaccine a week ago    Having cataract surgery mid February with Dr Saúl Zaldivar    Medication list reviewed     Joints are same    Taking diuretic with benefit for edema of LE's    Can be admitted for pacemaker with Dr Dora Smyth when she is comfortable with COVID issues    Having episodes of vertigo with head position change      The following portions of the patient's history were reviewed and updated as appropriate: allergies, current medications, past family history, past medical history, past social history, past surgical history and problem list     Review of Systems   Constitutional: Negative for fatigue and unexpected weight change  HENT: Negative  Respiratory: Negative  Cardiovascular: Positive for leg swelling  Gastrointestinal: Negative  Genitourinary: Negative  Musculoskeletal: Negative  Neurological: Positive for dizziness  Hematological: Negative  Psychiatric/Behavioral: Negative  All other systems reviewed and are negative  Objective:      /86   Pulse 103   Temp (!) 97 3 °F (36 3 °C)   Ht 5' 2 5" (1 588 m)   Wt 80 3 kg (177 lb)   BMI 31 86 kg/m²          Physical Exam  Vitals signs and nursing note reviewed     Constitutional: Appearance: Normal appearance  HENT:      Head: Normocephalic  Mouth/Throat:      Mouth: Mucous membranes are moist    Neck:      Vascular: No carotid bruit  Cardiovascular:      Rate and Rhythm: Normal rate  Rhythm irregular  Pulmonary:      Effort: Pulmonary effort is normal       Breath sounds: Normal breath sounds  Musculoskeletal: Normal range of motion  Right lower leg: Edema present  Left lower leg: Edema present  Lymphadenopathy:      Cervical: No cervical adenopathy  Skin:     General: Skin is warm  Neurological:      General: No focal deficit present  Mental Status: She is alert and oriented to person, place, and time  Psychiatric:         Mood and Affect: Mood normal          Behavior: Behavior normal          Thought Content:  Thought content normal          Judgment: Judgment normal

## 2021-02-07 DIAGNOSIS — E87.6 HYPOKALEMIA: ICD-10-CM

## 2021-02-07 RX ORDER — POTASSIUM CHLORIDE 750 MG/1
CAPSULE, EXTENDED RELEASE ORAL
Qty: 90 CAPSULE | Refills: 1 | Status: SHIPPED | OUTPATIENT
Start: 2021-02-07 | End: 2021-08-06

## 2021-02-12 DIAGNOSIS — I48.91 ATRIAL FIBRILLATION, UNSPECIFIED TYPE (HCC): ICD-10-CM

## 2021-02-12 DIAGNOSIS — R60.9 EDEMA, UNSPECIFIED TYPE: ICD-10-CM

## 2021-02-12 DIAGNOSIS — H81.10 BENIGN PAROXYSMAL POSITIONAL VERTIGO, UNSPECIFIED LATERALITY: ICD-10-CM

## 2021-02-12 RX ORDER — MECLIZINE HYDROCHLORIDE 25 MG/1
25 TABLET ORAL EVERY 8 HOURS PRN
Qty: 30 TABLET | Refills: 3 | Status: SHIPPED | OUTPATIENT
Start: 2021-02-12 | End: 2022-01-17 | Stop reason: SDUPTHER

## 2021-02-12 RX ORDER — FUROSEMIDE 40 MG/1
40 TABLET ORAL DAILY
Qty: 90 TABLET | Refills: 1 | Status: SHIPPED | OUTPATIENT
Start: 2021-02-12 | End: 2021-11-03

## 2021-02-12 RX ORDER — FLECAINIDE ACETATE 100 MG/1
50 TABLET ORAL 2 TIMES DAILY
Qty: 180 TABLET | Refills: 3 | Status: SHIPPED | OUTPATIENT
Start: 2021-02-12 | End: 2021-12-08

## 2021-02-17 ENCOUNTER — IMMUNIZATIONS (OUTPATIENT)
Dept: FAMILY MEDICINE CLINIC | Facility: HOSPITAL | Age: 76
End: 2021-02-17

## 2021-02-17 DIAGNOSIS — Z23 ENCOUNTER FOR IMMUNIZATION: Primary | ICD-10-CM

## 2021-02-17 PROCEDURE — 0012A SARS-COV-2 / COVID-19 MRNA VACCINE (MODERNA) 100 MCG: CPT

## 2021-02-17 PROCEDURE — 91301 SARS-COV-2 / COVID-19 MRNA VACCINE (MODERNA) 100 MCG: CPT

## 2021-05-13 ENCOUNTER — OFFICE VISIT (OUTPATIENT)
Dept: CARDIOLOGY CLINIC | Facility: CLINIC | Age: 76
End: 2021-05-13
Payer: COMMERCIAL

## 2021-05-13 VITALS
SYSTOLIC BLOOD PRESSURE: 88 MMHG | DIASTOLIC BLOOD PRESSURE: 60 MMHG | BODY MASS INDEX: 29.95 KG/M2 | HEIGHT: 63 IN | WEIGHT: 169 LBS

## 2021-05-13 DIAGNOSIS — R06.00 DYSPNEA ON EXERTION: ICD-10-CM

## 2021-05-13 DIAGNOSIS — R00.1 BRADYCARDIA: ICD-10-CM

## 2021-05-13 DIAGNOSIS — I48.19 PERSISTENT ATRIAL FIBRILLATION (HCC): Primary | ICD-10-CM

## 2021-05-13 PROCEDURE — 1160F RVW MEDS BY RX/DR IN RCRD: CPT | Performed by: INTERNAL MEDICINE

## 2021-05-13 PROCEDURE — 99215 OFFICE O/P EST HI 40 MIN: CPT | Performed by: INTERNAL MEDICINE

## 2021-05-13 PROCEDURE — 93000 ELECTROCARDIOGRAM COMPLETE: CPT | Performed by: INTERNAL MEDICINE

## 2021-05-13 RX ORDER — MIDODRINE HYDROCHLORIDE 2.5 MG/1
2.5 TABLET ORAL 3 TIMES DAILY
Qty: 90 TABLET | Refills: 3 | Status: SHIPPED | OUTPATIENT
Start: 2021-05-13 | End: 2021-10-19 | Stop reason: ALTCHOICE

## 2021-05-13 NOTE — PATIENT INSTRUCTIONS
Our staff will call you to set-up date and time for the pacemaker placement  Start taking midodrine 2 5 mg three times a day  Pacemaker   WHAT YOU NEED TO KNOW:   What is a pacemaker? A pacemaker is a small, battery-powered device that is implanted into your chest to help regulate your heart rate  Why do I need a pacemaker? You need a pacemaker if your heart beat is too slow or is irregular  Your heart may not be able to pump blood well throughout your body  This can happen because of heart failure, aging, or heart medications  You may have the following signs and symptoms:  · You feel tired all the time or cannot do vigorous activities  · You are often dizzy  · You have a history of fainting  · You feel like you cannot catch your breath  · You are confused at times  What kinds of pacemakers are there? · Temporary pacemaker: This may be used in an emergency if you have a heart attack or if medicine caused your heart to slow down too much  A temporary pacemaker may be used after heart surgery to help your heart  All temporary pacemakers have wires that attach to a device outside of your body  A temporary pacemaker is sometimes used until a permanent pacemaker can be put in  · Permanent pacemaker: This is used to help your heart on a long-term basis  A permanent pacemaker is about the size of a large wristwatch  It is made up of thin, flexible wires, called leads, and the pacemaker unit  The pacemaker unit contains a battery, a pulse generator, and a small computer that senses your heart rate  The battery beck the pulse generator, which sends electrical impulses to your heart so it will beat as it should  The pacemaker unit and leads are placed under the skin on your chest  Once inserted, you will probably be able to see the outline of the pacemaker unit under your skin  How does a permanent pacemaker work? Your heart rate can change with activity   For example, your heart rate is lower when you are resting than when you are exercising  Most permanent pacemakers will sense your heart rate  This type of pacemaker is called an on-demand pacemaker  When your heart rate is below a preset value, the pacemaker starts to work to help your heart beat faster  Once your heart is pumping at the right rate, it will turn off  It will turn on again when it is needed  It can be programmed to meet your individual needs after it is inserted in your chest  For example, it can be programmed to help your heart during exercise or stress  If you have had a pacemaker in the past, you may have had a fixed-rate pacemaker  This type of pacemaker keeps a set rate that does not change  How is a permanent pacemaker implanted? You will need a surgical procedure that lasts about 1 to 2 hours  Your healthcare provider will give you IV medicine to help you relax  In most cases, you will be awake, but very drowsy  Local anesthesia medicine will be injected to numb your skin  An incision will be made in the skin on your neck or chest  The leads of the pacemaker will be guided into your heart through a blood vessel  A second incision will be made to implant the pacemaker unit, usually just below your collarbone  The leads will be connected to the pacemaker unit  Both incisions will be closed with stitches  You will spend the night at the hospital after your procedure  This is done so healthcare providers can be sure your pacemaker is working as it should  What are the risks of a permanent pacemaker? · You may have temporary bruising, pain, and swelling after the procedure  You could have problems breathing during the placement procedure  An infection could occur where your pacemaker is implanted  You could bleed more than usual or get blood clots  The pacemaker unit could move out of place and cause pain and bleeding  · You may have problems years after your pacemaker is implanted   The leads could move out of place or poke a hole in your lung, heart, or blood vessel  The pacemaker itself may cause your heart to beat irregularly  This can increase your risk of a stroke  Your pacemaker could stop working  What do I need to know about a permanent pacemaker? · A pacemaker will likely help you feel better and have more energy  Most people can get back to their regular activities within several days of pacemaker surgery  You will need to limit the amount you lift and move your arm for a few weeks after surgery  · Your cardiologist will check your pacemaker and the battery at regular intervals  He may check it in his office or use a computer to check it over the telephone  · You will need to avoid welding equipment, MRI machines, and other equipment with large magnets or electric fields  These things could interfere with how your pacemaker works  · A pacemaker battery usually lasts 5 to 8 years  Eventually you will need to have the pacemaker unit replaced  Sometimes the leads need to be replaced as well  Where can I find support or more information? · [address]  [place] , [place]   Phone: [phone]  Web Address: https://[employer]/  [employer]   CARE AGREEMENT:   You have the right to help plan your care  Learn about your health condition and how it may be treated  Discuss treatment options with your caregivers to decide what care you want to receive  You always have the right to refuse treatment  The above information is an  only  It is not intended as medical advice for individual conditions or treatments  Talk to your doctor, nurse or pharmacist before following any medical regimen to see if it is safe and effective for you  © 2017 [employer] Information is for End User's use only and may not be sold, redistributed or otherwise used for commercial purposes   All illustrations and images included in CareNotes® are the copyrighted property of [employer]  or Kashif Deepak  Pacemaker   WHAT YOU NEED TO KNOW:   A pacemaker is a small, battery-powered device that is implanted into your chest to help regulate your heart rate  DISCHARGE INSTRUCTIONS:   Medicines:   · Pain medicine: You may need medicine to take away or decrease pain  ¨ Learn how to take your medicine  Ask what medicine and how much you should take  Be sure you know how, when, and how often to take it  ¨ Do not wait until the pain is severe before you take your medicine  Tell caregivers if your pain does not decrease  ¨ Pain medicine can make you dizzy or sleepy  Prevent falls by calling someone when you get out of bed or if you need help  · Antibiotics: This medicine is given to fight or prevent an infection caused by bacteria  Always take your antibiotics exactly as ordered by your healthcare provider  Do not stop taking your medicine unless directed by your healthcare provider  Never save antibiotics or take leftover antibiotics that were given to you for another illness  · Take your medicine as directed  Contact your healthcare provider if you think your medicine is not helping or if you have side effects  Tell him or her if you are allergic to any medicine  Keep a list of the medicines, vitamins, and herbs you take  Include the amounts, and when and why you take them  Bring the list or the pill bottles to follow-up visits  Carry your medicine list with you in case of an emergency  Follow up with your cardiologist after your procedure: You may need a follow-up visit 7 to 10 days after you leave the hospital  Your cardiologist will check your wound and make sure that your pacemaker is working correctly  Follow the instructions to check your pacemaker: Your cardiologist or healthcare provider will check your pacemaker and the battery regularly, usually every 3 to 6 months  He may do this in his office   He may also use a computer to check your pacemaker over the telephone between visits  Pacemaker batteries usually last 5 to 8 years  The pacemaker unit will be replaced when the battery gets low  This is a simpler procedure than the original one to implant your pacemaker  Heart rate checks:   · You may need to use a heart monitor at home after your procedure  This device may be called an event monitor, Holter monitor, or mobile telemetry  Monitoring may be done for a period of time, such as a week, or at regular intervals until your heart rhythm is regular  · You may be taught how to check your pulse on your wrist or on your neck  This allows you to monitor how your pacemaker is working  A normal heart beats 50 to 70 times a minute when you are resting  Ask what your pulse should be (your pacemaker's set rate)  Wound care:  Keep your incisions clean and dry for 7 to 10 days after your procedure  Ask your healthcare provider how to care for your incisions and when you can shower or bathe  Activity:   · Arm movement and lifting:  Be careful using the arm on the side of your pacemaker  Do not move your arm for the first 24 hours after your procedure  Do not  lift your arm above your shoulder or lift more than 10 pounds for 6 weeks after your procedure  This helps the leads stay in place and helps your wound heal  Ask your healthcare provider when you can drive after your procedure  · Sports:  Ask your healthcare provider when it is okay to play tennis, golf, basketball, or any sport that requires you to lift your arms  Do not play full contact sports, such as football, that could damage your pacemaker  Ask your cardiologist or healthcare provider how much and what kinds of physical activity are safe for you  Living with a pacemaker:   · Tell all healthcare providers you have a pacemaker: This includes surgeons, radiologists, and medical technicians   You may want to wear a medical alert ID bracelet or necklace that states that you have a pacemaker  · Carry your pacemaker ID card: Make sure you receive a pacemaker ID card  Carry it with you at all times  It lists important information about your pacemaker  Show it to airport security if you travel  · Avoid electrical interference:  Avoid welding equipment, MRI machines, and other equipment with large magnets or electric fields  These things could interfere with how your pacemaker works  Use your cell phone on the ear opposite from your pacemaker  Do not carry your cell phone in your shirt pocket over your chest      · Do not touch the skin around your pacemaker: This can cause damage to the lead wires or move the pacemaker unit from where it should be  Contact your cardiologist or healthcare provider if:   · The area around your pacemaker is painful or tender after surgery  · The skin around your stitches is red, swollen, or has drainage  This may mean that you have an infection  · You have a fever  · You have chills, a cough, and feel weak or achy  These are also signs of infection  · Your feet or ankles are swollen  Seek care immediately if:   · Your bandage becomes soaked with blood  · Your stitches open up  · You feel your heart suddenly beating very slowly or quickly  · You become too weak or dizzy to stand, or you pass out  · Your arm or leg feels warm, tender, and painful  It may look swollen and red  You have chest pain that does not go away with rest or medicine  · You feel lightheaded, short of breath, and have chest pain  You cough up blood  © 2017 [address] [employer] Information is for End User's use only and may not be sold, redistributed or otherwise used for commercial purposes  All illustrations and images included in CareNotes® are the copyrighted property of [employer]  or Kashif Sotelo  The above information is an  only  It is not intended as medical advice for individual conditions or treatments  Talk to your doctor, nurse or pharmacist before following any medical regimen to see if it is safe and effective for you

## 2021-05-13 NOTE — PROGRESS NOTES
Electrophysiology Regular Follow-up Visit    Interval history:     Ofelia Cushing presents for a follow-up  She continues to have symptoms of fatigue and dyspnea with minimal exertion  She reports compliance with her medications  She notes dizziness when standing up and has led to falling at times  She has not checked her BP at home as she does not have any cuff  She denies any chest pain, orthopnea, PND or syncope  Office visit 12/11/2020:    Anahi Armas presented for a follow-up visit  Her CT scan showed normal lung parenchyma  She continued to have dyspnea on exertion to walking 100 ft  She reported her heart rate has been low most of the time  She reported fatigue as well  Her symptoms were present prior to flecainide and metoprolol dose but has worsened after being on flecainide  We noted her heart rate did not increase with exertion and she felt dyspneic with walking  We discussed pacemaker but patient preferred to wait after COVID vaccine  Office visit 6/15/2020:  Anahi Armas had an echocardiogram that showed preserved RV and LV function  She continued to have fatigue mainly occurring with walking  She was planning to have CT scan of her chest which has been ordered by her oncologist   She also reported swelling in lower extremities  She limits drinking fluids as much as she can  She does urinate well that when she takes furosemide  Telemedicine visit 04/16/2020  Ofelia Cushing is continued to have fatigue  She reported progressive worsening dyspnea on exertion to walking 50 ft now  She reported gaining weight about 40 lbs since she last took chemotherapy in 2019  She had a MCOT monitor which showed no atrial fibrillation but short episode of AT  She also had stress test in April 2019 showing no ischemia  She recorded no PND, or orthopnea  She reports compliance with taking furosemide 40 mg daily   When she had lost 40 lbs during her chemotherapy for her breast cancer, she felt improvement in her fatigue and dyspnea on exertion  She stated last dose was in Feb 2019 and since then she has gained 40 lbs weight mostly diet related  Office visit 12/13/2019:     Patient reports doing better since her last visit with Dr Idalia Bazan  She states her palpitations have discontinued however she does experience occasional shortness of breath with exertion  She did admit experiencing vertigo and LE edema (right more then left) which have been chronic for the past several years however she continues to take Lasix and elevate her legs  We decided to monitor for her atrial arrhythmias with a loop recorder but it was denied by the insurance  MCOT monitor was ordered instead  HPI:   It was a pleasure to see Swati Mcdowell in our arrhythmia clinic at [place] [address]  As you know she is a 76 y  o   woman with history of lower extremity edema, long QT (460 millisecond), hypothyroidism, obesity, history of breast cancer status post surgery and chemotherapy, atrial fibrillation status post ablation with Dr Idalia Bazan 3 years ago now with few paroxysmal episodes  She was last seen in the office with Dr Idalia Bazan on September 18 2019  She was started on Eliquis, metoprolol succinate and flecainide after event monitor showed 2 hour of atrial fibrillation      Past Medical History:   Diagnosis Date    Abnormal chest CT     last assessed - [id number]    Abnormal glucose     Resolved - [id number]    Arrhythmia     Arthritis     Slight    BCC (basal cell carcinoma of skin)     last assessed - [id number]    [facility] cancer ([facility] ) 09/12/2016    rt    Cardiomyopathy ([facility] )     Cervical spinal stenosis     Discharge from left nipple     last assessed - [id number]    Disease of thyroid gland     hypothyroidism    Full dentures     Upper and Lower    History of atrial fibrillation     Homocysteinemia ([facility] )     Hypertension     Hypokalemia     Intraductal papilloma of breast     last assessed - [id number]    Malignant neoplasm of skin     basal cell on face, Moh's surgery    Memory loss     last assessed - [id number]    Obesity     Open wound     last assessed - [id number]    Osteopenia     Overactive bladder     Palmar plantar erythrodysaesthesia     Resolved - [id number]    Pulmonary nodule seen on imaging study     Rosacea     UTI (urinary tract infection)     recently    Vertigo     Vitamin D deficiency        Past Surgical History:   Procedure Laterality Date    APPENDECTOMY      ATRIAL ABLATION SURGERY      last assessed - [id number]   [facility] BACK SURGERY  1997    Lumbar spinal stenosis    BREAST BIOPSY      2014? left breast; 9/12/16 right breast x 3    BREAST BIOPSY Left 03/30/2010    BREAST SURGERY      needle bx      BREAST SURGERY Right 03/16/2017    excision of right chest wall lesion    CARDIAC ELECTROPHYSIOLOGY STUDY AND ABLATION      CARDIOVERSION      DENTAL SURGERY      HAND SURGERY      Joint replaced with silicone joint 5th finger right hand    HYSTERECTOMY  1993    Complete    LAMINECTOMY      Decompressive up to two lumbar segments    MASTECTOMY Right 10/28/2016    OOPHORECTOMY  1993    UT EXC SKIN MALIG <0 5 CM TRUNK,ARM,LEG Right 3/16/2017    Procedure: EXCISION CHEST WALL LESION; NEEDLE LOC @ 1400;  Surgeon: Veto Eisenmenger, MD;  Location: [facility];  Service: Surgical Oncology    UT INTRAOPERATIVE SENTINEL LYMPH NODE ID W DYE INJECTION Right 10/28/2016    Procedure: BREAST MASTECTOMY WITH SENTINAL LYMPH NODE BIOPSY; LYMPHSCINITIGRAPHY; LYMPHATIC MAPPING; 0800 NUC MED INJECTION;  Surgeon: Veto Eisenmenger, MD;  Location: [facility];  Service: Surgical Oncology    SENTINEL LYMPH NODE BIOPSY Right     US BREAST NEEDLE LOC RIGHT Right 3/16/2017    US GUIDANCE BREAST BIOPSY RIGHT EACH ADDITIONAL Right 9/12/2016    US GUIDANCE BREAST BIOPSY RIGHT EACH ADDITIONAL Right 9/12/2016    US GUIDED BREAST BIOPSY RIGHT COMPLETE Right 9/12/2016       Current Outpatient Medications   Medication Sig Dispense Refill    apixaban (ELIQUIS) 5 mg Take 1 tablet (5 mg total) by mouth 2 (two) times a day 60 tablet 6    cyanocobalamin (VITAMIN B-12) 500 mcg tablet Take 1 tablet by mouth daily      flecainide (TAMBOCOR) 100 mg tablet Take 0 5 tablets (50 mg total) by mouth 2 (two) times a day 180 tablet 3    furosemide (LASIX) 40 mg tablet Take 1 tablet (40 mg total) by mouth daily As directed 90 tablet 1    levothyroxine 150 mcg tablet Take 1 tablet (150 mcg total) by mouth daily Take 1 tablet 3 days a week and 1/2 tablet 4  Days a week 90 tablet 3    meclizine (ANTIVERT) 25 mg tablet Take 1 tablet (25 mg total) by mouth every 8 (eight) hours as needed for dizziness 30 tablet 3    metoprolol succinate (TOPROL-XL) 25 mg 24 hr tablet Take 0 5 tablets (12 5 mg total) by mouth daily 30 tablet 3    Naproxen Sodium (ALEVE) 220 MG CAPS Take 1 capsule (220 mg total) by mouth as needed (prn headache)      potassium chloride (MICRO-K) 10 MEQ CR capsule TAKE 1 CAPSULE BY MOUTH EVERY DAY 90 capsule 1    RESTASIS 0 05 % ophthalmic emulsion Administer 1 drop to both eyes every 12 (twelve) hours      midodrine (PROAMATINE) 2 5 mg tablet Take 1 tablet (2 5 mg total) by mouth 3 (three) times a day 90 tablet 3     No current facility-administered medications for this visit  Allergies   Allergen Reactions    Morphine And Related GI Intolerance    Oxycodone-Acetaminophen Vomiting       Review of Systems   Constitutional: Positive for fatigue  Negative for chills and fever  Eyes: Negative for discharge and visual disturbance  Respiratory: Positive for shortness of breath  Negative for wheezing  Cardiovascular: Positive for leg swelling  Gastrointestinal: Negative for abdominal pain, nausea and vomiting  Endocrine: Negative  Genitourinary: Negative  Musculoskeletal: Negative  Negative for arthralgias  Skin: Negative  Negative for rash  Allergic/Immunologic: Negative      Neurological: Positive for dizziness  Falls due to dizziness   Psychiatric/Behavioral: Negative  The patient is not nervous/anxious  Video Exam    Vitals:    05/13/21 1118 05/13/21 1130 05/13/21 1131   BP: 110/60 106/60 (!) 88/60   BP Location: Left arm Left arm Left arm   Patient Position: Sitting Supine Standing   Cuff Size: Standard     Weight: 76 7 kg (169 lb)     Height: 5' 2 5" (1 588 m)        Baseline weight 175-180 lbs    Objective:   BP (!) 88/60 (BP Location: Left arm, Patient Position: Standing)   Ht 5' 2 5" (1 588 m)   Wt 76 7 kg (169 lb)   BMI 30 42 kg/m²      Orthostatic + during visit  Physical Exam:  GEN: NAD, Alert and oriented, well appearing  HEENT:Head, neck, ears, oral pharynx: Mucus membranes moist, oral pharynx clear, nares clear  External ears normal  EYES: Pupils equal, sclera anicteric  NECK: No JVD  CARDIOVASCULAR: Regular, bradycardic, No murmur, rub, gallops S1,S2  LUNGS: Clear To auscultation bilaterally  ABDOMEN: Soft, nondistended  EXTREMITIES/VASCULAR: No edema  PSYCH: Normal Affect  NEURO: Grossly intact, moving all extremiteis equal, face symetric  HEME: No bleeding, bruising, petechia  SKIN: No significant rashes     With walking, HR stayed in 50s, and O2 decreased to 90  Labs & Results:  Below is the patient's most recent value for Albumin, ALT, AST, BUN, Calcium, Chloride, Cholesterol, CO2, Creatinine, GFR, Glucose, HDL, Hematocrit, Hemoglobin, Hemoglobin A1C, LDL, Magnesium, Phosphorus, Platelets, Potassium, PSA, Sodium, Triglycerides, and WBC     Lab Results   Component Value Date    ALT 8 12/21/2020    AST 23 12/21/2020    BUN 16 12/21/2020    CALCIUM 9 0 08/14/2018     12/21/2020    CHOL 110 09/07/2017    CO2 26 12/21/2020    CREATININE 1 14 (H) 12/21/2020    HDL 41 12/21/2020    HCT 39 2 12/21/2020    HGB 13 1 12/21/2020    HGBA1C 5 5 12/21/2020    MG 2 2 01/16/2019     12/21/2020    K 4 4 12/21/2020     09/07/2017     09/07/2017    TRIG 88 12/21/2020 WBC 3 8 2020     Note: for a comprehensive list of the patient's lab results, access the Results Review activity  Cardiac testing:   ECG performed in the office reveals sinus bradycardia at 53 beats per minute    MCOT results 3/5 to 4/3/2020   40 events were transmitted  0 symptomatic; 40 device triggered   Patient monitored for 26d 14h 2m   SVT occurred 4 time(s) with fastest run 147 BPM, longest duration 7 beats    128,178 PACs with PAC burden of 6%   169 PVCs with PVC burden of <1%    Echocardiograms:  Results for orders placed during the hospital encounter of 19   Echo complete with contrast if indicated    Narrative [address]  [facility], [address] [place] [address]    Transthoracic Echocardiogram  2D, M-mode, Doppler, and Color Doppler    Study date:  2019    Patient: Estephanie Shoemaker  MR number: [id number]  Account number: [de-identified]  : 1945  Age: 68 years  Gender: Female  Status: Outpatient  Location: [address] [place] [address] and Vascular Center  Height: 62 in  Weight: 178 6 lb  BP: 138/ 74 mmHg    Indications: Shortness of breath, lower exremity edema, PAF  Diagnoses: I48 0 - Atrial fibrillation, R06 02 - Shortness of breath    Sonographer:  CARLOS [facility] RVT  Primary Physician:  Kinsey Harvey MD  Referring Physician:  SHADI Silva  Group:  [facility]  Interpreting Physician:  Masood Arnold MD    SUMMARY    LEFT VENTRICLE:  Systolic function was normal  Ejection fraction was estimated to be 65 %  There were no regional wall motion abnormalities  Doppler parameters were consistent with abnormal left ventricular relaxation (grade 1 diastolic dysfunction)  LEFT ATRIUM:  The atrium was mildly dilated  MITRAL VALVE:  There was mild annular calcification  There was mild regurgitation  AORTIC VALVE:  There was mild regurgitation      TRICUSPID VALVE:  There was mild to moderate regurgitation  HISTORY: PRIOR HISTORY: Right breast cancer, right mastectomy  PRIOR PROCEDURES: Arrhythmia ablation  PROCEDURE: The study was performed in the [facility]  This was a routine study  The transthoracic approach was used  The study included complete 2D imaging, M-mode, complete spectral Doppler, and color Doppler  Images were obtained from the parasternal, apical, subcostal, and suprasternal notch acoustic windows  Image quality was adequate  LEFT VENTRICLE: Size was normal  Systolic function was normal  Ejection fraction was estimated to be 65 %  There were no regional wall motion abnormalities  Wall thickness was normal  DOPPLER: Doppler parameters were consistent with  abnormal left ventricular relaxation (grade 1 diastolic dysfunction)  RIGHT VENTRICLE: The size was normal  Systolic function was normal  Wall thickness was normal     LEFT ATRIUM: The atrium was mildly dilated  RIGHT ATRIUM: Size was normal     MITRAL VALVE: There was mild annular calcification  Valve structure was normal  There was normal leaflet separation  DOPPLER: The transmitral velocity was within the normal range  There was no evidence for stenosis  There was mild  regurgitation  AORTIC VALVE: The valve was trileaflet  Leaflets exhibited normal thickness, normal cuspal separation, and sclerosis  DOPPLER: Transaortic velocity was within the normal range  There was no evidence for stenosis  There was mild  regurgitation  TRICUSPID VALVE: The valve structure was normal  There was normal leaflet separation  DOPPLER: The transtricuspid velocity was within the normal range  There was no evidence for stenosis  There was mild to moderate regurgitation  Pulmonary  artery systolic pressure was mildly increased  PULMONIC VALVE: Leaflets exhibited normal thickness, no calcification, and normal cuspal separation  DOPPLER: The transpulmonic velocity was within the normal range   There was no significant regurgitation  PERICARDIUM: There was no pericardial effusion  The pericardium was normal in appearance  AORTA: The root exhibited normal size  SYSTEMIC VEINS: IVC: The inferior vena cava was normal in size  PULMONARY VEINS: DOPPLER: There was systolic blunting in the pulmonary vein(s)      SYSTEM MEASUREMENT TABLES    2D  %FS: 31 66 %  Ao Diam: 2 23 cm  EDV(Teich): 105 22 ml  EF(Teich): 59 57 %  ESV(Cube): 34 34 ml  ESV(Teich): 42 54 ml  IVSd: 0 81 cm  LA Area: 21 18 cm2  LA Diam: 3 68 cm  LVEDV MOD A4C: 79 17 ml  LVEF MOD A4C: 66 73 %  LVESV MOD A4C: 26 34 ml  LVIDd: 4 76 cm  LVIDs: 3 25 cm  LVLd A4C: 6 9 cm  LVLs A4C: 5 58 cm  LVPWd: 0 76 cm  RA Area: 13 38 cm2  RV Diam : 3 39 cm  SI(Cube): 40 02 ml/m2  SI(Teich): 34 25 ml/m2  SV MOD A4C: 52 83 ml  SV(Cube): 73 23 ml  SV(Teich): 62 68 ml    CW  AR Dec Bonner: 2 07 m/s2  AR Dec Time: 2636 88 ms  AR PHT: 764 69 ms  AR Vmax: 4 56 m/s  AR maxP 02 mmHg  TR Vmax: 3 1 m/s  TR maxP 41 mmHg    MM  TAPSE: 2 23 cm    PW  E': 0 06 m/s  E/E': 15 62  MV A Obed: 0 32 m/s  MV Dec Bonner: 2 53 m/s2  MV DecT: 397 07 ms  MV E Obed: 1 m/s  MV E/A Ratio: 3 16    [facility] Accredited Echocardiography Laboratory    Prepared and electronically signed by    Linda Martell MD  Signed 2019 12:44:18       Results for orders placed during the hospital encounter of 16   YANIV    Narrative [facility] [address] [place] [id number]  [phone]    Transesophageal Echocardiogram  2D, Doppler, and Color Doppler    Study date:  12-Aug-2016    Patient: Edwin Cooks  MR number: [id number]  Account number: [de-identified]  : [id number]  Age: 79 years  Gender: Female  Status: Outpatient  Location: Cath lab  Height: 62 in  Weight: 190 lb  BP: 148/ 97 mmHg    Indications: Afib    Diagnoses: I48 0 - Atrial fibrillation    Sonographer:  SOLE Campos  Primary Physician:  Harini Fuchs MD  Referring Physician:  Chris Rao MD  Group:  [facility]  Interpreting Physician:  Chris Rao MD    SUMMARY    LEFT VENTRICLE:  Systolic function was mildly reduced  Ejection fraction was estimated to be 50  %  LEFT ATRIUM:  The atrium was dilated  LEFT ATRIAL APPENDAGE:  The appendage was dilated  No thrombus was identified  ATRIAL SEPTUM:  No defect or patent foramen ovale was identified  RIGHT ATRIUM:  The atrium was dilated  MITRAL VALVE:  There was mild regurgitation  AORTIC VALVE:  There was mild regurgitation  TRICUSPID VALVE:  There was moderate to severe regurgitation  PULMONIC VALVE:  There was trace regurgitation  HISTORY: PRIOR HISTORY: Obesity, Afib, Edema, Cardiomyopathy, HTN    PROCEDURE: The procedure was performed in the catheterization laboratory  This  was a routine study  The risks and alternatives of the procedure were explained  to the patient and informed consent was obtained  The transesophageal approach  was used  The study included complete 2D imaging, complete spectral Doppler,  and color Doppler  The heart rate was 118 bpm, at the start of the study  An  adult omniplane probe was inserted by the attending cardiologist  Intubated  with ease  One intubation attempt(s)  There was no blood detected on the probe  Image quality was adequate  MEDICATIONS: Anesthesia  LEFT VENTRICLE: Size was normal  Systolic function was mildly reduced  Ejection  fraction was estimated to be 50 %  This study was inadequate for the evaluation  of regional wall motion  Wall thickness was normal     RIGHT VENTRICLE: The size was normal  Systolic function was low normal     LEFT ATRIUM: The atrium was dilated  APPENDAGE: The appendage was dilated  No  thrombus was identified  ATRIAL SEPTUM: No defect or patent foramen ovale was identified  RIGHT ATRIUM: The atrium was dilated      MITRAL VALVE: Valve structure was normal  There was normal leaflet separation  DOPPLER: The transmitral velocity was within the normal range  There was no  evidence for stenosis  There was mild regurgitation  AORTIC VALVE: The valve was trileaflet  Leaflets exhibited normal thickness and  normal cuspal separation  DOPPLER: Transaortic velocity was within the normal  range  There was no evidence for stenosis  There was mild regurgitation  TRICUSPID VALVE: The valve structure was normal  There was normal leaflet  separation  DOPPLER: There was moderate to severe regurgitation  Estimated peak  PA pressure was 40 mmHg  PULMONIC VALVE: Leaflets exhibited normal thickness, no calcification, and  normal cuspal separation  DOPPLER: There was trace regurgitation  PERICARDIUM: There was no pericardial effusion  AORTA: The root exhibited normal size  The ascending aorta was normal in size  There was no significant atheroma  PULMONARY VEINS: DOPPLER: Doppler flow pattern was normal in the pulmonary  vein(s)  [address] Accredited Echocardiography Laboratory    Prepared and electronically signed by    Jake Chew MD  Signed [id number] 17:28:53         Catheterizations:   No results found for this or any previous visit      Stress Tests:  Results for orders placed during the hospital encounter of 19   NM myocardial perfusion spect (stress and/or rest)    [place] [address] [employer] [address]    Stress Gated SPECT Myocardial Perfusion Imaging after Regadenoson    Patient: Heriberto Cherry  MR number: [id number]  Account number: [de-identified]  : 1945  Age: 68 years  Gender: Female  Status: Outpatient  Location: [address] [place] [address] Vascular Center  Height: 62 in  Weight: 178 lb  BP: 141/ 80 mmHg    Allergies: MORPHINE AND RELATED, OXYCODONE-ACETAMINOPHEN    Diagnosis: R06 09 - Other forms of dyspnea    Primary Physician:  Gene Deng MD  RN:  Charisse Jamil RN  Referring Physician:  Martin Bauer Kathy Vasquez  Technician:  Christy Kline  Group:  [facility]  Report Prepared By[de-identified]  Elizabeth Arroyo RN  Interpreting Physician:  Negin Zuniga MD    INDICATIONS: Detection of coronary artery disease  HISTORY: Hx right breast CA The patient is a 68year old  female  Chest pain status: chest pain  Other symptoms: dyspnea and lower extremity edema  Coronary artery disease risk factors: dyslipidemia, hypertension, family history  of premature coronary artery disease, and post-menopausal state  Cardiovascular history: arrhythmia(hx atrial fibrillation/PAF) Medications: include a beta blocker, a diuretic, and thyroid medications  Previous test results: abnormal ECG  PHYSICAL EXAM: Baseline physical exam screening: no wheezes audible  REST ECG: Sinus bradycardia ST segments and T waves were normal  The ECG showed occasional premature atrial contractions  PROCEDURE: The study was performed in the the [facility] and [facility]  A regadenoson infusion pharmacologic stress test was performed  Gated SPECT myocardial perfusion imaging was performed during stress  Systolic blood  pressure was 141 mmHg, at the start of the study  Diastolic blood pressure was 80 mmHg, at the start of the study  The heart rate was 57 bpm, at the start of the study  Regadenoson protocol:  HR bpm SBP mmHg DBP mmHg Symptoms  Baseline 57 141 80 none  Immediate 100 146 82 dyspnea, fatigue  1 min 98 155 86 subsiding  2 min 69 153 85 none  The patient also performed low level exercise  STRESS SUMMARY: Duration of pharmacologic stress was 3 min and 0 sec  Maximal heart rate during stress was 101 bpm  The rate-pressure product for the peak heart rate and blood pressure was 46113  There was no chest pain during stress  The  stress test was terminated due to protocol completion  Pre oxygen saturation: 99 %  Peak oxygen saturation: 95 %   The stress ECG was negative for ischemia and normal  There were no stress arrhythmias or conduction abnormalities  ISOTOPE ADMINISTRATION:  The radiopharmaceutical was injected at the peak effect of pharmacologic stress  PERFUSION DEFECTS:  -  There were no perfusion defects  GATED SPECT:  The calculated left ventricular ejection fraction was 72 %  There was no left ventricular regional abnormality  SUMMARY:  -  Stress results: There was no chest pain during stress  -  ECG conclusions: The stress ECG was negative for ischemia and normal   -  Perfusion imaging: There were no perfusion defects   -  Gated SPECT: The calculated left ventricular ejection fraction was 72 %  There was no left ventricular regional abnormality  IMPRESSIONS: Normal study after pharmacologic vasodilation without reproduction of symptoms  Myocardial perfusion imaging was normal at rest and with stress  Prepared and signed by    Yonathan Regan MD  Signed 05/01/2019 09:21:35         Holter monitor -   No results found for this or any previous visit  Results for orders placed during the hospital encounter of 04/30/19   Holter monitor - 24 hour    Narrative INDICATIONS:  Paroxysmal atrial fibrillation status post prior ablation    DESCRIPTION OF FINDINGS:  The patient was monitored for a total of 24 hours  The patient was   predominantly noted to be in normal sinus rhythm with paroxysmal atrial   fibrillation throughout the study  The average heart rate was 69 beats   per minute  The heart rate ranged from a low of 46 beats per minute in   a m  at 954 to a maximum of 122 beats per minute in a m  at 930  Ventricular ectopic activity consisted of 127 (0 1 % of total beats)  There was no sustained or nonsustained ventricular tachycardia  Supraventricular ectopic activity consisted of 2441 (2 4 % of total   beats)  Patient was noted to have 45 SVT runs  The longest run  occurred   at 4:57 p m and lasted for 1 minutes and 30 seconds    The run demonstrated   atrial fibrillation with a rapid ventricular response up to a rate of 170  When the run broke there was a 1 second interval then a junctional complex   and then sinus rhythm  There were multiple other episodes of brief   paroxysmal atrial fibrillation with rapid ventricular response  One run   was nine complexes  One run was 15 complexes  There were no significant pauses  The longest R-R interval was 1 6   seconds  There was no evidence of advanced degree heart block  All there were no symptoms reported during the monitoring interval         Impression 1  Predominantly normal sinus rhythm throughout the monitoring interval   with brief paroxysms of atrial fibrillation noted  The longest interval   of time in atrial fibrillation was about 1 minutes and 30 seconds  The   ventricular response to atrial fibrillation was rapid  None of the   arrhythmia was associated with symptoms  2  Low-density of nonsustained premature supraventricular ectopy  3  No significant bradycardia  4  No symptoms in general noted during the monitoring interval   5  Clinical correlation is advised  ASSESSMENT/PLAN:  1  HTN  - Normotensive in the office  2  Obesity  - Definitely contributing to fatigue and dyspnea on exertion as she does not sound like in CHF  - Advised her to loose weight for 10 lbs every 6 months  -Lost 8 lbs since January 2021 though still feels symptoms     3  Dyspnea on exertion/fatigue  - Unlikely to have CHF exacerbation as no swelling in legs, no orthopnea, and no PND  - No arrhythmias on MCOT monitor either  - Could due to weight gain of 40 lbs over past year  - She did feel better when she had lost 40 lbs during her chemotherapy  - Advised her to loose weight again  -echo showed normal LV and RV function  -CT chest showed normal parynchema   - Could also be due to bradycardia     4   Sinus node dysfunction  - Sinus bradycardia noted at rest  - Did obtain exercise test and HR did increase however in the office, she felt dyspnea on exertion to walking 100 ft, and HR did not increase beyond 50s  - we decreased her metoprolol to 12 5 mg daily and decrease flecainide to 50 mg twice daily however patient continues to feel symptoms   - Discussed pacemaker procedure and answered questions risks/benefits  She is in agreement to proceed with it  5  Adenocarcinoma of breast s/p chemotherapy  - No evidence of recurrence     6  Hypothyroidism  -  Free T4 normal in January 2019    7  Paroxysmal atrial fibrillation  - Ablation performed 3 years ago  - had palpitations and recurrenced on cardiac event monitor  - Started on metoprolol and flecainide  - MCOT monitor shows no AFIB, brief AT episodes   - Due to bradycardia and poor chronotropic impotence, reduced metoprolol and flecainide  - discussed pacemaker as well; she is in agreement now  She is welcome to call our office for any questions or concerns  Thank you for allowing me to participate in the care of this patient  Please do not hesitate to contact me for any questions        Follow-up after pacemaker placement

## 2021-05-13 NOTE — H&P (VIEW-ONLY)
Electrophysiology Regular Follow-up Visit    Interval history:     Artur Mendez presents for a follow-up  She continues to have symptoms of fatigue and dyspnea with minimal exertion  She reports compliance with her medications  She notes dizziness when standing up and has led to falling at times  She has not checked her BP at home as she does not have any cuff  She denies any chest pain, orthopnea, PND or syncope  Office visit 12/11/2020:    Juliet Chery presented for a follow-up visit  Her CT scan showed normal lung parenchyma  She continued to have dyspnea on exertion to walking 100 ft  She reported her heart rate has been low most of the time  She reported fatigue as well  Her symptoms were present prior to flecainide and metoprolol dose but has worsened after being on flecainide  We noted her heart rate did not increase with exertion and she felt dyspneic with walking  We discussed pacemaker but patient preferred to wait after COVID vaccine  Office visit 6/15/2020:  Juliet Chery had an echocardiogram that showed preserved RV and LV function  She continued to have fatigue mainly occurring with walking  She was planning to have CT scan of her chest which has been ordered by her oncologist   She also reported swelling in lower extremities  She limits drinking fluids as much as she can  She does urinate well that when she takes furosemide  Telemedicine visit 04/16/2020  Artur Mendez is continued to have fatigue  She reported progressive worsening dyspnea on exertion to walking 50 ft now  She reported gaining weight about 40 lbs since she last took chemotherapy in 2019  She had a MCOT monitor which showed no atrial fibrillation but short episode of AT  She also had stress test in April 2019 showing no ischemia  She recorded no PND, or orthopnea  She reports compliance with taking furosemide 40 mg daily   When she had lost 40 lbs during her chemotherapy for her breast cancer, she felt improvement in her fatigue and dyspnea on exertion  She stated last dose was in Feb 2019 and since then she has gained 40 lbs weight mostly diet related  Office visit 12/13/2019:     Patient reports doing better since her last visit with Dr Aleksandar Sparks  She states her palpitations have discontinued however she does experience occasional shortness of breath with exertion  She did admit experiencing vertigo and LE edema (right more then left) which have been chronic for the past several years however she continues to take Lasix and elevate her legs  We decided to monitor for her atrial arrhythmias with a loop recorder but it was denied by the insurance  MCOT monitor was ordered instead  HPI:   It was a pleasure to see Vish Rizzo in our arrhythmia clinic at [place] [address]  As you know she is a 76 y  o   woman with history of lower extremity edema, long QT (460 millisecond), hypothyroidism, obesity, history of breast cancer status post surgery and chemotherapy, atrial fibrillation status post ablation with Dr Aleksadnar Sparks 3 years ago now with few paroxysmal episodes  She was last seen in the office with Dr Aleksandar Sparks on September 18 2019  She was started on Eliquis, metoprolol succinate and flecainide after event monitor showed 2 hour of atrial fibrillation      Past Medical History:   Diagnosis Date    Abnormal chest CT     last assessed - [id number]    Abnormal glucose     Resolved - [id number]    Arrhythmia     Arthritis     Slight    BCC (basal cell carcinoma of skin)     last assessed - [id number]    [facility]2016    rt    Cardiomyopathy ([facility] )     Cervical spinal stenosis     Discharge from left nipple     last assessed - [id number]    Disease of thyroid gland     hypothyroidism    Full dentures     Upper and Lower    History of atrial fibrillation     Homocysteinemia ([facility] )     Hypertension     Hypokalemia     Intraductal papilloma of breast     last assessed - [id number]    Malignant neoplasm of skin     basal cell on face, Moh's surgery    Memory loss     last assessed - [id number]    Obesity     Open wound     last assessed - [id number]    Osteopenia     Overactive bladder     Palmar plantar erythrodysaesthesia     Resolved - [id number]    Pulmonary nodule seen on imaging study     Rosacea     UTI (urinary tract infection)     recently    Vertigo     Vitamin D deficiency        Past Surgical History:   Procedure Laterality Date    APPENDECTOMY      ATRIAL ABLATION SURGERY      last assessed - [id number] Junie Nine BACK SURGERY  1997    Lumbar spinal stenosis    BREAST BIOPSY      2014? left breast; 9/12/16 right breast x 3    BREAST BIOPSY Left 03/30/2010    BREAST SURGERY      needle bx      BREAST SURGERY Right 03/16/2017    excision of right chest wall lesion    CARDIAC ELECTROPHYSIOLOGY STUDY AND ABLATION      CARDIOVERSION      DENTAL SURGERY      HAND SURGERY      Joint replaced with silicone joint 5th finger right hand    HYSTERECTOMY  1993    Complete    LAMINECTOMY      Decompressive up to two lumbar segments    MASTECTOMY Right 10/28/2016    OOPHORECTOMY  1993    [place] EXC SKIN MALIG <0 5 CM TRUNK,ARM,LEG Right 3/16/2017    Procedure: EXCISION CHEST WALL LESION; NEEDLE LOC @ 1400;  Surgeon: Susanne Yoder MD;  Location: [facility];  Service: Surgical Oncology    AZ INTRAOPERATIVE SENTINEL LYMPH NODE ID W DYE INJECTION Right 10/28/2016    Procedure: BREAST MASTECTOMY WITH SENTINAL LYMPH NODE BIOPSY; LYMPHSCINITIGRAPHY; LYMPHATIC MAPPING; 0800 NUC MED INJECTION;  Surgeon: Susanne Yoder MD;  Location: [facility];  Service: Surgical Oncology    SENTINEL LYMPH NODE BIOPSY Right     US BREAST NEEDLE LOC RIGHT Right 3/16/2017    US GUIDANCE BREAST BIOPSY RIGHT EACH ADDITIONAL Right 9/12/2016    US GUIDANCE BREAST BIOPSY RIGHT EACH ADDITIONAL Right 9/12/2016    US GUIDED BREAST BIOPSY RIGHT COMPLETE Right 9/12/2016       Current Outpatient Medications   Medication Sig Dispense Refill    apixaban (ELIQUIS) 5 mg Take 1 tablet (5 mg total) by mouth 2 (two) times a day 60 tablet 6    cyanocobalamin (VITAMIN B-12) 500 mcg tablet Take 1 tablet by mouth daily      flecainide (TAMBOCOR) 100 mg tablet Take 0 5 tablets (50 mg total) by mouth 2 (two) times a day 180 tablet 3    furosemide (LASIX) 40 mg tablet Take 1 tablet (40 mg total) by mouth daily As directed 90 tablet 1    levothyroxine 150 mcg tablet Take 1 tablet (150 mcg total) by mouth daily Take 1 tablet 3 days a week and 1/2 tablet 4  Days a week 90 tablet 3    meclizine (ANTIVERT) 25 mg tablet Take 1 tablet (25 mg total) by mouth every 8 (eight) hours as needed for dizziness 30 tablet 3    metoprolol succinate (TOPROL-XL) 25 mg 24 hr tablet Take 0 5 tablets (12 5 mg total) by mouth daily 30 tablet 3    Naproxen Sodium (ALEVE) 220 MG CAPS Take 1 capsule (220 mg total) by mouth as needed (prn headache)      potassium chloride (MICRO-K) 10 MEQ CR capsule TAKE 1 CAPSULE BY MOUTH EVERY DAY 90 capsule 1    RESTASIS 0 05 % ophthalmic emulsion Administer 1 drop to both eyes every 12 (twelve) hours      midodrine (PROAMATINE) 2 5 mg tablet Take 1 tablet (2 5 mg total) by mouth 3 (three) times a day 90 tablet 3     No current facility-administered medications for this visit  Allergies   Allergen Reactions    Morphine And Related GI Intolerance    Oxycodone-Acetaminophen Vomiting       Review of Systems   Constitutional: Positive for fatigue  Negative for chills and fever  Eyes: Negative for discharge and visual disturbance  Respiratory: Positive for shortness of breath  Negative for wheezing  Cardiovascular: Positive for leg swelling  Gastrointestinal: Negative for abdominal pain, nausea and vomiting  Endocrine: Negative  Genitourinary: Negative  Musculoskeletal: Negative  Negative for arthralgias  Skin: Negative  Negative for rash  Allergic/Immunologic: Negative      Neurological: Positive for dizziness  Falls due to dizziness   Psychiatric/Behavioral: Negative  The patient is not nervous/anxious  Video Exam    Vitals:    05/13/21 1118 05/13/21 1130 05/13/21 1131   BP: 110/60 106/60 (!) 88/60   BP Location: Left arm Left arm Left arm   Patient Position: Sitting Supine Standing   Cuff Size: Standard     Weight: 76 7 kg (169 lb)     Height: 5' 2 5" (1 588 m)        Baseline weight 175-180 lbs    Objective:   BP (!) 88/60 (BP Location: Left arm, Patient Position: Standing)   Ht 5' 2 5" (1 588 m)   Wt 76 7 kg (169 lb)   BMI 30 42 kg/m²      Orthostatic + during visit  Physical Exam:  GEN: NAD, Alert and oriented, well appearing  HEENT:Head, neck, ears, oral pharynx: Mucus membranes moist, oral pharynx clear, nares clear  External ears normal  EYES: Pupils equal, sclera anicteric  NECK: No JVD  CARDIOVASCULAR: Regular, bradycardic, No murmur, rub, gallops S1,S2  LUNGS: Clear To auscultation bilaterally  ABDOMEN: Soft, nondistended  EXTREMITIES/VASCULAR: No edema  PSYCH: Normal Affect  NEURO: Grossly intact, moving all extremiteis equal, face symetric  HEME: No bleeding, bruising, petechia  SKIN: No significant rashes     With walking, HR stayed in 50s, and O2 decreased to 90  Labs & Results:  Below is the patient's most recent value for Albumin, ALT, AST, BUN, Calcium, Chloride, Cholesterol, CO2, Creatinine, GFR, Glucose, HDL, Hematocrit, Hemoglobin, Hemoglobin A1C, LDL, Magnesium, Phosphorus, Platelets, Potassium, PSA, Sodium, Triglycerides, and WBC     Lab Results   Component Value Date    ALT 8 12/21/2020    AST 23 12/21/2020    BUN 16 12/21/2020    CALCIUM 9 0 08/14/2018     12/21/2020    CHOL 110 09/07/2017    CO2 26 12/21/2020    CREATININE 1 14 (H) 12/21/2020    HDL 41 12/21/2020    HCT 39 2 12/21/2020    HGB 13 1 12/21/2020    HGBA1C 5 5 12/21/2020    MG 2 2 01/16/2019     12/21/2020    K 4 4 12/21/2020     09/07/2017     09/07/2017    TRIG 88 12/21/2020 WBC 3 8 2020     Note: for a comprehensive list of the patient's lab results, access the Results Review activity  Cardiac testing:   ECG performed in the office reveals sinus bradycardia at 53 beats per minute    MCOT results 3/5 to 4/3/2020   40 events were transmitted  0 symptomatic; 40 device triggered   Patient monitored for 26d 14h 2m   SVT occurred 4 time(s) with fastest run 147 BPM, longest duration 7 beats    128,178 PACs with PAC burden of 6%   169 PVCs with PVC burden of <1%    Echocardiograms:  Results for orders placed during the hospital encounter of 19   Echo complete with contrast if indicated    Narrative [address]  [facility], [address] [place] [address]    Transthoracic Echocardiogram  2D, M-mode, Doppler, and Color Doppler    Study date:  2019    Patient: Danica Fontenot  MR number: [id number]  Account number: [de-identified]  : 1945  Age: 68 years  Gender: Female  Status: Outpatient  Location: [address] [place] [address] and Vascular Center  Height: 62 in  Weight: 178 6 lb  BP: 138/ 74 mmHg    Indications: Shortness of breath, lower exremity edema, PAF  Diagnoses: I48 0 - Atrial fibrillation, R06 02 - Shortness of breath    Sonographer:  CARLOS Urias RDCS RVT  Primary Physician:  Dimas Mao MD  Referring Physician:  SHADI Little  Group:  [facility]  Interpreting Physician:  Dannie Pham MD    SUMMARY    LEFT VENTRICLE:  Systolic function was normal  Ejection fraction was estimated to be 65 %  There were no regional wall motion abnormalities  Doppler parameters were consistent with abnormal left ventricular relaxation (grade 1 diastolic dysfunction)  LEFT ATRIUM:  The atrium was mildly dilated  MITRAL VALVE:  There was mild annular calcification  There was mild regurgitation  AORTIC VALVE:  There was mild regurgitation      TRICUSPID VALVE:  There was mild to moderate regurgitation  HISTORY: PRIOR HISTORY: Right breast cancer, right mastectomy  PRIOR PROCEDURES: Arrhythmia ablation  PROCEDURE: The study was performed in the [facility]  This was a routine study  The transthoracic approach was used  The study included complete 2D imaging, M-mode, complete spectral Doppler, and color Doppler  Images were obtained from the parasternal, apical, subcostal, and suprasternal notch acoustic windows  Image quality was adequate  LEFT VENTRICLE: Size was normal  Systolic function was normal  Ejection fraction was estimated to be 65 %  There were no regional wall motion abnormalities  Wall thickness was normal  DOPPLER: Doppler parameters were consistent with  abnormal left ventricular relaxation (grade 1 diastolic dysfunction)  RIGHT VENTRICLE: The size was normal  Systolic function was normal  Wall thickness was normal     LEFT ATRIUM: The atrium was mildly dilated  RIGHT ATRIUM: Size was normal     MITRAL VALVE: There was mild annular calcification  Valve structure was normal  There was normal leaflet separation  DOPPLER: The transmitral velocity was within the normal range  There was no evidence for stenosis  There was mild  regurgitation  AORTIC VALVE: The valve was trileaflet  Leaflets exhibited normal thickness, normal cuspal separation, and sclerosis  DOPPLER: Transaortic velocity was within the normal range  There was no evidence for stenosis  There was mild  regurgitation  TRICUSPID VALVE: The valve structure was normal  There was normal leaflet separation  DOPPLER: The transtricuspid velocity was within the normal range  There was no evidence for stenosis  There was mild to moderate regurgitation  Pulmonary  artery systolic pressure was mildly increased  PULMONIC VALVE: Leaflets exhibited normal thickness, no calcification, and normal cuspal separation  DOPPLER: The transpulmonic velocity was within the normal range   There was no significant regurgitation  PERICARDIUM: There was no pericardial effusion  The pericardium was normal in appearance  AORTA: The root exhibited normal size  SYSTEMIC VEINS: IVC: The inferior vena cava was normal in size  PULMONARY VEINS: DOPPLER: There was systolic blunting in the pulmonary vein(s)      SYSTEM MEASUREMENT TABLES    2D  %FS: 31 66 %  Ao Diam: 2 23 cm  EDV(Teich): 105 22 ml  EF(Teich): 59 57 %  ESV(Cube): 34 34 ml  ESV(Teich): 42 54 ml  IVSd: 0 81 cm  LA Area: 21 18 cm2  LA Diam: 3 68 cm  LVEDV MOD A4C: 79 17 ml  LVEF MOD A4C: 66 73 %  LVESV MOD A4C: 26 34 ml  LVIDd: 4 76 cm  LVIDs: 3 25 cm  LVLd A4C: 6 9 cm  LVLs A4C: 5 58 cm  LVPWd: 0 76 cm  RA Area: 13 38 cm2  RV Diam : 3 39 cm  SI(Cube): 40 02 ml/m2  SI(Teich): 34 25 ml/m2  SV MOD A4C: 52 83 ml  SV(Cube): 73 23 ml  SV(Teich): 62 68 ml    CW  AR Dec Crenshaw: 2 07 m/s2  AR Dec Time: 2636 88 ms  AR PHT: 764 69 ms  AR Vmax: 4 56 m/s  AR maxP 02 mmHg  TR Vmax: 3 1 m/s  TR maxP 41 mmHg    MM  TAPSE: 2 23 cm    PW  E': 0 06 m/s  E/E': 15 62  MV A Obed: 0 32 m/s  MV Dec Crenshaw: 2 53 m/s2  MV DecT: 397 07 ms  MV E Obed: 1 m/s  MV E/A Ratio: 3 16    [facility] Accredited Echocardiography Laboratory    Prepared and electronically signed by    Tiffanie Beckett MD  Signed 2019 12:44:18       Results for orders placed during the hospital encounter of 16   YANIV    N[facility]ative [facility] [address] [place] [address] [phone]    Transesophageal Echocardiogram  2D, Doppler, and Color Doppler    Study date:  12-Aug-2016    Patient: Humaira Red  MR number: [id number]  Account number: [de-identified]  : [id number]  Age: 79 years  Gender: Female  Status: Outpatient  Location: Cath lab  Height: 62 in  Weight: 190 lb  BP: 148/ 97 mmHg    Indications: Afib    Diagnoses: I48 0 - Atrial fibrillation    Sonographer:  SOLE Chung  Primary Physician:  Marybelle Homans, MD  Referring Physician:  Fannie Arteaga MD  Group:  [facility]  Interpreting Physician:  Fannie Arteaga MD    SUMMARY    LEFT VENTRICLE:  Systolic function was mildly reduced  Ejection fraction was estimated to be 50  %  LEFT ATRIUM:  The atrium was dilated  LEFT ATRIAL APPENDAGE:  The appendage was dilated  No thrombus was identified  ATRIAL SEPTUM:  No defect or patent foramen ovale was identified  RIGHT ATRIUM:  The atrium was dilated  MITRAL VALVE:  There was mild regurgitation  AORTIC VALVE:  There was mild regurgitation  TRICUSPID VALVE:  There was moderate to severe regurgitation  PULMONIC VALVE:  There was trace regurgitation  HISTORY: PRIOR HISTORY: Obesity, Afib, Edema, Cardiomyopathy, HTN    PROCEDURE: The procedure was performed in the catheterization laboratory  This  was a routine study  The risks and alternatives of the procedure were explained  to the patient and informed consent was obtained  The transesophageal approach  was used  The study included complete 2D imaging, complete spectral Doppler,  and color Doppler  The heart rate was 118 bpm, at the start of the study  An  adult omniplane probe was inserted by the attending cardiologist  Intubated  with ease  One intubation attempt(s)  There was no blood detected on the probe  Image quality was adequate  MEDICATIONS: Anesthesia  LEFT VENTRICLE: Size was normal  Systolic function was mildly reduced  Ejection  fraction was estimated to be 50 %  This study was inadequate for the evaluation  of regional wall motion  Wall thickness was normal     RIGHT VENTRICLE: The size was normal  Systolic function was low normal     LEFT ATRIUM: The atrium was dilated  APPENDAGE: The appendage was dilated  No  thrombus was identified  ATRIAL SEPTUM: No defect or patent foramen ovale was identified  RIGHT ATRIUM: The atrium was dilated      MITRAL VALVE: Valve structure was normal  There was normal leaflet separation  DOPPLER: The transmitral velocity was within the normal range  There was no  evidence for stenosis  There was mild regurgitation  AORTIC VALVE: The valve was trileaflet  Leaflets exhibited normal thickness and  normal cuspal separation  DOPPLER: Transaortic velocity was within the normal  range  There was no evidence for stenosis  There was mild regurgitation  TRICUSPID VALVE: The valve structure was normal  There was normal leaflet  separation  DOPPLER: There was moderate to severe regurgitation  Estimated peak  PA pressure was 40 mmHg  PULMONIC VALVE: Leaflets exhibited normal thickness, no calcification, and  normal cuspal separation  DOPPLER: There was trace regurgitation  PERICARDIUM: There was no pericardial effusion  AORTA: The root exhibited normal size  The ascending aorta was normal in size  There was no significant atheroma  PULMONARY VEINS: DOPPLER: Doppler flow pattern was normal in the pulmonary  vein(s)  [address] Accredited Echocardiography Laboratory    Prepared and electronically signed by    Ky Lopez MD  Signed [id number] 17:28:53         Catheterizations:   No results found for this or any previous visit      Stress Tests:  Results for orders placed during the hospital encounter of 19   NM myocardial perfusion spect (stress and/or rest)    [address]    Stress Gated SPECT Myocardial Perfusion Imaging after Regadenoson    Patient: Dolly Casillas  MR number: [id number]  Account number: [de-identified]  : 1945  Age: 68 years  Gender: Female  Status: Outpatient  Location: [address] [place] [address] [facility] [place]  Height: 62 in  Weight: 178 lb  BP: 141/ 80 mmHg    Allergies: MORPHINE AND RELATED, OXYCODONE-ACETAMINOPHEN    Diagnosis: R06 09 - Other forms of dyspnea    Primary Physician:  Annella Dancer, MD  RN:  Salvatore Whitfield RN  Referring Physician:  Lexy Maya Becky Jacobsen  Technician:  Christy Dorsye  Group:  [facility]  Report Prepared By[de-identified]  Thelma Corrigan, RN  Interpreting Physician:  Marianne Schulz MD    INDICATIONS: Detection of coronary artery disease  HISTORY: Hx right breast CA The patient is a 68year old  female  Chest pain status: chest pain  Other symptoms: dyspnea and lower extremity edema  Coronary artery disease risk factors: dyslipidemia, hypertension, family history  of premature coronary artery disease, and post-menopausal state  Cardiovascular history: arrhythmia(hx atrial fibrillation/PAF) Medications: include a beta blocker, a diuretic, and thyroid medications  Previous test results: abnormal ECG  PHYSICAL EXAM: Baseline physical exam screening: no wheezes audible  REST ECG: Sinus bradycardia ST segments and T waves were normal  The ECG showed occasional premature atrial contractions  PROCEDURE: The study was performed in the the [facility]  A regadenoson infusion pharmacologic stress test was performed  Gated SPECT myocardial perfusion imaging was performed during stress  Systolic blood  pressure was 141 mmHg, at the start of the study  Diastolic blood pressure was 80 mmHg, at the start of the study  The heart rate was 57 bpm, at the start of the study  Regadenoson protocol:  HR bpm SBP mmHg DBP mmHg Symptoms  Baseline 57 141 80 none  Immediate 100 146 82 dyspnea, fatigue  1 min 98 155 86 subsiding  2 min 69 153 85 none  The patient also performed low level exercise  STRESS SUMMARY: Duration of pharmacologic stress was 3 min and 0 sec  Maximal heart rate during stress was 101 bpm  The rate-pressure product for the peak heart rate and blood pressure was 49989  There was no chest pain during stress  The  stress test was terminated due to protocol completion  Pre oxygen saturation: 99 %  Peak oxygen saturation: 95 %   The stress ECG was negative for ischemia and normal  There were no stress arrhythmias or conduction abnormalities  ISOTOPE ADMINISTRATION:  The radiopharmaceutical was injected at the peak effect of pharmacologic stress  PERFUSION DEFECTS:  -  There were no perfusion defects  GATED SPECT:  The calculated left ventricular ejection fraction was 72 %  There was no left ventricular regional abnormality  SUMMARY:  -  Stress results: There was no chest pain during stress  -  ECG conclusions: The stress ECG was negative for ischemia and normal   -  Perfusion imaging: There were no perfusion defects   -  Gated SPECT: The calculated left ventricular ejection fraction was 72 %  There was no left ventricular regional abnormality  IMPRESSIONS: Normal study after pharmacologic vasodilation without reproduction of symptoms  Myocardial perfusion imaging was normal at rest and with stress  Prepared and signed by    Linus Greenwood MD  Signed 05/01/2019 09:21:35         Holter monitor -   No results found for this or any previous visit  Results for orders placed during the hospital encounter of 04/30/19   Holter monitor - 24 hour    Narrative INDICATIONS:  Paroxysmal atrial fibrillation status post prior ablation    DESCRIPTION OF FINDINGS:  The patient was monitored for a total of 24 hours  The patient was   predominantly noted to be in normal sinus rhythm with paroxysmal atrial   fibrillation throughout the study  The average heart rate was 69 beats   per minute  The heart rate ranged from a low of 46 beats per minute in   a m  at 954 to a maximum of 122 beats per minute in a m  at 930  Ventricular ectopic activity consisted of 127 (0 1 % of total beats)  There was no sustained or nonsustained ventricular tachycardia  Supraventricular ectopic activity consisted of 2441 (2 4 % of total   beats)  Patient was noted to have 45 SVT runs  The longest run  occurred   at 4:57 p m and lasted for 1 minutes and 30 seconds    The run demonstrated   atrial fibrillation with a rapid ventricular response up to a rate of 170  When the run broke there was a 1 second interval then a junctional complex   and then sinus rhythm  There were multiple other episodes of brief   paroxysmal atrial fibrillation with rapid ventricular response  One run   was nine complexes  One run was 15 complexes  There were no significant pauses  The longest R-R interval was 1 6   seconds  There was no evidence of advanced degree heart block  All there were no symptoms reported during the monitoring interval         Impression 1  Predominantly normal sinus rhythm throughout the monitoring interval   with brief paroxysms of atrial fibrillation noted  The longest interval   of time in atrial fibrillation was about 1 minutes and 30 seconds  The   ventricular response to atrial fibrillation was rapid  None of the   arrhythmia was associated with symptoms  2  Low-density of nonsustained premature supraventricular ectopy  3  No significant bradycardia  4  No symptoms in general noted during the monitoring interval   5  Clinical correlation is advised  ASSESSMENT/PLAN:  1  HTN  - Normotensive in the office  2  Obesity  - Definitely contributing to fatigue and dyspnea on exertion as she does not sound like in CHF  - Advised her to loose weight for 10 lbs every 6 months  -Lost 8 lbs since January 2021 though still feels symptoms     3  Dyspnea on exertion/fatigue  - Unlikely to have CHF exacerbation as no swelling in legs, no orthopnea, and no PND  - No arrhythmias on MCOT monitor either  - Could due to weight gain of 40 lbs over past year  - She did feel better when she had lost 40 lbs during her chemotherapy  - Advised her to loose weight again  -echo showed normal LV and RV function  -CT chest showed normal parynchema   - Could also be due to bradycardia     4   Sinus node dysfunction  - Sinus bradycardia noted at rest  - Did obtain exercise test and HR did increase however in the office, she felt dyspnea on exertion to walking 100 ft, and HR did not increase beyond 50s  - we decreased her metoprolol to 12 5 mg daily and decrease flecainide to 50 mg twice daily however patient continues to feel symptoms   - Discussed pacemaker procedure and answered questions risks/benefits  She is in agreement to proceed with it  5  Adenocarcinoma of breast s/p chemotherapy  - No evidence of recurrence     6  Hypothyroidism  -  Free T4 normal in January 2019    7  Paroxysmal atrial fibrillation  - Ablation performed 3 years ago  - had palpitations and recurrenced on cardiac event monitor  - Started on metoprolol and flecainide  - MCOT monitor shows no AFIB, brief AT episodes   - Due to bradycardia and poor chronotropic impotence, reduced metoprolol and flecainide  - discussed pacemaker as well; she is in agreement now  She is welcome to call our office for any questions or concerns  Thank you for allowing me to participate in the care of this patient  Please do not hesitate to contact me for any questions        Follow-up after pacemaker placement

## 2021-05-14 ENCOUNTER — TELEPHONE (OUTPATIENT)
Dept: CARDIOLOGY CLINIC | Facility: CLINIC | Age: 76
End: 2021-05-14

## 2021-05-14 DIAGNOSIS — I48.19 PERSISTENT ATRIAL FIBRILLATION (HCC): Primary | ICD-10-CM

## 2021-05-14 NOTE — TELEPHONE ENCOUNTER
Patient scheduled for Dual chamber PPM on 6/7/21 in [facility] with Dr Krista Limon  Mailing patient instructions  Medication hold Lasix morning of  Labs to be done by 5/31/21  Can I have auth?

## 2021-05-19 ENCOUNTER — VBI (OUTPATIENT)
Dept: ADMINISTRATIVE | Facility: OTHER | Age: 76
End: 2021-05-19

## 2021-05-21 NOTE — TELEPHONE ENCOUNTER
She does not need auth for her Pacer implant [id number] on 6/7 21 at [facility]  Ref# [id number]

## 2021-05-29 LAB
ALBUMIN SERPL-MCNC: 3.5 G/DL (ref 3.7–4.7)
ALBUMIN/GLOB SERPL: 1.2 {RATIO} (ref 1.2–2.2)
ALP SERPL-CCNC: 103 IU/L (ref 48–121)
ALT SERPL-CCNC: 7 IU/L (ref 0–32)
AST SERPL-CCNC: 23 IU/L (ref 0–40)
BASOPHILS # BLD AUTO: 0 X10E3/UL (ref 0–0.2)
BASOPHILS NFR BLD AUTO: 1 %
BILIRUB SERPL-MCNC: 0.8 MG/DL (ref 0–1.2)
BUN SERPL-MCNC: 13 MG/DL (ref 8–27)
BUN/CREAT SERPL: 11 (ref 12–28)
CALCIUM SERPL-MCNC: 8.5 MG/DL (ref 8.7–10.3)
CHLORIDE SERPL-SCNC: 111 MMOL/L (ref 96–106)
CO2 SERPL-SCNC: 24 MMOL/L (ref 20–29)
CREAT SERPL-MCNC: 1.17 MG/DL (ref 0.57–1)
EOSINOPHIL # BLD AUTO: 0.1 X10E3/UL (ref 0–0.4)
EOSINOPHIL NFR BLD AUTO: 3 %
ERYTHROCYTE [DISTWIDTH] IN BLOOD BY AUTOMATED COUNT: 14.1 % (ref 11.7–15.4)
GLOBULIN SER-MCNC: 2.9 G/DL (ref 1.5–4.5)
GLUCOSE SERPL-MCNC: 94 MG/DL (ref 65–99)
HCT VFR BLD AUTO: 37.3 % (ref 34–46.6)
HGB BLD-MCNC: 12.7 G/DL (ref 11.1–15.9)
IMM GRANULOCYTES # BLD: 0 X10E3/UL (ref 0–0.1)
IMM GRANULOCYTES NFR BLD: 0 %
INR PPP: 1.2 (ref 0.9–1.2)
LYMPHOCYTES # BLD AUTO: 1 X10E3/UL (ref 0.7–3.1)
LYMPHOCYTES NFR BLD AUTO: 24 %
MCH RBC QN AUTO: 29.7 PG (ref 26.6–33)
MCHC RBC AUTO-ENTMCNC: 34 G/DL (ref 31.5–35.7)
MCV RBC AUTO: 87 FL (ref 79–97)
MONOCYTES # BLD AUTO: 0.5 X10E3/UL (ref 0.1–0.9)
MONOCYTES NFR BLD AUTO: 11 %
NEUTROPHILS # BLD AUTO: 2.5 X10E3/UL (ref 1.4–7)
NEUTROPHILS NFR BLD AUTO: 61 %
PLATELET # BLD AUTO: 176 X10E3/UL (ref 150–450)
POTASSIUM SERPL-SCNC: 4.1 MMOL/L (ref 3.5–5.2)
PROT SERPL-MCNC: 6.4 G/DL (ref 6–8.5)
PROTHROMBIN TIME: 13 SEC (ref 9.1–12)
RBC # BLD AUTO: 4.27 X10E6/UL (ref 3.77–5.28)
SL AMB EGFR AFRICAN AMERICAN: 53 ML/MIN/1.73
SL AMB EGFR NON AFRICAN AMERICAN: 46 ML/MIN/1.73
SODIUM SERPL-SCNC: 145 MMOL/L (ref 134–144)
WBC # BLD AUTO: 4.2 X10E3/UL (ref 3.4–10.8)

## 2021-06-04 ENCOUNTER — TELEPHONE (OUTPATIENT)
Dept: SURGERY | Facility: HOSPITAL | Age: 76
End: 2021-06-04

## 2021-06-04 RX ORDER — CEFAZOLIN SODIUM 1 G/50ML
1000 SOLUTION INTRAVENOUS ONCE
Status: CANCELLED | OUTPATIENT
Start: 2021-06-04 | End: 2021-06-04

## 2021-06-07 ENCOUNTER — ANESTHESIA (OUTPATIENT)
Dept: NON INVASIVE DIAGNOSTICS | Facility: HOSPITAL | Age: 76
End: 2021-06-07
Payer: COMMERCIAL

## 2021-06-07 ENCOUNTER — ANESTHESIA EVENT (OUTPATIENT)
Dept: NON INVASIVE DIAGNOSTICS | Facility: HOSPITAL | Age: 76
End: 2021-06-07
Payer: COMMERCIAL

## 2021-06-07 ENCOUNTER — HOSPITAL ENCOUNTER (OUTPATIENT)
Dept: NON INVASIVE DIAGNOSTICS | Facility: HOSPITAL | Age: 76
Discharge: HOME | End: 2021-06-07
Attending: INTERNAL MEDICINE | Admitting: INTERNAL MEDICINE
Payer: COMMERCIAL

## 2021-06-07 VITALS
TEMPERATURE: 97.4 F | DIASTOLIC BLOOD PRESSURE: 54 MMHG | RESPIRATION RATE: 18 BRPM | SYSTOLIC BLOOD PRESSURE: 116 MMHG | OXYGEN SATURATION: 98 % | HEART RATE: 70 BPM

## 2021-06-07 DIAGNOSIS — I48.19 PERSISTENT ATRIAL FIBRILLATION (HCC): ICD-10-CM

## 2021-06-07 DIAGNOSIS — R00.1 BRADYCARDIA: Primary | ICD-10-CM

## 2021-06-07 LAB
ANION GAP SERPL CALCULATED.3IONS-SCNC: 6 MMOL/L (ref 4–13)
ATRIAL RATE: 71 BPM
ATRIAL RATE: 78 BPM
BASOPHILS # BLD AUTO: 0.04 THOUSANDS/ΜL (ref 0–0.1)
BASOPHILS NFR BLD AUTO: 1 % (ref 0–1)
BUN SERPL-MCNC: 15 MG/DL (ref 5–25)
CALCIUM SERPL-MCNC: 9 MG/DL (ref 8.3–10.1)
CHLORIDE SERPL-SCNC: 113 MMOL/L (ref 100–108)
CO2 SERPL-SCNC: 25 MMOL/L (ref 21–32)
CREAT SERPL-MCNC: 1.25 MG/DL (ref 0.6–1.3)
EOSINOPHIL # BLD AUTO: 0.14 THOUSAND/ΜL (ref 0–0.61)
EOSINOPHIL NFR BLD AUTO: 3 % (ref 0–6)
ERYTHROCYTE [DISTWIDTH] IN BLOOD BY AUTOMATED COUNT: 14.5 % (ref 11.6–15.1)
GFR SERPL CREATININE-BSD FRML MDRD: 42 ML/MIN/1.73SQ M
GLUCOSE P FAST SERPL-MCNC: 97 MG/DL (ref 65–99)
GLUCOSE SERPL-MCNC: 97 MG/DL (ref 65–140)
HCT VFR BLD AUTO: 40.3 % (ref 34.8–46.1)
HGB BLD-MCNC: 13 G/DL (ref 11.5–15.4)
IMM GRANULOCYTES # BLD AUTO: 0 THOUSAND/UL (ref 0–0.2)
IMM GRANULOCYTES NFR BLD AUTO: 0 % (ref 0–2)
INR PPP: 1.54 (ref 0.84–1.19)
LYMPHOCYTES # BLD AUTO: 1.3 THOUSANDS/ΜL (ref 0.6–4.47)
LYMPHOCYTES NFR BLD AUTO: 27 % (ref 14–44)
MCH RBC QN AUTO: 29.5 PG (ref 26.8–34.3)
MCHC RBC AUTO-ENTMCNC: 32.3 G/DL (ref 31.4–37.4)
MCV RBC AUTO: 91 FL (ref 82–98)
MONOCYTES # BLD AUTO: 0.55 THOUSAND/ΜL (ref 0.17–1.22)
MONOCYTES NFR BLD AUTO: 12 % (ref 4–12)
NEUTROPHILS # BLD AUTO: 2.76 THOUSANDS/ΜL (ref 1.85–7.62)
NEUTS SEG NFR BLD AUTO: 57 % (ref 43–75)
NRBC BLD AUTO-RTO: 0 /100 WBCS
P AXIS: 74 DEGREES
P AXIS: 76 DEGREES
PLATELET # BLD AUTO: 183 THOUSANDS/UL (ref 149–390)
PMV BLD AUTO: 11.9 FL (ref 8.9–12.7)
POTASSIUM SERPL-SCNC: 3.7 MMOL/L (ref 3.5–5.3)
PR INTERVAL: 162 MS
PR INTERVAL: 172 MS
PROTHROMBIN TIME: 18.4 SECONDS (ref 11.6–14.5)
QRS AXIS: -14 DEGREES
QRS AXIS: -14 DEGREES
QRSD INTERVAL: 78 MS
QRSD INTERVAL: 84 MS
QT INTERVAL: 434 MS
QT INTERVAL: 438 MS
QTC INTERVAL: 471 MS
QTC INTERVAL: 492 MS
RBC # BLD AUTO: 4.41 MILLION/UL (ref 3.81–5.12)
SODIUM SERPL-SCNC: 144 MMOL/L (ref 136–145)
T WAVE AXIS: 25 DEGREES
T WAVE AXIS: 45 DEGREES
VENTRICULAR RATE: 71 BPM
VENTRICULAR RATE: 76 BPM
WBC # BLD AUTO: 4.79 THOUSAND/UL (ref 4.31–10.16)

## 2021-06-07 PROCEDURE — 93005 ELECTROCARDIOGRAM TRACING: CPT

## 2021-06-07 PROCEDURE — 85025 COMPLETE CBC W/AUTO DIFF WBC: CPT | Performed by: PHYSICIAN ASSISTANT

## 2021-06-07 PROCEDURE — 80048 BASIC METABOLIC PNL TOTAL CA: CPT | Performed by: PHYSICIAN ASSISTANT

## 2021-06-07 PROCEDURE — 93010 ELECTROCARDIOGRAM REPORT: CPT | Performed by: INTERNAL MEDICINE

## 2021-06-07 PROCEDURE — 76937 US GUIDE VASCULAR ACCESS: CPT | Performed by: INTERNAL MEDICINE

## 2021-06-07 PROCEDURE — C1769 GUIDE WIRE: HCPCS | Performed by: INTERNAL MEDICINE

## 2021-06-07 PROCEDURE — C1785 PMKR, DUAL, RATE-RESP: HCPCS

## 2021-06-07 PROCEDURE — C1898 LEAD, PMKR, OTHER THAN TRANS: HCPCS

## 2021-06-07 PROCEDURE — 33208 INSRT HEART PM ATRIAL & VENT: CPT | Performed by: INTERNAL MEDICINE

## 2021-06-07 PROCEDURE — C1892 INTRO/SHEATH,FIXED,PEEL-AWAY: HCPCS | Performed by: INTERNAL MEDICINE

## 2021-06-07 PROCEDURE — 85610 PROTHROMBIN TIME: CPT | Performed by: PHYSICIAN ASSISTANT

## 2021-06-07 RX ORDER — SODIUM CHLORIDE 9 MG/ML
INJECTION, SOLUTION INTRAVENOUS CONTINUOUS PRN
Status: DISCONTINUED | OUTPATIENT
Start: 2021-06-07 | End: 2021-06-07

## 2021-06-07 RX ORDER — ACETAMINOPHEN 325 MG/1
650 TABLET ORAL EVERY 4 HOURS PRN
Status: DISCONTINUED | OUTPATIENT
Start: 2021-06-07 | End: 2021-06-07 | Stop reason: HOSPADM

## 2021-06-07 RX ORDER — LIDOCAINE HYDROCHLORIDE 10 MG/ML
INJECTION, SOLUTION EPIDURAL; INFILTRATION; INTRACAUDAL; PERINEURAL CODE/TRAUMA/SEDATION MEDICATION
Status: COMPLETED | OUTPATIENT
Start: 2021-06-07 | End: 2021-06-07

## 2021-06-07 RX ORDER — CEFAZOLIN SODIUM 1 G/50ML
1000 SOLUTION INTRAVENOUS ONCE
Status: COMPLETED | OUTPATIENT
Start: 2021-06-07 | End: 2021-06-07

## 2021-06-07 RX ORDER — FENTANYL CITRATE 50 UG/ML
INJECTION, SOLUTION INTRAMUSCULAR; INTRAVENOUS AS NEEDED
Status: DISCONTINUED | OUTPATIENT
Start: 2021-06-07 | End: 2021-06-07

## 2021-06-07 RX ORDER — PROPOFOL 10 MG/ML
INJECTION, EMULSION INTRAVENOUS AS NEEDED
Status: DISCONTINUED | OUTPATIENT
Start: 2021-06-07 | End: 2021-06-07

## 2021-06-07 RX ORDER — PROPOFOL 10 MG/ML
INJECTION, EMULSION INTRAVENOUS CONTINUOUS PRN
Status: DISCONTINUED | OUTPATIENT
Start: 2021-06-07 | End: 2021-06-07

## 2021-06-07 RX ORDER — GENTAMICIN SULFATE 40 MG/ML
INJECTION, SOLUTION INTRAMUSCULAR; INTRAVENOUS CODE/TRAUMA/SEDATION MEDICATION
Status: COMPLETED | OUTPATIENT
Start: 2021-06-07 | End: 2021-06-07

## 2021-06-07 RX ADMIN — ACETAMINOPHEN 650 MG: 325 TABLET, FILM COATED ORAL at 11:34

## 2021-06-07 RX ADMIN — GENTAMICIN SULFATE 80 MG: 40 INJECTION, SOLUTION INTRAMUSCULAR; INTRAVENOUS at 09:10

## 2021-06-07 RX ADMIN — PROPOFOL 20 MG: 10 INJECTION, EMULSION INTRAVENOUS at 08:06

## 2021-06-07 RX ADMIN — LIDOCAINE HYDROCHLORIDE 20 ML: 10 INJECTION, SOLUTION EPIDURAL; INFILTRATION; INTRACAUDAL; PERINEURAL at 08:38

## 2021-06-07 RX ADMIN — FENTANYL CITRATE 50 MCG: 50 INJECTION INTRAMUSCULAR; INTRAVENOUS at 07:56

## 2021-06-07 RX ADMIN — CEFAZOLIN SODIUM 1000 MG: 1 SOLUTION INTRAVENOUS at 07:46

## 2021-06-07 RX ADMIN — SODIUM CHLORIDE: 0.9 INJECTION, SOLUTION INTRAVENOUS at 07:28

## 2021-06-07 RX ADMIN — PHENYLEPHRINE HYDROCHLORIDE 50 MCG/MIN: 10 INJECTION INTRAVENOUS at 08:08

## 2021-06-07 RX ADMIN — PROPOFOL 60 MCG/KG/MIN: 10 INJECTION, EMULSION INTRAVENOUS at 07:59

## 2021-06-07 RX ADMIN — FENTANYL CITRATE 25 MCG: 50 INJECTION INTRAMUSCULAR; INTRAVENOUS at 09:24

## 2021-06-07 RX ADMIN — FENTANYL CITRATE 25 MCG: 50 INJECTION INTRAMUSCULAR; INTRAVENOUS at 08:38

## 2021-06-07 NOTE — INTERVAL H&P NOTE
Please refer to recent office visit with Dr Louis Garcia for full details  Briefly this patient is a pleasant 80-year-old female with symptomatic paroxysmal atrial fibrillation status post prior ablation, preserved LV systolic function, hypothyroidism, history of breast cancer status post chemotherapy, and obesity with BMI 30  She has had issues with chronic dyspnea with exertion  She has undergone chest CT scans which have showed normal lung parenchyma  For her symptomatic PAF, she has been maintained on metoprolol and flecainide  While her dyspnea was present prior to these medications, it was previously noted that they worsened since starting this regimen  She underwent an exercise stress test which showed appropriate increase in heart rate, however in subsequent follow-up she reported feeling significantly short of breath with walking short distance and at that time her heart rate was consistently in the 50s  It appears that she has sick sinus syndrome, which is likely contributing to her ongoing dyspnea with exertion  Thus, pacemaker implantation was recommended and she presents today to undergo that procedure  No significant changes since she was recently seen, physical exam unchanged

## 2021-06-07 NOTE — ANESTHESIA POSTPROCEDURE EVALUATION
Post-Op Assessment Note    CV Status:  Stable    Pain management: adequate     Mental Status:  Awake and lethargic   Hydration Status:  Stable   PONV Controlled:  None   Airway Patency:  Patent      Post Op Vitals Reviewed: Yes      Staff: CRNA         No complications documented      BP     Temp     Pulse     Resp      SpO2
spouse

## 2021-06-07 NOTE — DISCHARGE INSTRUCTIONS
Please refer to post pacemaker implantation discharge instructions and restrictions and your pacemaker booklet/temporary card  Keep incision dry for one week  Do not use lotions/powders/creams on incision  Leave outer bandage in place for 1 week - it is water proof, and as long as it is fully adhered to your skin you may shower with it  If it appears as though the bandage is coming off and/or there is any communication to the area of device incision, please then keep the whole area dry for the remaining week  After 1 week, please remove by pulling all edges away from the center of the bandage  No overhead reaching/pushing/pulling/lifting greater than 5-10lbs with left arm for six weeks  Please call the office if you notice redness, swelling, bleeding, or drainage from incision or if you develop fevers  AFTER PACEMAKER CARE:    If you have any questions, please call [phone] to speak with a nurse (8:30am-4pm, or [phone] after hours)  For appointments, please call [phone]  WHAT YOU SHOULD KNOW:   A pacemaker is a small, battery-powered device that is placed under your skin in your upper chest area with wires placed through a vein that lead directly into the heart  It helps regulate your heart rate and prevent your heart from beating too slowly  AFTER YOU LEAVE:     Medicines:     · Pain medicine: You may need medicine to take away or decrease pain  ¨ Learn how to take your medicine  Ask what medicine and how much you should take  Be sure you know how, when, and how often to take it  Usually Over the counter pain medicine is sufficient to control pain (Acetominophen or Ibuprofen) Ask your doctor if you may take these  If this does not control your pain, narcotic pain killers may be prescribed, please call if you need prescription  ¨ Do not wait until the pain is severe before you take your medicine  Tell caregivers if your pain does not decrease      ¨ Pain medicine can make you dizzy or sleepy  Prevent falls by calling someone when you get out of bed or if you need help  Take your medicine as directed  Call your healthcare provider if you think your medicine is not helping or if you have side effects  Tell him if you are allergic to any medicine  Follow up with your cardiologist after your procedure: You will need a follow-up visit approximately 2 weeks after you leave the hospital  Your cardiologist will check your wound and make sure that your pacemaker is working correctly  Follow the instructions to check your pacemaker: Your cardiologist or primary healthcare provider will check your pacemaker and the battery regularly  He will use a computer to check your pacemaker over the telephone or wireless device which will be given to you  Pacemaker batteries usually last 8 to 10 years  The pacemaker unit will be replaced when the battery gets low  This is a simpler procedure than the original one to implant your pacemaker  Wound care:  Keep your incision dry for one week  Do not use lotions/powders/creams on incision  Leave outer bandage in place for 1 week - it is water proof, and as long as it is fully adhered to your skin you may shower with it  If it appears as though the bandage is coming off and/or there is any communication to the area of device incision, please then keep the whole area dry for the remaining week  After 1 week, please remove by pulling all edges away from the center of the bandage  Please call the office if you notice redness, swelling, bleeding, or drainage from incision or if you develop fevers  Activity:   · Arm movement and lifting:  Be careful using the arm on the side of your pacemaker  Do not move your arm for the first 24 hours after your procedure  Do not  lift your arm above your shoulder or lift more than 10 pounds for one month after your procedure   Avoid pushing, pulling, or repetitive arm movements for one month  This helps the leads stay in place and helps your wound heal  Ask your caregiver when you can drive after your procedure  You may move your arm side to side without lifting above your shoulder, and do not need to wear a sling at home  · Driving: you are ok to drive 48 hours after pacemaker is implanted   · Sports:  Ask your caregiver when it is okay to play tennis, golf, basketball, or any sport that requires you to lift your arms  Do not play full contact sports, such as football, that could damage your pacemaker  Ask your cardiologist or primary healthcare provider how much and what kinds of physical activity are safe for you  Living with a pacemaker:   · Tell all caregivers you have a pacemaker: This includes surgeons, radiologists, and medical technicians  You may want to wear a medical alert ID bracelet or necklace that states that you have a pacemaker  · Carry your pacemaker ID card: Make sure you receive a pacemaker ID card  Carry it with you at all times  It lists important information about your pacemaker  Show it to airport security if you travel  · Avoid electrical interference:  Avoid welding equipment and other equipment with large magnets or electric fields  These things could interfere with how your pacemaker works  Use your cell phone on the ear opposite from your pacemaker  Do not carry your cell phone in your shirt pocket over your chest      · Some Pacemakers are MRI safe  Ask you doctor if it is safe to proceed with MRI and let the radiologist and staff know you have a pacemaker  · Do not touch the skin around your pacemaker: This can cause damage to the lead wires or move the pacemaker unit from where it should be  Contact your cardiologist or primary healthcare provider if:   · The area around your pacemaker has increasing amount of pain after surgery  The pain should improve over first few days after implantation       · The skin around your stitches has increasing redness, swelling, or has drainage  This may mean that you have an infection  · You have a fever  · You have chills, a cough, and feel weak or achy  These are also signs of infection  · Your feet or ankles are more swollen than your baseline  · Your Heart rate is less than 50 beats per minute     Seek care immediately if:   · Your bandage becomes soaked with blood  · Your pacemaker is swelling rapidly    · Your stitches open up  · You feel your heart suddenly beating very slowly or quickly  · You become too weak or dizzy to stand, or you pass out  · Your arm or leg feels warm, tender, and painful  It may look swollen and red  · You have chest pain that does not go away with rest or medicine  · You feel lightheaded, short of breath, and have chest pain  · You cough up blood  © 2014 [address] is for End User's use only and may not be sold, redistributed or otherwise used for commercial purposes  All illustrations and images included in CareNotes® are the copyrighted property of [employer]  or Kashif Sotelo  The above information is an  only  It is not intended as medical advice for individual conditions or treatments  Talk to your doctor, nurse or pharmacist before following any medical regimen to see if it is safe and effective for you

## 2021-06-16 ENCOUNTER — IN-CLINIC DEVICE VISIT (OUTPATIENT)
Dept: CARDIOLOGY CLINIC | Facility: CLINIC | Age: 76
End: 2021-06-16

## 2021-06-16 DIAGNOSIS — Z95.0 CARDIAC PACEMAKER: Primary | ICD-10-CM

## 2021-06-16 PROCEDURE — 99024 POSTOP FOLLOW-UP VISIT: CPT | Performed by: INTERNAL MEDICINE

## 2021-06-16 NOTE — PROGRESS NOTES
Results for orders placed or performed in visit on 06/16/21   Cardiac EP device report    Narrative    MDT DUAL PM/ ACTIVE SYSTEM IS MRI CONDITIONAL  DEVICE INTERROGATED IN THE [facility] OFFICE: WOUND CHECK: INCISION CLEAN AND DRY WITH EDGES APPROXIMATED; WOUND CARE AND RESTRICTIONS REVIEWED WITH PATIENT  BATTERY VOLTAGE ADEQUATE (12 3 YRS)  AP 80 4%  0 4%  ALL LEAD PARAMETERS WITHIN NORMAL LIMITS  23 AHR EPISODES W/EGRMS FOR PAF  AF BURDEN 4 6%  EF 65% (6/2020 ECHO)  HX: PAF & PT ON ELIQUIS, FLECAINIDE, METOPROLOL SUCC  NO PROGRAMMING CHANGES MADE TO DEVICE PARAMETERS  PACEMAKER FUNCTIONING APPROPRIATELY     ES

## 2021-07-16 ENCOUNTER — OFFICE VISIT (OUTPATIENT)
Dept: FAMILY MEDICINE CLINIC | Facility: HOSPITAL | Age: 76
End: 2021-07-16
Payer: COMMERCIAL

## 2021-07-16 VITALS
OXYGEN SATURATION: 97 % | SYSTOLIC BLOOD PRESSURE: 134 MMHG | BODY MASS INDEX: 30.83 KG/M2 | DIASTOLIC BLOOD PRESSURE: 76 MMHG | TEMPERATURE: 97.4 F | HEIGHT: 63 IN | WEIGHT: 174 LBS | HEART RATE: 96 BPM

## 2021-07-16 DIAGNOSIS — R82.90 FOUL SMELLING URINE: ICD-10-CM

## 2021-07-16 DIAGNOSIS — I89.0 LYMPHEDEMA: ICD-10-CM

## 2021-07-16 DIAGNOSIS — R73.9 HYPERGLYCEMIA: ICD-10-CM

## 2021-07-16 DIAGNOSIS — E03.9 ACQUIRED HYPOTHYROIDISM: ICD-10-CM

## 2021-07-16 DIAGNOSIS — I10 BENIGN ESSENTIAL HYPERTENSION: Primary | ICD-10-CM

## 2021-07-16 DIAGNOSIS — M48.02 CERVICAL STENOSIS OF SPINE: ICD-10-CM

## 2021-07-16 DIAGNOSIS — C50.911 RECURRENT MALIGNANT NEOPLASM OF RIGHT BREAST (HCC): ICD-10-CM

## 2021-07-16 DIAGNOSIS — I49.5 SSS (SICK SINUS SYNDROME) (HCC): ICD-10-CM

## 2021-07-16 DIAGNOSIS — Z95.0 CARDIAC PACEMAKER: ICD-10-CM

## 2021-07-16 DIAGNOSIS — I48.19 PERSISTENT ATRIAL FIBRILLATION (HCC): ICD-10-CM

## 2021-07-16 LAB
SL AMB  POCT GLUCOSE, UA: NORMAL
SL AMB LEUKOCYTE ESTERASE,UA: NORMAL
SL AMB POCT BILIRUBIN,UA: NORMAL
SL AMB POCT BLOOD,UA: NORMAL
SL AMB POCT CLARITY,UA: NORMAL
SL AMB POCT COLOR,UA: NORMAL
SL AMB POCT KETONES,UA: NORMAL
SL AMB POCT NITRITE,UA: NORMAL
SL AMB POCT PH,UA: 5
SL AMB POCT SPECIFIC GRAVITY,UA: 1.03
SL AMB POCT URINE PROTEIN: NORMAL
SL AMB POCT UROBILINOGEN: NORMAL

## 2021-07-16 PROCEDURE — 99214 OFFICE O/P EST MOD 30 MIN: CPT | Performed by: FAMILY MEDICINE

## 2021-07-16 PROCEDURE — 81002 URINALYSIS NONAUTO W/O SCOPE: CPT | Performed by: FAMILY MEDICINE

## 2021-07-16 NOTE — PROGRESS NOTES
Assessment/Plan:         Diagnoses and all orders for this visit:    Benign essential hypertension  Comments:  Good BP control    Acquired hypothyroidism  Comments:  Clinically euthyroid    Persistent atrial fibrillation (HCC)  Comments:  Current paced sinus rhythm    Hyperglycemia    Lymphedema    Recurrent malignant neoplasm of right breast (HCC)    SSS (sick sinus syndrome) (HCC)    Cardiac pacemaker    Cervical stenosis of spine  -     XR spine cervical complete 4 or 5 vw non injury; Future    Foul smelling urine  -     POCT urine dip          Subjective:      Patient ID: Ari Alvarado is a 76 y o  female  6 month follow up  No recent illness or injury  Had pacemaker placed in May by Dr Alie Koehler for SSS  Feeling much better energy  Breathing is even better  Headaches increased from the spinal stenosis c-spine  Trigger point at base of skull    Medication list reviewed and revised    Notes that urine is discolored and has odor      The following portions of the patient's history were reviewed and updated as appropriate: allergies, current medications, past family history, past medical history, past social history, past surgical history and problem list     Review of Systems   HENT: Negative  Eyes: Negative  Respiratory: Negative  Cardiovascular: Negative  Gastrointestinal: Negative  Genitourinary: Negative  Musculoskeletal: Positive for arthralgias, neck pain and neck stiffness  Neurological: Negative  Hematological: Negative  Psychiatric/Behavioral: Negative  All other systems reviewed and are negative  Objective:      /76 (BP Location: Left arm, Patient Position: Sitting, Cuff Size: Standard)   Pulse 96   Temp (!) 97 4 °F (36 3 °C) (Tympanic)   Ht 5' 2 5" (1 588 m)   Wt 78 9 kg (174 lb)   SpO2 97%   BMI 31 32 kg/m²          Physical Exam  Vitals and nursing note reviewed  Constitutional:       Appearance: Normal appearance     HENT:      Head: Normocephalic and atraumatic  Cardiovascular:      Pulses: Normal pulses  Heart sounds: Normal heart sounds  Pulmonary:      Effort: Pulmonary effort is normal       Breath sounds: Normal breath sounds  Musculoskeletal:      Right lower leg: Edema present  Left lower leg: Edema present  Skin:     Findings: No rash  Neurological:      General: No focal deficit present  Mental Status: She is alert and oriented to person, place, and time  Psychiatric:         Mood and Affect: Mood normal          Behavior: Behavior normal          Thought Content:  Thought content normal          Judgment: Judgment normal

## 2021-07-27 ENCOUNTER — TELEPHONE (OUTPATIENT)
Dept: CARDIOLOGY CLINIC | Facility: CLINIC | Age: 76
End: 2021-07-27

## 2021-07-27 NOTE — TELEPHONE ENCOUNTER
Herberth Riddle said Dr Tequila Simpson told her when she is running low on her blood thinner, call the office and we will give her samples  I told her if we have any, we may be able to help  I called [facility] and yandy/Cathy who said they do have some samples of Eliquis    She requested that I forward the task to [facility] clinical

## 2021-07-29 NOTE — TELEPHONE ENCOUNTER
Pt called - she will be on anticoag continuously  Discussed network policy re: samples, pt verbalized understanding  She will  samples to hold her until she completes PAP - included w/ samples

## 2021-07-30 ENCOUNTER — HOSPITAL ENCOUNTER (OUTPATIENT)
Dept: CT IMAGING | Facility: HOSPITAL | Age: 76
Discharge: HOME | End: 2021-07-30
Payer: COMMERCIAL

## 2021-07-30 DIAGNOSIS — C50.811 MALIGNANT NEOPLASM OF OVERLAPPING SITES OF RIGHT BREAST IN FEMALE, ESTROGEN RECEPTOR NEGATIVE (HCC): ICD-10-CM

## 2021-07-30 DIAGNOSIS — Z17.1 MALIGNANT NEOPLASM OF OVERLAPPING SITES OF RIGHT BREAST IN FEMALE, ESTROGEN RECEPTOR NEGATIVE (HCC): ICD-10-CM

## 2021-07-30 PROCEDURE — 71260 CT THORAX DX C+: CPT

## 2021-07-30 PROCEDURE — G1004 CDSM NDSC: HCPCS

## 2021-07-30 PROCEDURE — 74177 CT ABD & PELVIS W/CONTRAST: CPT

## 2021-07-30 RX ADMIN — IOHEXOL 100 ML: 350 INJECTION, SOLUTION INTRAVENOUS at 10:54

## 2021-08-06 ENCOUNTER — OFFICE VISIT (OUTPATIENT)
Dept: HEMATOLOGY ONCOLOGY | Facility: HOSPITAL | Age: 76
End: 2021-08-06
Payer: COMMERCIAL

## 2021-08-06 VITALS
BODY MASS INDEX: 30.48 KG/M2 | SYSTOLIC BLOOD PRESSURE: 114 MMHG | TEMPERATURE: 97.3 F | HEIGHT: 63 IN | HEART RATE: 86 BPM | OXYGEN SATURATION: 97 % | DIASTOLIC BLOOD PRESSURE: 74 MMHG | WEIGHT: 172 LBS | RESPIRATION RATE: 16 BRPM

## 2021-08-06 DIAGNOSIS — Z17.1 MALIGNANT NEOPLASM OF OVERLAPPING SITES OF RIGHT BREAST IN FEMALE, ESTROGEN RECEPTOR NEGATIVE (HCC): ICD-10-CM

## 2021-08-06 DIAGNOSIS — E87.6 HYPOKALEMIA: ICD-10-CM

## 2021-08-06 DIAGNOSIS — C50.811 MALIGNANT NEOPLASM OF OVERLAPPING SITES OF RIGHT BREAST IN FEMALE, ESTROGEN RECEPTOR NEGATIVE (HCC): ICD-10-CM

## 2021-08-06 DIAGNOSIS — C50.911 RECURRENT MALIGNANT NEOPLASM OF RIGHT BREAST (HCC): Primary | ICD-10-CM

## 2021-08-06 PROCEDURE — 3078F DIAST BP <80 MM HG: CPT | Performed by: INTERNAL MEDICINE

## 2021-08-06 PROCEDURE — 1160F RVW MEDS BY RX/DR IN RCRD: CPT | Performed by: INTERNAL MEDICINE

## 2021-08-06 PROCEDURE — 3074F SYST BP LT 130 MM HG: CPT | Performed by: INTERNAL MEDICINE

## 2021-08-06 PROCEDURE — 99214 OFFICE O/P EST MOD 30 MIN: CPT | Performed by: INTERNAL MEDICINE

## 2021-08-06 RX ORDER — POTASSIUM CHLORIDE 750 MG/1
CAPSULE, EXTENDED RELEASE ORAL
Qty: 90 CAPSULE | Refills: 1 | Status: SHIPPED | OUTPATIENT
Start: 2021-08-06 | End: 2022-05-07

## 2021-08-06 NOTE — PROGRESS NOTES
Hematology/Oncology Outpatient Follow- up Note  Zev Del Valle 76 y o  female MRN: @ Encounter: [id number]        Date:  8/6/2021    Presenting Complaint/Diagnosis : : Stage IIA triple negative breast cancer, with 9 negative lymph nodes  HPI:      Rob Arreola was diagnosed with triple negative breast cancer   Her tumor was a T2 N0 M0 triple negative breast cancer, with areas of DCIS   It was multifocal   It seemed to be a high-grade tumor  She was advised to have adjuvant chemotherapy   She was scheduled for Taxotere/Cytoxan every 3 weeks for 4 cycles  This was because she had negative lymph nodes, otherwise they would have gotten an anthracycline-based chemotherapy  The patient agreed  She completed 3 cycles and refused any further chemotherapy   She is on observation  Previous Hematologic/ Oncologic History:    Oncology History   Personal history of adenocarcinoma of breast   9/12/2016 Surgery    Right breast biopsy X 3 sites     9/29/2016 Initial Diagnosis    Malignant neoplasm of unspecified site of right female breast ([facility] [address] )     10/28/2016 Surgery    Right breast mastectomy, SLNB  12/7/2016 - 2/1/2017 Chemotherapy    Taxotere/Cytoxan  Scheduled for every 3 weeks x 4 cycles  Completed 3 cycles under the guidance of Dr Carlitos Cool  3/16/2017 Surgery    Right chest wall excision of lesion         Current Hematologic/ Oncologic Treatment:      Observation    Interval History:      The patient returns for follow-up visit  She states she is doing well  Her most recent imaging shows no evidence of metastatic disease in the chest abdomen or pelvis  Last blood work from June was within acceptable limits  Her initial diagnosis was in the end of 2016 although she did have a small area resected in March of 2017 which had shown a small microscopic focus of invasive carcinoma next to suture  It was in the subcutaneous tissue    Regardless she is currently just on observation and is at 4 5 years   From her last recurrence   And  almost 5 years from her initial diagnosis  I will see her back in 12 months with blood work  The rest of her 14 point review of systems today was negative  Test Results:    Imaging: CT chest abdomen pelvis w contrast    Result Date: 8/3/2021  Narrative: CT CHEST, ABDOMEN AND PELVIS WITH IV CONTRAST INDICATION:   C50 811: Malignant neoplasm of overlapping sites of right female breast Z17 1: Estrogen receptor negative status (ER-)  COMPARISON:  6/17/2020 TECHNIQUE: CT examination of the chest, abdomen and pelvis was performed  Axial, sagittal, and coronal 2D reformatted images were created from the source data and submitted for interpretation  Radiation dose length product (DLP) for this visit:  620 04 mGy-cm   This examination, like all CT scans performed in the [facility], was performed utilizing techniques to minimize radiation dose exposure, including the use of iterative  reconstruction and automated exposure control  IV Contrast:  100 mL of iohexol (OMNIPAQUE) Enteric Contrast: Enteric contrast was administered  FINDINGS: CHEST LUNGS:  There is biapical pleural parenchymal scarring  There is a stable linear density in the right upper lobe  There is no tracheal or endobronchial lesion  PLEURA:  Unremarkable  HEART/GREAT VESSELS:  Unremarkable for patient's age  MEDIASTINUM AND MILO:  Unremarkable  CHEST WALL AND LOWER NECK:   Status post right mastectomy  ABDOMEN LIVER/BILIARY TREE:  Unremarkable  GALLBLADDER:  No calcified gallstones  No pericholecystic inflammatory change  SPLEEN:  Unremarkable  PANCREAS:  Unremarkable  ADRENAL GLANDS:  Unremarkable  KIDNEYS/URETERS:  Stable left renal cyst   There is cortical scarring in the left upper pole  No hydronephrosis  STOMACH AND BOWEL:  Unremarkable  APPENDIX:  No findings to suggest appendicitis  ABDOMINOPELVIC CAVITY:  No ascites  No pneumoperitoneum  No lymphadenopathy   VESSELS:  There are multiple retroperitoneal collaterals again seen  PELVIS REPRODUCTIVE ORGANS:  Surgical changes of prior hysterectomy  URINARY BLADDER:  There is a posterior right bladder diverticulum  ABDOMINAL WALL/INGUINAL REGIONS:  Unremarkable  OSSEOUS STRUCTURES:  No acute fracture or destructive osseous lesion  Impression: No evidence of metastatic disease in the chest, abdomen, or pelvis  Workstation performed: [id number]       Labs:   Lab Results   Component Value Date    WBC 4 79 06/07/2021    HGB 13 0 06/07/2021    HCT 40 3 06/07/2021    MCV 91 06/07/2021     06/07/2021     Lab Results   Component Value Date     09/07/2017     09/07/2017    K 3 7 06/07/2021     (H) 06/07/2021    CO2 25 06/07/2021    ANIONGAP 6 06/19/2015    BUN 15 06/07/2021    CREATININE 1 25 06/07/2021    GLUCOSE 95 06/19/2015    GLUF 97 06/07/2021    CALCIUM 9 0 06/07/2021    AST 23 05/28/2021    ALT 7 05/28/2021    ALKPHOS 83 08/14/2018    PROT 6 1 09/07/2017    PROT 6 1 09/07/2017    BILITOT 0 8 09/07/2017    BILITOT 0 8 09/07/2017    EGFR 42 06/07/2021       ROS: As stated in the history of present illness otherwise his 14 point review of systems today was negative        Active Problems:   Patient Active Problem List   Diagnosis    Atrial fibrillation (HCC)    Benign essential hypertension    Bilateral edema of lower extremity    BPPV (benign paroxysmal positional vertigo)    Bursitis of left shoulder    Cardiomyopathy ([address]    Cervical stenosis of spine    Edema    Homocysteinemia ([address]    Hypokalemia    Acquired hypothyroidism    Long QT interval    Lymphedema    Personal history of adenocarcinoma of breast    Class 1 obesity due to excess calories without serious comorbidity with body mass index (BMI) of 31 0 to 31 9 in adult    Osteopenia    Overactive bladder    Peripheral neuropathy    Vitamin D deficiency    Bronchitis with bronchospasm    Candidal esophagitis (HCC)    Abnormal chest CT    Pulmonary nodule seen on imaging study    SOB (shortness of breath)    Weight loss    Encounter for screening mammogram for breast cancer    Hyperglycemia    Mixed hyperlipidemia    Other idiopathic peripheral autonomic neuropathy    Persistent nodularity of breast    Encounter for follow-up surveillance of breast cancer    Current use of long term anticoagulation    Recurrent malignant neoplasm of right breast ([facility] )    RICHARDS (dyspnea on exertion)    Cortical age-related cataract of both eyes    Bradycardia    SSS (sick sinus syndrome) ([facility] )    Cardiac pacemaker       Past Medical History:   Past Medical History:   Diagnosis Date    Abnormal chest CT     last assessed - [id number]    Abnormal glucose     Resolved - [id number]    Arrhythmia     Arthritis     Slight    BCC (basal cell carcinoma of skin)     last assessed - [id number]    Breast cancer ([facility] ) 09/12/2016    rt    Cardiomyopathy ([place] [address] )     Cervical spinal stenosis     Discharge from left nipple     last assessed - [id number]    Disease of thyroid gland     hypothyroidism    Full dentures     Upper and Lower    History of atrial fibrillation     Homocysteinemia ([facility] )     Hypertension     Hypokalemia     Intraductal papilloma of breast     last assessed - [id number]    Malignant neoplasm of skin     basal cell on face, Moh's surgery    Memory loss     last assessed - [id number]    Obesity     Open wound     last assessed - [id number]    Osteopenia     Overactive bladder     Palmar plantar erythrodysaesthesia     Resolved - [id number]    Pulmonary nodule seen on imaging study     Rosacea     UTI (urinary tract infection)     recently    Vertigo     Vitamin D deficiency        Surgical History:   Past Surgical History:   Procedure Laterality Date    APPENDECTOMY      ATRIAL ABLATION SURGERY      last assessed - [id number]   [facility] BACK SURGERY  1997    Lumbar spinal stenosis    BREAST BIOPSY      2014?  left breast; 9/12/16 right breast x 3    BREAST BIOPSY Left 03/30/2010    BREAST SURGERY      needle bx      BREAST SURGERY Right 03/16/2017    excision of right chest wall lesion    CARDIAC ELECTROPHYSIOLOGY STUDY AND ABLATION      CARDIOVERSION      DENTAL SURGERY      HAND SURGERY      Joint replaced with silicone joint 5th finger right hand    HYSTERECTOMY  1993    Complete    LAMINECTOMY      Decompressive up to two lumbar segments    MASTECTOMY Right 10/28/2016    OOPHORECTOMY  1993    GA EXC SKIN MALIG <0 5 CM TRUNK,ARM,LEG Right 3/16/2017    Procedure: EXCISION CHEST WALL LESION; NEEDLE LOC @ 1400;  Surgeon: Isaac Abdul MD;  Location: [facility];  Service: Surgical Oncology    GA INTRAOPERATIVE SENTINEL LYMPH NODE ID W DYE INJECTION Right 10/28/2016    Procedure: BREAST MASTECTOMY WITH SENTINAL LYMPH NODE BIOPSY; LYMPHSCINITIGRAPHY; LYMPHATIC MAPPING; 0800 NUC MED INJECTION;  Surgeon: Isaac Abdul MD;  Location: [facility];  Service: Surgical Oncology    SENTINEL LYMPH NODE BIOPSY Right     US BREAST NEEDLE LOC RIGHT Right 3/16/2017    US GUIDANCE BREAST BIOPSY RIGHT EACH ADDITIONAL Right 9/12/2016    US GUIDANCE BREAST BIOPSY RIGHT EACH ADDITIONAL Right 9/12/2016    US GUIDED BREAST BIOPSY RIGHT COMPLETE Right 9/12/2016       Family History:    Family History   Problem Relation Age of Onset    Coronary artery disease Mother         CABG    Diabetes Mother     Heart attack Mother     Diabetes Sister     Parkinsonism Sister     Coronary artery disease Brother         CABG    Diabetes Brother     Heart attack Brother     Heart attack Son     Kidney cancer Son 48    No Known Problems Son     No Known Problems Son     Emphysema Sister     No Known Problems Sister     Heart attack Brother     No Known Problems Brother     No Known Problems Maternal Grandmother     No Known Problems Maternal Grandfather     No Known Problems Paternal Grandmother     No Known Problems Paternal Grandfather     No Known Problems Paternal Aunt     No Known Problems Paternal Aunt     No Known Problems Paternal Aunt     No Known Problems Paternal Aunt        Cancer-related family history includes Kidney cancer (age of onset: 48) in her son  Social History:   Social History     Socioeconomic History    Marital status: /Civil Union     Spouse name: Not on file    Number of children: Not on file    Years of education: Not on file    Highest education level: Not on file   Occupational History    Occupation: Retired   Tobacco Use    Smoking status: Never Smoker    Smokeless tobacco: Never Used   Vaping Use    Vaping Use: Never used   Substance and Sexual Activity    Alcohol use: No    Drug use: No    Sexual activity: Not on file     Comment: Resides with    Other Topics Concern    Not on file   Social History Narrative    Always uses seat belt    Convalescence after chemotherapy     Social Determinants of Health     Financial Resource Strain:     Difficulty of Paying Living Expenses:    Food Insecurity:     Worried About Running Out of Food in the Last Year:     [address] in the Last Year:    Transportation Needs:     Lack of Transportation (Medical):      Lack of Transportation (Non-Medical):    Physical Activity:     Days of Exercise per Week:     Minutes of Exercise per Session:    Stress:     Feeling of Stress :    Social Connections:     Frequency of Communication with Friends and Family:     Frequency of Social Gatherings with Friends and Family:     Attends Buddhism Services:     Active Member of Clubs or Organizations:     Attends Club or Organization Meetings:     Marital Status:    Intimate Partner Violence:     Fear of Current or Ex-Partner:     Emotionally Abused:     Physically Abused:     Sexually Abused:        Current Medications:   Current Outpatient Medications   Medication Sig Dispense Refill    apixaban (ELIQUIS) 5 mg Take 1 tablet (5 mg total) by mouth 2 (two) times a day 60 tablet 6    cyanocobalamin (VITAMIN B-12) 500 mcg tablet Take 1 tablet by mouth daily      flecainide (TAMBOCOR) 100 mg tablet Take 0 5 tablets (50 mg total) by mouth 2 (two) times a day 180 tablet 3    furosemide (LASIX) 40 mg tablet Take 1 tablet (40 mg total) by mouth daily As directed 90 tablet 1    levothyroxine 150 mcg tablet Take 1 tablet (150 mcg total) by mouth daily Take 1 tablet 3 days a week and 1/2 tablet 4  Days a week 90 tablet 3    meclizine (ANTIVERT) 25 mg tablet Take 1 tablet (25 mg total) by mouth every 8 (eight) hours as needed for dizziness (Patient taking differently: Take 25 mg by mouth every 8 (eight) hours as needed for dizziness ) 30 tablet 3    metoprolol succinate (TOPROL-XL) 25 mg 24 hr tablet Take 0 5 tablets (12 5 mg total) by mouth daily 30 tablet 3    midodrine (PROAMATINE) 2 5 mg tablet Take 1 tablet (2 5 mg total) by mouth 3 (three) times a day 90 tablet 3    potassium chloride (MICRO-K) 10 MEQ CR capsule TAKE 1 CAPSULE BY MOUTH EVERY DAY 90 capsule 1    RESTASIS 0 05 % ophthalmic emulsion Administer 1 drop to both eyes every 12 (twelve) hours       No current facility-administered medications for this visit  Allergies: Allergies   Allergen Reactions    Morphine And Related GI Intolerance    Oxycodone-Acetaminophen Vomiting       Physical Exam:    Body surface area is 1 8 meters squared      Wt Readings from Last 3 Encounters:   08/06/21 78 kg (172 lb)   07/16/21 78 9 kg (174 lb)   05/13/21 76 7 kg (169 lb)        Temp Readings from Last 3 Encounters:   08/06/21 (!) 97 3 °F (36 3 °C) (Temporal)   07/16/21 (!) 97 4 °F (36 3 °C) (Tympanic)   06/07/21 (!) 97 4 °F (36 3 °C) (Temporal)        BP Readings from Last 3 Encounters:   08/06/21 114/74   07/16/21 134/76   06/07/21 116/54         Pulse Readings from Last 3 Encounters:   08/06/21 86   07/16/21 96   06/07/21 70           Physical Exam     Constitutional   General appearance: No acute distress, well appearing and well nourished  Eyes   Conjunctiva and lids: No swelling, erythema or discharge  Pupils and irises: Equal, round and reactive to light  Ears, Nose, Mouth, and Throat   External inspection of ears and nose: Normal     Nasal mucosa, septum, and turbinates: Normal without edema or erythema  Oropharynx: Normal with no erythema, edema, exudate or lesions  Pulmonary   Respiratory effort: No increased work of breathing or signs of respiratory distress  Auscultation of lungs: Clear to auscultation  Cardiovascular   Palpation of heart: Normal PMI, no thrills  Auscultation of heart: Normal rate and rhythm, normal S1 and S2, without murmurs  Examination of extremities for edema and/or varicosities: Normal     Carotid pulses: Normal     Abdomen   Abdomen: Non-tender, no masses  Liver and spleen: No hepatomegaly or splenomegaly  Lymphatic   Palpation of lymph nodes in neck: No lymphadenopathy  Musculoskeletal   Gait and station: Normal     Digits and nails: Normal without clubbing or cyanosis  Inspection/palpation of joints, bones, and muscles: Normal     Skin   Skin and subcutaneous tissue: Normal without rashes or lesions  Neurologic   Cranial nerves: Cranial nerves 2-12 intact  Sensation: No sensory loss  Psychiatric   Orientation to person, place, and time: Normal     Mood and affect: Normal         Assessment / Plan:      The patient is a pleasant 66-year-old female with Stage IIA Triple negative Right Breast cancer, with 9 (-) lymph nodes   She had surgery and chemo with Taxotere & Cytoxan, and completed 3 of anticipated 4 cycles   She had tiny local recurrence with positive margin in the surgical bed Which was resected and has remained on observation since this time   She continues to follow with Dr Hima Washington and she performs her breast exams   Again she had triple negative breast cancer    Most recent imaging shows no evidence of metastatic disease in the chest abdomen or pelvis  Blood work was within acceptable limits  She will stay on observation  Since she is 5 years out from her initial diagnosis we will see her back in a year with blood work  Goals and Barriers:  Current Goal:  Prolong Survival from  Breast cancer  Barriers: None  Patient's Capacity to Self Care:  Patient  able to self care  Portions of the record may have been created with voice recognition software   Occasional wrong word or "sound a like" substitutions may have occurred due to the inherent limitations of voice recognition software   Read the chart carefully and recognize, using context, where substitutions have occurred

## 2021-09-15 ENCOUNTER — HOSPITAL ENCOUNTER (OUTPATIENT)
Dept: ULTRASOUND IMAGING | Facility: CLINIC | Age: 76
Discharge: HOME | End: 2021-09-15
Payer: COMMERCIAL

## 2021-09-15 ENCOUNTER — HOSPITAL ENCOUNTER (OUTPATIENT)
Dept: MAMMOGRAPHY | Facility: CLINIC | Age: 76
Discharge: HOME | End: 2021-09-15
Payer: COMMERCIAL

## 2021-09-15 VITALS — BODY MASS INDEX: 30.48 KG/M2 | WEIGHT: 172 LBS | HEIGHT: 63 IN

## 2021-09-15 DIAGNOSIS — R92.8 ABNORMAL MAMMOGRAM: ICD-10-CM

## 2021-09-15 DIAGNOSIS — Z12.31 ENCOUNTER FOR SCREENING MAMMOGRAM FOR MALIGNANT NEOPLASM OF BREAST: ICD-10-CM

## 2021-09-15 PROCEDURE — G0279 TOMOSYNTHESIS, MAMMO: HCPCS

## 2021-09-15 PROCEDURE — 77065 DX MAMMO INCL CAD UNI: CPT

## 2021-09-15 PROCEDURE — 76642 ULTRASOUND BREAST LIMITED: CPT

## 2021-09-15 NOTE — PROGRESS NOTES
Met with patient and Dr Nini You                 regarding recommendation for;      _____ RIGHT ____x2__LEFT      ___x__Ultrasound guided  ______Stereotactic  Breast biopsy  ___x__Verbalized understanding  Blood thinners:  __x___yes _____no   Willyadira Spearing    Date stopped: _____x______    Biopsy teaching sheet given:  _______yes ______no    Pertinent medical history includes Right mastectomy in 2016, left breast papillomas, cardiac ablation and a fib  No pertinent allergies  Consent reviewed

## 2021-09-21 ENCOUNTER — HOSPITAL ENCOUNTER (OUTPATIENT)
Dept: MAMMOGRAPHY | Facility: CLINIC | Age: 76
Discharge: HOME | End: 2021-09-21
Payer: COMMERCIAL

## 2021-09-21 ENCOUNTER — HOSPITAL ENCOUNTER (OUTPATIENT)
Dept: ULTRASOUND IMAGING | Facility: CLINIC | Age: 76
Discharge: HOME | End: 2021-09-21
Payer: COMMERCIAL

## 2021-09-21 VITALS — HEART RATE: 80 BPM | DIASTOLIC BLOOD PRESSURE: 76 MMHG | SYSTOLIC BLOOD PRESSURE: 118 MMHG

## 2021-09-21 DIAGNOSIS — R92.8 ABNORMAL ULTRASOUND OF BREAST: ICD-10-CM

## 2021-09-21 DIAGNOSIS — Z98.890 STATUS POST BREAST BIOPSY: ICD-10-CM

## 2021-09-21 PROCEDURE — 88305 TISSUE EXAM BY PATHOLOGIST: CPT | Performed by: PATHOLOGY

## 2021-09-21 PROCEDURE — A4648 IMPLANTABLE TISSUE MARKER: HCPCS

## 2021-09-21 PROCEDURE — 88361 TUMOR IMMUNOHISTOCHEM/COMPUT: CPT | Performed by: PATHOLOGY

## 2021-09-21 PROCEDURE — 88342 IMHCHEM/IMCYTCHM 1ST ANTB: CPT | Performed by: PATHOLOGY

## 2021-09-21 PROCEDURE — 88341 IMHCHEM/IMCYTCHM EA ADD ANTB: CPT | Performed by: PATHOLOGY

## 2021-09-21 PROCEDURE — 19084 BX BREAST ADD LESION US IMAG: CPT

## 2021-09-21 PROCEDURE — 19083 BX BREAST 1ST LESION US IMAG: CPT

## 2021-09-21 RX ORDER — LIDOCAINE HYDROCHLORIDE 10 MG/ML
5 INJECTION, SOLUTION EPIDURAL; INFILTRATION; INTRACAUDAL; PERINEURAL ONCE
Status: COMPLETED | OUTPATIENT
Start: 2021-09-21 | End: 2021-09-21

## 2021-09-21 RX ADMIN — LIDOCAINE HYDROCHLORIDE 5 ML: 10 INJECTION, SOLUTION EPIDURAL; INFILTRATION; INTRACAUDAL; PERINEURAL at 12:58

## 2021-09-21 RX ADMIN — LIDOCAINE HYDROCHLORIDE 5 ML: 10 INJECTION, SOLUTION EPIDURAL; INFILTRATION; INTRACAUDAL; PERINEURAL at 12:56

## 2021-09-21 NOTE — PROGRESS NOTES
Procedure type:    ___X__ultrasound guided _____stereotactic    Breast:    ___X__Left _____Right    Location: 2 o'clock 5 cm Fn    Needle: 14ga Francisca    # of passes: 3    Clip: Heart(Ultraclip)    Performed by: Dr Emil Claros held for 5 minutes by:  Raquel Ott(PCA)    Steri Strips:    ___X__yes _____no    Band aid:    __X___yes_____no    Tape and guaze:    _____yes _____no    Tolerated procedure:    __X__yes _____no

## 2021-09-21 NOTE — PROGRESS NOTES
Ice pack given:    __X___yes _____no    Discharge instructions signed by patient:    ___X__yes _____no    Discharge instructions given to patient:    ___X__yes _____no    Discharged via:    __X___ambulatory    _____wheelchair    _____stretcher    Stable on discharge:    ___X__yes ____no

## 2021-09-21 NOTE — PROGRESS NOTES
Procedure type:    ___X__ultrasound guided _____stereotactic    Breast:    ___X__Left _____Right    Location: 3 o'clock 6 cm Fn    Needle: 14ga Kye    # of passes: 3    Clip: Wing(Ultraclip)    Performed by: Dr Viridiana Webber held for 5 minutes by:  Duran Ott(PCA)    Steri Strips:    __X___yes _____no    Band aid:    ___X_yes_____no    Tape and guaze:    _____yes _____no    Tolerated procedure:    ___X__yes _____no

## 2021-09-21 NOTE — PROGRESS NOTES
Patient arrived via:    ____X_ambulatory    _____wheelchair    _____stretcher      Domestic violence screen    ____X__negative______positive    Breast Implants:    _______yes ______X__no

## 2021-09-21 NOTE — DISCHARGE INSTR - OTHER ORDERS
POST LARGE CORE BREAST BIOPSY PATIENT INFORMATION      1  Place an ice pack inside your bra over the top of the dressing every hour for 20 minutes (20 minutes on, 60 minutes off)  Do this until bedtime  2  Do not shower or bathe until the following morning  3  You may bathe your breast carefully with the steri-strips in place  Be careful    Not to loosen them  The steri-strips will fall off in 3-5 days  4  You may have mild discomfort, and you may have some bruising where the   Needle entered the skin  This should clear within 5-7 days  5  If you need medicine for discomfort, take acetaminophen products such as   Tylenol  You may also take Advil or Motrin products  6  Do not participate in strenuous activities such as-tennis, aerobics, skiing,  Weight lifting, etc  for 24 hours  Refrain from swimming/soaking for 72 hours  7  Wearing a bra for sleeping may be more comfortable for the first 24-48 hours  8  Watch for continued bleeding, pain or fever over 101; please call with any questions or concerns  For procedures done at the [facility] [address] call:  Saintclair Mulling RN at [phone]  Chase Aguilar RN at [phone]                    *After 4 PM call the Interventional Radiology Department                    [phone] and ask to speak with the nurse on call  For procedures done at the [address] [place] [address] call:         Hannah Spivey RN at   *After 4 PM call the Interventional Radiology Department   [phone] and ask to speak with the nurse on call  For procedures done at [address] [place] [address] call: The Radiology Nurse at [phone]  *After 4 PM call your physician, or go to the Emergency Department  9          The final results of your biopsy are usually available within one week

## 2021-09-23 ENCOUNTER — TELEPHONE (OUTPATIENT)
Dept: MAMMOGRAPHY | Facility: CLINIC | Age: 76
End: 2021-09-23

## 2021-09-23 ENCOUNTER — TELEPHONE (OUTPATIENT)
Dept: HEMATOLOGY ONCOLOGY | Facility: CLINIC | Age: 76
End: 2021-09-23

## 2021-09-23 NOTE — TELEPHONE ENCOUNTER
New Patient Breast Form   Patient Details:     Kiki July 1945     [id number]     Background Information:   [address] starts by opening a telephone encounter and gathering the following information   Who is calling to schedule and relationship? [facility] - Cristela   Referring Provider [facility]   To which speciality is the referral?  Surgical Oncology   Reason for Visit? New Diagnosis   Tumor Type? DCIS of left breast    Is this Cancer or Non-Cancer? Cancer   Is there a confimed diagnosis from biopsy/tissue reviewed by Pathology? Yes   Date of Tissue Diagnosis (If done outside of [facility] please obtain report and slides) 9/21   Is patient aware of diagnosis? Who made them aware? yes   If no tissue diagnosis, please stop and discuss with Navigator prior to scheduling   When was the diagnosis made? 9/2021   Were outside slides requested? If not, why? No   If biopsy done externally, obtain reports and slides for internal review   Have you had any imaging or labs done? Where were they done? Yes   Was the patient told to bring a disk? If no, why not? No   Are records in [facility]? Yes   Are records needed from an outside facility? No   If yes, Name, [facility] where facility is located  na   Is there a personal history of cancer? If yes, please list the type and year diagnosed Yes - right breast (2016)   If patient has a prior history of breast cancer were old records obtained? No - treated at    Is there a family history of cancer? If yes, please list type of cancer  Yes - kidney   Have you ever had genetic testing done for breast cancer? If so, can you please bring a copy to appointment for timely treatment planning No   Scheduling Information:   Preferred [address]   Are you willing to see another provider in order to be seen sooner? No   Are there any days the patient cannot be seen? If yes, please list the dates No   How was New Patient Packet Sent?  Will Come Early   Miscellaneous:

## 2021-09-23 NOTE — TELEPHONE ENCOUNTER
Patient scheduled 10/29 with Dr Huog Gamble  Offered sooner appointment in [place] -- patient declined [place] appointment and did not want to be seen by  Dr Moyer Margarita

## 2021-09-23 NOTE — TELEPHONE ENCOUNTER
Soft Intake Form    Patient Details:    Jaspreet Chiu    [birth year]    [id number]    Reason For Appointment    Who is calling? [facility]   If not patient, name? Cristela    What is the diagnosis? DCIS of left breast    Is this cancer or non-cancer? Cancer   Who is the referring doctor? Ruby Sinha MD   Scheduling Information    Which department are they being referred to? Surgical Oncology   Best number to call back on? If it is the [facility], please use the nurses number   [phone] or  [phone]  Information Patient does not want to be seen by Dr Lavon Steen   Please advise the patient a member from the navigation team will be reaching out to them within 2 business days

## 2021-09-23 NOTE — TELEPHONE ENCOUNTER
Returned call - intake completed  [facility] following up with patient regarding scheduling and will call back

## 2021-09-24 NOTE — TELEPHONE ENCOUNTER
Intake received  Benefits review in process  Pt outreach to follow    Per RTE pt has an active [employer] 65 plan that runs on a kobe year   Effective 01/01/21  No deduct  Out of pocket $7550 met $1155    Pt has an appt 10/07/21 will revw for plan & f/u w/pt

## 2021-09-28 PROBLEM — Z12.31 ENCOUNTER FOR SCREENING MAMMOGRAM FOR BREAST CANCER: Status: RESOLVED | Noted: 2019-01-16 | Resolved: 2021-09-28

## 2021-09-28 PROBLEM — Z85.3 ENCOUNTER FOR FOLLOW-UP SURVEILLANCE OF BREAST CANCER: Status: RESOLVED | Noted: 2019-04-04 | Resolved: 2021-09-28

## 2021-09-28 PROBLEM — Z08 ENCOUNTER FOR FOLLOW-UP SURVEILLANCE OF BREAST CANCER: Status: RESOLVED | Noted: 2019-04-04 | Resolved: 2021-09-28

## 2021-09-28 PROBLEM — C50.911 RECURRENT MALIGNANT NEOPLASM OF RIGHT BREAST (HCC): Status: RESOLVED | Noted: 2020-01-17 | Resolved: 2021-09-28

## 2021-09-28 PROBLEM — D05.12 DUCTAL CARCINOMA IN SITU (DCIS) OF LEFT BREAST: Status: ACTIVE | Noted: 2021-09-28

## 2021-09-29 ENCOUNTER — TELEPHONE (OUTPATIENT)
Dept: SURGICAL ONCOLOGY | Facility: CLINIC | Age: 76
End: 2021-09-29

## 2021-09-30 ENCOUNTER — DOCUMENTATION (OUTPATIENT)
Dept: GENETICS | Facility: CLINIC | Age: 76
End: 2021-09-30

## 2021-09-30 ENCOUNTER — APPOINTMENT (OUTPATIENT)
Dept: LAB | Facility: CLINIC | Age: 76
End: 2021-09-30
Payer: COMMERCIAL

## 2021-09-30 ENCOUNTER — CONSULT (OUTPATIENT)
Dept: SURGICAL ONCOLOGY | Facility: CLINIC | Age: 76
End: 2021-09-30
Payer: COMMERCIAL

## 2021-09-30 VITALS
BODY MASS INDEX: 29.95 KG/M2 | RESPIRATION RATE: 16 BRPM | OXYGEN SATURATION: 98 % | WEIGHT: 169 LBS | DIASTOLIC BLOOD PRESSURE: 76 MMHG | HEIGHT: 63 IN | HEART RATE: 94 BPM | TEMPERATURE: 97.8 F | SYSTOLIC BLOOD PRESSURE: 124 MMHG

## 2021-09-30 DIAGNOSIS — Z85.3 HISTORY OF RIGHT BREAST CANCER: ICD-10-CM

## 2021-09-30 DIAGNOSIS — D05.12 DUCTAL CARCINOMA IN SITU (DCIS) OF LEFT BREAST: Primary | ICD-10-CM

## 2021-09-30 DIAGNOSIS — I42.8 OTHER CARDIOMYOPATHY (HCC): ICD-10-CM

## 2021-09-30 DIAGNOSIS — L98.8 LESION OF SKIN OF BREAST: ICD-10-CM

## 2021-09-30 DIAGNOSIS — D05.12 INTRADUCTAL CARCINOMA IN SITU OF LEFT BREAST: ICD-10-CM

## 2021-09-30 PROCEDURE — 36415 COLL VENOUS BLD VENIPUNCTURE: CPT

## 2021-09-30 PROCEDURE — 99215 OFFICE O/P EST HI 40 MIN: CPT | Performed by: SURGERY

## 2021-09-30 NOTE — PROGRESS NOTES
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  She had a papilloma and a 2nd spot  The patient has a past history of triple negative breast cancer with an ultimate   Local recurrence required a mastectomy as well as chemotherapy  The patient prefers a left mastectomy for her DCIS  Which would also remove her papilloma  On exam the patient has a 1 5 cm nodule  In the subcutaneous tissues with the tail of Lemus used to be  This is  Not a simple cyst   I have recommended this be biopsied prior to her surgery  Given that the patient had triple negative cancer at the age of 79 and now has a 2nd cancer and has multiple family members /symptoms she is interested in genetic testing will coordinate that  I will see her back shortly after this  If she did have a local regional recurrence on the right side would recommend a metastatic workup  Cancer History:     Oncology History   History of right breast cancer   9/12/2016 Biopsy    Right breast 3 site biopsy  Invasive breast carcinoma  Grade 2  ER 0, UT 0, HER2      10/28/2016 Surgery    Right breast mastectomy with SLN biopsy (Dr Shirley Pereira)  Invasive mammary carcinoma of no special type (ductal)  Grade 3  2 8 cm  Margins negative  0/9 Lymph nodes  Stage IIA     12/7/2016 - 2/1/2017 Chemotherapy    Taxotere/Cytoxan  Scheduled for every 3 weeks x 4 cycles  Completed 3 cycles under the guidance of Dr Stacy Munguia        3/16/2017 Surgery    Right chest wall lesion excision (Dr Shirley Pereira)  Residual microscopic focus of invasive carcinoma  4 mm  Margins negative     Ductal carcinoma in situ (DCIS) of left breast   9/21/2021 Biopsy    Left breast ultrasound-guided biopsy  A  3 o'clock, 6 cm from nipple  Intraductal papilloma  Focal stromal changes suggestive of PASH    B  2 o'clock, 5 cm from nipple  Ductal carcinoma in situ  Grade 1        Concordant  Carcinoma measured up to 6mm on US  Left axilla US negative  If the papilloma is not excised at surgery, a 6 MO F/U US is recommended  Interval History:     The patient is now on Eliquis  She had significant bruising from her biopsy in her breast   She has ductal carcinoma in Situ  The patient has not noticed any changes in the right side,  However when I directed our attention to the nodule she said this was definitely new  The patient has had its recently had a pacemaker placed on the left side  Review of Systems:   Review of Systems   Constitutional: Negative for activity change, appetite change and fatigue  HENT: Negative  Eyes: Negative  Respiratory: Positive for shortness of breath  Negative for cough and wheezing  Cardiovascular: Positive for leg swelling  Negative for chest pain  Gastrointestinal: Negative  Endocrine: Negative  Genitourinary: Negative  Musculoskeletal:        No new changes or complaints of bone pain   Skin: Negative  Allergic/Immunologic: Negative  Neurological: Negative  Hematological: Negative  Psychiatric/Behavioral: Negative          Past Medical History     Patient Active Problem List   Diagnosis    Atrial fibrillation (HCC)    Benign essential hypertension    Bilateral edema of lower extremity    BPPV (benign paroxysmal positional vertigo)    Bursitis of left shoulder    Cardiomyopathy ([address] )    Cervical stenosis of spine    Edema    Homocysteinemia ([address] )    Hypokalemia    Acquired hypothyroidism    Long QT interval    Lymphedema    History of right breast cancer    Class 1 obesity due to excess calories without serious comorbidity with body mass index (BMI) of 31 0 to 31 9 in adult    Osteopenia    Overactive bladder    Peripheral neuropathy    Vitamin D deficiency    Bronchitis with bronchospasm    Candidal esophagitis (HCC)    Abnormal chest CT    Pulmonary nodule seen on imaging study    SOB (shortness of breath)    Weight loss    Hyperglycemia    Mixed hyperlipidemia    Other idiopathic peripheral autonomic neuropathy    Persistent nodularity of breast    Current use of long term anticoagulation    RICHARDS (dyspnea on exertion)    Cortical age-related cataract of both eyes    Bradycardia    SSS (sick sinus syndrome) ([facility] )    Cardiac pacemaker    Ductal carcinoma in situ (DCIS) of left breast     Past Medical History:   Diagnosis Date    Abnormal chest CT     last assessed - [id number]    Abnormal glucose     Resolved - [id number]    Arrhythmia     Arthritis     Slight    BCC (basal cell carcinoma of skin)     last assessed - [id number]    Breast cancer ([facility] ) 09/12/2016    rt    Cardiomyopathy ([facility] )     Cervical spinal stenosis     Discharge from left nipple     last assessed - [id number]    Disease of thyroid gland     hypothyroidism    Full dentures     Upper and Lower    History of atrial fibrillation     Homocysteinemia ([facility] )     Hypertension     Hypokalemia     Intraductal papilloma of breast     last assessed - [id number]    Malignant neoplasm of skin     basal cell on face, Moh's surgery    Memory loss     last assessed - [id number]    Obesity     Open wound     last assessed - [id number]    Osteopenia     Overactive bladder     Palmar plantar erythrodysaesthesia     Resolved - [id number]    Pulmonary nodule seen on imaging study     Rosacea     UTI (urinary tract infection)     recently    Vertigo     Vitamin D deficiency      Past Surgical History:   Procedure Laterality Date    APPENDECTOMY      ATRIAL ABLATION SURGERY      last assessed - [id number]   [facility] SURGERY  1997    Lumbar spinal stenosis    BREAST BIOPSY      2014? left breast; 9/12/16 right breast x 3    BREAST BIOPSY Left 03/30/2010    BREAST BIOPSY Right 09/12/2016    us bx- invasive br ca    BREAST SURGERY      needle bx      BREAST SURGERY Right 03/16/2017    excision of right chest wall lesion    CARDIAC ELECTROPHYSIOLOGY STUDY AND ABLATION      CARDIOVERSION      DENTAL SURGERY      HAND SURGERY      Joint replaced with silicone joint 5th finger right hand    HYSTERECTOMY  1993    Complete    LAMINECTOMY      Decompressive up to two lumbar segments    MASTECTOMY Right 10/28/2016    OOPHORECTOMY  1993    CT EXC SKIN MALIG <0 5 CM TRUNK,ARM,LEG Right 03/16/2017    Procedure: EXCISION CHEST WALL LESION; NEEDLE LOC @ 1400;  Surgeon: Creig Halsted, MD;  Location: [facility];  Service: Surgical Oncology    CT INTRAOPERATIVE SENTINEL LYMPH NODE ID W DYE INJECTION Right 10/28/2016    Procedure: BREAST MASTECTOMY WITH SENTINAL LYMPH NODE BIOPSY; LYMPHSCINITIGRAPHY; LYMPHATIC MAPPING; 0800 NUC MED INJECTION;  Surgeon: Creig Halsted, MD;  Location: [facility];  Service: Surgical Oncology    SENTINEL LYMPH NODE BIOPSY Right     US BREAST NEEDLE LOC RIGHT Right 03/16/2017    US GUIDANCE BREAST BIOPSY LEFT EACH ADDITIONAL Left 09/21/2021    US GUIDANCE BREAST BIOPSY RIGHT EACH ADDITIONAL Right 09/12/2016    US GUIDANCE BREAST BIOPSY RIGHT EACH ADDITIONAL Right 09/12/2016    US GUIDED BREAST BIOPSY LEFT COMPLETE Left 09/21/2021    US GUIDED BREAST BIOPSY RIGHT COMPLETE Right 09/12/2016     Family History   Problem Relation Age of Onset    Coronary artery disease Mother         CABG    Diabetes Mother     Heart attack Mother     Diabetes Sister     Parkinsonism Sister     Coronary artery disease Brother         CABG    Diabetes Brother     Heart attack Brother     Heart attack Son     Kidney cancer Son 48    No Known Problems Son     No Known Problems Son     Emphysema Sister     No Known Problems Sister     Heart attack Brother     No Known Problems Brother     No Known Problems Maternal Grandmother     No Known Problems Maternal Grandfather     No Known Problems Paternal Grandmother     No Known Problems Paternal Grandfather     No Known Problems Paternal Aunt     No Known Problems Paternal Aunt     No Known Problems Paternal Aunt     No Known Problems Paternal Aunt      Social History     Socioeconomic History    Marital status: /Civil Union     Spouse name: Not on file    Number of children: Not on file    Years of education: Not on file    Highest education level: Not on file   Occupational History    Occupation: Retired   Tobacco Use    Smoking status: Never Smoker    Smokeless tobacco: Never Used   Vaping Use    Vaping Use: Never used   Substance and Sexual Activity    Alcohol use: No    Drug use: No    Sexual activity: Not on file     Comment: Resides with    Other Topics Concern    Not on file   Social History Narrative    Always uses seat belt    Convalescence after chemotherapy     Social Determinants of Health     Financial Resource Strain:     Difficulty of Paying Living Expenses:    Food Insecurity:     Worried About Running Out of Food in the Last Year:     [address] in the Last Year:    Transportation Needs:     Lack of Transportation (Medical):      Lack of Transportation (Non-Medical):    Physical Activity:     Days of Exercise per Week:     Minutes of Exercise per Session:    Stress:     Feeling of Stress :    Social Connections:     Frequency of Communication with Friends and Family:     Frequency of Social Gatherings with Friends and Family:     Attends [employer] Services:     Active Member of Clubs or Organizations:     Attends Club or Organization Meetings:     Marital Status:    Intimate Partner Violence:     Fear of Current or Ex-Partner:     Emotionally Abused:  Physically Abused:     Sexually Abused:        Current Outpatient Medications:     apixaban (ELIQUIS) 5 mg, Take 1 tablet (5 mg total) by mouth 2 (two) times a day, Disp: 60 tablet, Rfl: 6    cyanocobalamin (VITAMIN B-12) 500 mcg tablet, Take 1 tablet by mouth daily, Disp: , Rfl:     flecainide (TAMBOCOR) 100 mg tablet, Take 0 5 tablets (50 mg total) by mouth 2 (two) times a day, Disp: 180 tablet, Rfl: 3    furosemide (LASIX) 40 mg tablet, Take 1 tablet (40 mg total) by mouth daily As directed, Disp: 90 tablet, Rfl: 1    levothyroxine 150 mcg tablet, Take 1 tablet (150 mcg total) by mouth daily Take 1 tablet 3 days a week and 1/2 tablet 4  Days a week, Disp: 90 tablet, Rfl: 3    meclizine (ANTIVERT) 25 mg tablet, Take 1 tablet (25 mg total) by mouth every 8 (eight) hours as needed for dizziness (Patient taking differently: Take 25 mg by mouth every 8 (eight) hours as needed for dizziness ), Disp: 30 tablet, Rfl: 3    metoprolol succinate (TOPROL-XL) 25 mg 24 hr tablet, Take 0 5 tablets (12 5 mg total) by mouth daily, Disp: 30 tablet, Rfl: 3    midodrine (PROAMATINE) 2 5 mg tablet, Take 1 tablet (2 5 mg total) by mouth 3 (three) times a day, Disp: 90 tablet, Rfl: 3    potassium chloride (MICRO-K) 10 MEQ CR capsule, TAKE 1 CAPSULE BY MOUTH EVERY DAY, Disp: 90 capsule, Rfl: 1    RESTASIS 0 05 % ophthalmic emulsion, Administer 1 drop to both eyes every 12 (twelve) hours, Disp: , Rfl:   Allergies   Allergen Reactions    Morphine And Related GI Intolerance    Oxycodone-Acetaminophen Vomiting       Physical Exam:     Vitals:    09/30/21 1035   BP: 124/76   Pulse: 94   Resp: 16   Temp: 97 8 °F (36 6 °C)   SpO2: 98%     Physical Exam  Vitals reviewed  Constitutional:       Appearance: She is well-developed  HENT:      Head: Normocephalic and atraumatic  Eyes:      Pupils: Pupils are equal, round, and reactive to light  Neck:      Thyroid: No thyromegaly  Vascular: No JVD        Trachea: No tracheal deviation  Cardiovascular:      Rate and Rhythm: Normal rate and regular rhythm  Heart sounds: Normal heart sounds  No murmur heard  No friction rub  No gallop  Pulmonary:      Effort: Pulmonary effort is normal  No respiratory distress  Breath sounds: Normal breath sounds  No wheezing or rales  Chest:          Comments:   Examination of the right mastectomy site demonstrates a 1-1/2 cm nodule outlined on the diagram   This is apparently a new finding  Ultrasound interrogation demonstrates that this is not a simple cyst   Its appearance is consistent with possible recurrence or fat necrosis  There is no evidence of infection  There are no other masses in the mastectomy site or in the axilla  Examination left breast demonstrates considerable ecchymosis in the lower outer quadrant  There is underlying palpable defect consistent with a small hematoma  There are no other masses in the breast   There are no worrisome skin findings there is no axillary or paraclavicular adenopathy  Abdominal:      General: There is no distension  Palpations: Abdomen is soft  There is no hepatomegaly or mass  Tenderness: There is no abdominal tenderness  There is no guarding or rebound  Musculoskeletal:         General: No tenderness  Normal range of motion  Cervical back: Normal range of motion and neck supple  Lymphadenopathy:      Cervical: No cervical adenopathy  Skin:     General: Skin is warm and dry  Findings: No erythema or rash  Neurological:      Mental Status: She is alert and oriented to person, place, and time  Cranial Nerves: No cranial nerve deficit  Psychiatric:         Behavior: Behavior normal            Results & Discussion:     Patient presents with a new diagnosis of contralateral left breast cancer in this particular case Dr Jeanne Powell in Situ measuring 6 mm in size grade 1 % %    She also had a papilloma at the  3 o'clock position 6 cm from the nipple  Originally suggested that the patient have a lumpectomy for DCIS however she strongly preferred to have a mastectomy to minimize the chance of any other surgical interventions on this side  During the examination the patient was found to have a proximal 1 5 cm mass in the right mastectomy site  Ultrasound interrogation of this is concerning for possible recurrence though fat necrosis remains a viable possibility  I think it is unlikely to be infectious process  I have recommended she have an ultrasound and biopsy of this mass  If the mass is a local recurrence to note perform a metastatic workup and if it is benign then we will proceed with a mastectomy  The patient and I both think it would be reasonable to remove it even if it is benign because it is somewhat tender  given the patient has 2 cancers she is interested in genetic testing  We will coordinate that for her  We will see her back for definitive surgical planning  Based on her cardiac history as well as her recent pacemaker implant,  I would recommend cardiac clearance prior to surgical intervention  Advance Care Planning/Advance Directives:  I discussed the disease status, treatment plans and follow-up with the patient

## 2021-10-01 ENCOUNTER — HOSPITAL ENCOUNTER (OUTPATIENT)
Dept: ULTRASOUND IMAGING | Facility: CLINIC | Age: 76
Discharge: HOME | End: 2021-10-01
Payer: COMMERCIAL

## 2021-10-01 VITALS — HEIGHT: 63 IN | WEIGHT: 169 LBS | BODY MASS INDEX: 29.95 KG/M2

## 2021-10-01 VITALS — SYSTOLIC BLOOD PRESSURE: 160 MMHG | DIASTOLIC BLOOD PRESSURE: 90 MMHG | HEART RATE: 80 BPM

## 2021-10-01 DIAGNOSIS — Z85.3 HISTORY OF RIGHT BREAST CANCER: ICD-10-CM

## 2021-10-01 DIAGNOSIS — L98.8 LESION OF SKIN OF BREAST: ICD-10-CM

## 2021-10-01 PROCEDURE — A4648 IMPLANTABLE TISSUE MARKER: HCPCS

## 2021-10-01 PROCEDURE — 88342 IMHCHEM/IMCYTCHM 1ST ANTB: CPT | Performed by: SPECIALIST

## 2021-10-01 PROCEDURE — 88305 TISSUE EXAM BY PATHOLOGIST: CPT | Performed by: SPECIALIST

## 2021-10-01 PROCEDURE — 76642 ULTRASOUND BREAST LIMITED: CPT

## 2021-10-01 PROCEDURE — 19083 BX BREAST 1ST LESION US IMAG: CPT

## 2021-10-01 RX ORDER — LIDOCAINE HYDROCHLORIDE 10 MG/ML
5 INJECTION, SOLUTION EPIDURAL; INFILTRATION; INTRACAUDAL; PERINEURAL ONCE
Status: COMPLETED | OUTPATIENT
Start: 2021-10-01 | End: 2021-10-01

## 2021-10-01 RX ADMIN — LIDOCAINE HYDROCHLORIDE 10 ML: 10 INJECTION, SOLUTION EPIDURAL; INFILTRATION; INTRACAUDAL; PERINEURAL at 11:03

## 2021-10-04 LAB — MISCELLANEOUS LAB TEST RESULT: NORMAL

## 2021-10-06 ENCOUNTER — TELEPHONE (OUTPATIENT)
Dept: SURGICAL ONCOLOGY | Facility: CLINIC | Age: 76
End: 2021-10-06

## 2021-10-08 ENCOUNTER — TELEPHONE (OUTPATIENT)
Dept: SURGICAL ONCOLOGY | Facility: CLINIC | Age: 76
End: 2021-10-08

## 2021-10-12 ENCOUNTER — TELEPHONE (OUTPATIENT)
Dept: FAMILY MEDICINE CLINIC | Facility: HOSPITAL | Age: 76
End: 2021-10-12

## 2021-10-12 PROCEDURE — U0005 INFEC AGEN DETEC AMPLI PROBE: HCPCS | Performed by: FAMILY MEDICINE

## 2021-10-12 PROCEDURE — U0003 INFECTIOUS AGENT DETECTION BY NUCLEIC ACID (DNA OR RNA); SEVERE ACUTE RESPIRATORY SYNDROME CORONAVIRUS 2 (SARS-COV-2) (CORONAVIRUS DISEASE [COVID-19]), AMPLIFIED PROBE TECHNIQUE, MAKING USE OF HIGH THROUGHPUT TECHNOLOGIES AS DESCRIBED BY CMS-2020-01-R: HCPCS | Performed by: FAMILY MEDICINE

## 2021-10-13 ENCOUNTER — DOCUMENTATION (OUTPATIENT)
Dept: HEMATOLOGY ONCOLOGY | Facility: CLINIC | Age: 76
End: 2021-10-13

## 2021-10-14 ENCOUNTER — OFFICE VISIT (OUTPATIENT)
Dept: SURGICAL ONCOLOGY | Facility: CLINIC | Age: 76
End: 2021-10-14
Payer: COMMERCIAL

## 2021-10-14 ENCOUNTER — APPOINTMENT (OUTPATIENT)
Dept: LAB | Facility: CLINIC | Age: 76
End: 2021-10-14
Payer: COMMERCIAL

## 2021-10-14 ENCOUNTER — OFFICE VISIT (OUTPATIENT)
Dept: LAB | Facility: CLINIC | Age: 76
End: 2021-10-14
Payer: COMMERCIAL

## 2021-10-14 VITALS
SYSTOLIC BLOOD PRESSURE: 136 MMHG | HEIGHT: 63 IN | WEIGHT: 166 LBS | BODY MASS INDEX: 29.41 KG/M2 | RESPIRATION RATE: 12 BRPM | HEART RATE: 88 BPM | OXYGEN SATURATION: 98 % | DIASTOLIC BLOOD PRESSURE: 82 MMHG | TEMPERATURE: 97.1 F

## 2021-10-14 DIAGNOSIS — Z01.818 PREOP EXAMINATION: Primary | ICD-10-CM

## 2021-10-14 DIAGNOSIS — D05.12 DUCTAL CARCINOMA IN SITU (DCIS) OF LEFT BREAST: ICD-10-CM

## 2021-10-14 DIAGNOSIS — Z85.3 HISTORY OF RIGHT BREAST CANCER: ICD-10-CM

## 2021-10-14 DIAGNOSIS — D05.12 DUCTAL CARCINOMA IN SITU OF LEFT BREAST WITH MICROINVASIVE COMPONENT: ICD-10-CM

## 2021-10-14 DIAGNOSIS — Z01.818 PREOPERATIVE EXAMINATION, UNSPECIFIED: Primary | ICD-10-CM

## 2021-10-14 DIAGNOSIS — Z01.818 PREOP EXAMINATION: ICD-10-CM

## 2021-10-14 LAB
ALBUMIN SERPL BCP-MCNC: 3 G/DL (ref 3.5–5)
ALP SERPL-CCNC: 94 U/L (ref 46–116)
ALT SERPL W P-5'-P-CCNC: 15 U/L (ref 12–78)
ANION GAP SERPL CALCULATED.3IONS-SCNC: 5 MMOL/L (ref 4–13)
AST SERPL W P-5'-P-CCNC: 27 U/L (ref 5–45)
ATRIAL RATE: 80 BPM
BASOPHILS # BLD AUTO: 0.03 THOUSANDS/ΜL (ref 0–0.1)
BASOPHILS NFR BLD AUTO: 1 % (ref 0–1)
BILIRUB SERPL-MCNC: 1.04 MG/DL (ref 0.2–1)
BUN SERPL-MCNC: 10 MG/DL (ref 5–25)
CALCIUM ALBUM COR SERPL-MCNC: 9.2 MG/DL (ref 8.3–10.1)
CALCIUM SERPL-MCNC: 8.4 MG/DL (ref 8.3–10.1)
CHLORIDE SERPL-SCNC: 107 MMOL/L (ref 100–108)
CO2 SERPL-SCNC: 31 MMOL/L (ref 21–32)
CREAT SERPL-MCNC: 1.11 MG/DL (ref 0.6–1.3)
EOSINOPHIL # BLD AUTO: 0.09 THOUSAND/ΜL (ref 0–0.61)
EOSINOPHIL NFR BLD AUTO: 2 % (ref 0–6)
ERYTHROCYTE [DISTWIDTH] IN BLOOD BY AUTOMATED COUNT: 14.8 % (ref 11.6–15.1)
GFR SERPL CREATININE-BSD FRML MDRD: 49 ML/MIN/1.73SQ M
GLUCOSE SERPL-MCNC: 86 MG/DL (ref 65–140)
HCT VFR BLD AUTO: 39.9 % (ref 34.8–46.1)
HGB BLD-MCNC: 12.6 G/DL (ref 11.5–15.4)
IMM GRANULOCYTES # BLD AUTO: 0.01 THOUSAND/UL (ref 0–0.2)
IMM GRANULOCYTES NFR BLD AUTO: 0 % (ref 0–2)
LYMPHOCYTES # BLD AUTO: 1.04 THOUSANDS/ΜL (ref 0.6–4.47)
LYMPHOCYTES NFR BLD AUTO: 24 % (ref 14–44)
MCH RBC QN AUTO: 29 PG (ref 26.8–34.3)
MCHC RBC AUTO-ENTMCNC: 31.6 G/DL (ref 31.4–37.4)
MCV RBC AUTO: 92 FL (ref 82–98)
MONOCYTES # BLD AUTO: 0.53 THOUSAND/ΜL (ref 0.17–1.22)
MONOCYTES NFR BLD AUTO: 12 % (ref 4–12)
NEUTROPHILS # BLD AUTO: 2.72 THOUSANDS/ΜL (ref 1.85–7.62)
NEUTS SEG NFR BLD AUTO: 61 % (ref 43–75)
NRBC BLD AUTO-RTO: 0 /100 WBCS
P AXIS: 99 DEGREES
PLATELET # BLD AUTO: 176 THOUSANDS/UL (ref 149–390)
PMV BLD AUTO: 11.4 FL (ref 8.9–12.7)
POTASSIUM SERPL-SCNC: 4 MMOL/L (ref 3.5–5.3)
PR INTERVAL: 226 MS
PROT SERPL-MCNC: 6.6 G/DL (ref 6.4–8.2)
QRS AXIS: -4 DEGREES
QRSD INTERVAL: 80 MS
QT INTERVAL: 422 MS
QTC INTERVAL: 486 MS
RBC # BLD AUTO: 4.34 MILLION/UL (ref 3.81–5.12)
SODIUM SERPL-SCNC: 143 MMOL/L (ref 136–145)
T WAVE AXIS: 30 DEGREES
VENTRICULAR RATE: 80 BPM
WBC # BLD AUTO: 4.42 THOUSAND/UL (ref 4.31–10.16)

## 2021-10-14 PROCEDURE — 93005 ELECTROCARDIOGRAM TRACING: CPT

## 2021-10-14 PROCEDURE — 80053 COMPREHEN METABOLIC PANEL: CPT

## 2021-10-14 PROCEDURE — 85025 COMPLETE CBC W/AUTO DIFF WBC: CPT

## 2021-10-14 PROCEDURE — 99214 OFFICE O/P EST MOD 30 MIN: CPT | Performed by: SURGERY

## 2021-10-14 PROCEDURE — 93010 ELECTROCARDIOGRAM REPORT: CPT | Performed by: INTERNAL MEDICINE

## 2021-10-14 PROCEDURE — 1160F RVW MEDS BY RX/DR IN RCRD: CPT | Performed by: SURGERY

## 2021-10-14 PROCEDURE — 1036F TOBACCO NON-USER: CPT | Performed by: SURGERY

## 2021-10-14 PROCEDURE — 36415 COLL VENOUS BLD VENIPUNCTURE: CPT

## 2021-10-14 RX ORDER — OXYCODONE HYDROCHLORIDE AND ACETAMINOPHEN 5; 325 MG/1; MG/1
1 TABLET ORAL EVERY 6 HOURS PRN
Qty: 20 TABLET | Refills: 0 | Status: SHIPPED | OUTPATIENT
Start: 2021-10-14 | End: 2022-01-17 | Stop reason: ALTCHOICE

## 2021-10-19 ENCOUNTER — ANESTHESIA EVENT (OUTPATIENT)
Dept: PERIOP | Facility: HOSPITAL | Age: 76
End: 2021-10-19
Payer: COMMERCIAL

## 2021-10-19 ENCOUNTER — HOSPITAL ENCOUNTER (OUTPATIENT)
Dept: MAMMOGRAPHY | Facility: CLINIC | Age: 76
Discharge: HOME | End: 2021-10-19
Payer: COMMERCIAL

## 2021-10-19 ENCOUNTER — HOSPITAL ENCOUNTER (OUTPATIENT)
Dept: ULTRASOUND IMAGING | Facility: CLINIC | Age: 76
Discharge: HOME | End: 2021-10-19
Payer: COMMERCIAL

## 2021-10-19 VITALS — HEART RATE: 60 BPM | DIASTOLIC BLOOD PRESSURE: 70 MMHG | SYSTOLIC BLOOD PRESSURE: 130 MMHG

## 2021-10-19 VITALS — WEIGHT: 166 LBS | HEIGHT: 62 IN | BODY MASS INDEX: 30.55 KG/M2

## 2021-10-19 DIAGNOSIS — R92.8 ABNORMAL MAMMOGRAM: ICD-10-CM

## 2021-10-19 DIAGNOSIS — D05.12 DUCTAL CARCINOMA IN SITU (DCIS) OF LEFT BREAST: ICD-10-CM

## 2021-10-19 PROCEDURE — A4648 IMPLANTABLE TISSUE MARKER: HCPCS

## 2021-10-19 PROCEDURE — 19285 PERQ DEV BREAST 1ST US IMAG: CPT

## 2021-10-19 RX ORDER — LIDOCAINE HYDROCHLORIDE 10 MG/ML
5 INJECTION, SOLUTION EPIDURAL; INFILTRATION; INTRACAUDAL; PERINEURAL ONCE
Status: COMPLETED | OUTPATIENT
Start: 2021-10-19 | End: 2021-10-19

## 2021-10-19 RX ADMIN — LIDOCAINE HYDROCHLORIDE 5 ML: 10 INJECTION, SOLUTION EPIDURAL; INFILTRATION; INTRACAUDAL; PERINEURAL at 08:23

## 2021-10-22 ENCOUNTER — HOSPITAL ENCOUNTER (OUTPATIENT)
Facility: HOSPITAL | Age: 76
Setting detail: OUTPATIENT SURGERY
Discharge: HOME | End: 2021-10-23
Attending: SURGERY | Admitting: SURGERY
Payer: COMMERCIAL

## 2021-10-22 ENCOUNTER — ANESTHESIA (OUTPATIENT)
Dept: PERIOP | Facility: HOSPITAL | Age: 76
End: 2021-10-22
Payer: COMMERCIAL

## 2021-10-22 DIAGNOSIS — D05.12 DUCTAL CARCINOMA IN SITU (DCIS) OF LEFT BREAST: ICD-10-CM

## 2021-10-22 LAB
PLATELET # BLD AUTO: 150 THOUSANDS/UL (ref 149–390)
PMV BLD AUTO: 11.2 FL (ref 8.9–12.7)

## 2021-10-22 PROCEDURE — 88307 TISSUE EXAM BY PATHOLOGIST: CPT | Performed by: PATHOLOGY

## 2021-10-22 PROCEDURE — 88342 IMHCHEM/IMCYTCHM 1ST ANTB: CPT | Performed by: PATHOLOGY

## 2021-10-22 PROCEDURE — 19303 MAST SIMPLE COMPLETE: CPT | Performed by: SURGERY

## 2021-10-22 PROCEDURE — 85049 AUTOMATED PLATELET COUNT: CPT | Performed by: SURGERY

## 2021-10-22 PROCEDURE — C9290 INJ, BUPIVACAINE LIPOSOME: HCPCS | Performed by: STUDENT IN AN ORGANIZED HEALTH CARE EDUCATION/TRAINING PROGRAM

## 2021-10-22 RX ORDER — ACETAMINOPHEN 325 MG/1
975 TABLET ORAL ONCE
Status: COMPLETED | OUTPATIENT
Start: 2021-10-22 | End: 2021-10-22

## 2021-10-22 RX ORDER — HYDROMORPHONE HCL/PF 1 MG/ML
0.5 SYRINGE (ML) INJECTION
Status: DISCONTINUED | OUTPATIENT
Start: 2021-10-22 | End: 2021-10-23 | Stop reason: HOSPADM

## 2021-10-22 RX ORDER — LEVOTHYROXINE SODIUM 0.15 MG/1
150 TABLET ORAL
Status: DISCONTINUED | OUTPATIENT
Start: 2021-10-23 | End: 2021-10-23 | Stop reason: HOSPADM

## 2021-10-22 RX ORDER — PROPOFOL 10 MG/ML
INJECTION, EMULSION INTRAVENOUS AS NEEDED
Status: DISCONTINUED | OUTPATIENT
Start: 2021-10-22 | End: 2021-10-22

## 2021-10-22 RX ORDER — MAGNESIUM HYDROXIDE 1200 MG/15ML
LIQUID ORAL AS NEEDED
Status: DISCONTINUED | OUTPATIENT
Start: 2021-10-22 | End: 2021-10-22 | Stop reason: HOSPADM

## 2021-10-22 RX ORDER — SODIUM CHLORIDE, SODIUM LACTATE, POTASSIUM CHLORIDE, CALCIUM CHLORIDE 600; 310; 30; 20 MG/100ML; MG/100ML; MG/100ML; MG/100ML
100 INJECTION, SOLUTION INTRAVENOUS CONTINUOUS
Status: DISCONTINUED | OUTPATIENT
Start: 2021-10-22 | End: 2021-10-23 | Stop reason: HOSPADM

## 2021-10-22 RX ORDER — METOPROLOL SUCCINATE 25 MG/1
12.5 TABLET, EXTENDED RELEASE ORAL DAILY
Status: DISCONTINUED | OUTPATIENT
Start: 2021-10-22 | End: 2021-10-23 | Stop reason: HOSPADM

## 2021-10-22 RX ORDER — DEXAMETHASONE SODIUM PHOSPHATE 10 MG/ML
INJECTION, SOLUTION INTRAMUSCULAR; INTRAVENOUS AS NEEDED
Status: DISCONTINUED | OUTPATIENT
Start: 2021-10-22 | End: 2021-10-22

## 2021-10-22 RX ORDER — FENTANYL CITRATE 50 UG/ML
INJECTION, SOLUTION INTRAMUSCULAR; INTRAVENOUS AS NEEDED
Status: DISCONTINUED | OUTPATIENT
Start: 2021-10-22 | End: 2021-10-22

## 2021-10-22 RX ORDER — BUPIVACAINE HYDROCHLORIDE AND EPINEPHRINE 2.5; 5 MG/ML; UG/ML
INJECTION, SOLUTION EPIDURAL; INFILTRATION; INTRACAUDAL; PERINEURAL AS NEEDED
Status: DISCONTINUED | OUTPATIENT
Start: 2021-10-22 | End: 2021-10-22 | Stop reason: HOSPADM

## 2021-10-22 RX ORDER — BUPIVACAINE HYDROCHLORIDE 2.5 MG/ML
INJECTION, SOLUTION EPIDURAL; INFILTRATION; INTRACAUDAL
Status: COMPLETED | OUTPATIENT
Start: 2021-10-22 | End: 2021-10-22

## 2021-10-22 RX ORDER — ONDANSETRON 2 MG/ML
4 INJECTION INTRAMUSCULAR; INTRAVENOUS ONCE AS NEEDED
Status: DISCONTINUED | OUTPATIENT
Start: 2021-10-22 | End: 2021-10-22 | Stop reason: HOSPADM

## 2021-10-22 RX ORDER — OXYCODONE HYDROCHLORIDE 5 MG/1
5 TABLET ORAL EVERY 4 HOURS PRN
Status: DISCONTINUED | OUTPATIENT
Start: 2021-10-22 | End: 2021-10-23 | Stop reason: HOSPADM

## 2021-10-22 RX ORDER — ALBUTEROL SULFATE 2.5 MG/3ML
2.5 SOLUTION RESPIRATORY (INHALATION) ONCE AS NEEDED
Status: DISCONTINUED | OUTPATIENT
Start: 2021-10-22 | End: 2021-10-22 | Stop reason: HOSPADM

## 2021-10-22 RX ORDER — MECLIZINE HYDROCHLORIDE 25 MG/1
25 TABLET ORAL EVERY 8 HOURS PRN
Status: DISCONTINUED | OUTPATIENT
Start: 2021-10-22 | End: 2021-10-23 | Stop reason: HOSPADM

## 2021-10-22 RX ORDER — SODIUM CHLORIDE, SODIUM LACTATE, POTASSIUM CHLORIDE, CALCIUM CHLORIDE 600; 310; 30; 20 MG/100ML; MG/100ML; MG/100ML; MG/100ML
INJECTION, SOLUTION INTRAVENOUS CONTINUOUS PRN
Status: DISCONTINUED | OUTPATIENT
Start: 2021-10-22 | End: 2021-10-22

## 2021-10-22 RX ORDER — FENTANYL CITRATE/PF 50 MCG/ML
25 SYRINGE (ML) INJECTION
Status: DISCONTINUED | OUTPATIENT
Start: 2021-10-22 | End: 2021-10-22 | Stop reason: HOSPADM

## 2021-10-22 RX ORDER — LIDOCAINE HYDROCHLORIDE 10 MG/ML
INJECTION, SOLUTION EPIDURAL; INFILTRATION; INTRACAUDAL; PERINEURAL AS NEEDED
Status: DISCONTINUED | OUTPATIENT
Start: 2021-10-22 | End: 2021-10-22

## 2021-10-22 RX ORDER — METOCLOPRAMIDE HYDROCHLORIDE 5 MG/ML
10 INJECTION INTRAMUSCULAR; INTRAVENOUS ONCE AS NEEDED
Status: DISCONTINUED | OUTPATIENT
Start: 2021-10-22 | End: 2021-10-22 | Stop reason: HOSPADM

## 2021-10-22 RX ORDER — ACETAMINOPHEN 325 MG/1
650 TABLET ORAL EVERY 6 HOURS SCHEDULED
Status: DISCONTINUED | OUTPATIENT
Start: 2021-10-22 | End: 2021-10-23 | Stop reason: HOSPADM

## 2021-10-22 RX ORDER — CEFAZOLIN SODIUM 1 G/50ML
1000 SOLUTION INTRAVENOUS ONCE
Status: COMPLETED | OUTPATIENT
Start: 2021-10-22 | End: 2021-10-22

## 2021-10-22 RX ORDER — ONDANSETRON 2 MG/ML
INJECTION INTRAMUSCULAR; INTRAVENOUS AS NEEDED
Status: DISCONTINUED | OUTPATIENT
Start: 2021-10-22 | End: 2021-10-22

## 2021-10-22 RX ORDER — ONDANSETRON 2 MG/ML
4 INJECTION INTRAMUSCULAR; INTRAVENOUS EVERY 4 HOURS PRN
Status: DISCONTINUED | OUTPATIENT
Start: 2021-10-22 | End: 2021-10-23 | Stop reason: HOSPADM

## 2021-10-22 RX ADMIN — ACETAMINOPHEN 975 MG: 325 TABLET, FILM COATED ORAL at 12:54

## 2021-10-22 RX ADMIN — SODIUM CHLORIDE 500 ML: 0.9 INJECTION, SOLUTION INTRAVENOUS at 12:54

## 2021-10-22 RX ADMIN — SODIUM CHLORIDE, SODIUM LACTATE, POTASSIUM CHLORIDE, AND CALCIUM CHLORIDE 100 ML/HR: .6; .31; .03; .02 INJECTION, SOLUTION INTRAVENOUS at 18:46

## 2021-10-22 RX ADMIN — FENTANYL CITRATE 25 MCG: 50 INJECTION, SOLUTION INTRAMUSCULAR; INTRAVENOUS at 13:33

## 2021-10-22 RX ADMIN — BUPIVACAINE HYDROCHLORIDE 15 ML: 2.5 INJECTION, SOLUTION EPIDURAL; INFILTRATION; INTRACAUDAL at 12:25

## 2021-10-22 RX ADMIN — ACETAMINOPHEN 650 MG: 325 TABLET, FILM COATED ORAL at 23:00

## 2021-10-22 RX ADMIN — BUPIVACAINE 15 ML: 13.3 INJECTION, SUSPENSION, LIPOSOMAL INFILTRATION at 12:25

## 2021-10-22 RX ADMIN — LIDOCAINE HYDROCHLORIDE 10 MG: 10 INJECTION, SOLUTION EPIDURAL; INFILTRATION; INTRACAUDAL; PERINEURAL at 13:06

## 2021-10-22 RX ADMIN — FENTANYL CITRATE 25 MCG: 50 INJECTION, SOLUTION INTRAMUSCULAR; INTRAVENOUS at 13:35

## 2021-10-22 RX ADMIN — ONDANSETRON 4 MG: 2 INJECTION INTRAMUSCULAR; INTRAVENOUS at 13:16

## 2021-10-22 RX ADMIN — SODIUM CHLORIDE, SODIUM LACTATE, POTASSIUM CHLORIDE, AND CALCIUM CHLORIDE: .6; .31; .03; .02 INJECTION, SOLUTION INTRAVENOUS at 12:59

## 2021-10-22 RX ADMIN — DEXAMETHASONE SODIUM PHOSPHATE 8 MG: 10 INJECTION, SOLUTION INTRAMUSCULAR; INTRAVENOUS at 13:16

## 2021-10-22 RX ADMIN — CEFAZOLIN SODIUM 1000 MG: 1 SOLUTION INTRAVENOUS at 12:59

## 2021-10-22 RX ADMIN — PROPOFOL 140 MG: 10 INJECTION, EMULSION INTRAVENOUS at 13:06

## 2021-10-22 RX ADMIN — ENOXAPARIN SODIUM 40 MG: 40 INJECTION SUBCUTANEOUS at 21:10

## 2021-10-22 RX ADMIN — ACETAMINOPHEN 650 MG: 325 TABLET, FILM COATED ORAL at 18:46

## 2021-10-23 VITALS
TEMPERATURE: 98 F | DIASTOLIC BLOOD PRESSURE: 52 MMHG | RESPIRATION RATE: 16 BRPM | SYSTOLIC BLOOD PRESSURE: 111 MMHG | HEART RATE: 61 BPM | OXYGEN SATURATION: 95 %

## 2021-10-23 LAB
BASOPHILS # BLD AUTO: 0.01 THOUSANDS/ΜL (ref 0–0.1)
BASOPHILS NFR BLD AUTO: 0 % (ref 0–1)
EOSINOPHIL # BLD AUTO: 0 THOUSAND/ΜL (ref 0–0.61)
EOSINOPHIL NFR BLD AUTO: 0 % (ref 0–6)
ERYTHROCYTE [DISTWIDTH] IN BLOOD BY AUTOMATED COUNT: 14.6 % (ref 11.6–15.1)
HCT VFR BLD AUTO: 34.6 % (ref 34.8–46.1)
HGB BLD-MCNC: 11.1 G/DL (ref 11.5–15.4)
IMM GRANULOCYTES # BLD AUTO: 0.06 THOUSAND/UL (ref 0–0.2)
IMM GRANULOCYTES NFR BLD AUTO: 1 % (ref 0–2)
LYMPHOCYTES # BLD AUTO: 0.5 THOUSANDS/ΜL (ref 0.6–4.47)
LYMPHOCYTES NFR BLD AUTO: 7 % (ref 14–44)
MCH RBC QN AUTO: 29.8 PG (ref 26.8–34.3)
MCHC RBC AUTO-ENTMCNC: 32.1 G/DL (ref 31.4–37.4)
MCV RBC AUTO: 93 FL (ref 82–98)
MONOCYTES # BLD AUTO: 0.23 THOUSAND/ΜL (ref 0.17–1.22)
MONOCYTES NFR BLD AUTO: 3 % (ref 4–12)
NEUTROPHILS # BLD AUTO: 6.53 THOUSANDS/ΜL (ref 1.85–7.62)
NEUTS SEG NFR BLD AUTO: 89 % (ref 43–75)
NRBC BLD AUTO-RTO: 0 /100 WBCS
PLATELET # BLD AUTO: 140 THOUSANDS/UL (ref 149–390)
PMV BLD AUTO: 12 FL (ref 8.9–12.7)
RBC # BLD AUTO: 3.72 MILLION/UL (ref 3.81–5.12)
WBC # BLD AUTO: 7.33 THOUSAND/UL (ref 4.31–10.16)

## 2021-10-23 PROCEDURE — 99213 OFFICE O/P EST LOW 20 MIN: CPT | Performed by: ANESTHESIOLOGY

## 2021-10-23 PROCEDURE — 99024 POSTOP FOLLOW-UP VISIT: CPT | Performed by: SURGERY

## 2021-10-23 PROCEDURE — NC001 PR NO CHARGE: Performed by: SURGERY

## 2021-10-23 PROCEDURE — 85025 COMPLETE CBC W/AUTO DIFF WBC: CPT | Performed by: SURGERY

## 2021-10-23 RX ADMIN — ENOXAPARIN SODIUM 40 MG: 40 INJECTION SUBCUTANEOUS at 08:28

## 2021-10-23 RX ADMIN — METOPROLOL SUCCINATE 12.5 MG: 25 TABLET, EXTENDED RELEASE ORAL at 08:28

## 2021-10-23 RX ADMIN — LEVOTHYROXINE SODIUM 150 MCG: 150 TABLET ORAL at 05:40

## 2021-10-23 RX ADMIN — ACETAMINOPHEN 650 MG: 325 TABLET, FILM COATED ORAL at 05:40

## 2021-10-28 ENCOUNTER — OFFICE VISIT (OUTPATIENT)
Dept: SURGICAL ONCOLOGY | Facility: CLINIC | Age: 76
End: 2021-10-28

## 2021-10-28 ENCOUNTER — TELEPHONE (OUTPATIENT)
Dept: SURGICAL ONCOLOGY | Facility: CLINIC | Age: 76
End: 2021-10-28

## 2021-10-28 VITALS
WEIGHT: 163 LBS | SYSTOLIC BLOOD PRESSURE: 128 MMHG | HEART RATE: 88 BPM | BODY MASS INDEX: 30 KG/M2 | OXYGEN SATURATION: 97 % | RESPIRATION RATE: 16 BRPM | TEMPERATURE: 97.5 F | HEIGHT: 62 IN | DIASTOLIC BLOOD PRESSURE: 72 MMHG

## 2021-10-28 DIAGNOSIS — D05.12 DUCTAL CARCINOMA IN SITU (DCIS) OF LEFT BREAST: ICD-10-CM

## 2021-10-28 DIAGNOSIS — Z90.12 STATUS POST LEFT MASTECTOMY: Primary | ICD-10-CM

## 2021-10-28 PROCEDURE — 99024 POSTOP FOLLOW-UP VISIT: CPT | Performed by: SURGERY

## 2021-11-03 DIAGNOSIS — R60.9 EDEMA, UNSPECIFIED TYPE: ICD-10-CM

## 2021-11-03 RX ORDER — FUROSEMIDE 40 MG/1
40 TABLET ORAL DAILY
Qty: 90 TABLET | Refills: 1 | Status: SHIPPED | OUTPATIENT
Start: 2021-11-03 | End: 2022-05-13

## 2021-11-04 ENCOUNTER — OFFICE VISIT (OUTPATIENT)
Dept: SURGICAL ONCOLOGY | Facility: CLINIC | Age: 76
End: 2021-11-04

## 2021-11-04 VITALS
HEART RATE: 96 BPM | SYSTOLIC BLOOD PRESSURE: 94 MMHG | BODY MASS INDEX: 30 KG/M2 | TEMPERATURE: 97.1 F | WEIGHT: 163 LBS | DIASTOLIC BLOOD PRESSURE: 70 MMHG | HEIGHT: 62 IN | RESPIRATION RATE: 18 BRPM | OXYGEN SATURATION: 97 %

## 2021-11-04 DIAGNOSIS — Z90.12 STATUS POST LEFT MASTECTOMY: ICD-10-CM

## 2021-11-04 DIAGNOSIS — Z85.3 HISTORY OF RIGHT BREAST CANCER: ICD-10-CM

## 2021-11-04 DIAGNOSIS — Z71.89 OTHER SPECIFIED COUNSELING: Primary | ICD-10-CM

## 2021-11-04 DIAGNOSIS — D05.12 DUCTAL CARCINOMA IN SITU (DCIS) OF LEFT BREAST: ICD-10-CM

## 2021-11-04 DIAGNOSIS — Z90.13 HISTORY OF BILATERAL MASTECTOMY: ICD-10-CM

## 2021-11-04 PROCEDURE — 99024 POSTOP FOLLOW-UP VISIT: CPT | Performed by: SURGERY

## 2021-11-05 ENCOUNTER — TELEPHONE (OUTPATIENT)
Dept: HEMATOLOGY ONCOLOGY | Facility: CLINIC | Age: 76
End: 2021-11-05

## 2021-11-15 ENCOUNTER — EVALUATION (OUTPATIENT)
Dept: PHYSICAL THERAPY | Facility: CLINIC | Age: 76
End: 2021-11-15
Payer: COMMERCIAL

## 2021-11-15 ENCOUNTER — TELEPHONE (OUTPATIENT)
Dept: SURGICAL ONCOLOGY | Facility: CLINIC | Age: 76
End: 2021-11-15

## 2021-11-15 DIAGNOSIS — I89.0 LYMPHEDEMA: Primary | ICD-10-CM

## 2021-11-15 DIAGNOSIS — Z90.13 HISTORY OF BILATERAL MASTECTOMY: ICD-10-CM

## 2021-11-15 PROCEDURE — 97162 PT EVAL MOD COMPLEX 30 MIN: CPT | Performed by: PHYSICAL THERAPIST

## 2021-12-03 ENCOUNTER — CLINICAL SUPPORT (OUTPATIENT)
Dept: FAMILY MEDICINE CLINIC | Facility: HOSPITAL | Age: 76
End: 2021-12-03
Payer: COMMERCIAL

## 2021-12-03 DIAGNOSIS — Z23 ENCOUNTER FOR IMMUNIZATION: Primary | ICD-10-CM

## 2021-12-03 PROCEDURE — 90662 IIV NO PRSV INCREASED AG IM: CPT | Performed by: FAMILY MEDICINE

## 2021-12-03 PROCEDURE — G0008 ADMIN INFLUENZA VIRUS VAC: HCPCS | Performed by: FAMILY MEDICINE

## 2021-12-08 ENCOUNTER — IN-CLINIC DEVICE VISIT (OUTPATIENT)
Dept: CARDIOLOGY CLINIC | Facility: CLINIC | Age: 76
End: 2021-12-08
Payer: COMMERCIAL

## 2021-12-08 ENCOUNTER — TELEPHONE (OUTPATIENT)
Dept: HEMATOLOGY ONCOLOGY | Facility: CLINIC | Age: 76
End: 2021-12-08

## 2021-12-08 DIAGNOSIS — Z95.0 PRESENCE OF CARDIAC PACEMAKER: Primary | ICD-10-CM

## 2021-12-08 PROCEDURE — 93280 PM DEVICE PROGR EVAL DUAL: CPT | Performed by: INTERNAL MEDICINE

## 2022-01-17 ENCOUNTER — OFFICE VISIT (OUTPATIENT)
Dept: FAMILY MEDICINE CLINIC | Facility: HOSPITAL | Age: 77
End: 2022-01-17
Payer: COMMERCIAL

## 2022-01-17 VITALS
TEMPERATURE: 96.3 F | DIASTOLIC BLOOD PRESSURE: 80 MMHG | HEART RATE: 80 BPM | HEIGHT: 62 IN | OXYGEN SATURATION: 93 % | WEIGHT: 163.8 LBS | SYSTOLIC BLOOD PRESSURE: 135 MMHG | BODY MASS INDEX: 30.14 KG/M2

## 2022-01-17 DIAGNOSIS — H81.10 BENIGN PAROXYSMAL POSITIONAL VERTIGO, UNSPECIFIED LATERALITY: ICD-10-CM

## 2022-01-17 DIAGNOSIS — Z11.59 ENCOUNTER FOR HEPATITIS C SCREENING TEST FOR LOW RISK PATIENT: ICD-10-CM

## 2022-01-17 DIAGNOSIS — E03.9 ACQUIRED HYPOTHYROIDISM: Primary | ICD-10-CM

## 2022-01-17 DIAGNOSIS — D05.12 DUCTAL CARCINOMA IN SITU (DCIS) OF LEFT BREAST: ICD-10-CM

## 2022-01-17 DIAGNOSIS — I42.8 OTHER CARDIOMYOPATHY (HCC): ICD-10-CM

## 2022-01-17 DIAGNOSIS — E78.2 MIXED HYPERLIPIDEMIA: ICD-10-CM

## 2022-01-17 DIAGNOSIS — D69.6 PLATELETS DECREASED (HCC): ICD-10-CM

## 2022-01-17 DIAGNOSIS — Z95.0 CARDIAC PACEMAKER: ICD-10-CM

## 2022-01-17 DIAGNOSIS — R73.9 HYPERGLYCEMIA: ICD-10-CM

## 2022-01-17 DIAGNOSIS — I48.0 PAROXYSMAL ATRIAL FIBRILLATION (HCC): ICD-10-CM

## 2022-01-17 DIAGNOSIS — I10 BENIGN ESSENTIAL HYPERTENSION: ICD-10-CM

## 2022-01-17 DIAGNOSIS — Z79.01 CURRENT USE OF LONG TERM ANTICOAGULATION: ICD-10-CM

## 2022-01-17 DIAGNOSIS — N18.30 STAGE 3 CHRONIC KIDNEY DISEASE, UNSPECIFIED WHETHER STAGE 3A OR 3B CKD (HCC): ICD-10-CM

## 2022-01-17 PROCEDURE — 99215 OFFICE O/P EST HI 40 MIN: CPT | Performed by: FAMILY MEDICINE

## 2022-01-17 RX ORDER — MECLIZINE HYDROCHLORIDE 25 MG/1
25 TABLET ORAL EVERY 8 HOURS PRN
Qty: 30 TABLET | Refills: 3 | Status: SHIPPED | OUTPATIENT
Start: 2022-01-17

## 2022-01-17 NOTE — PROGRESS NOTES
Assessment/Plan:         Diagnoses and all orders for this visit:    Acquired hypothyroidism  Comments:  Clinically euthyroid, due for TSH  Orders:  -     TSH, 3rd generation with Free T4 reflex; Future  -     TSH, 3rd generation with Free T4 reflex    Benign essential hypertension  Comments:  Adequate BP control    Paroxysmal atrial fibrillation (HCC)  Comments:  Stable, continue anticoagulation    Mixed hyperlipidemia  -     Lipid Panel with Direct LDL reflex; Future  -     Lipid Panel with Direct LDL reflex    Ductal carcinoma in situ (DCIS) of left breast  Comments:  F/U Dr Godfrey/Dr Katrina Nielsen    Cardiac pacemaker    Current use of long term anticoagulation  Comments:  Good tolerance of Eliquis    Benign paroxysmal positional vertigo, unspecified laterality  -     meclizine (ANTIVERT) 25 mg tablet; Take 1 tablet (25 mg total) by mouth every 8 (eight) hours as needed for dizziness    Platelets decreased (HCC)  -     CBC and Platelet; Future  -     CBC and Platelet    Stage 3 chronic kidney disease, unspecified whether stage 3a or 3b CKD (HCC)    Other cardiomyopathy ([facility] )    Hyperglycemia  -     Comprehensive metabolic panel; Future  -     HEMOGLOBIN A1C W/ EAG ESTIMATION; Future  -     Comprehensive metabolic panel  -     HEMOGLOBIN A1C W/ EAG ESTIMATION    Encounter for hepatitis C screening test for low risk patient  -     Hepatitis C antibody; Future  -     Hepatitis C antibody          Subjective:      Patient ID: Huang Mendiola is a 68 y o  female  6 month follow up  No recent illness or injury    Did well with the COVID booster  Had flu shot as well      Has a fib more often than not  Wearing her compression stockings regularly     Has F/U with Dr Sonny Jones and Dr Katrina Nielsen    Medication list reviewed in detail and revised    Has limited episodes of vertigo, wants to keep Meclizine on hand      The following portions of the patient's history were reviewed and updated as appropriate: allergies, current medications, past family history, past medical history, past social history, past surgical history and problem list     Review of Systems   Constitutional: Negative for unexpected weight change  Respiratory: Negative  Cardiovascular: Positive for leg swelling  Negative for palpitations  Psychiatric/Behavioral: Negative  All other systems reviewed and are negative  Objective:      /80 (BP Location: Right arm, Patient Position: Sitting, Cuff Size: Standard)   Pulse 80   Temp (!) 96 3 °F (35 7 °C) (Tympanic)   Ht 5' 2" (1 575 m)   Wt 74 3 kg (163 lb 12 8 oz)   SpO2 93%   BMI 29 96 kg/m²          Physical Exam  Vitals and nursing note reviewed  Constitutional:       Appearance: Normal appearance  Neck:      Vascular: No carotid bruit  Cardiovascular:      Rate and Rhythm: Normal rate  Rhythm irregular  Heart sounds: Murmur heard  Pulmonary:      Effort: Pulmonary effort is normal       Breath sounds: Normal breath sounds  Musculoskeletal:      Right lower leg: Edema present  Left lower leg: Edema present  Neurological:      Mental Status: She is alert and oriented to person, place, and time     Psychiatric:         Mood and Affect: Mood normal

## 2022-01-20 ENCOUNTER — TELEPHONE (OUTPATIENT)
Dept: HEMATOLOGY ONCOLOGY | Facility: HOSPITAL | Age: 77
End: 2022-01-20

## 2022-01-20 NOTE — TELEPHONE ENCOUNTER
Called patient and could not LVM called  # could not LVM x3  Numbers are not available  Rescheduled patient due to provider not being in

## 2022-01-25 ENCOUNTER — TELEPHONE (OUTPATIENT)
Dept: HEMATOLOGY ONCOLOGY | Facility: CLINIC | Age: 77
End: 2022-01-25

## 2022-01-25 LAB
ALBUMIN SERPL-MCNC: 3.5 G/DL (ref 3.7–4.7)
ALBUMIN/GLOB SERPL: 1.1 {RATIO} (ref 1.2–2.2)
ALP SERPL-CCNC: 120 IU/L (ref 44–121)
ALT SERPL-CCNC: 14 IU/L (ref 0–32)
AST SERPL-CCNC: 23 IU/L (ref 0–40)
BASOPHILS # BLD AUTO: 0 X10E3/UL (ref 0–0.2)
BASOPHILS NFR BLD AUTO: 1 %
BILIRUB SERPL-MCNC: 0.7 MG/DL (ref 0–1.2)
BUN SERPL-MCNC: 13 MG/DL (ref 8–27)
BUN/CREAT SERPL: 11 (ref 12–28)
CALCIUM SERPL-MCNC: 9 MG/DL (ref 8.7–10.3)
CANCER AG27-29 SERPL-ACNC: 14.4 U/ML (ref 0–38.6)
CHLORIDE SERPL-SCNC: 105 MMOL/L (ref 96–106)
CO2 SERPL-SCNC: 26 MMOL/L (ref 20–29)
CREAT SERPL-MCNC: 1.18 MG/DL (ref 0.57–1)
EOSINOPHIL # BLD AUTO: 0.2 X10E3/UL (ref 0–0.4)
EOSINOPHIL NFR BLD AUTO: 4 %
ERYTHROCYTE [DISTWIDTH] IN BLOOD BY AUTOMATED COUNT: 13.8 % (ref 11.7–15.4)
GLOBULIN SER-MCNC: 3.2 G/DL (ref 1.5–4.5)
GLUCOSE SERPL-MCNC: 92 MG/DL (ref 65–99)
HCT VFR BLD AUTO: 38.7 % (ref 34–46.6)
HGB BLD-MCNC: 12.1 G/DL (ref 11.1–15.9)
IMM GRANULOCYTES # BLD: 0 X10E3/UL (ref 0–0.1)
IMM GRANULOCYTES NFR BLD: 0 %
LYMPHOCYTES # BLD AUTO: 1.2 X10E3/UL (ref 0.7–3.1)
LYMPHOCYTES NFR BLD AUTO: 24 %
MCH RBC QN AUTO: 27.6 PG (ref 26.6–33)
MCHC RBC AUTO-ENTMCNC: 31.3 G/DL (ref 31.5–35.7)
MCV RBC AUTO: 88 FL (ref 79–97)
MONOCYTES # BLD AUTO: 0.4 X10E3/UL (ref 0.1–0.9)
MONOCYTES NFR BLD AUTO: 9 %
NEUTROPHILS # BLD AUTO: 3.1 X10E3/UL (ref 1.4–7)
NEUTROPHILS NFR BLD AUTO: 62 %
PLATELET # BLD AUTO: 188 X10E3/UL (ref 150–450)
POTASSIUM SERPL-SCNC: 4.1 MMOL/L (ref 3.5–5.2)
PROT SERPL-MCNC: 6.7 G/DL (ref 6–8.5)
RBC # BLD AUTO: 4.38 X10E6/UL (ref 3.77–5.28)
SL AMB EGFR AFRICAN AMERICAN: 52 ML/MIN/1.73
SL AMB EGFR NON AFRICAN AMERICAN: 45 ML/MIN/1.73
SODIUM SERPL-SCNC: 143 MMOL/L (ref 134–144)
WBC # BLD AUTO: 4.9 X10E3/UL (ref 3.4–10.8)

## 2022-01-25 NOTE — TELEPHONE ENCOUNTER
Reschedule Appointment     Who is calling in  Patient   Doctor Appointment Scheduled with Brittany Kraus date and time 1-26-22 @ 8:00am   New date and time 02-4-22 @ 9:00am    Location [place]   Patient verbalized understanding

## 2022-02-04 NOTE — PROGRESS NOTES
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 and is s/p left mastectomy on 10/22/21  Margins are clear  Tumor was ER/AK positive  Since this was her second breast cancer she was referred to genetics and testing was negative  Previous Hematologic/ Oncologic History:    Oncology History   History of right breast cancer   9/12/2016 Biopsy    Right breast 3 site biopsy  Invasive breast carcinoma  Grade 2  ER 0, AK 0, HER2      10/28/2016 Surgery    Right breast mastectomy with SLN biopsy (Dr Jazmín Florentino)  Invasive mammary carcinoma of no special type (ductal)  Grade 3  2 8 cm  Margins negative  0/9 Lymph nodes  Stage IIA     12/7/2016 - 2/1/2017 Chemotherapy    Taxotere/Cytoxan  Scheduled for every 3 weeks x 4 cycles  Completed 3 cycles under the guidance of Dr Garbiella Rodriguez  3/16/2017 Surgery    Right chest wall lesion excision (Dr Jazmín Florentino)  Residual microscopic focus of invasive carcinoma  4 mm  Margins negative     Ductal carcinoma in situ (DCIS) of left breast   9/21/2021 Biopsy    Left breast ultrasound-guided biopsy  A  3 o'clock, 6 cm from nipple  Intraductal papilloma  Focal stromal changes suggestive of PASH    B  2 o'clock, 5 cm from nipple  Ductal carcinoma in situ  Grade 1        Concordant  Carcinoma measured up to 6mm on US  Left axilla US negative  If the papilloma is not excised at surgery, a 6 MO F/U US is recommended  2021 Genetic Testing    A total of 36 genes were evaluated, including: FELIPE, BRCA1, BRCA2, CDH1, CHEK2, PALB2, PTEN, STK11, TP53  Negative result  No pathogenic sequence variants or deletions/dupllications identified  [employer]     10/22/2021 Surgery    Left breast simple mastectomy  Ductal carcinoma in situ  Grade 2  Size cannot be determined (multifocal)  Margins negative  0/1 Intramammary lymph node  Stage 0       Current Hematologic/ Oncologic Treatment:    Observation     ECO - Asymptomatic    Interval History:   The patient presents for routine follow up  She was last seen by Dr Elkin Powell on 21 and recommended yearly follow up  However, she has been diagnosed with DCIS of left breast and underwent mastectomy on 10/22/21  Tumor was ER/OR + Lymph nodes negative, margins negative  She has recovered from surgery and healed nicely  Denies any pain  No adenopathy  She reports she feels well  Recent blood work on  was reviewed  Her CBC and tumor marker are normal      She is here today to discuss if there is any benefit to starting on adjuvant hormonal therapy         Cancer Staging:  Cancer Staging  No matching staging information was found for the patient  Molecular Testing:         Test Results:    Imaging: No results found      Labs:   Lab Results   Component Value Date    WBC 4 9 2022    HGB 12 1 2022    HCT 38 7 2022    MCV 88 2022     2022     Lab Results   Component Value Date     2017     2017    K 4 1 2022     2022    CO2 26 2022    ANIONGAP 6 2015    BUN 13 2022    CREATININE 1 18 (H) 2022    GLUCOSE 95 2015    GLUF 97 2021    CALCIUM 8 4 10/14/2021    CORRECTEDCA 9 2 10/14/2021    AST 23 2022    ALT 14 2022    ALKPHOS 94 10/14/2021    PROT 6 1 2017    PROT 6 1 2017    BILITOT 0 8 2017    BILITOT 0 8 2017    EGFR 49 10/14/2021 Review of Systems   All other systems reviewed and are negative          Active Problems:   Patient Active Problem List   Diagnosis    Atrial fibrillation (HCC)    Benign essential hypertension    Bilateral edema of lower extremity    BPPV (benign paroxysmal positional vertigo)    Bursitis of left shoulder    Cardiomyopathy ([facility] )    Cervical stenosis of spine    Edema    Homocysteinemia    Hypokalemia    Acquired hypothyroidism    Long QT interval    Lymphedema    History of right breast cancer    Overweight with body mass index (BMI) of 29 to 29 9 in adult    Osteopenia    Overactive bladder    Peripheral neuropathy    Vitamin D deficiency    Bronchitis with bronchospasm    Candidal esophagitis (HCC)    Abnormal chest CT    Pulmonary nodule seen on imaging study    SOB (shortness of breath)    Weight loss    Hyperglycemia    Mixed hyperlipidemia    Other idiopathic peripheral autonomic neuropathy    Persistent nodularity of breast    Current use of long term anticoagulation    RICHARDS (dyspnea on exertion)    Cortical age-related cataract of both eyes    Bradycardia    SSS (sick sinus syndrome) ([facility] )    Cardiac pacemaker    Ductal carcinoma in situ (DCIS) of left breast    Platelets decreased ([facility] )    Stage 3 chronic kidney disease, unspecified whether stage 3a or 3b CKD ([facility] )       Past Medical History:   Past Medical History:   Diagnosis Date    Abnormal chest CT     last assessed - [id number]    Abnormal glucose     Resolved - [id number]    Arrhythmia     Arthritis     Slight    BCC (basal cell carcinoma of skin)     last assessed - [id number]    BRCA gene mutation negative 09/30/2021    [employer]; 36 gene panel    Breast cancer ([facility] ) 09/12/2016    rt    Cardiomyopathy ([facility] )     Cervical spinal stenosis     Discharge from left nipple     last assessed - [id number]    Disease of thyroid gland     hypothyroidism    Full dentures     Upper and Lower    History of atrial fibrillation     Homocysteinemia     Hypertension     Hypokalemia     Intraductal papilloma of breast     last assessed - [id number]    Limb alert care status     right upper ext    Malignant neoplasm of skin     basal cell on face, Moh's surgery    Memory loss     last assessed - [id number]    Obesity     Open wound     last assessed - [id number]    Osteopenia     Overactive bladder     Palmar plantar erythrodysaesthesia     Resolved - [id number]    Pulmonary nodule seen on imaging study     Rosacea     UTI (urinary tract infection)     recently    Vertigo     Vitamin D deficiency        Surgical History:   Past Surgical History:   Procedure Laterality Date    APPENDECTOMY      ATRIAL ABLATION SURGERY      last assessed - [id number]   [facility] BACK SURGERY  1997    Lumbar spinal stenosis    BREAST BIOPSY      2014? left breast; 9/12/16 right breast x 3    BREAST BIOPSY Left 03/30/2010    BREAST BIOPSY Right 09/12/2016    us bx- invasive br ca    BREAST SURGERY      needle bx      BREAST SURGERY Right 03/16/2017    excision of right chest wall lesion    CARDIAC ELECTROPHYSIOLOGY STUDY AND ABLATION      CARDIOVERSION      DENTAL SURGERY      HAND SURGERY      Joint replaced with silicone joint 5th finger right hand    HYSTERECTOMY  1993    Complete    LAMINECTOMY      Decompressive up to two lumbar segments    MASTECTOMY Right 10/28/2016    OOPHORECTOMY  1993    MT EXC SKIN MALIG <0 5 CM TRUNK,ARM,LEG Right 03/16/2017    Procedure: EXCISION CHEST WALL LESION; NEEDLE LOC @ 1400;  Surgeon: William Rooney MD;  Location: [facility];  Service: Surgical Oncology    MT INTRAOPERATIVE SENTINEL LYMPH NODE ID W DYE INJECTION Right 10/28/2016    Procedure: BREAST MASTECTOMY WITH SENTINAL LYMPH NODE BIOPSY; Braulio Miller; LYMPHATIC MAPPING; 0800 NUC MED INJECTION;  Surgeon: William Rooney MD;  Location[facility];  Service: Surgical Oncology    MT MASTECTOMY, SIMPLE, COMPLETE Left 10/22/2021 Procedure: BREAST MASTECTOMY SIMPLE MARTHA  GUIDED; Surgeon: Tanisha Cuba MD;  [facility];  Service: Surgical Oncology    SENTINEL LYMPH NODE BIOPSY Right     US BREAST NEEDLE LOC LEFT Left 10/19/2021    US BREAST NEEDLE LOC RIGHT Right 03/16/2017    US GUIDANCE BREAST BIOPSY LEFT EACH ADDITIONAL Left 09/21/2021    US GUIDANCE BREAST BIOPSY RIGHT EACH ADDITIONAL Right 09/12/2016    US GUIDANCE BREAST BIOPSY RIGHT EACH ADDITIONAL Right 09/12/2016    US GUIDED BREAST BIOPSY LEFT COMPLETE Left 09/21/2021    US GUIDED BREAST BIOPSY RIGHT COMPLETE Right 09/12/2016    US GUIDED BREAST BIOPSY RIGHT COMPLETE Right 10/1/2021       Family History:    Family History   Problem Relation Age of Onset    Coronary artery disease Mother         CABG    Diabetes Mother     Heart attack Mother     Diabetes Sister     Parkinsonism Sister     Coronary artery disease Brother         CABG    Diabetes Brother     Heart attack Brother     Heart attack Son     Kidney cancer Son 48    No Known Problems Son     No Known Problems Son     Emphysema Sister     No Known Problems Sister     Heart attack Brother     No Known Problems Brother     No Known Problems Maternal Grandmother     No Known Problems Maternal Grandfather     No Known Problems Paternal Grandmother     No Known Problems Paternal Grandfather     No Known Problems Paternal Aunt     No Known Problems Paternal Aunt     No Known Problems Paternal Aunt     No Known Problems Paternal Aunt        Cancer-related family history includes Kidney cancer (age of onset: 48) in her son      Social History:   Social History     Socioeconomic History    Marital status: /Civil Union     Spouse name: Not on file    Number of children: Not on file    Years of education: Not on file    Highest education level: Not on file   Occupational History    Occupation: Retired   Tobacco Use    Smoking status: Never Smoker    Smokeless tobacco: Never Used Vaping Use    Vaping Use: Never used   Substance and Sexual Activity    Alcohol use: No    Drug use: No    Sexual activity: Not Currently     Comment: Resides with    Other Topics Concern    Not on file   Social History Narrative    Always uses seat belt    Convalescence after chemotherapy     Social Determinants of Health     Financial Resource Strain: Not on file   Food Insecurity: Not on file   Transportation Needs: Not on file   Physical Activity: Not on file   Stress: Not on file   Social Connections: Not on file   Intimate Partner Violence: Not on file   Housing Stability: Not on file       Current Medications:   Current Outpatient Medications   Medication Sig Dispense Refill    apixaban (ELIQUIS) 5 mg Take 1 tablet (5 mg total) by mouth 2 (two) times a day 60 tablet 6    cyanocobalamin (VITAMIN B-12) 500 mcg tablet Take 1 tablet by mouth daily      flecainide (TAMBOCOR) 100 mg tablet Take 1 tablet (100 mg total) by mouth 2 (two) times a day 180 tablet 3    furosemide (LASIX) 40 mg tablet TAKE 1 TABLET (40 MG TOTAL) BY MOUTH DAILY AS DIRECTED 90 tablet 1    levothyroxine 150 mcg tablet TAKE 1 TABLET DAILY TAKE 1 TABLET 3 DAYS A WEEK AND 1/2 TABLET 4 DAYS A WEEK 90 tablet 0    meclizine (ANTIVERT) 25 mg tablet Take 1 tablet (25 mg total) by mouth every 8 (eight) hours as needed for dizziness 30 tablet 3    metoprolol succinate (TOPROL-XL) 25 mg 24 hr tablet Take 0 5 tablets (12 5 mg total) by mouth daily 30 tablet 3    potassium chloride (MICRO-K) 10 MEQ CR capsule TAKE 1 CAPSULE BY MOUTH EVERY DAY 90 capsule 1    RESTASIS 0 05 % ophthalmic emulsion Administer 1 drop to both eyes every 12 (twelve) hours       No current facility-administered medications for this visit  Allergies:    Allergies   Allergen Reactions    Morphine And Related GI Intolerance       Physical Exam:  /64 (BP Location: Right arm, Patient Position: Sitting, Cuff Size: Adult)   Pulse 90   Temp 97 8 °F (36 6 °C) (Temporal)   Resp 14   Ht 5' 2" (1 575 m)   Wt 74 4 kg (164 lb)   SpO2 98%   BMI 30 00 kg/m²   Body surface area is 1 76 meters squared  Wt Readings from Last 3 Encounters:   02/07/22 74 4 kg (164 lb)   01/17/22 74 3 kg (163 lb 12 8 oz)   11/04/21 73 9 kg (163 lb)           Physical Exam  Constitutional:       General: She is not in acute distress  Appearance: Normal appearance  HENT:      Head: Normocephalic and atraumatic  Eyes:      General: No scleral icterus  Right eye: No discharge  Left eye: No discharge  Conjunctiva/sclera: Conjunctivae normal    Cardiovascular:      Rate and Rhythm: Normal rate and regular rhythm  Pulmonary:      Effort: Pulmonary effort is normal  No respiratory distress  Breath sounds: Normal breath sounds  Chest:   Breasts:      Right: No axillary adenopathy or supraclavicular adenopathy  Left: No axillary adenopathy or supraclavicular adenopathy  Abdominal:      General: Bowel sounds are normal  There is no distension  Palpations: Abdomen is soft  There is no mass  Tenderness: There is no abdominal tenderness  Musculoskeletal:         General: Normal range of motion  Lymphadenopathy:      Cervical: No cervical adenopathy  Upper Body:      Right upper body: No supraclavicular, axillary or pectoral adenopathy  Left upper body: No supraclavicular, axillary or pectoral adenopathy  Skin:     General: Skin is warm and dry  Neurological:      General: No focal deficit present  Mental Status: She is alert and oriented to person, place, and time  Psychiatric:         Mood and Affect: Mood normal          Behavior: Behavior normal          Assessment / Plan:    1  History of right breast cancer    2   Ductal carcinoma in situ (DCIS) of left breast    The patient is a very pleasant 75 yo female with Stage IIA Triple negative Right Breast cancer diagnosed in 2016, with 9 (-) lymph nodes   She had surgery and chemo with Taxotere & Cytoxan, and completed 3 of anticipated 4 cycles   She had tiny local recurrence with positive margin in the surgical bed which was resected in 2017 and has remained on observation   More recently, she was 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 and is s/p left mastectomy on 10/22/21  Margins are clear  Tumor was ER/MO positive  Since this was her second breast cancer she was referred to genetics and testing was negative  We discussed the role of adjuvant hormonal therapy for ER/MO positive breast cancers  I reviewed with her the indications of using this therapy and stated this is indicated for women who have had lumpectomy  Since she has had bilateral mastectomies, she is essentially cured from her initial invasive breast cancer and DCIS would only be an indication if she had a lumpectomy   Patient verbalized understanding and was relieved to hear that she does not require systemic chemo or adjuvant hormonal therapy  She continues to follow closely with her surgeon  Her most recent blood work was within normal limits  Clinically, patient appears well and there are no concerns on exam today  She will return for her scheduled annual follow up with Dr Donny Phelps in August  I will repeat blood work prior to that visit  Patient was agreeable to plan of care  She was encouraged to call with any questions or concerns prior to her next office visit      Goals and Barriers:  Current Goal:  Prolong Survival from triple negative breast cancer  Barriers: None  Patient's Capacity to Self Care:  Patient is able to self care  Portions of the record may have been created with voice recognition software  Occasional wrong word or "sound a like" substitutions may have occurred due to the inherent limitations of voice recognition software  Read the chart carefully and recognize, using context, where substitutions have occurred

## 2022-02-07 ENCOUNTER — OFFICE VISIT (OUTPATIENT)
Dept: HEMATOLOGY ONCOLOGY | Facility: HOSPITAL | Age: 77
End: 2022-02-07
Payer: COMMERCIAL

## 2022-02-07 VITALS
DIASTOLIC BLOOD PRESSURE: 64 MMHG | TEMPERATURE: 97.8 F | SYSTOLIC BLOOD PRESSURE: 110 MMHG | HEIGHT: 62 IN | OXYGEN SATURATION: 98 % | RESPIRATION RATE: 14 BRPM | BODY MASS INDEX: 30.18 KG/M2 | HEART RATE: 90 BPM | WEIGHT: 164 LBS

## 2022-02-07 DIAGNOSIS — D05.12 DUCTAL CARCINOMA IN SITU (DCIS) OF LEFT BREAST: ICD-10-CM

## 2022-02-07 DIAGNOSIS — Z85.3 HISTORY OF RIGHT BREAST CANCER: Primary | ICD-10-CM

## 2022-02-07 PROCEDURE — 99215 OFFICE O/P EST HI 40 MIN: CPT | Performed by: NURSE PRACTITIONER

## 2022-02-08 PROBLEM — Z86.000 HISTORY OF DUCTAL CARCINOMA IN SITU (DCIS) OF BREAST: Status: ACTIVE | Noted: 2021-09-28

## 2022-02-09 ENCOUNTER — OFFICE VISIT (OUTPATIENT)
Dept: SURGICAL ONCOLOGY | Facility: CLINIC | Age: 77
End: 2022-02-09
Payer: COMMERCIAL

## 2022-02-09 VITALS
DIASTOLIC BLOOD PRESSURE: 78 MMHG | WEIGHT: 165 LBS | HEIGHT: 62 IN | TEMPERATURE: 97.8 F | OXYGEN SATURATION: 98 % | HEART RATE: 72 BPM | RESPIRATION RATE: 16 BRPM | BODY MASS INDEX: 30.36 KG/M2 | SYSTOLIC BLOOD PRESSURE: 126 MMHG

## 2022-02-09 DIAGNOSIS — Z08 ENCOUNTER FOR FOLLOW-UP SURVEILLANCE OF BREAST CANCER: ICD-10-CM

## 2022-02-09 DIAGNOSIS — Z85.3 ENCOUNTER FOR FOLLOW-UP SURVEILLANCE OF BREAST CANCER: ICD-10-CM

## 2022-02-09 DIAGNOSIS — Z85.3 HISTORY OF RIGHT BREAST CANCER: ICD-10-CM

## 2022-02-09 DIAGNOSIS — Z86.000 HISTORY OF DUCTAL CARCINOMA IN SITU (DCIS) OF BREAST: Primary | ICD-10-CM

## 2022-02-09 PROCEDURE — 99213 OFFICE O/P EST LOW 20 MIN: CPT | Performed by: SURGERY

## 2022-02-09 NOTE — PROGRESS NOTES
Surgical Oncology Follow Up       [address] [facility],[address]  CANCER CARE [facility] SURGICAL ONCOLOGY [place]  [address] [facility] [address]    [facility] Number  [id number]  [id number]  [address] [facility],[address] [place] [address]  [facility]ER [facility] SURGICAL ONCOLOGY [place]  [address] [address]    Chief Complaint   Patient presents with    Breast Cancer          Assessment & Plan:   Patient presents for a three-month follow-up for left mastectomy for DCIS  She had previously had a right mastectomy with the local regional recurrence which was excised  She also has a relatively large nodule which is stable in the right mastectomy site  This was biopsied and shown to be fat necrosis  Otherwise the patient she has no evidence of any abnormal findings  She remains in intermittent AFib with a pacer  Cancer History:     Oncology History   History of right breast cancer   9/12/2016 Biopsy    Right breast 3 site biopsy  Invasive breast carcinoma  Grade 2  ER 0, ID 0, HER2      10/28/2016 Surgery    Right breast mastectomy with SLN biopsy (Dr Lorenzo Aranda)  Invasive mammary carcinoma of no special type (ductal)  Grade 3  2 8 cm  Margins negative  0/9 Lymph nodes  Stage IIA     12/7/2016 - 2/1/2017 Chemotherapy    Taxotere/Cytoxan  Scheduled for every 3 weeks x 4 cycles  Completed 3 cycles under the guidance of Dr Valeria Anderson  3/16/2017 Surgery    Right chest wall lesion excision (Dr Lorenzo Aranda)  Residual microscopic focus of invasive carcinoma  4 mm  Margins negative     History of ductal carcinoma in situ (DCIS) of breast   9/21/2021 Biopsy    Left breast ultrasound-guided biopsy  A  3 o'clock, 6 cm from nipple  Intraductal papilloma  Focal stromal changes suggestive of PASH    B  2 o'clock, 5 cm from nipple  Ductal carcinoma in situ  Grade 1        Concordant  Carcinoma measured up to 6mm on US  Left axilla US negative  If the papilloma is not excised at surgery, a 6 MO F/U US is recommended  9/30/2021 Genetic Testing    A total of 36 genes were evaluated, including: FELIPE, BRCA1, BRCA2, CDH1, CHEK2, PALB2, PTEN, STK11, TP53  Negative result  No pathogenic sequence variants or deletions/dupllications identified  [employer]     10/22/2021 Surgery    Left breast simple mastectomy  Ductal carcinoma in situ  Grade 2  Size cannot be determined (multifocal)  Margins negative  0/1 Intramammary lymph node  Stage 0           Interval History:   Patient has not noticed any changes in her breast she has no complaints  Review of Systems:   Review of Systems   Constitutional: Negative for activity change, appetite change and fatigue  HENT: Negative  Eyes: Negative  Respiratory: Negative for cough, shortness of breath and wheezing  Cardiovascular: Negative for chest pain and leg swelling  Gastrointestinal: Negative  Endocrine: Negative  Genitourinary: Negative  Musculoskeletal:        No new changes or complaints of bone pain   Skin: Negative  Allergic/Immunologic: Negative  Neurological: Negative  Hematological: Negative  Psychiatric/Behavioral: Negative          Past Medical History     Patient Active Problem List   Diagnosis    Atrial fibrillation (HCC)    Benign essential hypertension    Bilateral edema of lower extremity    BPPV (benign paroxysmal positional vertigo)    Bursitis of left shoulder    Cardiomyopathy ([address] )    Cervical stenosis of spine    Edema    Homocysteinemia    Hypokalemia    Acquired hypothyroidism    Long QT interval    Lymphedema    History of right breast cancer    Overweight with body mass index (BMI) of 29 to 29 9 in adult    Osteopenia    Overactive bladder    Peripheral neuropathy    Vitamin D deficiency    Bronchitis with bronchospasm    Candidal esophagitis (HCC)    Abnormal chest CT    Pulmonary nodule seen on imaging study    SOB (shortness of breath)    Weight loss    Hyperglycemia    Mixed hyperlipidemia    Other idiopathic peripheral autonomic neuropathy    Persistent nodularity of breast    Current use of long term anticoagulation    RICHARDS (dyspnea on exertion)    Cortical age-related cataract of both eyes    Bradycardia    SSS (sick sinus syndrome) (HCC)    Cardiac pacemaker    History of ductal carcinoma in situ (DCIS) of breast    Platelets decreased (HCC)    Stage 3 chronic kidney disease, unspecified whether stage 3a or 3b CKD (HCC)     Past Medical History:   Diagnosis Date    Abnormal chest CT     last assessed - [id number]    Abnormal glucose     Resolved - [id number]    Arrhythmia     Arthritis     Slight    BCC (basal cell carcinoma of skin)     last assessed - [id number]    BRCA gene mutation negative 09/30/2021    [employer]; 36 gene panel    Breast cancer ([facility] ) 09/12/2016    rt    Cardiomyopathy ([facility] )     Cervical spinal stenosis     Discharge from left nipple     last assessed - [id number]    Disease of thyroid gland     hypothyroidism    Full dentures     Upper and Lower    History of atrial fibrillation     Homocysteinemia     Hypertension     Hypokalemia     Intraductal papilloma of breast     last assessed - [id number]    Limb alert care status     right upper ext    Malignant neoplasm of skin     basal cell on face, Moh's surgery    Memory loss     last assessed - [id number]    Obesity     Open wound     last assessed - [id number]    Osteopenia     Overactive bladder     Palmar plantar erythrodysaesthesia     Resolved - [id number]    Pulmonary nodule seen on imaging study     Rosacea     UTI (urinary tract infection)     recently    Vertigo     Vitamin D deficiency      Past Surgical History:   Procedure Laterality Date    APPENDECTOMY      ATRIAL ABLATION SURGERY      last assessed - [id number]   [facility] BACK SURGERY  1997    Lumbar spinal stenosis    BREAST BIOPSY      2014?  left breast; 9/12/16 right breast x 3    BREAST BIOPSY Left 03/30/2010    BREAST BIOPSY Right 09/12/2016 us bx- invasive br ca    BREAST SURGERY      needle bx   BREAST SURGERY Right 03/16/2017    excision of right chest wall lesion    CARDIAC ELECTROPHYSIOLOGY STUDY AND ABLATION      CARDIOVERSION      DENTAL SURGERY      HAND SURGERY      Joint replaced with silicone joint 5th finger right hand    HYSTERECTOMY  1993    Complete    LAMINECTOMY      Decompressive up to two lumbar segments    MASTECTOMY Right 10/28/2016    OOPHORECTOMY  1993    ME EXC SKIN MALIG <0 5 CM TRUNK,ARM,LEG Right 03/16/2017    Procedure: EXCISION CHEST WALL LESION; NEEDLE LOC @ 1400;  Surgeon: Jerzy Buckley MD;  Location[facility];  Service: Surgical Oncology    ME INTRAOPERATIVE SENTINEL LYMPH NODE ID W DYE INJECTION Right 10/28/2016    Procedure: BREAST MASTECTOMY WITH SENTINAL LYMPH NODE BIOPSY; Yuri Yun; LYMPHATIC MAPPING; 0800 [facility] MED INJECTION;  Surgeon: Jerzy Buckley MD;  Location[facility];  Service: Surgical Oncology    ME MASTECTOMY, SIMPLE, COMPLETE Left 10/22/2021    Procedure: BREAST MASTECTOMY SIMPLE MARTHA  GUIDED;   Surgeon: Sarah Cherry MD;  [facility];  Service: Surgical Oncology    SENTINEL LYMPH NODE BIOPSY Right     US BREAST NEEDLE LOC LEFT Left 10/19/2021    US BREAST NEEDLE LOC RIGHT Right 03/16/2017    US GUIDANCE BREAST BIOPSY LEFT EACH ADDITIONAL Left 09/21/2021    US GUIDANCE BREAST BIOPSY RIGHT EACH ADDITIONAL Right 09/12/2016    US GUIDANCE BREAST BIOPSY RIGHT EACH ADDITIONAL Right 09/12/2016    US GUIDED BREAST BIOPSY LEFT COMPLETE Left 09/21/2021    US GUIDED BREAST BIOPSY RIGHT COMPLETE Right 09/12/2016    US GUIDED BREAST BIOPSY RIGHT COMPLETE Right 10/1/2021     Family History   Problem Relation Age of Onset    Coronary artery disease Mother         CABG    Diabetes Mother     Heart attack Mother     Diabetes Sister     Parkinsonism Sister     Coronary artery disease Brother         CABG    Diabetes Brother     Heart attack Brother     Heart attack Son     Kidney cancer Son 48    No Known Problems Son     No Known Problems Son     Emphysema Sister     No Known Problems Sister     Heart attack Brother     No Known Problems Brother     No Known Problems Maternal Grandmother     No Known Problems Maternal Grandfather     No Known Problems Paternal Grandmother     No Known Problems Paternal Grandfather     No Known Problems Paternal Aunt     No Known Problems Paternal Aunt     No Known Problems Paternal Aunt     No Known Problems Paternal Aunt      Social History     Socioeconomic History    Marital status: /Civil Union     Spouse name: Not on file    Number of children: Not on file    Years of education: Not on file    Highest education level: Not on file   Occupational History    Occupation: Retired   Tobacco Use    Smoking status: Never Smoker    Smokeless tobacco: Never Used   Vaping Use    Vaping Use: Never used   Substance and Sexual Activity    Alcohol use: No    Drug use: No    Sexual activity: Not Currently     Comment: Resides with    Other Topics Concern    Not on file   Social History Narrative    Always uses seat belt    Convalescence after chemotherapy     Social Determinants of Health     Financial Resource Strain: Not on file   Food Insecurity: Not on file   Transportation Needs: Not on file   Physical Activity: Not on file   Stress: Not on file   Social Connections: Not on file   Intimate Partner Violence: Not on file   Housing Stability: Not on file       Current Outpatient Medications:     apixaban (ELIQUIS) 5 mg, Take 1 tablet (5 mg total) by mouth 2 (two) times a day, Disp: 60 tablet, Rfl: 6    cyanocobalamin (VITAMIN B-12) 500 mcg tablet, Take 1 tablet by mouth daily, Disp: , Rfl:     flecainide (TAMBOCOR) 100 mg tablet, Take 1 tablet (100 mg total) by mouth 2 (two) times a day, Disp: 180 tablet, Rfl: 3    furosemide (LASIX) 40 mg tablet, TAKE 1 TABLET (40 MG TOTAL) BY MOUTH DAILY AS DIRECTED, Disp: 90 tablet, Rfl: 1    levothyroxine 150 mcg tablet, TAKE 1 TABLET DAILY TAKE 1 TABLET 3 DAYS A WEEK AND 1/2 TABLET 4 DAYS A WEEK, Disp: 90 tablet, Rfl: 0    meclizine (ANTIVERT) 25 mg tablet, Take 1 tablet (25 mg total) by mouth every 8 (eight) hours as needed for dizziness, Disp: 30 tablet, Rfl: 3    metoprolol succinate (TOPROL-XL) 25 mg 24 hr tablet, Take 0 5 tablets (12 5 mg total) by mouth daily, Disp: 30 tablet, Rfl: 3    potassium chloride (MICRO-K) 10 MEQ CR capsule, TAKE 1 CAPSULE BY MOUTH EVERY DAY, Disp: 90 capsule, Rfl: 1    RESTASIS 0 05 % ophthalmic emulsion, Administer 1 drop to both eyes every 12 (twelve) hours, Disp: , Rfl:   Allergies   Allergen Reactions    Morphine And Related GI Intolerance       Physical Exam:     Vitals:    02/09/22 1312   BP: 126/78   Pulse: 72   Resp: 16   Temp: 97 8 °F (36 6 °C)   SpO2: 98%     Physical Exam  Vitals reviewed  Constitutional:       Appearance: She is well-developed  HENT:      Head: Normocephalic and atraumatic  Eyes:      Pupils: Pupils are equal, round, and reactive to light  Neck:      Thyroid: No thyromegaly  Vascular: No JVD  Trachea: No tracheal deviation  Cardiovascular:      Rate and Rhythm: Normal rate and regular rhythm  Heart sounds: Normal heart sounds  No murmur heard  No friction rub  No gallop  Pulmonary:      Effort: Pulmonary effort is normal  No respiratory distress  Breath sounds: Normal breath sounds  No wheezing or rales  Chest:      Comments: Examination of the right mastectomy site demonstrates considerable scarring  She does have a dominant mass measuring approximately 1/2 cm in size along the anterior axillary line extending towards the chest wall  This is been evaluated by ultrasound and biopsied and shown to be consistent with fat necrosis and stable  Examination of the left breast demonstrates a well-healed incision with no masses    She has no evidence of any axillary adenopathy on either side  Abdominal:      General: There is no distension  Palpations: Abdomen is soft  There is no hepatomegaly or mass  Tenderness: There is no abdominal tenderness  There is no guarding or rebound  Musculoskeletal:         General: No tenderness  Normal range of motion  Cervical back: Normal range of motion and neck supple  Lymphadenopathy:      Cervical: No cervical adenopathy  Skin:     General: Skin is warm and dry  Findings: No erythema or rash  Neurological:      Mental Status: She is alert and oriented to person, place, and time  Cranial Nerves: No cranial nerve deficit  Psychiatric:         Behavior: Behavior normal            Results & Discussion:   The patient does have a mass in the right mastectomy site however this been biopsied and shown to be fat necrosis  Otherwise she is free of any evidence of local regional or distant recurrence disease  We will see her back in 6 months  Advance Care Planning/Advance Directives:  I discussed the disease status, treatment plans and follow-up with the patient

## 2022-03-03 ENCOUNTER — REMOTE DEVICE CLINIC VISIT (OUTPATIENT)
Dept: CARDIOLOGY CLINIC | Facility: CLINIC | Age: 77
End: 2022-03-03
Payer: COMMERCIAL

## 2022-03-03 DIAGNOSIS — Z95.0 PRESENCE OF CARDIAC PACEMAKER: Primary | ICD-10-CM

## 2022-03-03 PROCEDURE — 93294 REM INTERROG EVL PM/LDLS PM: CPT | Performed by: INTERNAL MEDICINE

## 2022-03-03 PROCEDURE — 93296 REM INTERROG EVL PM/IDS: CPT | Performed by: INTERNAL MEDICINE

## 2022-03-03 NOTE — PROGRESS NOTES
Results for orders placed or performed in visit on 03/03/22   Cardiac EP device report    Narrative    MDT DUAL PM/ ACTIVE SYSTEM IS MRI CONDITIONAL  CARELINK TRANSMISSION: BATTERY VOLTAGE ADEQUATE (13 1 YRS)  AP: 80 5%  : 0 3% (MVP-ON)  ALL AVAILABLE LEAD PARAMETERS WITHIN NORMAL LIMITS  42 AT/AF EPISODES W/ AVAIL EGMS SHOWING AF, MAX DURATION 6 HRS 37 MINS  AF BURDEN: 3 3%  PT TAKES TAMBOCOR, ELIQUIS, METOPROLOL SUCC  EF: 65% (ECHO 6/12/20)  PACEMAKER FUNCTIONING APPROPRIATELY    [address] [place] [address]

## 2022-03-14 DIAGNOSIS — I48.91 ATRIAL FIBRILLATION, UNSPECIFIED TYPE (HCC): ICD-10-CM

## 2022-03-14 RX ORDER — APIXABAN 5 MG/1
TABLET, FILM COATED ORAL
Qty: 60 TABLET | Refills: 6 | Status: SHIPPED | OUTPATIENT
Start: 2022-03-14

## 2022-03-21 DIAGNOSIS — I48.91 ATRIAL FIBRILLATION, UNSPECIFIED TYPE (HCC): ICD-10-CM

## 2022-03-21 RX ORDER — FLECAINIDE ACETATE 100 MG/1
100 TABLET ORAL 2 TIMES DAILY
Qty: 180 TABLET | Refills: 3 | Status: SHIPPED | OUTPATIENT
Start: 2022-03-21 | End: 2022-07-25

## 2022-04-05 DIAGNOSIS — E03.9 ACQUIRED HYPOTHYROIDISM: ICD-10-CM

## 2022-04-05 RX ORDER — LEVOTHYROXINE SODIUM 0.15 MG/1
TABLET ORAL
Qty: 90 TABLET | Refills: 0 | Status: SHIPPED | OUTPATIENT
Start: 2022-04-05 | End: 2022-07-10

## 2022-04-20 ENCOUNTER — TELEPHONE (OUTPATIENT)
Dept: FAMILY MEDICINE CLINIC | Facility: HOSPITAL | Age: 77
End: 2022-04-20

## 2022-04-20 NOTE — TELEPHONE ENCOUNTER
Patient received a letter from [facility] breast [place] that she is due for a mammo  I see the modifier has been removed from her health maintenance due to age but she also has bi-lat mastectomy      Advised patient that she does not need a mammo, but it there anything else she should be doing?    pcb

## 2022-05-05 ENCOUNTER — TELEPHONE (OUTPATIENT)
Dept: SURGICAL ONCOLOGY | Facility: CLINIC | Age: 77
End: 2022-05-05

## 2022-05-06 ENCOUNTER — TELEPHONE (OUTPATIENT)
Dept: HEMATOLOGY ONCOLOGY | Facility: CLINIC | Age: 77
End: 2022-05-06

## 2022-05-06 NOTE — TELEPHONE ENCOUNTER
Appointment Cancellation Or Reschedule     Person calling in patient   Provider Dr Antelmo Vuong   Office Visit Date and Time 8/10 @ 130pm   Office Visit Location [facility] [place]   Did patient want to reschedule their office appointment? If so, when was it scheduled to? Yes 8/15@ 1030   Is this patient calling to reschedule an infusion appointment? no   When is their next infusion appointment? n/a   Is this patient a Chemo patient? no   Reason for Cancellation or Reschedule office called patient to reschedule appt  If the patient is a treatment patient, please route this to the office nurse  If the patient is not on treatment, please route to the office MA

## 2022-05-06 NOTE — TELEPHONE ENCOUNTER
Appointment Cancellation Or Reschedule     Person calling in Patient    Provider Dr Rodney Gu   Office Visit Date and Time 8/10 2PM    [facility] Visit Location [facility]   Did patient want to reschedule their office appointment? If so, when was it scheduled to? Yes, 8/1 11AM [place]   Is this patient calling to reschedule an infusion appointment? No   When is their next infusion appointment? No   Is this patient a Chemo patient? No   Reason for Cancellation or Reschedule Better location     If the patient is a treatment patient, please route this to the office nurse  If the patient is not on treatment, please route to the office MA  [Consultation] : a consultation visit [FreeTextEntry1] : lung cancer

## 2022-05-07 DIAGNOSIS — E87.6 HYPOKALEMIA: ICD-10-CM

## 2022-05-07 RX ORDER — POTASSIUM CHLORIDE 750 MG/1
CAPSULE, EXTENDED RELEASE ORAL
Qty: 90 CAPSULE | Refills: 1 | Status: SHIPPED | OUTPATIENT
Start: 2022-05-07

## 2022-05-13 DIAGNOSIS — R60.9 EDEMA, UNSPECIFIED TYPE: ICD-10-CM

## 2022-05-13 RX ORDER — FUROSEMIDE 40 MG/1
40 TABLET ORAL DAILY
Qty: 90 TABLET | Refills: 1 | Status: SHIPPED | OUTPATIENT
Start: 2022-05-13

## 2022-05-19 LAB
ALBUMIN SERPL-MCNC: 3.7 G/DL (ref 3.7–4.7)
ALBUMIN/GLOB SERPL: 1.2 {RATIO} (ref 1.2–2.2)
ALP SERPL-CCNC: 123 IU/L (ref 44–121)
ALT SERPL-CCNC: 8 IU/L (ref 0–32)
AST SERPL-CCNC: 23 IU/L (ref 0–40)
BILIRUB SERPL-MCNC: 0.7 MG/DL (ref 0–1.2)
BUN SERPL-MCNC: 13 MG/DL (ref 8–27)
BUN/CREAT SERPL: 10 (ref 12–28)
CALCIUM SERPL-MCNC: 9.1 MG/DL (ref 8.7–10.3)
CHLORIDE SERPL-SCNC: 106 MMOL/L (ref 96–106)
CHOLEST SERPL-MCNC: 126 MG/DL (ref 100–199)
CO2 SERPL-SCNC: 23 MMOL/L (ref 20–29)
CREAT SERPL-MCNC: 1.3 MG/DL (ref 0.57–1)
EGFR: 43 ML/MIN/1.73
ERYTHROCYTE [DISTWIDTH] IN BLOOD BY AUTOMATED COUNT: 14.4 % (ref 11.7–15.4)
EST. AVERAGE GLUCOSE BLD GHB EST-MCNC: 120 MG/DL
GLOBULIN SER-MCNC: 3 G/DL (ref 1.5–4.5)
GLUCOSE SERPL-MCNC: 93 MG/DL (ref 65–99)
HBA1C MFR BLD: 5.8 % (ref 4.8–5.6)
HCT VFR BLD AUTO: 37.1 % (ref 34–46.6)
HCV AB S/CO SERPL IA: <0.1 S/CO RATIO (ref 0–0.9)
HDLC SERPL-MCNC: 46 MG/DL
HGB BLD-MCNC: 11.9 G/DL (ref 11.1–15.9)
LDLC SERPL CALC-MCNC: 66 MG/DL (ref 0–99)
LDLC/HDLC SERPL: 1.4 RATIO (ref 0–3.2)
MCH RBC QN AUTO: 27.6 PG (ref 26.6–33)
MCHC RBC AUTO-ENTMCNC: 32.1 G/DL (ref 31.5–35.7)
MCV RBC AUTO: 86 FL (ref 79–97)
PLATELET # BLD AUTO: 197 X10E3/UL (ref 150–450)
POTASSIUM SERPL-SCNC: 4.6 MMOL/L (ref 3.5–5.2)
PROT SERPL-MCNC: 6.7 G/DL (ref 6–8.5)
RBC # BLD AUTO: 4.31 X10E6/UL (ref 3.77–5.28)
SL AMB VLDL CHOLESTEROL CALC: 14 MG/DL (ref 5–40)
SODIUM SERPL-SCNC: 143 MMOL/L (ref 134–144)
TRIGL SERPL-MCNC: 65 MG/DL (ref 0–149)
TSH SERPL DL<=0.005 MIU/L-ACNC: 2.1 UIU/ML (ref 0.45–4.5)
WBC # BLD AUTO: 4.8 X10E3/UL (ref 3.4–10.8)

## 2022-06-02 ENCOUNTER — REMOTE DEVICE CLINIC VISIT (OUTPATIENT)
Dept: CARDIOLOGY CLINIC | Facility: CLINIC | Age: 77
End: 2022-06-02
Payer: COMMERCIAL

## 2022-06-02 DIAGNOSIS — I48.91 ATRIAL FIBRILLATION, UNSPECIFIED TYPE (HCC): ICD-10-CM

## 2022-06-02 DIAGNOSIS — Z95.0 CARDIAC PACEMAKER IN SITU: Primary | ICD-10-CM

## 2022-06-02 PROCEDURE — 93294 REM INTERROG EVL PM/LDLS PM: CPT | Performed by: INTERNAL MEDICINE

## 2022-06-02 PROCEDURE — 93296 REM INTERROG EVL PM/IDS: CPT | Performed by: INTERNAL MEDICINE

## 2022-06-02 RX ORDER — METOPROLOL SUCCINATE 25 MG/1
25 TABLET, EXTENDED RELEASE ORAL DAILY
Qty: 30 TABLET | Refills: 3 | Status: SHIPPED | OUTPATIENT
Start: 2022-06-02 | End: 2022-06-30

## 2022-06-02 RX ORDER — METOPROLOL SUCCINATE 25 MG/1
25 TABLET, EXTENDED RELEASE ORAL DAILY
Qty: 30 TABLET | Refills: 3 | Status: SHIPPED | OUTPATIENT
Start: 2022-06-02 | End: 2022-06-02 | Stop reason: SDUPTHER

## 2022-06-02 NOTE — PROGRESS NOTES
Results for orders placed or performed in visit on 06/02/22   Cardiac EP device report    Narrative    MDT DUAL PM/ ACTIVE SYSTEM IS MRI CONDITIONAL  CARELINK TRANSMISSION: BATTERY STATUS "13 YRS " AP 90%  0%  ALL AVAILABLE LEAD PARAMETERS WITHIN NORMAL LIMITS  133 AT/AF NOTED; 10% BURDEN  7 FAST A/V NOTED & 4 VHRS NOTED  AVAIL EGRAMS PRESENT AS SVT, AF W/RVR, & AF  PT ON FLECAINIDE, METO SUCC, & ELIQUIS  EF 65% (2020)  NORMAL DEVICE FUNCTION   [place]

## 2022-07-10 DIAGNOSIS — E03.9 ACQUIRED HYPOTHYROIDISM: ICD-10-CM

## 2022-07-10 RX ORDER — LEVOTHYROXINE SODIUM 0.15 MG/1
TABLET ORAL
Qty: 90 TABLET | Refills: 0 | Status: SHIPPED | OUTPATIENT
Start: 2022-07-10

## 2022-07-18 ENCOUNTER — HOSPITAL ENCOUNTER (OUTPATIENT)
Dept: RADIOLOGY | Facility: HOSPITAL | Age: 77
Discharge: HOME | End: 2022-07-18
Payer: COMMERCIAL

## 2022-07-18 ENCOUNTER — OFFICE VISIT (OUTPATIENT)
Dept: FAMILY MEDICINE CLINIC | Facility: HOSPITAL | Age: 77
End: 2022-07-18
Payer: COMMERCIAL

## 2022-07-18 VITALS
OXYGEN SATURATION: 95 % | SYSTOLIC BLOOD PRESSURE: 130 MMHG | HEART RATE: 80 BPM | HEIGHT: 62 IN | BODY MASS INDEX: 30.62 KG/M2 | DIASTOLIC BLOOD PRESSURE: 75 MMHG | TEMPERATURE: 97.8 F | WEIGHT: 166.4 LBS

## 2022-07-18 DIAGNOSIS — Z79.01 CURRENT USE OF LONG TERM ANTICOAGULATION: ICD-10-CM

## 2022-07-18 DIAGNOSIS — E03.9 ACQUIRED HYPOTHYROIDISM: ICD-10-CM

## 2022-07-18 DIAGNOSIS — M48.02 CERVICAL STENOSIS OF SPINE: ICD-10-CM

## 2022-07-18 DIAGNOSIS — Z85.3 HISTORY OF RIGHT BREAST CANCER: ICD-10-CM

## 2022-07-18 DIAGNOSIS — Z00.00 MEDICARE ANNUAL WELLNESS VISIT, SUBSEQUENT: Primary | ICD-10-CM

## 2022-07-18 DIAGNOSIS — N18.32 STAGE 3B CHRONIC KIDNEY DISEASE (HCC): ICD-10-CM

## 2022-07-18 DIAGNOSIS — E78.2 MIXED HYPERLIPIDEMIA: ICD-10-CM

## 2022-07-18 DIAGNOSIS — I48.19 PERSISTENT ATRIAL FIBRILLATION (HCC): ICD-10-CM

## 2022-07-18 DIAGNOSIS — I10 BENIGN ESSENTIAL HYPERTENSION: ICD-10-CM

## 2022-07-18 PROBLEM — M75.52 BURSITIS OF LEFT SHOULDER: Status: RESOLVED | Noted: 2017-12-11 | Resolved: 2022-07-18

## 2022-07-18 PROCEDURE — 72050 X-RAY EXAM NECK SPINE 4/5VWS: CPT

## 2022-07-18 PROCEDURE — 99214 OFFICE O/P EST MOD 30 MIN: CPT | Performed by: FAMILY MEDICINE

## 2022-07-18 PROCEDURE — G0439 PPPS, SUBSEQ VISIT: HCPCS | Performed by: FAMILY MEDICINE

## 2022-07-18 NOTE — PATIENT INSTRUCTIONS
Wellness Visit for Adults   AMBULATORY CARE:   A wellness visit  is when you see your healthcare provider to get screened for health problems  Your healthcare provider will also give you advice on how to stay healthy  Write down your questions so you remember to ask them  Ask your healthcare provider how often you should have a wellness visit  What happens at a wellness visit:  Your healthcare provider will ask about your health, and your family history of health problems  This includes high blood pressure, heart disease, and cancer  He or she will ask if you have symptoms that concern you, if you smoke, and about your mood  You may also be asked about your intake of medicines, supplements, food, and alcohol  Any of the following may be done: Your weight  will be checked  Your height may also be checked so your body mass index (BMI) can be calculated  Your BMI shows if you are at a healthy weight  Your blood pressure  and heart rate will be checked  Your temperature may also be checked  Blood and urine tests  may be done  Blood tests may be done to check your cholesterol levels  Abnormal cholesterol levels increase your risk for heart disease and stroke  You may also need a blood or urine test to check for diabetes if you are at increased risk  Urine tests may be done to look for signs of an infection or kidney disease  A physical exam  includes checking your heartbeat and lungs with a stethoscope  Your healthcare provider may also check your skin to look for sun damage  Screening tests  may be recommended  A screening test is done to check for diseases that may not cause symptoms  The screening tests you may need depend on your age, gender, family history, and lifestyle habits  For example, colorectal screening may be recommended if you are 48years old or older  Screening tests you need if you are a woman:   A Pap smear  is used to screen for cervical cancer   Pap smears are usually done every 3 to 5 years depending on your age  You may need them more often if you have had abnormal Pap smear test results in the past  Ask your healthcare provider how often you should have a Pap smear  A mammogram  is an x-ray of your breasts to screen for breast cancer  Experts recommend mammograms every 2 years starting at age 48 years  You may need a mammogram at age 52 years or younger if you have an increased risk for breast cancer  Talk to your healthcare provider about when you should start having mammograms and how often you need them  Vaccines you may need:   Get an influenza vaccine  every year  The influenza vaccine protects you from the flu  Several types of viruses cause the flu  The viruses change over time, so new vaccines are made each year  Get a tetanus-diphtheria (Td) booster vaccine  every 10 years  This vaccine protects you against tetanus and diphtheria  Tetanus is a severe infection that may cause painful muscle spasms and lockjaw  Diphtheria is a severe bacterial infection that causes a thick covering in the back of your mouth and throat  Get a human papillomavirus (HPV) vaccine  if you are female and aged 23 to 32 or male 23 to 24 and never received it  This vaccine protects you from HPV infection  HPV is the most common infection spread by sexual contact  HPV may also cause vaginal, penile, and anal cancers  Get a pneumococcal vaccine  if you are aged 72 years or older  The pneumococcal vaccine is an injection given to protect you from pneumococcal disease  Pneumococcal disease is an infection caused by pneumococcal bacteria  The infection may cause pneumonia, meningitis, or an ear infection  Get a shingles vaccine  if you are 60 or older, even if you have had shingles before  The shingles vaccine is an injection to protect you from the varicella-zoster virus  This is the same virus that causes chickenpox   Shingles is a painful rash that develops in people who had chickenpox or have been exposed to the virus  How to eat healthy:  My Plate is a model for planning healthy meals  It shows the types and amounts of foods that should go on your plate  Fruits and vegetables make up about half of your plate, and grains and protein make up the other half  A serving of dairy is included on the side of your plate  The amount of calories and serving sizes you need depends on your age, gender, weight, and height  Examples of healthy foods are listed below:  Eat a variety of vegetables  such as dark green, red, and orange vegetables  You can also include canned vegetables low in sodium (salt) and frozen vegetables without added butter or sauces  Eat a variety of fresh fruits , canned fruit in 100% juice, frozen fruit, and dried fruit  Include whole grains  At least half of the grains you eat should be whole grains  Examples include whole-wheat bread, wheat pasta, brown rice, and whole-grain cereals such as oatmeal     Eat a variety of protein foods such as seafood (fish and shellfish), lean meat, and poultry without skin (turkey and chicken)  Examples of lean meats include pork leg, shoulder, or tenderloin, and beef round, sirloin, tenderloin, and extra lean ground beef  Other protein foods include eggs and egg substitutes, beans, peas, soy products, nuts, and seeds  Choose low-fat dairy products such as skim or 1% milk or low-fat yogurt, cheese, and cottage cheese  Limit unhealthy fats  such as butter, hard margarine, and shortening  Exercise:  Exercise at least 30 minutes per day on most days of the week  Some examples of exercise include walking, biking, dancing, and swimming  You can also fit in more physical activity by taking the stairs instead of the elevator or parking farther away from stores  Include muscle strengthening activities 2 days each week  Regular exercise provides many health benefits   It helps you manage your weight, and decreases your risk for type 2 diabetes, heart disease, stroke, and high blood pressure  Exercise can also help improve your mood  Ask your healthcare provider about the best exercise plan for you  General health and safety guidelines:   Do not smoke  Nicotine and other chemicals in cigarettes and cigars can cause lung damage  Ask your healthcare provider for information if you currently smoke and need help to quit  E-cigarettes or smokeless tobacco still contain nicotine  Talk to your healthcare provider before you use these products  Limit alcohol  A drink of alcohol is 12 ounces of beer, 5 ounces of wine, or 1½ ounces of liquor  Lose weight, if needed  Being overweight increases your risk of certain health conditions  These include heart disease, high blood pressure, type 2 diabetes, and certain types of cancer  Protect your skin  Do not sunbathe or use tanning beds  Use sunscreen with a SPF 15 or higher  Apply sunscreen at least 15 minutes before you go outside  Reapply sunscreen every 2 hours  Wear protective clothing, hats, and sunglasses when you are outside  Drive safely  Always wear your seatbelt  Make sure everyone in your car wears a seatbelt  A seatbelt can save your life if you are in an accident  Do not use your cell phone when you are driving  This could distract you and cause an accident  Pull over if you need to make a call or send a text message  Practice safe sex  Use latex condoms if are sexually active and have more than one partner  Your healthcare provider may recommend screening tests for sexually transmitted infections (STIs)  Wear helmets, lifejackets, and protective gear  Always wear a helmet when you ride a bike or motorcycle, go skiing, or play sports that could cause a head injury  Wear protective equipment when you play sports  Wear a lifejacket when you are on a boat or doing water sports      © Copyright [address] 2022 Information is for End User's use only and may not be sold, redistributed or otherwise used for commercial purposes  All illustrations and images included in CareNotes® are the copyrighted property of A D A M , Inc  or Simon Millan  The above information is an  only  It is not intended as medical advice for individual conditions or treatments  Talk to your doctor, nurse or pharmacist before following any medical regimen to see if it is safe and effective for you

## 2022-07-18 NOTE — PROGRESS NOTES
Assessment and Plan:     Problem List Items Addressed This Visit        Endocrine    Acquired hypothyroidism       Cardiovascular and Mediastinum    Atrial fibrillation ([facility] [address] )    Benign essential hypertension       Genitourinary    Stage 3b chronic kidney disease ([facility] [address] )       Other    Cervical stenosis of spine    Relevant Orders    XR spine cervical complete 4 or 5 vw non injury    Ambulatory Referral to Pain Management    History of right breast cancer    BMI 30 0-30 9,adult    Medicare annual wellness visit, subsequent - Primary    Mixed hyperlipidemia    Current use of long term anticoagulation        BMI Counseling: Body mass index is 30 43 kg/m²  The BMI is above normal  Nutrition recommendations include decreasing portion sizes, limiting drinks that contain sugar, moderation in carbohydrate intake and reducing intake of cholesterol  Exercise recommendations include strength training exercises  No pharmacotherapy was ordered  Rationale for BMI follow-up plan is due to patient being overweight or obese  Depression Screening and Follow-up Plan: Patient was screened for depression during today's encounter  They screened negative with a PHQ-2 score of 0  Preventive health issues were discussed with patient, and age appropriate screening tests were ordered as noted in patient's After Visit Summary  Personalized health advice and appropriate referrals for health education or preventive services given if needed, as noted in patient's After Visit Summary  History of Present Illness:     Patient presents for a Medicare Wellness Visit    6 month follow up      No recent illness or injury    Feels episodes of atrial fibrillation going fast  Due for F/U with Dr Jeri Dior causes episodes    Medication reviewed and list revised     Patient Care Team:  Ronnie Cueva MD as PCP - General  Ronnie Cueva MD as PCP - [address] [place] [address] (RTE)  MD Ronnie Hernandez MD Ever Patron, MD as Surgeon (Surgical Oncology)  Phan Espinosa MD (Cardiology)     Review of Systems:     Review of Systems   Constitutional: Negative for fatigue and unexpected weight change  HENT: Negative  Eyes: Positive for visual disturbance  Respiratory: Negative  Cardiovascular: Negative  Gastrointestinal: Negative  Musculoskeletal: Positive for arthralgias  Neurological: Negative  Hematological: Negative  Psychiatric/Behavioral: Negative  All other systems reviewed and are negative         Problem List:     Patient Active Problem List   Diagnosis    Atrial fibrillation (HCC)    Benign essential hypertension    Bilateral edema of lower extremity    BPPV (benign paroxysmal positional vertigo)    Cardiomyopathy (HCC)    Cervical stenosis of spine    Edema    Homocysteinemia    Hypokalemia    Acquired hypothyroidism    Long QT interval    Lymphedema    History of right breast cancer    BMI 30 0-30 9,adult    Osteopenia    Overactive bladder    Peripheral neuropathy    Vitamin D deficiency    Bronchitis with bronchospasm    Candidal esophagitis (HCC)    Abnormal chest CT    Pulmonary nodule seen on imaging study    SOB (shortness of breath)    Weight loss    Medicare annual wellness visit, subsequent    Hyperglycemia    Mixed hyperlipidemia    Other idiopathic peripheral autonomic neuropathy    Persistent nodularity of breast    Current use of long term anticoagulation    RICHARDS (dyspnea on exertion)    Cortical age-related cataract of both eyes    Bradycardia    SSS (sick sinus syndrome) ([facility] [address] )    Cardiac pacemaker    History of ductal carcinoma in situ (DCIS) of breast    Platelets decreased (HCC)    Stage 3b chronic kidney disease ([facility] [address] )      Past Medical and Surgical History:     Past Medical History:   Diagnosis Date    Abnormal chest CT     last assessed - [id number]    Abnormal glucose     Resolved - [id number]    Arrhythmia     Arthritis     Slight    BCC (basal cell carcinoma of skin)     last assessed - [id number]    BRCA gene mutation negative 09/30/2021    [employer]; 36 gene panel    Breast cancer ([facility] [address] ) 09/12/2016    rt    Cardiomyopathy ([facility] [address] )     Cervical spinal stenosis     Discharge from left nipple     last assessed - [id number]    Disease of thyroid gland     hypothyroidism    Full dentures     Upper and Lower    History of atrial fibrillation     Homocysteinemia     Hypertension     Hypokalemia     Intraductal papilloma of breast     last assessed - [id number]    Limb alert care status     right upper ext    Malignant neoplasm of skin     basal cell on face, Moh's surgery    Memory loss     last assessed - [id number]    Obesity     Open wound     last assessed - [id number]    Osteopenia     Overactive bladder     Palmar plantar erythrodysaesthesia     Resolved - [id number]    Pulmonary nodule seen on imaging study     Rosacea     UTI (urinary tract infection)     recently    Vertigo     Vitamin D deficiency      Past Surgical History:   Procedure Laterality Date    APPENDECTOMY      ATRIAL ABLATION SURGERY      last assessed - [id number]   Pastora Mare BACK SURGERY  1997    Lumbar spinal stenosis    BREAST BIOPSY      2014? left breast; 9/12/16 right breast x 3    BREAST BIOPSY Left 03/30/2010    BREAST BIOPSY Right 09/12/2016    us bx- invasive br ca    BREAST SURGERY      needle bx      BREAST SURGERY Right 03/16/2017    excision of right chest wall lesion    CARDIAC ELECTROPHYSIOLOGY STUDY AND ABLATION      CARDIOVERSION      DENTAL SURGERY      HAND SURGERY      Joint replaced with silicone joint 5th finger right hand    HYSTERECTOMY  1993    Complete    LAMINECTOMY      Decompressive up to two lumbar segments    MASTECTOMY Right 10/28/2016    OOPHORECTOMY  1993    PA EXC SKIN MALIG <0 5 CM TRUNK,ARM,LEG Right 03/16/2017    Procedure: EXCISION CHEST WALL LESION; NEEDLE LOC @ 1400;  Surgeon: Tejal Diop MD;  Location: [facility]; Service: Surgical Oncology    WY INTRAOPERATIVE SENTINEL LYMPH NODE ID W DYE INJECTION Right 10/28/2016    Procedure: BREAST MASTECTOMY WITH SENTINAL LYMPH NODE BIOPSY; Braulio Miller; LYMPHATIC MAPPING; 0800 NUC MED INJECTION;  Surgeon: William Rooney MD;  Location: [facility];  Service: Surgical Oncology    WY MASTECTOMY, SIMPLE, COMPLETE Left 10/22/2021    Procedure: BREAST MASTECTOMY SIMPLE MARTHA  GUIDED;   Surgeon: Melvin Hopkins MD;  [facility]: [facility];  Service: Surgical Oncology    SENTINEL LYMPH NODE BIOPSY Right     US BREAST NEEDLE LOC LEFT Left 10/19/2021    US BREAST NEEDLE LOC RIGHT Right 03/16/2017    US GUIDANCE BREAST BIOPSY LEFT EACH ADDITIONAL Left 09/21/2021    US GUIDANCE BREAST BIOPSY RIGHT EACH ADDITIONAL Right 09/12/2016    US GUIDANCE BREAST BIOPSY RIGHT EACH ADDITIONAL Right 09/12/2016    US GUIDED BREAST BIOPSY LEFT COMPLETE Left 09/21/2021    US GUIDED BREAST BIOPSY RIGHT COMPLETE Right 09/12/2016    US GUIDED BREAST BIOPSY RIGHT COMPLETE Right 10/1/2021      Family History:     Family History   Problem Relation Age of Onset    Coronary artery disease Mother         CABG    Diabetes Mother     Heart attack Mother     Diabetes Sister     Parkinsonism Sister     Coronary artery disease Brother         CABG    Diabetes Brother     Heart attack Brother     Heart attack Son     Kidney cancer Son 48    No Known Problems Son     No Known Problems Son     Emphysema Sister     No Known Problems Sister     Heart attack Brother     No Known Problems Brother     No Known Problems Maternal Grandmother     No Known Problems Maternal Grandfather     No Known Problems Paternal Grandmother     No Known Problems Paternal Grandfather     No Known Problems Paternal Aunt     No Known Problems Paternal Aunt     No Known Problems Paternal Aunt     No Known Problems Paternal Aunt       Social History:     Social History     Socioeconomic History    Marital status: /Civil [employer]     Spouse name: None    Number of children: None    Years of education: None    Highest education level: None   Occupational History    Occupation: Retired   Tobacco Use    Smoking status: Never Smoker    Smokeless tobacco: Never Used   Vaping Use    Vaping Use: Never used   Substance and Sexual Activity    Alcohol use: No    Drug use: No    Sexual activity: Not Currently     Comment: Resides with    Other Topics Concern    None   Social History Narrative    Always uses seat belt    Convalescence after chemotherapy     Social Determinants of Health     Financial Resource Strain: Not on file   Food Insecurity: Not on file   Transportation Needs: Not on file   Physical Activity: Not on file   Stress: Not on file   Social Connections: Not on file   Intimate Partner Violence: Not on file   Housing Stability: Not on file      Medications and Allergies:     Current Outpatient Medications   Medication Sig Dispense Refill    cyanocobalamin (VITAMIN B-12) 500 mcg tablet Take 1 tablet by mouth daily      Eliquis 5 MG TAKE 1 TABLET BY MOUTH TWICE A DAY 60 tablet 6    flecainide (TAMBOCOR) 100 mg tablet Take 1 tablet (100 mg total) by mouth 2 (two) times a day 180 tablet 3    furosemide (LASIX) 40 mg tablet TAKE 1 TABLET (40 MG TOTAL) BY MOUTH DAILY AS DIRECTED 90 tablet 1    levothyroxine 150 mcg tablet TAKE 1 TABLET DAILY TAKE 1 TABLET 3 DAYS A WEEK AND 1/2 TABLET 4 DAYS A WEEK 90 tablet 0    meclizine (ANTIVERT) 25 mg tablet Take 1 tablet (25 mg total) by mouth every 8 (eight) hours as needed for dizziness 30 tablet 3    metoprolol succinate (TOPROL-XL) 25 mg 24 hr tablet Take 0 5 tablets (12 5 mg total) by mouth daily 45 tablet 2    potassium chloride (MICRO-K) 10 MEQ CR capsule TAKE 1 CAPSULE BY MOUTH EVERY DAY 90 capsule 1    RESTASIS 0 05 % ophthalmic emulsion Administer 1 drop to both eyes every 12 (twelve) hours       No current facility-administered medications for this visit  Allergies   Allergen Reactions    Morphine And Related GI Intolerance      Immunizations:     Immunization History   Administered Date(s) Administered    COVID-19 MODERNA VACC 0 5 ML IM 01/22/2021, 02/17/2021, 11/18/2021    INFLUENZA 10/23/2007, 11/06/2010, 10/13/2011, 10/17/2012, 10/11/2013, 10/29/2014, 11/08/2016, 10/27/2017    Influenza, high dose seasonal 0 7 mL 11/18/2020, 12/03/2021    Pneumococcal Conjugate 13-Valent 04/09/2015    Pneumococcal Polysaccharide PPV23 06/29/2017    Tdap 06/14/2016    Zoster 03/08/2013      Health Maintenance:         Topic Date Due    DXA SCAN  06/24/2016    Hepatitis C Screening  Completed         Topic Date Due    COVID-19 Vaccine (4 - Booster for Moderna series) 03/18/2022    Influenza Vaccine (1) 09/01/2022      Medicare Screening Tests and Risk Assessments:     Anastasia Barreto is here for her Subsequent Wellness visit  Last Medicare Wellness visit information reviewed, patient interviewed and updates made to the record today  Health Risk Assessment:   Patient rates overall health as good  Patient feels that their physical health rating is slightly better  Patient is very satisfied with their life  Eyesight was rated as same  Hearing was rated as same  Patient feels that their emotional and mental health rating is same  Patients states they are often angry  Patient states they are never, rarely unusually tired/fatigued  Pain experienced in the last 7 days has been some  Patient's pain rating has been 4/10  Patient states that she has experienced no weight loss or gain in last 6 months  Neck     Depression Screening:   PHQ-2 Score: 0      Fall Risk Screening: In the past year, patient has experienced: history of falling in past year    Number of falls: 1  Injured during fall?: No    Feels unsteady when standing or walking?: No    Worried about falling?: No      Urinary Incontinence Screening:   Patient has not leaked urine accidently in the last six months  Home Safety:  Patient has trouble with stairs inside or outside of their home  Patient has no working smoke alarms and has no working carbon monoxide detector  Home safety hazards include: none  Nutrition:   Current diet is Regular  Medications:   Patient is not currently taking any over-the-counter supplements  Patient is able to manage medications  Activities of Daily Living (ADLs)/Instrumental Activities of Daily Living (IADLs):   Walk and transfer into and out of bed and chair?: Yes  Dress and groom yourself?: Yes    Bathe or shower yourself?: Yes    Feed yourself?  Yes  Do your laundry/housekeeping?: Yes  Manage your money, pay your bills and track your expenses?: Yes  Make your own meals?: Yes    Do your own shopping?: Yes    Previous Hospitalizations:   Any hospitalizations or ED visits within the last 12 months?: No      Advance Care Planning:   Living will: No    Durable POA for healthcare: No    Advanced directive: No    Advanced directive counseling given: Yes    Five wishes given: Yes    Patient declined ACP directive: No    End of Life Decisions reviewed with patient: Yes    Provider agrees with end of life decisions: Yes      Cognitive Screening:   Provider or family/friend/caregiver concerned regarding cognition?: No    PREVENTIVE SCREENINGS      Cardiovascular Screening:    General: Screening Not Indicated and History Lipid Disorder      Diabetes Screening:     General: Screening Current      Colorectal Cancer Screening:     General: Risks and Benefits Discussed    Due for: Colonoscopy - Low Risk      Breast Cancer Screening:     General: Screening Not Indicated and History Breast Cancer      Cervical Cancer Screening:    General: Screening Not Indicated      Osteoporosis Screening:    General: Risks and Benefits Discussed    Due for: DXA Axial      Abdominal Aortic Aneurysm (AAA) Screening:        General: Screening Not Indicated      Lung Cancer Screening:     General: Screening Not Indicated      Hepatitis C Screening:    General: Screening Current    Screening, Brief Intervention, and Referral to Treatment (SBIRT)    Screening  Typical number of drinks in a day: 0  Typical number of drinks in a week: 0  Interpretation: Low risk drinking behavior  Single Item Drug Screening:  How often have you used an illegal drug (including marijuana) or a prescription medication for non-medical reasons in the past year? never    Single Item Drug Screen Score: 0  Interpretation: Negative screen for possible drug use disorder     Visual Acuity Screening    Right eye Left eye Both eyes   Without correction: 20/25 20/30 20/25   With correction:           Physical Exam:     /75 (BP Location: Left arm, Patient Position: Sitting, Cuff Size: Large)   Pulse 80   Temp 97 8 °F (36 6 °C) (Tympanic)   Ht 5' 2" (1 575 m)   Wt 75 5 kg (166 lb 6 4 oz)   SpO2 95%   BMI 30 43 kg/m²     Physical Exam  Vitals and nursing note reviewed  Constitutional:       Appearance: Normal appearance  Neck:      Vascular: No carotid bruit  Cardiovascular:      Rate and Rhythm: Normal rate  Rhythm irregular  Heart sounds: Normal heart sounds  Pulmonary:      Effort: Pulmonary effort is normal       Breath sounds: Normal breath sounds  Musculoskeletal:      Right lower leg: No edema  Left lower leg: No edema  Neurological:      General: No focal deficit present  Mental Status: She is alert and oriented to person, place, and time     Psychiatric:         Mood and Affect: Mood normal           Isabela Rangel MD

## 2022-07-24 DIAGNOSIS — I48.91 ATRIAL FIBRILLATION, UNSPECIFIED TYPE (HCC): ICD-10-CM

## 2022-07-25 RX ORDER — FLECAINIDE ACETATE 100 MG/1
TABLET ORAL
Qty: 60 TABLET | Refills: 0 | Status: SHIPPED | OUTPATIENT
Start: 2022-07-25 | End: 2022-09-01

## 2022-07-26 ENCOUNTER — CONSULT (OUTPATIENT)
Dept: PAIN MEDICINE | Facility: CLINIC | Age: 77
End: 2022-07-26
Payer: COMMERCIAL

## 2022-07-26 VITALS
SYSTOLIC BLOOD PRESSURE: 130 MMHG | WEIGHT: 165 LBS | BODY MASS INDEX: 30.36 KG/M2 | DIASTOLIC BLOOD PRESSURE: 80 MMHG | TEMPERATURE: 98.7 F | HEART RATE: 86 BPM | HEIGHT: 62 IN

## 2022-07-26 DIAGNOSIS — M79.18 CERVICAL MYOFASCIAL PAIN SYNDROME: ICD-10-CM

## 2022-07-26 DIAGNOSIS — M47.812 CERVICAL SPONDYLOSIS: ICD-10-CM

## 2022-07-26 DIAGNOSIS — M54.81 OCCIPITAL NEURALGIA OF RIGHT SIDE: Primary | ICD-10-CM

## 2022-07-26 DIAGNOSIS — M54.2 NECK PAIN: ICD-10-CM

## 2022-07-26 PROCEDURE — 99204 OFFICE O/P NEW MOD 45 MIN: CPT | Performed by: ANESTHESIOLOGY

## 2022-07-26 NOTE — PROGRESS NOTES
Assessment  1  Occipital neuralgia of right side    2  Cervical myofascial pain syndrome    3  Cervical spondylosis    4  Neck pain        Plan    Pleasant 60-year-old female with longstanding history but worsening neck pain headaches  She is cervical spondylosis overlying myofascial pain and subsequent occipital neuralgia  My recommendations are as follows:     I will have her undergo a course of physical therapy, myofascial release, as well as use of ultrasound and electrical stimulation, prescription was provided  Will schedule patient for a right occipital nerve block that would serve both diagnostic and hopefully therapeutic purposes  I reviewed the procedure in great detail the approach I would use for the occipital nerve block and provided literature for home review  Patient understands the risks associated the procedure provided verbal consent  She will be tentatively scheduled in the near future  We will follow-up after the occipital nerve block and physical therapy  If her pain persists then I would consider her pain being generated by the cervical facet joints would consider diagnostic medial branch blocks but will re-evaluate if needed  My impressions and treatment recommendations were discussed in detail with the patient who verbalized understanding and had no further questions  Discharge instructions were provided  I personally saw and examined the patient and I agree with the above discussed plan of care  This note is created using dictation transcription  It may contain typographical errors, grammatical errors, improperly dictated words, background noise and other errors      Orders Placed This Encounter   Procedures    Ambulatory referral to Physical Therapy     Standing Status:   Future     Standing Expiration Date:   7/26/2023     Referral Priority:   Routine     Referral Type:   Physical Therapy     Referral Reason:   Specialty Services Required     Requested Specialty: Physical Therapy     Number of Visits Requested:   1     Expiration Date:   7/26/2023       Referred By: Lili Frederick MD  History of Present Illness    Artur Mendez is a 68 y o  female with right-sided neck pain stiffness for a number of years as well as vertigo  She is headaches in the occipital region radiating into the frontal area which is moderate to severe  She rates as 8/10 on the visual analog scale interfering with daily living activities  It is constant denies any weakness of her upper lower limbs  Describes as pressure-like and sitting and relaxation aggravates her symptoms  She is difficulty reading  I have personally reviewed and/or updated the patient's past medical history, past surgical history, family history, social history, current medications, allergies, and vital signs today  Review of Systems   Constitutional: Negative for fever and unexpected weight change  HENT: Negative for trouble swallowing  Eyes: Negative for visual disturbance  Respiratory: Positive for shortness of breath  Negative for wheezing  Cardiovascular: Positive for leg swelling  Negative for chest pain and palpitations  Gastrointestinal: Negative for constipation, diarrhea, nausea and vomiting  Endocrine: Negative for cold intolerance, heat intolerance and polydipsia  Genitourinary: Negative for difficulty urinating and frequency  Musculoskeletal: Negative for arthralgias, gait problem, joint swelling and myalgias  Skin: Negative for rash  Neurological: Positive for headaches  Negative for dizziness, seizures, syncope and weakness  Hematological: Does not bruise/bleed easily  Psychiatric/Behavioral: Negative for dysphoric mood  All other systems reviewed and are negative        Patient Active Problem List   Diagnosis    Atrial fibrillation ([facility] )    Benign essential hypertension    Bilateral edema of lower extremity    BPPV (benign paroxysmal positional vertigo)    Cardiomyopathy ([facility] )    Cervical stenosis of spine    Edema    Homocysteinemia    Hypokalemia    Acquired hypothyroidism    Long QT interval    Lymphedema    History of right breast cancer    BMI 30 0-30 9,adult    Osteopenia    Overactive bladder    Peripheral neuropathy    Vitamin D deficiency    Bronchitis with bronchospasm    Candidal esophagitis (HCC)    Abnormal chest CT    Pulmonary nodule seen on imaging study    SOB (shortness of breath)    Weight loss    Medicare annual wellness visit, subsequent    Hyperglycemia    Mixed hyperlipidemia    Other idiopathic peripheral autonomic neuropathy    Persistent nodularity of breast    Current use of long term anticoagulation    RICHARDS (dyspnea on exertion)    Cortical age-related cataract of both eyes    Bradycardia    SSS (sick sinus syndrome) (HCC)    Cardiac pacemaker    History of ductal carcinoma in situ (DCIS) of breast    Platelets decreased (HCC)    Stage 3b chronic kidney disease (HCC)    Occipital neuralgia of right side    Cervical myofascial pain syndrome    Cervical spondylosis       Past Medical History:   Diagnosis Date    Abnormal chest CT     last assessed - [id number]    Abnormal glucose     Resolved - [id number]    Arrhythmia     Arthritis     Slight    BCC (basal cell carcinoma of skin)     last assessed - [id number]    BRCA gene mutation negative 09/30/2021    [employer]; 36 gene panel    Breast cancer ([facility] ) 09/12/2016    rt    Cancer ([facility] )     Cardiomyopathy ([facility] )     Cervical spinal stenosis     Discharge from left nipple     last assessed - [id number]    Disease of thyroid gland     hypothyroidism    Full dentures     Upper and Lower    History of atrial fibrillation     Homocysteinemia     Hypertension     Hypokalemia     Intraductal papilloma of breast     last assessed - [id number]    Limb alert care status     right upper ext    Malignant neoplasm of skin     basal cell on face, Moh's surgery  Memory loss     last assessed - [id number]    Obesity     Open wound     last assessed - [id number]    Osteopenia     Overactive bladder     Palmar plantar erythrodysaesthesia     Resolved - [id number]    Pulmonary nodule seen on imaging study     Rosacea     UTI (urinary tract infection)     recently    Vertigo     Vitamin D deficiency        Past Surgical History:   Procedure Laterality Date    APPENDECTOMY      ATRIAL ABLATION SURGERY      last assessed - [id number]   Tomie Coop BACK SURGERY  1997    Lumbar spinal stenosis    BREAST BIOPSY      2014? left breast; 9/12/16 right breast x 3    BREAST BIOPSY Left 03/30/2010    BREAST BIOPSY Right 09/12/2016    us bx- invasive br ca    BREAST SURGERY      needle bx   BREAST SURGERY Right 03/16/2017    excision of right chest wall lesion    CARDIAC ELECTROPHYSIOLOGY STUDY AND ABLATION      CARDIOVERSION      DENTAL SURGERY      HAND SURGERY      Joint replaced with silicone joint 5th finger right hand    HYSTERECTOMY  1993    Complete    LAMINECTOMY      Decompressive up to two lumbar segments    MASTECTOMY Right 10/28/2016    OOPHORECTOMY  1993    SD EXC SKIN MALIG <0 5 CM TRUNK,ARM,LEG Right 03/16/2017    Procedure: EXCISION CHEST WALL LESION; NEEDLE LOC @ 1400;  Surgeon: Kanchan Herman MD;  Location: [facility];  Service: Surgical Oncology    SD INTRAOPERATIVE SENTINEL LYMPH NODE ID W DYE INJECTION Right 10/28/2016    Procedure: BREAST MASTECTOMY WITH SENTINAL LYMPH NODE BIOPSY; Jane Dubose; LYMPHATIC MAPPING; 0800 NUC MED INJECTION;  Surgeon: Kanchan Herman MD;  Location: [facility];  Service: Surgical Oncology    SD MASTECTOMY, SIMPLE, COMPLETE Left 10/22/2021    Procedure: BREAST MASTECTOMY SIMPLE MARTHA  GUIDED;   Surgeon: Ana Zaragoza MD;  [facility];  Service: Surgical Oncology    SENTINEL LYMPH NODE BIOPSY Right     US BREAST NEEDLE LOC LEFT Left 10/19/2021    US BREAST NEEDLE LOC RIGHT Right 03/16/2017    US GUIDANCE BREAST BIOPSY LEFT EACH ADDITIONAL Left 09/21/2021    US GUIDANCE BREAST BIOPSY RIGHT EACH ADDITIONAL Right 09/12/2016    US GUIDANCE BREAST BIOPSY RIGHT EACH ADDITIONAL Right 09/12/2016    US GUIDED BREAST BIOPSY LEFT COMPLETE Left 09/21/2021    US GUIDED BREAST BIOPSY RIGHT COMPLETE Right 09/12/2016    US GUIDED BREAST BIOPSY RIGHT COMPLETE Right 10/1/2021       Family History   Problem Relation Age of Onset    Coronary artery disease Mother         CABG    Diabetes Mother     Heart attack Mother     Diabetes Sister     Parkinsonism Sister     Coronary artery disease Brother         CABG    Diabetes Brother     Heart attack Brother     Heart attack Son     Kidney cancer Son 48    No Known Problems Son     No Known Problems Son     Emphysema Sister     No Known Problems Sister     Heart attack Brother     No Known Problems Brother     No Known Problems Maternal Grandmother     No Known Problems Maternal Grandfather     No Known Problems Paternal Grandmother     No Known Problems Paternal Grandfather     No Known Problems Paternal Aunt     No Known Problems Paternal Aunt     No Known Problems Paternal Aunt     No Known Problems Paternal Aunt        Social History     Occupational History    Occupation: Retired   Tobacco Use    Smoking status: Never Smoker    Smokeless tobacco: Never Used   Vaping Use    Vaping Use: Never used   Substance and Sexual Activity    Alcohol use: No    Drug use: No    Sexual activity: Not Currently     Comment: Resides with        Current Outpatient Medications on File Prior to Visit   Medication Sig    cyanocobalamin (VITAMIN B-12) 500 mcg tablet Take 1 tablet by mouth daily    Eliquis 5 MG TAKE 1 TABLET BY MOUTH TWICE A DAY    flecainide (TAMBOCOR) 100 mg tablet TAKE 1 TABLET BY MOUTH TWICE A DAY    furosemide (LASIX) 40 mg tablet TAKE 1 TABLET (40 MG TOTAL) BY MOUTH DAILY AS DIRECTED    levothyroxine 150 mcg tablet TAKE 1 TABLET DAILY TAKE 1 TABLET 3 DAYS A WEEK AND 1/2 TABLET 4 DAYS A WEEK    meclizine (ANTIVERT) 25 mg tablet Take 1 tablet (25 mg total) by mouth every 8 (eight) hours as needed for dizziness    metoprolol succinate (TOPROL-XL) 25 mg 24 hr tablet Take 0 5 tablets (12 5 mg total) by mouth daily    potassium chloride (MICRO-K) 10 MEQ CR capsule TAKE 1 CAPSULE BY MOUTH EVERY DAY    RESTASIS 0 05 % ophthalmic emulsion Administer 1 drop to both eyes every 12 (twelve) hours     No current facility-administered medications on file prior to visit  Allergies   Allergen Reactions    Morphine And Related GI Intolerance       Physical Exam    /80 (BP Location: Left arm, Patient Position: Sitting, Cuff Size: Standard)   Pulse 86   Temp 98 7 °F (37 1 °C)   Ht 5' 2" (1 575 m)   Wt 74 8 kg (165 lb)   BMI 30 18 kg/m²     Constitutional: normal, well developed, well nourished, alert, in no distress and non-toxic and no overt pain behavior  Eyes: anicteric  HEENT: grossly intact  Neck: supple, symmetric, trachea midline and no masses   Pulmonary:even and unlabored  Cardiovascular:No edema or pitting edema present  Skin:Normal without rashes or lesions and well hydrated  Psychiatric:Mood and affect appropriate  Neurologic:Cranial Nerves II-XII grossly intact  Musculoskeletal:normal,   Inspection:  Normal station and gait  Normal cervical curves and head posture  Skin intact without erythema  No sensory loss  There is no atrophy  Palpation:  There is tenderness to palpation overlying the right cervical paraspinals, cervical facet joints  There is no tenderness over the upper trapezius muscles bilateral  No shoulder tenderness, positive tenderness along right occipital groove  Motor/Strength:  5/5 strength in the bilateral upper extremities  Reflexes:  equal and symmetric in the upper limbs  Sensation:   Sensation intact to light touch and pinprick in the upper limbs  Maneuvers:  Negative Spurling's maneuver    Negative Lhermitte's sign  Significant decrease her range of motion right rotation  Imaging  CERVICAL SPINE @  7-18-22       INDICATION:   M48 02: Spinal stenosis, cervical region      COMPARISON:  None     VIEWS:  XR SPINE CERVICAL COMPLETE 4 OR 5 VW NON INJURY         FINDINGS:     No fracture       Straightening of the cervical lordosis may be related to patient positioning or muscle spasm      Degenerative disc changes with loss of disc height at multiple levels  Anterior osteophytes predominantly at C6-C7     The neuroforamina are narrowed at C5-C6 on the right and C6-C7 on the left     The prevertebral soft tissues are within normal limits        The lung apices are clear      IMPRESSION:     Unremarkable cervical spine  I have personally reviewed pertinent films in PACS and my interpretation is Multilevel cervical spondylosis

## 2022-07-28 ENCOUNTER — TELEPHONE (OUTPATIENT)
Dept: HEMATOLOGY ONCOLOGY | Facility: CLINIC | Age: 77
End: 2022-07-28

## 2022-07-28 NOTE — TELEPHONE ENCOUNTER
07/28/22    LVM reminding pt for updated labs prior to the next appt  I will get CBC/ CMP/ CA27 29    Advising pt to get blood work done in any [facility] walk-in labs  Or pt can go to [facility] the get them done  [employer] number also provided

## 2022-08-01 ENCOUNTER — TELEPHONE (OUTPATIENT)
Dept: HEMATOLOGY ONCOLOGY | Facility: CLINIC | Age: 77
End: 2022-08-01

## 2022-08-01 NOTE — TELEPHONE ENCOUNTER
Appointment Cancellation Or Reschedule     Person calling in Patient    Provider Dr Ashely Hubbard   Office Visit Date and Time  08/01 @11AM   Office Visit Location [facility]   Did patient want to reschedule their office appointment? If so, when was it scheduled to? No, will call back when she is feeling better   Is this patient calling to reschedule an infusion appointment? No   When is their next infusion appointment? N/A   Is this patient a Chemo patient? No   Reason for Cancellation or Reschedule Patient is not feeling well     If the patient is a treatment patient, please route this to the office nurse  If the patient is not on treatment, please route to the office MA  If the patient is a surgical oncology patient, please route to surg/onc clinical pool

## 2022-08-03 ENCOUNTER — TELEPHONE (OUTPATIENT)
Dept: PAIN MEDICINE | Facility: CLINIC | Age: 77
End: 2022-08-03

## 2022-08-03 NOTE — TELEPHONE ENCOUNTER
LVM for Pt to reschedule ONB procedure with Dr Romana Bayard appt was cancelled due to being  scheduled with wrong provider - only Dr Levy Home does ONB procedures    Patients having ONB procedures require a  to and from office per Dr Jericho Johns    No authorization is required for this procedure per Omero Carroll

## 2022-08-11 PROBLEM — N63.0 PERSISTENT NODULARITY OF BREAST: Status: RESOLVED | Noted: 2019-04-04 | Resolved: 2022-08-11

## 2022-08-11 PROBLEM — Z00.00 MEDICARE ANNUAL WELLNESS VISIT, SUBSEQUENT: Status: RESOLVED | Noted: 2019-01-16 | Resolved: 2022-08-11

## 2022-08-15 ENCOUNTER — OFFICE VISIT (OUTPATIENT)
Dept: SURGICAL ONCOLOGY | Facility: CLINIC | Age: 77
End: 2022-08-15
Payer: COMMERCIAL

## 2022-08-15 VITALS
TEMPERATURE: 97.3 F | WEIGHT: 165 LBS | SYSTOLIC BLOOD PRESSURE: 136 MMHG | DIASTOLIC BLOOD PRESSURE: 80 MMHG | OXYGEN SATURATION: 98 % | HEIGHT: 62 IN | BODY MASS INDEX: 30.36 KG/M2 | RESPIRATION RATE: 16 BRPM | HEART RATE: 101 BPM

## 2022-08-15 DIAGNOSIS — Z85.3 ENCOUNTER FOR FOLLOW-UP SURVEILLANCE OF BREAST CANCER: ICD-10-CM

## 2022-08-15 DIAGNOSIS — Z08 ENCOUNTER FOR FOLLOW-UP SURVEILLANCE OF BREAST CANCER: ICD-10-CM

## 2022-08-15 DIAGNOSIS — Z85.3 HISTORY OF RIGHT BREAST CANCER: Primary | ICD-10-CM

## 2022-08-15 DIAGNOSIS — Z86.000 HISTORY OF DUCTAL CARCINOMA IN SITU (DCIS) OF BREAST: ICD-10-CM

## 2022-08-15 PROCEDURE — 99213 OFFICE O/P EST LOW 20 MIN: CPT | Performed by: SURGERY

## 2022-08-15 NOTE — PROGRESS NOTES
Surgical Oncology Follow Up       [address] [facility]  [facility] [place]  [address] [place] [address]    Ella Darby  1945  [id number]  [address] [facility]  [facility] [place]  [address] [facility] [place] [address]    No chief complaint on file  Assessment & Plan:   Patient presents for six-month follow-up visit  She has no complaints referable to her mastectomy site  She did have a biopsy of the right nodule which showed fat necrosis  This is unchanged  Clinical exam shows no evidence of local regional or distant recurrence disease on either side  We will see her back in 6 months  She is agreeable to see our nurse practitioner at that time    Cancer History:     Oncology History   History of right breast cancer   9/12/2016 Biopsy    Right breast 3 site biopsy  Invasive breast carcinoma  Grade 2  ER 0, MD 0, HER2      10/28/2016 Surgery    Right breast mastectomy with SLN biopsy (Dr Nini Salinas)  Invasive mammary carcinoma of no special type (ductal)  Grade 3  2 8 cm  Margins negative  0/9 Lymph nodes  Stage IIA     12/7/2016 - 2/1/2017 Chemotherapy    Taxotere/Cytoxan  Scheduled for every 3 weeks x 4 cycles  Completed 3 cycles under the guidance of Dr Ruben Gonzalez  3/16/2017 Surgery    Right chest wall lesion excision (Dr Nini Salinas)  Residual microscopic focus of invasive carcinoma  4 mm  Margins negative     History of ductal carcinoma in situ (DCIS) of breast   9/21/2021 Biopsy    Left breast ultrasound-guided biopsy  A  3 o'clock, 6 cm from nipple  Intraductal papilloma  Focal stromal changes suggestive of PASH    B  2 o'clock, 5 cm from nipple  Ductal carcinoma in situ  Grade 1        Concordant  Carcinoma measured up to 6mm on US  Left axilla US negative  If the papilloma is not excised at surgery, a 6 MO F/U US is recommended       9/30/2021 Genetic Testing    A total of 36 genes were evaluated, including: FELIPE, BRCA1, BRCA2, CDH1, CHEK2, PALB2, PTEN, STK11, TP53  Negative result  No pathogenic sequence variants or deletions/dupllications identified  [employer]     10/22/2021 Surgery    Left breast simple mastectomy  Ductal carcinoma in situ  Grade 2  Size cannot be determined (multifocal)  Margins negative  0/1 Intramammary lymph node  Stage 0           Interval History:   Patient is free of any evidence of local regional distant recurrence disease with the exception of her right nodule which is been biopsied and shown to be fat necrosis  Review of Systems:   Review of Systems   All other systems reviewed and are negative        Past Medical History     Patient Active Problem List   Diagnosis    Atrial fibrillation (HCC)    Benign essential hypertension    Bilateral edema of lower extremity    BPPV (benign paroxysmal positional vertigo)    Cardiomyopathy ([address] )    Cervical stenosis of spine    Edema    Homocysteinemia    Hypokalemia    Acquired hypothyroidism    Long QT interval    Lymphedema    History of right breast cancer    BMI 30 0-30 9,adult    Osteopenia    Overactive bladder    Peripheral neuropathy    Vitamin D deficiency    Bronchitis with bronchospasm    Candidal esophagitis (HCC)    Abnormal chest CT    Pulmonary nodule seen on imaging study    SOB (shortness of breath)    Weight loss    Hyperglycemia    Mixed hyperlipidemia    Other idiopathic peripheral autonomic neuropathy    Current use of long term anticoagulation    RICHARDS (dyspnea on exertion)    Cortical age-related cataract of both eyes    Bradycardia    SSS (sick sinus syndrome) (HCC)    Cardiac pacemaker    History of ductal carcinoma in situ (DCIS) of breast    Platelets decreased (HCC)    Stage 3b chronic kidney disease (HCC)    Occipital neuralgia of right side    Cervical myofascial pain syndrome    Cervical spondylosis     Past Medical History:   Diagnosis Date    Abnormal chest CT     last assessed - [id number]    Abnormal glucose     Resolved - [id number]    Arrhythmia     Arthritis     Slight    BCC (basal cell carcinoma of skin)     last assessed - [id number]    BRCA gene mutation negative 09/30/2021    [employer]; 36 gene panel    Breast cancer ([facility] [address] ) 09/12/2016    rt    Cancer ([facility] [address] )     Cardiomyopathy ([facility] [address] )     Cervical spinal stenosis     Discharge from left nipple     last assessed - [id number]    Disease of thyroid gland     hypothyroidism    Full dentures     Upper and Lower    History of atrial fibrillation     Homocysteinemia     Hypertension     Hypokalemia     Intraductal papilloma of breast     last assessed - [id number]    Limb alert care status     right upper ext    Malignant neoplasm of skin     basal cell on face, Moh's surgery    Memory loss     last assessed - [id number]    Obesity     Open wound     last assessed - [id number]    Osteopenia     Overactive bladder     Palmar plantar erythrodysaesthesia     Resolved - [id number]    Pulmonary nodule seen on imaging study     Rosacea     UTI (urinary tract infection)     recently    Vertigo     Vitamin D deficiency      Past Surgical History:   Procedure Laterality Date    APPENDECTOMY      ATRIAL ABLATION SURGERY      last assessed - [id number]   [facility] BACK SURGERY  1997    Lumbar spinal stenosis    BREAST BIOPSY      2014? left breast; 9/12/16 right breast x 3    BREAST BIOPSY Left 03/30/2010    BREAST BIOPSY Right 09/12/2016    us bx- invasive br ca    BREAST SURGERY      needle bx      BREAST SURGERY Right 03/16/2017    excision of right chest wall lesion    CARDIAC ELECTROPHYSIOLOGY STUDY AND ABLATION      CARDIOVERSION      DENTAL SURGERY      HAND SURGERY      Joint replaced with silicone joint 5th finger right hand    HYSTERECTOMY  1993    Complete    LAMINECTOMY      Decompressive up to two lumbar segments    MASTECTOMY Right 10/28/2016    OOPHORECTOMY  1993    MS EXC SKIN MALIG <0 5 CM TRUNK,ARM,LEG Right 03/16/2017    Procedure: EXCISION CHEST WALL LESION; NEEDLE LOC @ 1400;  Surgeon: Ela Duran MD;  Location: [facility];  Service: Surgical Oncology    SD INTRAOPERATIVE SENTINEL LYMPH NODE ID W DYE INJECTION Right 10/28/2016    Procedure: BREAST MASTECTOMY WITH SENTINAL LYMPH NODE BIOPSY; Dahiana Welsh; LYMPHATIC MAPPING; 0800 NUC MED INJECTION;  Surgeon: Ela Duran MD;  Location: [facility];  Service: Surgical Oncology    SD MASTECTOMY, SIMPLE, COMPLETE Left 10/22/2021    Procedure: BREAST MASTECTOMY SIMPLE MARTHA  GUIDED;   Surgeon: Cullen Kimble MD;  [facility];  Service: Surgical Oncology    SENTINEL LYMPH NODE BIOPSY Right     US BREAST NEEDLE LOC LEFT Left 10/19/2021    US BREAST NEEDLE LOC RIGHT Right 03/16/2017    US GUIDANCE BREAST BIOPSY LEFT EACH ADDITIONAL Left 09/21/2021    US GUIDANCE BREAST BIOPSY RIGHT EACH ADDITIONAL Right 09/12/2016    US GUIDANCE BREAST BIOPSY RIGHT EACH ADDITIONAL Right 09/12/2016    US GUIDED BREAST BIOPSY LEFT COMPLETE Left 09/21/2021    US GUIDED BREAST BIOPSY RIGHT COMPLETE Right 09/12/2016    US GUIDED BREAST BIOPSY RIGHT COMPLETE Right 10/1/2021     Family History   Problem Relation Age of Onset    Coronary artery disease Mother         CABG    Diabetes Mother     Heart attack Mother     Diabetes Sister     Parkinsonism Sister     Coronary artery disease Brother         CABG    Diabetes Brother     Heart attack Brother     Heart attack Son     Kidney cancer Son 48    No Known Problems Son     No Known Problems Son     Emphysema Sister     No Known Problems Sister     Heart attack Brother     No Known Problems Brother     No Known Problems Maternal Grandmother     No Known Problems Maternal Grandfather     No Known Problems Paternal Grandmother     No Known Problems Paternal Grandfather     No Known Problems Paternal Aunt     No Known Problems Paternal Aunt     No Known Problems Paternal Aunt     No Known Problems Paternal Aunt      Social History     Socioeconomic History    Marital status: /Civil Union     Spouse name: Not on file    Number of children: Not on file    Years of education: Not on file    Highest education level: Not on file   Occupational History    Occupation: Retired   Tobacco Use    Smoking status: Never Smoker    Smokeless tobacco: Never Used   Vaping Use    Vaping Use: Never used   Substance and Sexual Activity    Alcohol use: No    Drug use: No    Sexual activity: Not Currently     Comment: Resides with    Other Topics Concern    Not on file   Social History Narrative    Always uses seat belt    Convalescence after chemotherapy     Social Determinants of Health     Financial Resource Strain: Not on file   Food Insecurity: Not on file   Transportation Needs: Not on file   Physical Activity: Not on file   Stress: Not on file   Social Connections: Not on file   Intimate Partner Violence: Not on file   Housing Stability: Not on file       Current Outpatient Medications:     cyanocobalamin (VITAMIN B-12) 500 mcg tablet, Take 1 tablet by mouth daily, Disp: , Rfl:     Eliquis 5 MG, TAKE 1 TABLET BY MOUTH TWICE A DAY, Disp: 60 tablet, Rfl: 6    flecainide (TAMBOCOR) 100 mg tablet, TAKE 1 TABLET BY MOUTH TWICE A DAY, Disp: 60 tablet, Rfl: 0    furosemide (LASIX) 40 mg tablet, TAKE 1 TABLET (40 MG TOTAL) BY MOUTH DAILY AS DIRECTED, Disp: 90 tablet, Rfl: 1    levothyroxine 150 mcg tablet, TAKE 1 TABLET DAILY TAKE 1 TABLET 3 DAYS A WEEK AND 1/2 TABLET 4 DAYS A WEEK, Disp: 90 tablet, Rfl: 0    meclizine (ANTIVERT) 25 mg tablet, Take 1 tablet (25 mg total) by mouth every 8 (eight) hours as needed for dizziness, Disp: 30 tablet, Rfl: 3    metoprolol succinate (TOPROL-XL) 25 mg 24 hr tablet, Take 0 5 tablets (12 5 mg total) by mouth daily, Disp: 45 tablet, Rfl: 2    potassium chloride (MICRO-K) 10 MEQ CR capsule, TAKE 1 CAPSULE BY MOUTH EVERY DAY, Disp: 90 capsule, Rfl: 1    RESTASIS 0 05 % ophthalmic emulsion, Administer 1 drop to both eyes every 12 (twelve) hours, Disp: , Rfl:   Allergies   Allergen Reactions    Morphine And Related GI Intolerance       Physical Exam:     Vitals:    08/15/22 1012   BP: 136/80   Pulse: 101   Resp: 16   Temp: (!) 97 3 °F (36 3 °C)   SpO2: 98%     Physical Exam  Chest:          Comments: Examination demonstrates a area of biopsy-proven fat necrosis as outlined by the Elim IRA on the diagram above  Otherwise there are no worrisome findings on her skin  There is no axillary adenopathy on either side  Results & Discussion:   The patient is free of any evidence of local regional distant recurrence disease  We will see her back in 6 months  All questions were answered the patient's satisfaction  Advance Care Planning/Advance Directives:  I discussed the disease status, treatment plans and follow-up with the patient

## 2022-08-28 DIAGNOSIS — R60.9 EDEMA, UNSPECIFIED TYPE: ICD-10-CM

## 2022-08-28 DIAGNOSIS — E87.6 HYPOKALEMIA: ICD-10-CM

## 2022-08-29 RX ORDER — FUROSEMIDE 40 MG/1
40 TABLET ORAL DAILY
Qty: 90 TABLET | Refills: 1 | Status: SHIPPED | OUTPATIENT
Start: 2022-08-29

## 2022-08-29 RX ORDER — POTASSIUM CHLORIDE 750 MG/1
CAPSULE, EXTENDED RELEASE ORAL
Qty: 90 CAPSULE | Refills: 1 | Status: SHIPPED | OUTPATIENT
Start: 2022-08-29

## 2022-08-30 NOTE — TELEPHONE ENCOUNTER
S/w pt, stated that she is not interested in ONB and she has not gone for PT  Offered pt an appt to come in and discuss further / discuss medication options  Pt stated that she would prefer not to move forward at this time  Advised pt to cb if questions / issues arise  Pt verbalized understanding and appreciation

## 2022-09-01 ENCOUNTER — REMOTE DEVICE CLINIC VISIT (OUTPATIENT)
Dept: CARDIOLOGY CLINIC | Facility: CLINIC | Age: 77
End: 2022-09-01
Payer: COMMERCIAL

## 2022-09-01 ENCOUNTER — TELEPHONE (OUTPATIENT)
Dept: CARDIOLOGY CLINIC | Facility: CLINIC | Age: 77
End: 2022-09-01

## 2022-09-01 DIAGNOSIS — Z95.0 PRESENCE OF CARDIAC PACEMAKER: Primary | ICD-10-CM

## 2022-09-01 DIAGNOSIS — I48.91 ATRIAL FIBRILLATION, UNSPECIFIED TYPE (HCC): ICD-10-CM

## 2022-09-01 PROCEDURE — 93296 REM INTERROG EVL PM/IDS: CPT | Performed by: INTERNAL MEDICINE

## 2022-09-01 PROCEDURE — 93294 REM INTERROG EVL PM/LDLS PM: CPT | Performed by: INTERNAL MEDICINE

## 2022-09-01 RX ORDER — FLECAINIDE ACETATE 100 MG/1
TABLET ORAL
Qty: 180 TABLET | Refills: 1 | Status: SHIPPED | OUTPATIENT
Start: 2022-09-01

## 2022-09-01 NOTE — PROGRESS NOTES
Results for orders placed or performed in visit on 09/01/22   Cardiac EP device report    Narrative    MDT DUAL PM/ ACTIVE SYSTEM IS MRI CONDITIONAL  CARELINK TRANSMISSION: BATTERY VOLTAGE ADEQUATE (12 1 YRS)  AP: 90 8%  : 1 8% (MVP-ON)  ALL AVAILABLE LEAD PARAMETERS WITHIN NORMAL LIMITS  1 VT EPISODE W/ EGM SHOWING SVT-ST W/ HR @ 154 BPM (NO TERMINATION ON STRIP)  445 AT/AF EPISODES W/ AVAIL EGM SHOWING AF, MAX DURATION 4 HRS 39 MINS  AF BURDEN: 4 4%  PT TAKES TAMBOCOR, METOPROLOL SUCC, ELIQUIS  EF: 65% (ECHO 6/12/20)  PACEMAKER FUNCTIONING APPROPRIATELY    [facility] AND [facility]

## 2022-09-02 DIAGNOSIS — H81.10 BENIGN PAROXYSMAL POSITIONAL VERTIGO, UNSPECIFIED LATERALITY: ICD-10-CM

## 2022-09-02 RX ORDER — MECLIZINE HYDROCHLORIDE 25 MG/1
25 TABLET ORAL EVERY 8 HOURS PRN
Qty: 60 TABLET | Refills: 2 | Status: SHIPPED | OUTPATIENT
Start: 2022-09-02

## 2022-10-07 ENCOUNTER — OFFICE VISIT (OUTPATIENT)
Dept: CARDIOLOGY CLINIC | Facility: CLINIC | Age: 77
End: 2022-10-07
Payer: COMMERCIAL

## 2022-10-07 VITALS
WEIGHT: 165 LBS | SYSTOLIC BLOOD PRESSURE: 136 MMHG | BODY MASS INDEX: 30.36 KG/M2 | HEIGHT: 62 IN | DIASTOLIC BLOOD PRESSURE: 70 MMHG | HEART RATE: 93 BPM

## 2022-10-07 DIAGNOSIS — I47.1 ATRIAL TACHYCARDIA (HCC): ICD-10-CM

## 2022-10-07 DIAGNOSIS — I48.19 PERSISTENT ATRIAL FIBRILLATION (HCC): Primary | ICD-10-CM

## 2022-10-07 PROCEDURE — 99215 OFFICE O/P EST HI 40 MIN: CPT | Performed by: INTERNAL MEDICINE

## 2022-10-07 PROCEDURE — 93000 ELECTROCARDIOGRAM COMPLETE: CPT | Performed by: INTERNAL MEDICINE

## 2022-10-07 RX ORDER — DILTIAZEM HYDROCHLORIDE 120 MG/1
120 CAPSULE, EXTENDED RELEASE ORAL DAILY
Qty: 30 CAPSULE | Refills: 3 | Status: SHIPPED | OUTPATIENT
Start: 2022-10-07

## 2022-10-07 NOTE — PROGRESS NOTES
Electrophysiology Regular Follow-up Visit    Interval history:     Zaire Tanner continues to feel fatigued and has swelling lower extremities  She does note rapid heart rate when she is laying down in bed at night  She notes stable blood pressure at home  She reports compliance with higher dose of flecainide and metoprolol  She otherwise denies any chest pain, orthopnea, PND or syncope  Office visit 5/13/2021:    Dmitry Connors presented for a follow-up  She continued to have symptoms of fatigue and dyspnea with minimal exertion  She reported compliance with her medications  She noted dizziness when standing up and which has led to falling at times  She had not checked her BP at home as she did not have any cuff  She denied any chest pain, orthopnea, PND or syncope  Office visit 12/11/2020:    Zaire Tanner presented for a follow-up visit  Her CT scan showed normal lung parenchyma  She continued to have dyspnea on exertion to walking 100 ft  She reported her heart rate has been low most of the time  She reported fatigue as well  Her symptoms were present prior to flecainide and metoprolol dose but has worsened after being on flecainide  We noted her heart rate did not increase with exertion and she felt dyspneic with walking  We discussed pacemaker but patient preferred to wait after COVID vaccine  Office visit 6/15/2020:  Zaire Tanner had an echocardiogram that showed preserved RV and LV function  She continued to have fatigue mainly occurring with walking  She was planning to have CT scan of her chest which has been ordered by her oncologist   She also reported swelling in lower extremities  She limits drinking fluids as much as she can  She does urinate well that when she takes furosemide  Telemedicine visit 04/16/2020  Dmitry Connors is continued to have fatigue  She reported progressive worsening dyspnea on exertion to walking 50 ft now   She reported gaining weight about 40 lbs since she last took chemotherapy in 2019  She had a MCOT monitor which showed no atrial fibrillation but short episode of AT  She also had stress test in April 2019 showing no ischemia  She recorded no PND, or orthopnea  She reports compliance with taking furosemide 40 mg daily  When she had lost 40 lbs during her chemotherapy for her breast cancer, she felt improvement in her fatigue and dyspnea on exertion  She stated last dose was in Feb 2019 and since then she has gained 40 lbs weight mostly diet related  Office visit 12/13/2019:     Patient reports doing better since her last visit with Dr Iain Cerrato  She states her palpitations have discontinued however she does experience occasional shortness of breath with exertion  She did admit experiencing vertigo and LE edema (right more then left) which have been chronic for the past several years however she continues to take Lasix and elevate her legs  We decided to monitor for her atrial arrhythmias with a loop recorder but it was denied by the insurance  MCOT monitor was ordered instead  HPI:   It was a pleasure to see Artur Mendez in our arrhythmia clinic at [place] [address]  As you know she is a 76 y  o   woman with history of lower extremity edema, long QT (460 millisecond), hypothyroidism, obesity, history of breast cancer status post surgery and chemotherapy, atrial fibrillation status post ablation with Dr Iain Cerrato 3 years ago now with few paroxysmal episodes  She was last seen in the office with Dr Iain Cerrato on September 18 2019  She was started on Eliquis, metoprolol succinate and flecainide after event monitor showed 2 hour of atrial fibrillation      Past Medical History:   Diagnosis Date    Abnormal chest CT     last assessed - [id number]    Abnormal glucose     Resolved - [id number]    Arrhythmia     Arthritis     Slight    BCC (basal cell carcinoma of skin)     last assessed - [id number]    BRCA gene mutation negative 09/30/2021    [employer]; 36 gene panel    Breast cancer ([facility] ) 09/12/2016    rt    Cancer ([facility] )     Cardiomyopathy ([facility] )     Cervical spinal stenosis     Discharge from left nipple     last assessed - [id number]    Disease of thyroid gland     hypothyroidism    Full dentures     Upper and Lower    History of atrial fibrillation     Homocysteinemia     Hypertension     Hypokalemia     Intraductal papilloma of breast     last assessed - [id number]    Limb alert care status     right upper ext    Malignant neoplasm of skin     basal cell on face, Moh's surgery    Memory loss     last assessed - [id number]    Obesity     Open wound     last assessed - [id number]    Osteopenia     Overactive bladder     Palmar plantar erythrodysaesthesia     Resolved - [id number]    Pulmonary nodule seen on imaging study     Rosacea     UTI (urinary tract infection)     recently    Vertigo     Vitamin D deficiency        Past Surgical History:   Procedure Laterality Date    APPENDECTOMY      ATRIAL ABLATION SURGERY      last assessed - [id number]   Thom Abt BACK SURGERY  1997    Lumbar spinal stenosis    BREAST BIOPSY      2014? left breast; 9/12/16 right breast x 3    BREAST BIOPSY Left 03/30/2010    BREAST BIOPSY Right 09/12/2016    us bx- invasive br ca    BREAST SURGERY      needle bx      BREAST SURGERY Right 03/16/2017    excision of right chest wall lesion    CARDIAC ELECTROPHYSIOLOGY STUDY AND ABLATION      CARDIOVERSION      DENTAL SURGERY      HAND SURGERY      Joint replaced with silicone joint 5th finger right hand    HYSTERECTOMY  1993    Complete    LAMINECTOMY      Decompressive up to two lumbar segments    MASTECTOMY Right 10/28/2016    OOPHORECTOMY  1993    MO EXC SKIN MALIG <0 5 CM TRUNK,ARM,LEG Right 03/16/2017    Procedure: EXCISION CHEST WALL LESION; NEEDLE LOC @ 1400;  Surgeon: Alvin Rice MD;  Location:  MAIN OR;  Service: Surgical Oncology    MO INTRAOPERATIVE SENTINEL LYMPH NODE ID W DYE INJECTION Right 10/28/2016    Procedure: BREAST MASTECTOMY WITH SENTINAL LYMPH NODE BIOPSY; LYMPHSCINITIGRAPHY; LYMPHATIC MAPPING; 0800 NUC MED INJECTION;  Surgeon: Meli Matt MD;  Location: [facility];  Service: Surgical Oncology    ND MASTECTOMY, SIMPLE, COMPLETE Left 10/22/2021    Procedure: BREAST MASTECTOMY SIMPLE MARTHA  GUIDED;   Surgeon: Wiliam Valero MD;  Location: [facility];  Service: Surgical Oncology    SENTINEL LYMPH NODE BIOPSY Right     US BREAST NEEDLE LOC LEFT Left 10/19/2021    US BREAST NEEDLE LOC RIGHT Right 03/16/2017    US GUIDANCE BREAST BIOPSY LEFT EACH ADDITIONAL Left 09/21/2021    US GUIDANCE BREAST BIOPSY RIGHT EACH ADDITIONAL Right 09/12/2016    US GUIDANCE BREAST BIOPSY RIGHT EACH ADDITIONAL Right 09/12/2016    US GUIDED BREAST BIOPSY LEFT COMPLETE Left 09/21/2021    US GUIDED BREAST BIOPSY RIGHT COMPLETE Right 09/12/2016    US GUIDED BREAST BIOPSY RIGHT COMPLETE Right 10/1/2021       Current Outpatient Medications   Medication Sig Dispense Refill    cyanocobalamin (VITAMIN B-12) 500 mcg tablet Take 1 tablet by mouth daily      Eliquis 5 MG TAKE 1 TABLET BY MOUTH TWICE A DAY 60 tablet 6    flecainide (TAMBOCOR) 100 mg tablet TAKE 1 TABLET BY MOUTH TWICE A  tablet 1    furosemide (LASIX) 40 mg tablet TAKE 1 TABLET (40 MG TOTAL) BY MOUTH DAILY AS DIRECTED 90 tablet 1    levothyroxine 150 mcg tablet TAKE 1 TABLET DAILY TAKE 1 TABLET 3 DAYS A WEEK AND 1/2 TABLET 4 DAYS A WEEK 90 tablet 0    meclizine (ANTIVERT) 25 mg tablet Take 1 tablet (25 mg total) by mouth every 8 (eight) hours as needed for dizziness 60 tablet 2    metoprolol succinate (TOPROL-XL) 25 mg 24 hr tablet Take 0 5 tablets (12 5 mg total) by mouth daily 45 tablet 2    potassium chloride (MICRO-K) 10 MEQ CR capsule TAKE 1 CAPSULE BY MOUTH EVERY DAY 90 capsule 1    RESTASIS 0 05 % ophthalmic emulsion Administer 1 drop to both eyes every 12 (twelve) hours       No current facility-administered medications for this visit  Allergies   Allergen Reactions    Morphine And Related GI Intolerance       Review of Systems   Constitutional: Positive for fatigue  Negative for chills and fever  Eyes: Negative for discharge and visual disturbance  Respiratory: Positive for shortness of breath  Negative for wheezing  Cardiovascular: Positive for leg swelling  Gastrointestinal: Negative for abdominal pain, nausea and vomiting  Endocrine: Negative  Genitourinary: Negative  Musculoskeletal: Negative  Negative for arthralgias  Skin: Negative  Negative for rash  Allergic/Immunologic: Negative  Neurological: Positive for dizziness  Falls due to dizziness   Psychiatric/Behavioral: Negative  The patient is not nervous/anxious  Video Exam    Vitals:    10/07/22 1042   Weight: 74 8 kg (165 lb)   Height: 5' 2" (1 575 m)      Baseline weight 175-180 lbs    Objective:   Ht 5' 2" (1 575 m)   Wt 74 8 kg (165 lb)   BMI 30 18 kg/m²      Orthostatic + during visit  Physical Exam:  GEN: NAD, Alert and oriented, well appearing  HEENT:Head, neck, ears, oral pharynx: Mucus membranes moist, oral pharynx clear, nares clear  External ears normal  EYES: Pupils equal, sclera anicteric  NECK: No JVD  CARDIOVASCULAR: Regular, bradycardic, No murmur, rub, gallops S1,S2  LUNGS: Clear To auscultation bilaterally  ABDOMEN: Soft, nondistended  EXTREMITIES/VASCULAR: No edema  PSYCH: Normal Affect  NEURO: Grossly intact, moving all extremiteis equal, face symetric  HEME: No bleeding, bruising, petechia  SKIN: No significant rashes     With walking, HR stayed in 50s, and O2 decreased to 90  Labs & Results:  Below is the patient's most recent value for Albumin, ALT, AST, BUN, Calcium, Chloride, Cholesterol, CO2, Creatinine, GFR, Glucose, HDL, Hematocrit, Hemoglobin, Hemoglobin A1C, LDL, Magnesium, Phosphorus, Platelets, Potassium, PSA, Sodium, Triglycerides, and WBC     Lab Results   Component Value Date    ALT 8 05/18/2022 AST 23 2022    BUN 13 2022    CALCIUM 8 4 10/14/2021     2022    CHOL 110 2017    CO2 23 2022    CREATININE 1 30 (H) 2022    HDL 46 2022    HCT 37 1 2022    HGB 11 9 2022    HGBA1C 5 8 (H) 2022    MG 2 2 2019     2022    K 4 6 2022     2017     2017    TRIG 65 2022    WBC 4 8 2022     Note: for a comprehensive list of the patient's lab results, access the Results Review activity  Cardiac testing:   ECG performed in the office reveals sinus bradycardia at 53 beats per minute    MCOT results 3/5 to 4/3/2020   40 events were transmitted  0 symptomatic; 40 device triggered   Patient monitored for 26d 14h 2m   SVT occurred 4 time(s) with fastest run 147 BPM, longest duration 7 beats    128,178 PACs with PAC burden of 6%   169 PVCs with PVC burden of <1%    Echocardiograms:  Results for orders placed during the hospital encounter of 19   Echo complete with contrast if indicated    Narrative [address] [place] [address]    Transthoracic Echocardiogram  2D, M-mode, Doppler, and Color Doppler    Study date:  2019    Patient: Vannessa Chau  MR number: [id number]  Account number: [de-identified]  : 1945  Age: 68 years  Gender: Female  Status: Outpatient  Location: [address] [place] [address] [facility] [place]  Height: 62 in  Weight: 178 6 lb  BP: 138/ 74 mmHg    Indications: Shortness of breath, lower exremity edema, PAF  Diagnoses: I48 0 - Atrial fibrillation, R06 02 - Shortness of breath    Sonographer:  CARLOS Medel [facility] RVT  Primary Physician:  Verenice Wagoner MD  Referring Physician:  SHADI Wright  Group:  [facility]  Interpreting Physician:  Jackson Mccracken MD    SUMMARY    LEFT VENTRICLE:  Systolic function was normal  Ejection fraction was estimated to be 65 %    There were no regional wall motion abnormalities  Doppler parameters were consistent with abnormal left ventricular relaxation (grade 1 diastolic dysfunction)  LEFT ATRIUM:  The atrium was mildly dilated  MITRAL VALVE:  There was mild annular calcification  There was mild regurgitation  AORTIC VALVE:  There was mild regurgitation  TRICUSPID VALVE:  There was mild to moderate regurgitation  HISTORY: PRIOR HISTORY: Right breast cancer, right mastectomy  PRIOR PROCEDURES: Arrhythmia ablation  PROCEDURE: The study was performed in the [facility]  This was a routine study  The transthoracic approach was used  The study included complete 2D imaging, M-mode, complete spectral Doppler, and color Doppler  Images were obtained from the parasternal, apical, subcostal, and suprasternal notch acoustic windows  Image quality was adequate  LEFT VENTRICLE: Size was normal  Systolic function was normal  Ejection fraction was estimated to be 65 %  There were no regional wall motion abnormalities  Wall thickness was normal  DOPPLER: Doppler parameters were consistent with  abnormal left ventricular relaxation (grade 1 diastolic dysfunction)  RIGHT VENTRICLE: The size was normal  Systolic function was normal  Wall thickness was normal     LEFT ATRIUM: The atrium was mildly dilated  RIGHT ATRIUM: Size was normal     MITRAL VALVE: There was mild annular calcification  Valve structure was normal  There was normal leaflet separation  DOPPLER: The transmitral velocity was within the normal range  There was no evidence for stenosis  There was mild  regurgitation  AORTIC VALVE: The valve was trileaflet  Leaflets exhibited normal thickness, normal cuspal separation, and sclerosis  DOPPLER: Transaortic velocity was within the normal range  There was no evidence for stenosis  There was mild  regurgitation  TRICUSPID VALVE: The valve structure was normal  There was normal leaflet separation  DOPPLER: The transtricuspid velocity was within the normal range  There was no evidence for stenosis  There was mild to moderate regurgitation  Pulmonary  artery systolic pressure was mildly increased  PULMONIC VALVE: Leaflets exhibited normal thickness, no calcification, and normal cuspal separation  DOPPLER: The transpulmonic velocity was within the normal range  There was no significant regurgitation  PERICARDIUM: There was no pericardial effusion  The pericardium was normal in appearance  AORTA: The root exhibited normal size  SYSTEMIC VEINS: IVC: The inferior vena cava was normal in size  PULMONARY VEINS: DOPPLER: There was systolic blunting in the pulmonary vein(s)      SYSTEM MEASUREMENT TABLES    2D  %FS: 31 66 %  Ao Diam: 2 23 cm  EDV(Teich): 105 22 ml  EF(Teich): 59 57 %  ESV(Cube): 34 34 ml  ESV(Teich): 42 54 ml  IVSd: 0 81 cm  LA Area: 21 18 cm2  LA Diam: 3 68 cm  LVEDV MOD A4C: 79 17 ml  LVEF MOD A4C: 66 73 %  LVESV MOD A4C: 26 34 ml  LVIDd: 4 76 cm  LVIDs: 3 25 cm  LVLd A4C: 6 9 cm  LVLs A4C: 5 58 cm  LVPWd: 0 76 cm  RA Area: 13 38 cm2  RV Diam : 3 39 cm  SI(Cube): 40 02 ml/m2  SI(Teich): 34 25 ml/m2  SV MOD A4C: 52 83 ml  SV(Cube): 73 23 ml  SV(Teich): 62 68 ml    CW  AR Dec [place]: 2 07 m/s2  AR Dec Time: 2636 88 ms  AR PHT: 764 69 ms  AR Vmax: 4 56 m/s  AR maxP 02 mmHg  TR Vmax: 3 1 m/s  TR maxP 41 mmHg    MM  TAPSE: 2 23 cm    PW  E': 0 06 m/s  E/E': 15 62  MV A Obed: 0 32 m/s  MV Dec Red [place]: 2 53 m/s2  MV DecT: 397 07 ms  MV E Obed: 1 m/s  MV E/A Ratio: 3 16    [employer] Accredited Echocardiography Laboratory    Prepared and electronically signed by    Jennifer Cordova MD  Signed 2019 12:44:18       Results for orders placed during the hospital encounter of 16   YANIV    Narrative [address] [place] [address] [phone]    Transesophageal Echocardiogram  2D, Doppler, and Color Doppler    Study date: 12-Aug-2016    Patient: Cale Tadeo  MR number: [id number]  Account number: [de-identified]  : [id number]  Age: 79 years  Gender: Female  Status: Outpatient  Location: Cath lab  Height: 62 in  Weight: 190 lb  BP: 148/ 97 mmHg    Indications: Afib    Diagnoses: I48 0 - Atrial fibrillation    Sonographer:  SOLE Alvarez  Primary Physician:  Deb Olsen MD  Referring Physician:  Davidson Waters MD  Group:  [facility]  Interpreting Physician:  Davidson Waters MD    SUMMARY    LEFT VENTRICLE:  Systolic function was mildly reduced  Ejection fraction was estimated to be 50  %  LEFT ATRIUM:  The atrium was dilated  LEFT ATRIAL APPENDAGE:  The appendage was dilated  No thrombus was identified  ATRIAL SEPTUM:  No defect or patent foramen ovale was identified  RIGHT ATRIUM:  The atrium was dilated  MITRAL VALVE:  There was mild regurgitation  AORTIC VALVE:  There was mild regurgitation  TRICUSPID VALVE:  There was moderate to severe regurgitation  PULMONIC VALVE:  There was trace regurgitation  HISTORY: PRIOR HISTORY: Obesity, Afib, Edema, Cardiomyopathy, HTN    PROCEDURE: The procedure was performed in the catheterization laboratory  This  was a routine study  The risks and alternatives of the procedure were explained  to the patient and informed consent was obtained  The transesophageal approach  was used  The study included complete 2D imaging, complete spectral Doppler,  and color Doppler  The heart rate was 118 bpm, at the start of the study  An  adult omniplane probe was inserted by the attending cardiologist  Intubated  with ease  One intubation attempt(s)  There was no blood detected on the probe  Image quality was adequate  MEDICATIONS: Anesthesia  LEFT VENTRICLE: Size was normal  Systolic function was mildly reduced  Ejection  fraction was estimated to be 50 %  This study was inadequate for the evaluation  of regional wall motion   Wall thickness was normal     RIGHT VENTRICLE: The size was normal  Systolic function was low normal     LEFT ATRIUM: The atrium was dilated  APPENDAGE: The appendage was dilated  No  thrombus was identified  ATRIAL SEPTUM: No defect or patent foramen ovale was identified  RIGHT ATRIUM: The atrium was dilated  MITRAL VALVE: Valve structure was normal  There was normal leaflet separation  DOPPLER: The transmitral velocity was within the normal range  There was no  evidence for stenosis  There was mild regurgitation  AORTIC VALVE: The valve was trileaflet  Leaflets exhibited normal thickness and  normal cuspal separation  DOPPLER: Transaortic velocity was within the normal  range  There was no evidence for stenosis  There was mild regurgitation  TRICUSPID VALVE: The valve structure was normal  There was normal leaflet  separation  DOPPLER: There was moderate to severe regurgitation  Estimated peak  PA pressure was 40 mmHg  PULMONIC VALVE: Leaflets exhibited normal thickness, no calcification, and  normal cuspal separation  DOPPLER: There was trace regurgitation  PERICARDIUM: There was no pericardial effusion  AORTA: The root exhibited normal size  The ascending aorta was normal in size  There was no significant atheroma  PULMONARY VEINS: DOPPLER: Doppler flow pattern was normal in the pulmonary  vein(s)  [address] Accredited Echocardiography Laboratory    Prepared and electronically signed by    Barbara Franco MD  Signed [id number] 17:28:53         Catheterizations:   No results found for this or any previous visit      Stress Tests:  Results for orders placed during the hospital encounter of 04/30/19   NM myocardial perfusion spect (stress and/or rest)    [address]    Stress Gated SPECT Myocardial Perfusion Imaging after Regadenoson    Patient: Akanksha Bundy  MR number: [id number]  Account number: [id number]  : 1945  Age: 68 years  Gender: Female  Status: Outpatient  Location: [address] [place] [address]  Height: 62 in  Weight: 178 lb  BP: 141/ 80 mmHg    Allergies: MORPHINE AND RELATED, OXYCODONE-ACETAMINOPHEN    Diagnosis: R06 09 - Other forms of dyspnea    Primary Physician:  Josef Tucker MD  RN:  Trisha Dockery RN  Referring Physician:  SHADI Mcarthur  Technician:  Christy Velasco  Group:  [facility]  Report Prepared By[de-identified]  Trisha Dockery RN  Interpreting Physician:  Danie Herrera MD    INDICATIONS: Detection of coronary artery disease  HISTORY: Hx right breast CA The patient is a 68year old  female  Chest pain status: chest pain  Other symptoms: dyspnea and lower extremity edema  Coronary artery disease risk factors: dyslipidemia, hypertension, family history  of premature coronary artery disease, and post-menopausal state  Cardiovascular history: arrhythmia(hx atrial fibrillation/PAF) Medications: include a beta blocker, a diuretic, and thyroid medications  Previous test results: abnormal ECG  PHYSICAL EXAM: Baseline physical exam screening: no wheezes audible  REST ECG: Sinus bradycardia ST segments and T waves were normal  The ECG showed occasional premature atrial contractions  PROCEDURE: The study was performed in the the [address] [place] [address]  A regadenoson infusion pharmacologic stress test was performed  Gated SPECT myocardial perfusion imaging was performed during stress  Systolic blood  pressure was 141 mmHg, at the start of the study  Diastolic blood pressure was 80 mmHg, at the start of the study  The heart rate was 57 bpm, at the start of the study  Regadenoson protocol:  HR bpm SBP mmHg DBP mmHg Symptoms  Baseline 57 141 80 none  Immediate 100 146 82 dyspnea, fatigue  1 min 98 155 86 subsiding  2 min 69 153 85 none  The patient also performed low level exercise      STRESS SUMMARY: Duration of pharmacologic stress was 3 min and 0 sec  Maximal heart rate during stress was 101 bpm  The rate-pressure product for the peak heart rate and blood pressure was 92199  There was no chest pain during stress  The  stress test was terminated due to protocol completion  Pre oxygen saturation: 99 %  Peak oxygen saturation: 95 %  The stress ECG was negative for ischemia and normal  There were no stress arrhythmias or conduction abnormalities  ISOTOPE ADMINISTRATION:  The radiopharmaceutical was injected at the peak effect of pharmacologic stress  PERFUSION DEFECTS:  -  There were no perfusion defects  GATED SPECT:  The calculated left ventricular ejection fraction was 72 %  There was no left ventricular regional abnormality  SUMMARY:  -  Stress results: There was no chest pain during stress  -  ECG conclusions: The stress ECG was negative for ischemia and normal   -  Perfusion imaging: There were no perfusion defects   -  Gated SPECT: The calculated left ventricular ejection fraction was 72 %  There was no left ventricular regional abnormality  IMPRESSIONS: Normal study after pharmacologic vasodilation without reproduction of symptoms  Myocardial perfusion imaging was normal at rest and with stress  Prepared and signed by    Andriy Ashley MD  Signed 05/01/2019 09:21:35         Holter monitor -   No results found for this or any previous visit  Results for orders placed during the hospital encounter of 04/30/19   Holter monitor - 24 hour    Narrative INDICATIONS:  Paroxysmal atrial fibrillation status post prior ablation    DESCRIPTION OF FINDINGS:  The patient was monitored for a total of 24 hours  The patient was   predominantly noted to be in normal sinus rhythm with paroxysmal atrial   fibrillation throughout the study  The average heart rate was 69 beats   per minute  The heart rate ranged from a low of 46 beats per minute in   a m  at 954 to a maximum of 122 beats per minute in a m  at 930      Ventricular ectopic activity consisted of 127 (0 1 % of total beats)  There was no sustained or nonsustained ventricular tachycardia  Supraventricular ectopic activity consisted of 2441 (2 4 % of total   beats)  Patient was noted to have 45 SVT runs  The longest run  occurred   at 4:57 p m and lasted for 1 minutes and 30 seconds  The run demonstrated   atrial fibrillation with a rapid ventricular response up to a rate of 170  When the run broke there was a 1 second interval then a junctional complex   and then sinus rhythm  There were multiple other episodes of brief   paroxysmal atrial fibrillation with rapid ventricular response  One run   was nine complexes  One run was 15 complexes  There were no significant pauses  The longest R-R interval was 1 6   seconds  There was no evidence of advanced degree heart block  All there were no symptoms reported during the monitoring interval         Impression 1  Predominantly normal sinus rhythm throughout the monitoring interval   with brief paroxysms of atrial fibrillation noted  The longest interval   of time in atrial fibrillation was about 1 minutes and 30 seconds  The   ventricular response to atrial fibrillation was rapid  None of the   arrhythmia was associated with symptoms  2  Low-density of nonsustained premature supraventricular ectopy  3  No significant bradycardia  4  No symptoms in general noted during the monitoring interval   5  Clinical correlation is advised  ASSESSMENT/PLAN:  1  HTN  - Normotensive in the office    - continue to monitor    2  Obesity  - Definitely contributing to fatigue and dyspnea on exertion as she does not sound like in CHF  - Have advised her to loose weight in past  - Lost 8 lbs since January 2021 though still feels symptoms     3   Dyspnea on exertion/fatigue  - Unlikely to have CHF exacerbation as no swelling in legs, no orthopnea, and no PND  - No arrhythmias on MCOT monitor either  - Could due to weight gain of 40 lbs over past year  - She did feel better when she had lost 40 lbs during her chemotherapy  - Advised her to loose weight again  -echo showed normal LV and RV function  -CT chest showed normal parynchema   - Could also be due to bradycardia     4  Sinus node dysfunction  - Sinus bradycardia noted at rest  - Did obtain exercise test and HR did increase however in the office, she felt dyspnea on exertion to walking 100 ft, and HR did not increase beyond 50s  - we decreased her metoprolol to 12 5 mg daily and decrease flecainide to 50 mg twice daily however patient continues to feel symptoms   - Discussed pacemaker procedure and answered questions risks/benefits  She is in agreement to proceed with it     - she is now status post pacemaker placement on June 7, 2021    5  Adenocarcinoma of breast s/p chemotherapy  - No evidence of recurrence     6  Hypothyroidism  -  Free T4 normal in January 2019    7  Paroxysmal atrial fibrillation  - Ablation performed 3 years ago  - had palpitations and recurrenced on cardiac event monitor  - Started on metoprolol and flecainide  - MCOT monitor shows no AFIB, brief AT episodes   - Due to bradycardia and poor chronotropic impotence, reduced metoprolol and flecainide  -status post pacemaker placement now   -has been having atrial fibrillation with burden on recent pacemaker about 4 4%  -EGM however appears to be atrial tach  -due to increased burden in the past flecainide was increased to 100 mg twice daily and metoprolol increased to 25 mg daily  - Will switch metoprolol to Cardizem 120 milligram daily to suppress atrial tachycardia      She is welcome to call our office for any questions or concerns  Thank you for allowing me to participate in the care of this patient  Please do not hesitate to contact me for any questions        Follow-up in 6 month

## 2022-12-03 DIAGNOSIS — I47.1 ATRIAL TACHYCARDIA (HCC): ICD-10-CM

## 2022-12-05 RX ORDER — DILTIAZEM HYDROCHLORIDE 120 MG/1
CAPSULE, EXTENDED RELEASE ORAL
Qty: 90 CAPSULE | Refills: 2 | Status: SHIPPED | OUTPATIENT
Start: 2022-12-05

## 2022-12-07 ENCOUNTER — CLINICAL SUPPORT (OUTPATIENT)
Dept: FAMILY MEDICINE CLINIC | Facility: HOSPITAL | Age: 77
End: 2022-12-07

## 2022-12-07 DIAGNOSIS — Z23 ENCOUNTER FOR IMMUNIZATION: Primary | ICD-10-CM

## 2023-01-12 ENCOUNTER — RA CDI HCC (OUTPATIENT)
Dept: OTHER | Facility: HOSPITAL | Age: 78
End: 2023-01-12

## 2023-01-12 NOTE — PROGRESS NOTES
[facility]  coding opportunities     I49 5     Chart Reviewed number of suggestions sent to Provider: 1     Patients Insurance     Medicare Insurance: [address] [place] [address]

## 2023-01-18 ENCOUNTER — OFFICE VISIT (OUTPATIENT)
Dept: FAMILY MEDICINE CLINIC | Facility: HOSPITAL | Age: 78
End: 2023-01-18

## 2023-01-18 VITALS
DIASTOLIC BLOOD PRESSURE: 84 MMHG | TEMPERATURE: 96.6 F | HEIGHT: 62 IN | SYSTOLIC BLOOD PRESSURE: 148 MMHG | OXYGEN SATURATION: 98 % | HEART RATE: 92 BPM | BODY MASS INDEX: 31.87 KG/M2 | WEIGHT: 173.2 LBS

## 2023-01-18 DIAGNOSIS — I10 BENIGN ESSENTIAL HYPERTENSION: ICD-10-CM

## 2023-01-18 DIAGNOSIS — R60.0 BILATERAL EDEMA OF LOWER EXTREMITY: ICD-10-CM

## 2023-01-18 DIAGNOSIS — Z79.01 CURRENT USE OF LONG TERM ANTICOAGULATION: ICD-10-CM

## 2023-01-18 DIAGNOSIS — N18.32 STAGE 3B CHRONIC KIDNEY DISEASE (HCC): ICD-10-CM

## 2023-01-18 DIAGNOSIS — E03.9 ACQUIRED HYPOTHYROIDISM: ICD-10-CM

## 2023-01-18 DIAGNOSIS — Z85.3 HISTORY OF RIGHT BREAST CANCER: ICD-10-CM

## 2023-01-18 DIAGNOSIS — E78.2 MIXED HYPERLIPIDEMIA: Primary | ICD-10-CM

## 2023-01-18 DIAGNOSIS — J20.9 BRONCHITIS WITH BRONCHOSPASM: ICD-10-CM

## 2023-01-18 DIAGNOSIS — I48.19 PERSISTENT ATRIAL FIBRILLATION (HCC): ICD-10-CM

## 2023-01-18 DIAGNOSIS — R73.9 HYPERGLYCEMIA: ICD-10-CM

## 2023-01-18 RX ORDER — PREDNISONE 10 MG/1
TABLET ORAL
Qty: 16 TABLET | Refills: 0 | Status: SHIPPED | OUTPATIENT
Start: 2023-01-18

## 2023-01-18 NOTE — PROGRESS NOTES
Name: Eduarda Pina      :       MRN: [id number]  Encounter Provider: Sammy Brandt MD  Encounter Date: 2023   Encounter department: [facility]      1  Mixed hyperlipidemia  -     Lipid Panel with Direct LDL reflex; Future; Expected date: 2023  -     Lipid Panel with Direct LDL reflex    2  Acquired hypothyroidism  -     TSH, 3rd generation with Free T4 reflex; Future; Expected date: 2023  -     TSH, 3rd generation with Free T4 reflex    3  Persistent atrial fibrillation ([address] )    4  Benign essential hypertension    5  Stage 3b chronic kidney disease (HCC)  -     CBC and differential; Future; Expected date: 2023  -     Comprehensive metabolic panel; Future; Expected date: 2023  -     CBC and differential  -     Comprehensive metabolic panel    6  Bilateral edema of lower extremity    7  Current use of long term anticoagulation    8  History of right breast cancer    9  Bronchitis with bronchospasm  -     predniSONE 10 mg tablet; Take 4 tablets today, then three tablets daily for 2 days, two tablets daily for 2 days, then one tablet daily for 2 days  10  Hyperglycemia  -     HEMOGLOBIN A1C W/ EAG ESTIMATION; Future; Expected date: 2023  -     HEMOGLOBIN A1C W/ EAG ESTIMATION      BMI Counseling: Body mass index is 31 68 kg/m²  The BMI is above normal  Nutrition recommendations include decreasing portion sizes, encouraging healthy choices of fruits and vegetables and moderation in carbohydrate intake  Exercise recommendations include exercising 3-5 times per week  No pharmacotherapy was ordered  Rationale for BMI follow-up plan is due to patient being overweight or obese  Subjective     6 month follow up  Had chest congestion around [place]  COVID negative  Initially productive cough  Gets SOB with exertion        Review of Systems   Constitutional: Negative for unexpected weight change     HENT: Negative  Respiratory: Positive for cough and shortness of breath  Negative for wheezing  Cardiovascular: Negative  Genitourinary: Negative  Musculoskeletal: Negative  Psychiatric/Behavioral: Negative  All other systems reviewed and are negative  Past Medical History:   Diagnosis Date   • Abnormal chest CT     last assessed - [id number]   • Abnormal glucose     Resolved - [id number]   • Arrhythmia    • Arthritis     Slight   • BCC (basal cell carcinoma of skin)     last assessed - [id number]   • BRCA gene mutation negative 09/30/2021    [employer]; 36 gene panel   • Breast cancer ([facility] ) 09/12/2016    rt   • Cancer ([facility] )    • Cardiomyopathy ([facility] )    • Cervical spinal stenosis    • Discharge from left nipple     last assessed - [id number]   • Disease of thyroid gland     hypothyroidism   • Full dentures     Upper and Lower   • History of atrial fibrillation    • Homocysteinemia    • Hypertension    • Hypokalemia    • Intraductal papilloma of breast     last assessed - 26Sep2012   • Limb alert care status     right upper ext   • Malignant neoplasm of skin     basal cell on face, Moh's surgery   • Memory loss     last assessed - [id number]   • Obesity    • Open wound     last assessed - [id number]   • Osteopenia    • Overactive bladder    • Palmar plantar erythrodysaesthesia     Resolved - [id number]   • Pulmonary nodule seen on imaging study    • Rosacea    • UTI (urinary tract infection)     recently   • Vertigo    • Vitamin D deficiency      Past Surgical History:   Procedure Laterality Date   • APPENDECTOMY     • ATRIAL ABLATION SURGERY      last assessed - 25Aug2016   • BACK SURGERY  1997    Lumbar spinal stenosis   • BREAST BIOPSY      2014? left breast; 9/12/16 right breast x 3   • BREAST BIOPSY Left 03/30/2010   • BREAST BIOPSY Right 09/12/2016    us bx- invasive br ca   • BREAST SURGERY      needle bx     • BREAST SURGERY Right 03/16/2017    excision of right chest wall lesion   • CARDIAC ELECTROPHYSIOLOGY STUDY AND ABLATION     • CARDIOVERSION     • DENTAL SURGERY     • HAND SURGERY      Joint replaced with silicone joint 5th finger right hand   • HYSTERECTOMY  1993    Complete   • LAMINECTOMY      Decompressive up to two lumbar segments   • MASTECTOMY Right 10/28/2016   • OOPHORECTOMY  1993   • HI EXCISION MAL LESION TRUNK/ARM/LEG 0 5 CM/< Right 03/16/2017    Procedure: EXCISION CHEST WALL LESION; NEEDLE LOC @ 1400;  Surgeon: Chris Massey MD;  Location: [facility];  Service: Surgical Oncology   • HI INTRAOP SENTINEL LYMPH NODE ID W/DYE INJECTION Right 10/28/2016    Procedure: BREAST MASTECTOMY WITH SENTINAL LYMPH NODE BIOPSY; Desiree Nielsen; LYMPHATIC MAPPING; 0800 NUC MED INJECTION;  Surgeon: Chris Massey MD;  Location: [facility];  Service: Surgical Oncology   • HI MASTECTOMY SIMPLE COMPLETE Left 10/22/2021    Procedure: BREAST MASTECTOMY SIMPLE MARTHA  GUIDED;   Surgeon: Rubén Crocker MD;  Location: [facility];  Service: Surgical Oncology   • SENTINEL LYMPH NODE BIOPSY Right    • US BREAST NEEDLE LOC LEFT Left 10/19/2021   • US BREAST NEEDLE LOC RIGHT Right 03/16/2017   • US GUIDANCE BREAST BIOPSY LEFT EACH ADDITIONAL Left 09/21/2021   • US GUIDANCE BREAST BIOPSY RIGHT EACH ADDITIONAL Right 09/12/2016   • US GUIDANCE BREAST BIOPSY RIGHT EACH ADDITIONAL Right 09/12/2016   • US GUIDED BREAST BIOPSY LEFT COMPLETE Left 09/21/2021   • US GUIDED BREAST BIOPSY RIGHT COMPLETE Right 09/12/2016   • US GUIDED BREAST BIOPSY RIGHT COMPLETE Right 10/1/2021     Family History   Problem Relation Age of Onset   • Coronary artery disease Mother         CABG   • Diabetes Mother    • Heart attack Mother    • Diabetes Sister    • Parkinsonism Sister    • Coronary artery disease Brother         CABG   • Diabetes Brother    • Heart attack Brother    • Heart attack Son    • Kidney cancer Son 48   • No Known Problems Son    • No Known Problems Son    • Emphysema Sister    • No Known Problems Sister    • Heart attack Brother    • No Known Problems Brother    • No Known Problems Maternal Grandmother    • No Known Problems Maternal Grandfather    • No Known Problems Paternal Grandmother    • No Known Problems Paternal Grandfather    • No Known Problems Paternal Aunt    • No Known Problems Paternal Aunt    • No Known Problems Paternal Aunt    • No Known Problems Paternal Aunt      Social History     Socioeconomic History   • Marital status: /Civil Union     Spouse name: None   • Number of children: None   • Years of education: None   • Highest education level: None   Occupational History   • Occupation: Retired   Tobacco Use   • Smoking status: Never   • Smokeless tobacco: Never   Vaping Use   • Vaping Use: Never used   Substance and Sexual Activity   • Alcohol use: No   • Drug use: No   • Sexual activity: Not Currently     Comment: Resides with    Other Topics Concern   • None   Social History Narrative    Always uses seat belt    Convalescence after chemotherapy     Social Determinants of Health     Financial Resource Strain: Not on file   Food Insecurity: Not on file   Transportation Needs: Not on file   Physical Activity: Not on file   Stress: Not on file   Social Connections: Not on file   Intimate Partner Violence: Not on file   Housing Stability: Not on file     Current Outpatient Medications on File Prior to Visit   Medication Sig   • cyanocobalamin (VITAMIN B-12) 500 mcg tablet Take 1 tablet by mouth daily   • Dilt- MG 24 hr capsule TAKE 1 CAPSULE BY MOUTH EVERY DAY   • Eliquis 5 MG TAKE 1 TABLET BY MOUTH TWICE A DAY   • flecainide (TAMBOCOR) 100 mg tablet TAKE 1 TABLET BY MOUTH TWICE A DAY   • furosemide (LASIX) 40 mg tablet TAKE 1 TABLET (40 MG TOTAL) BY MOUTH DAILY AS DIRECTED   • levothyroxine 150 mcg tablet TAKE 1 TABLET DAILY TAKE 1 TABLET 3 DAYS A WEEK AND 1/2 TABLET 4 DAYS A WEEK   • meclizine (ANTIVERT) 25 mg tablet Take 1 tablet (25 mg total) by mouth every 8 (eight) hours as needed for dizziness   • potassium chloride (MICRO-K) 10 MEQ CR capsule TAKE 1 CAPSULE BY MOUTH EVERY DAY   • RESTASIS 0 05 % ophthalmic emulsion Administer 1 drop to both eyes every 12 (twelve) hours     Allergies   Allergen Reactions   • Morphine And Related GI Intolerance     Immunization History   Administered Date(s) Administered   • COVID-19 MODERNA VACC 0 5 ML IM 01/22/2021, 02/17/2021, 11/18/2021   • INFLUENZA 10/23/2007, 11/06/2010, 10/13/2011, 10/17/2012, 10/11/2013, 10/29/2014, 11/08/2016, 10/27/2017   • Influenza, high dose seasonal 0 7 mL 11/18/2020, 12/03/2021, 12/07/2022   • Pneumococcal Conjugate 13-Valent 04/09/2015   • Pneumococcal Polysaccharide PPV23 06/29/2017   • Tdap 06/14/2016   • Zoster 03/08/2013       Objective     /84   Pulse 92   Temp (!) 96 6 °F (35 9 °C) (Tympanic)   Ht 5' 2" (1 575 m)   Wt 78 6 kg (173 lb 3 2 oz)   SpO2 98%   BMI 31 68 kg/m²     Physical Exam  Vitals and nursing note reviewed  Constitutional:       Appearance: Normal appearance  Neck:      Vascular: No carotid bruit  Cardiovascular:      Rate and Rhythm: Rhythm irregular  Heart sounds: Normal heart sounds  Pulmonary:      Effort: Pulmonary effort is normal       Breath sounds: Normal breath sounds  Musculoskeletal:      Right lower leg: Edema present  Left lower leg: Edema present  Neurological:      Mental Status: She is alert     Psychiatric:         Mood and Affect: Mood normal        Sammy Brandt MD

## 2023-01-24 ENCOUNTER — TELEPHONE (OUTPATIENT)
Dept: CARDIOLOGY CLINIC | Facility: CLINIC | Age: 78
End: 2023-01-24

## 2023-01-24 ENCOUNTER — REMOTE DEVICE CLINIC VISIT (OUTPATIENT)
Dept: CARDIOLOGY CLINIC | Facility: CLINIC | Age: 78
End: 2023-01-24

## 2023-01-24 DIAGNOSIS — Z95.0 PRESENCE OF PERMANENT CARDIAC PACEMAKER: Primary | ICD-10-CM

## 2023-01-24 NOTE — TELEPHONE ENCOUNTER
She is in atrial flutter  She will benefit from cardioversion or ablation procedure  Has she been taking flecainide? Can she f/u with [facility] to discuss these options? Thanks

## 2023-01-24 NOTE — TELEPHONE ENCOUNTER
Patient called today stating she has been having sob  Patient has a cold but wanted to make sure it is not heart related  Patient sent a transmission   Please advise, thank you

## 2023-01-24 NOTE — PROGRESS NOTES
MDT DUAL PM/ ACTIVE SYSTEM IS MRI CONDITIONAL   CARELINK TRANSMISSION:  BATTERY VOLTAGE ADEQUATE (12 1 YR )   AP 26 3%  3 4%   ALL AVAILABLE LEAD PARAMETERS WITHIN NORMAL LIMITS  Jaskaran Ortega 9/11/2022;  59 DEVICE CLASSIFIED VT EPISODES, 60 FAST A&V EPISODES, AND 1,582 AT/AF EPISODES WITH ALL AVAILABLE EGMS SHOWING AT/AF/RVR WITH AVG   V -176 BPM   AF BURDEN 50 7%   PATIENT TAKES ELIQUIS AND FLECAINIDE   NORMAL DEVICE FUNCTION   RG

## 2023-01-27 ENCOUNTER — TELEPHONE (OUTPATIENT)
Dept: CARDIOLOGY CLINIC | Facility: CLINIC | Age: 78
End: 2023-01-27

## 2023-01-27 DIAGNOSIS — Z95.0 PRESENCE OF PERMANENT CARDIAC PACEMAKER: Primary | ICD-10-CM

## 2023-01-27 DIAGNOSIS — I47.1 ATRIAL TACHYCARDIA (HCC): ICD-10-CM

## 2023-01-27 NOTE — TELEPHONE ENCOUNTER
----- Message from Cl Cornejo MD sent at 1/24/2023  5:47 PM EST -----  Will increase diltiazem to rate control afib  Please let patient know to double dose of Dilt to 240 mg daily  Also ask if she is taking flecainide     Thanks  ----- Message -----  From: Vincenzo Lieberman Results Incoming  Sent: 1/24/2023   2:10 PM EST  To: Cl Cornejo MD

## 2023-02-01 NOTE — TELEPHONE ENCOUNTER
Spoke to her  She is interested in sotalol/Tikosyn loading  Stephan Fitch/Mel/Sabrina,    Please schedule this patient for sotalol or dofetilide admission  Routine labs    Hold medication: Flecainide for 2 weeks prior  Eleni - can you please price check sotalol/dofetilide?      Thank you

## 2023-02-01 NOTE — TELEPHONE ENCOUNTER
Spoke with pt she is going to take 240 mg daily  She would like to know results of her recent device transmission

## 2023-02-01 NOTE — TELEPHONE ENCOUNTER
Quentin Gómez can you please check price for Dofetilide/Sotalol for this patient admission  Thank you

## 2023-02-02 NOTE — TELEPHONE ENCOUNTER
Dofetilide 500 mcg BID- no prior authorization required, no deductible applied   60 for 30 $ 47 00  Sotalol 160 mg BID- no prior authorization required     60 for 30 $5 00    [id number] [facility]  ID [id number]  [phone]

## 2023-02-02 NOTE — TELEPHONE ENCOUNTER
Patient scheduled for med admission (Dofetilide)  at [facility] on 02/20/23 per Dr Bhanu Camarena  Patient aware of general instructions  Flecainide to hold 2 weeks prior, last dose will be 02/05/2023  Can we please have insurance check for service

## 2023-02-06 NOTE — TELEPHONE ENCOUNTER
[employer] pending ref # [id number]   for admit/loading of dofetilide on 2/20/23 @ [facility]  Dr Freya Amezquita    Clinicals uploaded

## 2023-02-07 ENCOUNTER — TELEPHONE (OUTPATIENT)
Dept: FAMILY MEDICINE CLINIC | Facility: HOSPITAL | Age: 78
End: 2023-02-07

## 2023-02-07 DIAGNOSIS — N32.81 OAB (OVERACTIVE BLADDER): Primary | ICD-10-CM

## 2023-02-07 RX ORDER — OXYBUTYNIN CHLORIDE 10 MG/1
10 TABLET, EXTENDED RELEASE ORAL
Qty: 30 TABLET | Refills: 1 | Status: SHIPPED | OUTPATIENT
Start: 2023-02-07 | End: 2023-04-03 | Stop reason: SDUPTHER

## 2023-02-07 NOTE — TELEPHONE ENCOUNTER
Having a-fib procedure done on 2/20  Says she does not need a pre op appointment  Please call as she has questions about the medication and bladder control issues

## 2023-02-07 NOTE — TELEPHONE ENCOUNTER
PT ASKING ABOUT BLADDER CONTROL ISSUES   FORGOT TO MENTION THE CONDITION   HOPING YOU COULD SEND A SCRIPT TO     PHARMACY    REPORTS SHE HAS A HYSTERECTOMY 1993   PROBLEM HAS HAS ISSUE FOR 1 YEAR  THE LAST  4-5 MONTHS DAILY

## 2023-02-15 ENCOUNTER — OFFICE VISIT (OUTPATIENT)
Dept: SURGICAL ONCOLOGY | Facility: CLINIC | Age: 78
End: 2023-02-15

## 2023-02-15 VITALS
SYSTOLIC BLOOD PRESSURE: 138 MMHG | BODY MASS INDEX: 32.2 KG/M2 | RESPIRATION RATE: 16 BRPM | OXYGEN SATURATION: 99 % | TEMPERATURE: 97.1 F | HEIGHT: 62 IN | DIASTOLIC BLOOD PRESSURE: 84 MMHG | WEIGHT: 175 LBS | HEART RATE: 98 BPM

## 2023-02-15 DIAGNOSIS — Z85.3 HISTORY OF RIGHT BREAST CANCER: ICD-10-CM

## 2023-02-15 DIAGNOSIS — Z86.000 HISTORY OF DUCTAL CARCINOMA IN SITU (DCIS) OF BREAST: Primary | ICD-10-CM

## 2023-02-15 DIAGNOSIS — Z08 ENCOUNTER FOR FOLLOW-UP SURVEILLANCE OF BREAST CANCER: ICD-10-CM

## 2023-02-15 DIAGNOSIS — Z85.3 ENCOUNTER FOR FOLLOW-UP SURVEILLANCE OF BREAST CANCER: ICD-10-CM

## 2023-02-15 NOTE — PROGRESS NOTES
Surgical Oncology Follow Up       [address] [facility],[address] [facility]  [facility] [place]  [address]  [facility] [address]    Jacques Dong  1945  [id number]  [address] [facility],[address] [place] [address]  [facility] [place]  [address] [address]    Chief Complaint   Patient presents with   • Follow-up          Assessment & Plan:   Patient presents for 6-month follow-up  She has no complaints referable to her breast   She has a small lump on her right mastectomy site which is stable this has been biopsied and shown to be fat necrosis  There are no other abnormal findings  He is not on an AI per Dr Arnulfo Jacobsen  Going to be admitted to the hospital later this week to try a new medication for her atrial fibrillation  She has a regular rhythm today  Cancer History:     Oncology History   History of right breast cancer   9/12/2016 Biopsy    Right breast 3 site biopsy  Invasive breast carcinoma  Grade 2  ER 0, ND 0, HER2      10/28/2016 Surgery    Right breast mastectomy with SLN biopsy (Dr Anali Smalls)  Invasive mammary carcinoma of no special type (ductal)  Grade 3  2 8 cm  Margins negative  0/9 Lymph nodes  Stage IIA     12/7/2016 - 2/1/2017 Chemotherapy    Taxotere/Cytoxan  Scheduled for every 3 weeks x 4 cycles  Completed 3 cycles under the guidance of Dr Cheri Zendejas  3/16/2017 Surgery    Right chest wall lesion excision (Dr Anali Smalls)  Residual microscopic focus of invasive carcinoma  4 mm  Margins negative     History of ductal carcinoma in situ (DCIS) of breast   9/21/2021 Biopsy    Left breast ultrasound-guided biopsy  A  3 o'clock, 6 cm from nipple  Intraductal papilloma  Focal stromal changes suggestive of PASH    B  2 o'clock, 5 cm from nipple  Ductal carcinoma in situ  Grade 1        Concordant  Carcinoma measured up to 6mm on US  Left axilla US negative  If the papilloma is not excised at surgery, a 6 MO F/U US is recommended  9/30/2021 Genetic Testing    A total of 36 genes were evaluated, including: FELIPE, BRCA1, BRCA2, CDH1, CHEK2, PALB2, PTEN, STK11, TP53  Negative result  No pathogenic sequence variants or deletions/dupllications identified  [employer]     10/22/2021 Surgery    Left breast simple mastectomy  Ductal carcinoma in situ  Grade 2  Size cannot be determined (multifocal)  Margins negative  0/1 Intramammary lymph node  Stage 0           Interval History:   See above, the patient will undergo new medication trial in the hospital for relatively resistant atrial fibrillation  She has a regular rate today  Review of Systems:   Review of Systems   All other systems reviewed and are negative        Past Medical History     Patient Active Problem List   Diagnosis   • Atrial fibrillation ([facility] [address] )   • Benign essential hypertension   • Bilateral edema of lower extremity   • BPPV (benign paroxysmal positional vertigo)   • Cardiomyopathy (HCC)   • Cervical stenosis of spine   • Edema   • Homocysteinemia   • Hypokalemia   • Acquired hypothyroidism   • Long QT interval   • Lymphedema   • History of right breast cancer   • BMI 30 0-30 9,adult   • Osteopenia   • Overactive bladder   • Peripheral neuropathy   • Vitamin D deficiency   • Bronchitis with bronchospasm   • Candidal esophagitis ([facility])   • Abnormal chest CT   • Pulmonary nodule seen on imaging study   • SOB (shortness of breath)   • Weight loss   • Hyperglycemia   • Mixed hyperlipidemia   • Other idiopathic peripheral autonomic neuropathy   • Current use of long term anticoagulation   • RICHARDS (dyspnea on exertion)   • Cortical age-related cataract of both eyes   • Bradycardia   • SSS (sick sinus syndrome) ([facility])   • Cardiac pacemaker   • History of ductal carcinoma in situ (DCIS) of breast   • Platelets decreased ([facility])   • Stage 3b chronic kidney disease ([facility])   • Occipital neuralgia of right side   • Cervical myofascial pain syndrome   • Cervical spondylosis     Past Medical History: Diagnosis Date   • Abnormal chest CT     last assessed - [id number]   • Abnormal glucose     Resolved - [id number]   • Arrhythmia    • Arthritis     Slight   • BCC (basal cell carcinoma of skin)     last assessed - [id number]   • BRCA gene mutation negative 09/30/2021    [employer]; 36 gene panel   • Breast cancer ([facility] [address] ) 09/12/2016    rt   • Cancer ([facility] [address] )    • Cardiomyopathy [facility])    • Cervical spinal stenosis    • Discharge from left nipple     last assessed - [id number]   • Disease of thyroid gland     hypothyroidism   • Full dentures     Upper and Lower   • History of atrial fibrillation    • Homocysteinemia    • Hypertension    • Hypokalemia    • Intraductal papilloma of breast     last assessed - 26Sep2012   • Limb alert care status     right upper ext   • Malignant neoplasm of skin     basal cell on face, Moh's surgery   • Memory loss     last assessed - [id number]   • Obesity    • Open wound     last assessed - [id number]   • Osteopenia    • Overactive bladder    • Palmar plantar erythrodysaesthesia     Resolved - [id number]   • Pulmonary nodule seen on imaging study    • Rosacea    • UTI (urinary tract infection)     recently   • Vertigo    • Vitamin D deficiency      Past Surgical History:   Procedure Laterality Date   • APPENDECTOMY     • ATRIAL ABLATION SURGERY      last assessed - 25Aug2016   • BACK SURGERY  1997    Lumbar spinal stenosis   • BREAST BIOPSY      2014? left breast; 9/12/16 right breast x 3   • BREAST BIOPSY Left 03/30/2010   • BREAST BIOPSY Right 09/12/2016    us bx- invasive br ca   • BREAST SURGERY      needle bx     • BREAST SURGERY Right 03/16/2017    excision of right chest wall lesion   • CARDIAC ELECTROPHYSIOLOGY STUDY AND ABLATION     • CARDIOVERSION     • DENTAL SURGERY     • HAND SURGERY      Joint replaced with silicone joint 5th finger right hand   • HYSTERECTOMY  1993    Complete   • LAMINECTOMY      Decompressive up to two lumbar segments   • MASTECTOMY Right 10/28/2016   • OOPHORECTOMY 1993   • MI EXCISION MAL LESION TRUNK/ARM/LEG 0 5 CM/< Right 03/16/2017    Procedure: EXCISION CHEST WALL LESION; NEEDLE LOC @ 1400;  Surgeon: Lesli Prasad MD;  Location: [facility];  Service: Surgical Oncology   • MI INTRAOP SENTINEL LYMPH NODE ID W/DYE INJECTION Right 10/28/2016    Procedure: BREAST MASTECTOMY WITH SENTINAL LYMPH NODE BIOPSY; Ying Gongora; LYMPHATIC MAPPING; 0800 NUC MED INJECTION;  Surgeon: Lesli Prasad MD;  Location: [facility];  Service: Surgical Oncology   • MI MASTECTOMY SIMPLE COMPLETE Left 10/22/2021    Procedure: BREAST MASTECTOMY SIMPLE MARTHA  GUIDED;   Surgeon: Jennifer Roman MD;  Location: [facility];  Service: Surgical Oncology   • SENTINEL LYMPH NODE BIOPSY Right    • US BREAST NEEDLE LOC LEFT Left 10/19/2021   • US BREAST NEEDLE LOC RIGHT Right 03/16/2017   • US GUIDANCE BREAST BIOPSY LEFT EACH ADDITIONAL Left 09/21/2021   • US GUIDANCE BREAST BIOPSY RIGHT EACH ADDITIONAL Right 09/12/2016   • US GUIDANCE BREAST BIOPSY RIGHT EACH ADDITIONAL Right 09/12/2016   • US GUIDED BREAST BIOPSY LEFT COMPLETE Left 09/21/2021   • US GUIDED BREAST BIOPSY RIGHT COMPLETE Right 09/12/2016   • US GUIDED BREAST BIOPSY RIGHT COMPLETE Right 10/1/2021     Family History   Problem Relation Age of Onset   • Coronary artery disease Mother         CABG   • Diabetes Mother    • Heart attack Mother    • Diabetes Sister    • Parkinsonism Sister    • Coronary artery disease Brother         CABG   • Diabetes Brother    • Heart attack Brother    • Heart attack Son    • Kidney cancer Son 48   • No Known Problems Son    • No Known Problems Son    • Emphysema Sister    • No Known Problems Sister    • Heart attack Brother    • No Known Problems Brother    • No Known Problems Maternal Grandmother    • No Known Problems Maternal Grandfather    • No Known Problems Paternal Grandmother    • No Known Problems Paternal Grandfather    • No Known Problems Paternal Aunt    • No Known Problems Paternal Aunt    • No Known Problems Paternal Aunt    • No Known Problems Paternal Aunt      Social History     Socioeconomic History   • Marital status: /Civil Union     Spouse name: Not on file   • Number of children: Not on file   • Years of education: Not on file   • Highest education level: Not on file   Occupational History   • Occupation: Retired   Tobacco Use   • Smoking status: Never   • Smokeless tobacco: Never   Vaping Use   • Vaping Use: Never used   Substance and Sexual Activity   • Alcohol use: No   • Drug use: No   • Sexual activity: Not Currently     Comment: Resides with    Other Topics Concern   • Not on file   Social History Narrative    Always uses seat belt    Convalescence after chemotherapy     Social Determinants of Health     Financial Resource Strain: Not on file   Food Insecurity: Not on file   Transportation Needs: Not on file   Physical Activity: Not on file   Stress: Not on file   Social Connections: Not on file   Intimate Partner Violence: Not on file   Housing Stability: Not on file       Current Outpatient Medications:   •  cyanocobalamin (VITAMIN B-12) 500 mcg tablet, Take 1 tablet by mouth daily, Disp: , Rfl:   •  Dilt- MG 24 hr capsule, TAKE 1 CAPSULE BY MOUTH EVERY DAY, Disp: 90 capsule, Rfl: 2  •  Eliquis 5 MG, TAKE 1 TABLET BY MOUTH TWICE A DAY, Disp: 60 tablet, Rfl: 6  •  flecainide (TAMBOCOR) 100 mg tablet, TAKE 1 TABLET BY MOUTH TWICE A DAY, Disp: 180 tablet, Rfl: 1  •  furosemide (LASIX) 40 mg tablet, TAKE 1 TABLET (40 MG TOTAL) BY MOUTH DAILY AS DIRECTED, Disp: 90 tablet, Rfl: 1  •  levothyroxine 150 mcg tablet, TAKE 1 TABLET DAILY TAKE 1 TABLET 3 DAYS A WEEK AND 1/2 TABLET 4 DAYS A WEEK, Disp: 90 tablet, Rfl: 0  •  meclizine (ANTIVERT) 25 mg tablet, Take 1 tablet (25 mg total) by mouth every 8 (eight) hours as needed for dizziness, Disp: 60 tablet, Rfl: 2  •  oxybutynin (DITROPAN-XL) 10 MG 24 hr tablet, Take 1 tablet (10 mg total) by mouth daily at bedtime, Disp: 30 tablet, Rfl: 1  •  potassium chloride (MICRO-K) 10 MEQ CR capsule, TAKE 1 CAPSULE BY MOUTH EVERY DAY, Disp: 90 capsule, Rfl: 1  •  RESTASIS 0 05 % ophthalmic emulsion, Administer 1 drop to both eyes every 12 (twelve) hours, Disp: , Rfl:   •  predniSONE 10 mg tablet, Take 4 tablets today, then three tablets daily for 2 days, two tablets daily for 2 days, then one tablet daily for 2 days  (Patient not taking: Reported on 2/15/2023), Disp: 16 tablet, Rfl: 0  Allergies   Allergen Reactions   • Morphine And Related GI Intolerance       Physical Exam:     Vitals:    02/15/23 1042   BP: 138/84   Pulse: 98   Resp: 16   Temp: (!) 97 1 °F (36 2 °C)   SpO2: 99%     Physical Exam  Vitals reviewed  Constitutional:       Appearance: She is well-developed  HENT:      Head: Normocephalic and atraumatic  Eyes:      Pupils: Pupils are equal, round, and reactive to light  Neck:      Thyroid: No thyromegaly  Vascular: No JVD  Trachea: No tracheal deviation  Cardiovascular:      Rate and Rhythm: Normal rate and regular rhythm  Heart sounds: Normal heart sounds  No murmur heard  No friction rub  No gallop  Pulmonary:      Effort: Pulmonary effort is normal  No respiratory distress  Breath sounds: Normal breath sounds  No wheezing or rales  Chest:          Comments: Semination of both mastectomy sites demonstrate no worrisome skin findings  He does have a 1 to 1 5 cm firm mass as outlined on the diagram   This has been biopsied and shown to be fat necrosis  Abdominal:      General: There is no distension  Palpations: Abdomen is soft  There is no hepatomegaly or mass  Tenderness: There is no abdominal tenderness  There is no guarding or rebound  Musculoskeletal:         General: No tenderness  Normal range of motion  Cervical back: Normal range of motion and neck supple  Lymphadenopathy:      Cervical: No cervical adenopathy  Skin:     General: Skin is warm and dry        Findings: No erythema or rash  Neurological:      Mental Status: She is alert and oriented to person, place, and time  Cranial Nerves: No cranial nerve deficit  Psychiatric:         Behavior: Behavior normal            Results & Discussion:   The patient is free of any evidence of local regional or distant recurrent disease  We will see her back in 6 months  She is agreeable to see our nurse practitioner at that time  Questions were answered the patient's satisfaction  Advance Care Planning/Advance Directives:  I discussed the disease status, treatment plans and follow-up with the patient

## 2023-02-20 ENCOUNTER — HOSPITAL ENCOUNTER (INPATIENT)
Facility: HOSPITAL | Age: 78
LOS: 3 days | Discharge: HOME | End: 2023-02-23
Attending: INTERNAL MEDICINE | Admitting: INTERNAL MEDICINE

## 2023-02-20 DIAGNOSIS — E87.6 HYPOKALEMIA: ICD-10-CM

## 2023-02-20 DIAGNOSIS — I48.19 PERSISTENT ATRIAL FIBRILLATION (HCC): Primary | ICD-10-CM

## 2023-02-20 LAB
ALBUMIN SERPL BCP-MCNC: 3.3 G/DL (ref 3.5–5)
ALP SERPL-CCNC: 102 U/L (ref 46–116)
ALT SERPL W P-5'-P-CCNC: 17 U/L (ref 12–78)
ANION GAP SERPL CALCULATED.3IONS-SCNC: 6 MMOL/L (ref 4–13)
AST SERPL W P-5'-P-CCNC: 23 U/L (ref 5–45)
BILIRUB SERPL-MCNC: 0.65 MG/DL (ref 0.2–1)
BUN SERPL-MCNC: 20 MG/DL (ref 5–25)
CALCIUM ALBUM COR SERPL-MCNC: 9.9 MG/DL (ref 8.3–10.1)
CALCIUM SERPL-MCNC: 9.3 MG/DL (ref 8.3–10.1)
CHLORIDE SERPL-SCNC: 111 MMOL/L (ref 96–108)
CO2 SERPL-SCNC: 25 MMOL/L (ref 21–32)
CREAT SERPL-MCNC: 1.19 MG/DL (ref 0.6–1.3)
ERYTHROCYTE [DISTWIDTH] IN BLOOD BY AUTOMATED COUNT: 16.1 % (ref 11.6–15.1)
GFR SERPL CREATININE-BSD FRML MDRD: 44 ML/MIN/1.73SQ M
GLUCOSE SERPL-MCNC: 84 MG/DL (ref 65–140)
HCT VFR BLD AUTO: 37.9 % (ref 34.8–46.1)
HGB BLD-MCNC: 11.7 G/DL (ref 11.5–15.4)
MAGNESIUM SERPL-MCNC: 2.3 MG/DL (ref 1.6–2.6)
MCH RBC QN AUTO: 27.7 PG (ref 26.8–34.3)
MCHC RBC AUTO-ENTMCNC: 30.9 G/DL (ref 31.4–37.4)
MCV RBC AUTO: 90 FL (ref 82–98)
PLATELET # BLD AUTO: 216 THOUSANDS/UL (ref 149–390)
PMV BLD AUTO: 11.4 FL (ref 8.9–12.7)
POTASSIUM SERPL-SCNC: 3.5 MMOL/L (ref 3.5–5.3)
PROT SERPL-MCNC: 7.2 G/DL (ref 6.4–8.4)
RBC # BLD AUTO: 4.23 MILLION/UL (ref 3.81–5.12)
SODIUM SERPL-SCNC: 142 MMOL/L (ref 135–147)
WBC # BLD AUTO: 5.08 THOUSAND/UL (ref 4.31–10.16)

## 2023-02-20 RX ORDER — POTASSIUM CHLORIDE 750 MG/1
10 TABLET, EXTENDED RELEASE ORAL DAILY
Status: DISCONTINUED | OUTPATIENT
Start: 2023-02-21 | End: 2023-02-23 | Stop reason: HOSPADM

## 2023-02-20 RX ORDER — POTASSIUM CHLORIDE 20 MEQ/1
40 TABLET, EXTENDED RELEASE ORAL ONCE
Status: COMPLETED | OUTPATIENT
Start: 2023-02-20 | End: 2023-02-20

## 2023-02-20 RX ORDER — LANOLIN ALCOHOL/MO/W.PET/CERES
3 CREAM (GRAM) TOPICAL
Status: DISCONTINUED | OUTPATIENT
Start: 2023-02-20 | End: 2023-02-23 | Stop reason: HOSPADM

## 2023-02-20 RX ORDER — MECLIZINE HYDROCHLORIDE 25 MG/1
25 TABLET ORAL EVERY 8 HOURS PRN
Status: DISCONTINUED | OUTPATIENT
Start: 2023-02-20 | End: 2023-02-23 | Stop reason: HOSPADM

## 2023-02-20 RX ORDER — HYDROXYZINE 50 MG/1
50 TABLET, FILM COATED ORAL EVERY 6 HOURS PRN
Status: DISCONTINUED | OUTPATIENT
Start: 2023-02-20 | End: 2023-02-23 | Stop reason: HOSPADM

## 2023-02-20 RX ORDER — LEVOTHYROXINE SODIUM 0.07 MG/1
75 TABLET ORAL EVERY OTHER DAY
Status: DISCONTINUED | OUTPATIENT
Start: 2023-02-22 | End: 2023-02-23 | Stop reason: HOSPADM

## 2023-02-20 RX ORDER — OXYBUTYNIN CHLORIDE 10 MG/1
10 TABLET, EXTENDED RELEASE ORAL
Status: DISCONTINUED | OUTPATIENT
Start: 2023-02-20 | End: 2023-02-20

## 2023-02-20 RX ORDER — DOCUSATE SODIUM 100 MG/1
100 CAPSULE, LIQUID FILLED ORAL 2 TIMES DAILY PRN
Status: DISCONTINUED | OUTPATIENT
Start: 2023-02-20 | End: 2023-02-23 | Stop reason: HOSPADM

## 2023-02-20 RX ORDER — FUROSEMIDE 10 MG/ML
40 INJECTION INTRAMUSCULAR; INTRAVENOUS ONCE
Status: COMPLETED | OUTPATIENT
Start: 2023-02-21 | End: 2023-02-21

## 2023-02-20 RX ORDER — DILTIAZEM HYDROCHLORIDE 120 MG/1
120 CAPSULE, COATED, EXTENDED RELEASE ORAL DAILY
Status: DISCONTINUED | OUTPATIENT
Start: 2023-02-21 | End: 2023-02-23 | Stop reason: HOSPADM

## 2023-02-20 RX ORDER — ACETAMINOPHEN 325 MG/1
650 TABLET ORAL EVERY 4 HOURS PRN
Status: DISCONTINUED | OUTPATIENT
Start: 2023-02-20 | End: 2023-02-23 | Stop reason: HOSPADM

## 2023-02-20 RX ORDER — SOTALOL HYDROCHLORIDE 120 MG/1
120 TABLET ORAL DAILY
Status: DISCONTINUED | OUTPATIENT
Start: 2023-02-20 | End: 2023-02-22

## 2023-02-20 RX ORDER — POTASSIUM CHLORIDE 750 MG/1
10 TABLET, EXTENDED RELEASE ORAL ONCE
Status: DISCONTINUED | OUTPATIENT
Start: 2023-02-20 | End: 2023-02-20

## 2023-02-20 RX ORDER — LEVOTHYROXINE SODIUM 0.07 MG/1
150 TABLET ORAL EVERY OTHER DAY
Status: DISCONTINUED | OUTPATIENT
Start: 2023-02-21 | End: 2023-02-23 | Stop reason: HOSPADM

## 2023-02-20 RX ADMIN — SOTALOL HYDROCHLORIDE 120 MG: 120 TABLET ORAL at 13:38

## 2023-02-20 RX ADMIN — POTASSIUM CHLORIDE 40 MEQ: 1500 TABLET, EXTENDED RELEASE ORAL at 13:38

## 2023-02-20 RX ADMIN — APIXABAN 5 MG: 5 TABLET, FILM COATED ORAL at 17:44

## 2023-02-21 LAB
ANION GAP SERPL CALCULATED.3IONS-SCNC: 6 MMOL/L (ref 4–13)
ATRIAL RATE: 63 BPM
ATRIAL RATE: 68 BPM
ATRIAL RATE: 71 BPM
ATRIAL RATE: 72 BPM
BUN SERPL-MCNC: 20 MG/DL (ref 5–25)
CALCIUM SERPL-MCNC: 8.7 MG/DL (ref 8.3–10.1)
CHLORIDE SERPL-SCNC: 116 MMOL/L (ref 96–108)
CO2 SERPL-SCNC: 23 MMOL/L (ref 21–32)
CREAT SERPL-MCNC: 1.14 MG/DL (ref 0.6–1.3)
GFR SERPL CREATININE-BSD FRML MDRD: 46 ML/MIN/1.73SQ M
GLUCOSE SERPL-MCNC: 87 MG/DL (ref 65–140)
MAGNESIUM SERPL-MCNC: 2.4 MG/DL (ref 1.6–2.6)
P AXIS: -16 DEGREES
P AXIS: 17 DEGREES
P AXIS: 76 DEGREES
P AXIS: 83 DEGREES
POTASSIUM SERPL-SCNC: 3.8 MMOL/L (ref 3.5–5.3)
PR INTERVAL: 162 MS
PR INTERVAL: 208 MS
PR INTERVAL: 220 MS
PR INTERVAL: 232 MS
QRS AXIS: -15 DEGREES
QRS AXIS: -19 DEGREES
QRS AXIS: -6 DEGREES
QRS AXIS: -7 DEGREES
QRSD INTERVAL: 82 MS
QT INTERVAL: 456 MS
QT INTERVAL: 482 MS
QT INTERVAL: 494 MS
QT INTERVAL: 520 MS
QTC INTERVAL: 484 MS
QTC INTERVAL: 527 MS
QTC INTERVAL: 532 MS
QTC INTERVAL: 536 MS
SODIUM SERPL-SCNC: 145 MMOL/L (ref 135–147)
T WAVE AXIS: -1 DEGREES
T WAVE AXIS: 0 DEGREES
T WAVE AXIS: 14 DEGREES
T WAVE AXIS: 43 DEGREES
VENTRICULAR RATE: 63 BPM
VENTRICULAR RATE: 68 BPM
VENTRICULAR RATE: 71 BPM
VENTRICULAR RATE: 72 BPM

## 2023-02-21 RX ORDER — FUROSEMIDE 10 MG/ML
40 INJECTION INTRAMUSCULAR; INTRAVENOUS ONCE
Status: COMPLETED | OUTPATIENT
Start: 2023-02-22 | End: 2023-02-22

## 2023-02-21 RX ORDER — POTASSIUM CHLORIDE 20 MEQ/1
40 TABLET, EXTENDED RELEASE ORAL ONCE
Status: COMPLETED | OUTPATIENT
Start: 2023-02-21 | End: 2023-02-21

## 2023-02-21 RX ORDER — FUROSEMIDE 40 MG/1
40 TABLET ORAL DAILY
Status: DISCONTINUED | OUTPATIENT
Start: 2023-02-22 | End: 2023-02-21

## 2023-02-21 RX ADMIN — MECLIZINE HYDROCHLORIDE 25 MG: 25 TABLET ORAL at 04:53

## 2023-02-21 RX ADMIN — APIXABAN 5 MG: 5 TABLET, FILM COATED ORAL at 18:55

## 2023-02-21 RX ADMIN — LEVOTHYROXINE SODIUM 150 MCG: 75 TABLET ORAL at 04:45

## 2023-02-21 RX ADMIN — APIXABAN 5 MG: 5 TABLET, FILM COATED ORAL at 08:26

## 2023-02-21 RX ADMIN — FUROSEMIDE 40 MG: 10 INJECTION, SOLUTION INTRAMUSCULAR; INTRAVENOUS at 08:26

## 2023-02-21 RX ADMIN — GLYCERIN 1 DROP: .002; .002; .01 SOLUTION/ DROPS OPHTHALMIC at 08:26

## 2023-02-21 RX ADMIN — DILTIAZEM HYDROCHLORIDE 120 MG: 120 CAPSULE, COATED, EXTENDED RELEASE ORAL at 08:26

## 2023-02-21 RX ADMIN — POTASSIUM CHLORIDE 10 MEQ: 750 TABLET, EXTENDED RELEASE ORAL at 08:26

## 2023-02-21 RX ADMIN — POTASSIUM CHLORIDE 40 MEQ: 1500 TABLET, EXTENDED RELEASE ORAL at 18:55

## 2023-02-21 RX ADMIN — SOTALOL HYDROCHLORIDE 120 MG: 120 TABLET ORAL at 11:34

## 2023-02-21 NOTE — UTILIZATION REVIEW
Initial Clinical Review    Admission: Date/Time/Statement:   Admission Orders (From admission, onward)     Ordered        02/20/23 0934  Inpatient Admission  Once                      Orders Placed This Encounter   Procedures   • Inpatient Admission     Standing Status:   Standing     Number of Occurrences:   1     Order Specific Question:   Level of Care     Answer:   Med Surg [16]     Order Specific Question:   Estimated length of stay     Answer:   More than 2 Midnights     Order Specific Question:   Certification     Answer:   I certify that inpatient services are medically necessary for this patient for a duration of greater than two midnights  See H&P and MD Progress Notes for additional information about the patient's course of treatment  ED Arrival Information     Patient not seen in ED                     No chief complaint on file  Initial Presentation: 68 y o  female with PMH of obesity, hypothyroidism, SSS s/p PPM, positional vertigo on meclizine pn, h/o of breast ca, paroxysmal Afib presented to [address] [place] [address] admitted as Inpatient on the EP service for  antiarrhythmic initiation d/t symptomatic paroxysmal Afib  Pt was maintained on Flecainide but recently was having more episodes of atrial fibrillation and flutter, breaking through flecainide  She was having SOB, unable to get from one rm to the other  Flecainide dc'd 2 wks ago to undergo antiarrhythmic initiation today  Plan: will start sotalol 120 mg daily today  This can be started midday and will be backed about an hour or so each day until she is taking this 9 AM daily  continue diltiazem  continue to monitor blood pressures   give her 1 dose of IV diuretic tomorrow for LLE edema  replete potassium today prior to initiating diuresis  continue to monitor electrolytes, telemetry, and EKG  EKG to be done 2 hours after each dose  02/20 EP Notes: After first dose of sotalol, QT interval did change from 440 ms to 480 ms  EKG in the morning and check QT interval prior to giving second dose at around 1130  Date: 02/21  Day 2:   EP Notes: P{t currently on sinus rhythm  Received 2 doses of sotalol at the current dose of 120 mg daily  Diuresing well  Also increase low rate of pacer to 70  EKG, Qtc interval is stable at 488 milliseconds  Will continue to this dosing and continue to check EKGs two hours post  monitor electrolytes and telemetry  replete potassium more and repeat IV lasix 40mg x1 tomorrow in the morning  PE: alert and oriented, normal HR and regular rhythm       ED Triage Vitals   Temperature Pulse Respirations Blood Pressure SpO2   02/20/23 0931 02/20/23 0931 02/20/23 0931 02/20/23 0931 02/20/23 0931   97 6 °F (36 4 °C) 73 18 121/67 97 %      Temp Source Heart Rate Source Patient Position - Orthostatic VS BP Location FiO2 (%)   02/20/23 0931 02/20/23 0931 02/20/23 0931 02/20/23 0931 --   Oral Monitor Lying Right arm       Pain Score       02/20/23 0927       No Pain          Wt Readings from Last 1 Encounters:   02/20/23 79 3 kg (174 lb 13 2 oz)     Additional Vital Signs:   Date/Time Temp Pulse Resp BP MAP (mmHg) SpO2 O2 Device Patient Position - Orthostatic VS   02/21/23 07:02:59 98 °F (36 7 °C) 63 -- 114/62 79 93 % None (Room air) --   02/21/23 03:56:15 98 1 °F (36 7 °C) 63 -- 112/63 79 94 % -- --   02/20/23 22:03:59 98 °F (36 7 °C) 64 -- 114/64 81 95 % -- --   02/20/23 22:03:47 98 °F (36 7 °C) 64 -- 114/64 81 95 % -- --   02/20/23 2000 -- -- -- -- -- -- None (Room air) --   02/20/23 19:18:48 98 3 °F (36 8 °C) 60 -- 108/52 71 97 % -- --   02/20/23 15:12:55 98 1 °F (36 7 °C) 65 17 119/63 82 97 % -- --       Pertinent Labs/Diagnostic Test Results:   No orders to display         Results from last 7 days   Lab Units 02/20/23  1022   WBC Thousand/uL 5 08   HEMOGLOBIN g/dL 11 7   HEMATOCRIT % 37 9   PLATELETS Thousands/uL 216         Results from last 7 days   Lab Units 02/21/23  0440 02/20/23  1022   SODIUM mmol/L 145 142 POTASSIUM mmol/L 3 8 3 5   CHLORIDE mmol/L 116* 111*   CO2 mmol/L 23 25   ANION GAP mmol/L 6 6   BUN mg/dL 20 20   CREATININE mg/dL 1 14 1 19   EGFR ml/min/1 73sq m 46 44   CALCIUM mg/dL 8 7 9 3   MAGNESIUM mg/dL 2 4 2 3     Results from last 7 days   Lab Units 02/20/23  1022   AST U/L 23   ALT U/L 17   ALK PHOS U/L 102   TOTAL PROTEIN g/dL 7 2   ALBUMIN g/dL 3 3*   TOTAL BILIRUBIN mg/dL 0 65         Results from last 7 days   Lab Units 02/21/23  0440 02/20/23  1022   GLUCOSE RANDOM mg/dL 87 84     ED Treatment:   Medication Administration - No Administrations Displayed (No Start Event Found)     None        Past Medical History:   Diagnosis Date   • Abnormal chest CT     last assessed - [id number]   • Abnormal glucose     Resolved - [id number]   • Arrhythmia    • Arthritis     Slight   • BCC (basal cell carcinoma of skin)     last assessed - [id number]   • BRCA gene mutation negative 09/30/2021    [employer]; 36 gene panel   • Breast cancer ([facility] [address] ) 09/12/2016    rt   • Cancer ([facility] [address] )    • Cardiomyopathy ([facility] [address] )    • Cervical spinal stenosis    • Discharge from left nipple     last assessed - [id number]   • Disease of thyroid gland     hypothyroidism   • Full dentures     Upper and Lower   • History of atrial fibrillation    • Homocysteinemia    • Hypertension    • Hypokalemia    • Intraductal papilloma of breast     last assessed - [id number]   • Limb alert care status     right upper ext   • Malignant neoplasm of skin     basal cell on face, Moh's surgery   • Memory loss     last assessed - [id number]   • Obesity    • Open wound     last assessed - 77ZIN8403   • Osteopenia    • Overactive bladder    • Palmar plantar erythrodysaesthesia     Resolved - 12GQD5308   • Pulmonary nodule seen on imaging study    • Rosacea    • UTI (urinary tract infection)     recently   • Vertigo    • Vitamin D deficiency      Present on Admission:  • Atrial fibrillation [facility])      Admitting Diagnosis: Presence of permanent cardiac pacemaker [Z95 0]  Age/Sex: 68 y o  female  Admission Orders:  SCD  48 hr telemetry monitoring  I/O    Scheduled Medications:  apixaban, 5 mg, Oral, BID  diltiazem, 120 mg, Oral, Daily  glycerin-hypromellose-, 1 drop, Both Eyes, BID  levothyroxine, 150 mcg, Oral, Every Other Day  [START ON 2/22/2023] levothyroxine, 75 mcg, Oral, Every Other Day  potassium chloride, 10 mEq, Oral, Daily  sotalol, 120 mg, Oral, Daily  furosemide (LASIX) injection 40 mg IV once    Continuous IV Infusions: none     PRN Meds:  acetaminophen, 650 mg, Oral, Q4H PRN  docusate sodium, 100 mg, Oral, BID PRN  hydrOXYzine HCL, 50 mg, Oral, Q6H PRN  meclizine, 25 mg, Oral, Q8H PRN 02/21 x 1  melatonin, 3 mg, Oral, HS PRN        None    Network Utilization Review Department  ATTENTION: Please call with any questions or concerns to [phone] and carefully listen to the prompts so that you are directed to the right person  All voicemails are confidential   Davina Veronica all requests for admission clinical reviews, approved or denied determinations and any other requests to dedicated fax number below belonging to the campus where the patient is receiving treatment   List of dedicated fax numbers for the Facilities:  [address] DENIALS (Administrative/Medical Necessity) [phone]   [address] (Maternity/NICU/Pediatrics) [phone]   [address] Helen Da Silva [phone]   [facility] [address] [phone]   [address] [place] [id number] [place] [address] [facility] Centerl[facility] [phone]   [address] [place] [facility] 134 815 [facility] [phone]

## 2023-02-21 NOTE — PROGRESS NOTES
Progress Note - Electrophysiology  Katarina Morris 68 y o  female MRN: [id number]  Unit/Bed#: [facility] 531-01 Encounter: [id number]      Assessment:  1  Symptomatic paroxysmal atrial fibrillation  - anticoagulated with Eliquis / [id number] score of 3 (age, sex)  - EF of 65% per echo 6/2020 / mild dilation of LA noted  - rate control: diltiazem  - antiarrhythmic therapy: being started on new AAD / was on flecainide with breakthrough   - prior ablation: cryo of atrial fibrillation with pulmonary vein isolation by Dr Christine Fleming 8/2016  2  [employer]tronic dual-chamber pacemaker in situ  -Implanted by Dr Nicholas Moulton on 6/7/2021 due to sick sinus syndrome  3  Hypothyroidism  4  History of breast cancer   - status post chemotherapy  5  Obesity BMI of 31  6  Benign paroxysmal positional vertigo  - utilizes meclizine as needed    Plan:  Patient is currently in sinus rhythm  She has already received 2 doses of sotalol at the current dose of 120 mg daily  Upon review of the last EKG, Qtc interval is stable at 488 milliseconds  Will continue to this dosing and continue to check EKGs two hours post  Will also monitor electrolytes and telemetry as well which has not shown ventricular ectopy thus far  Patient is diuresing well, will replete potassium more and repeat IV lasix 40mg x1 tomorrow in the morning  Did also increase low rate of pacer to 70  Last time in atrial fibrillation was 2/15  Patient was hopeful for discharge tomorrow but did explain more plausible to happen on Thursday as she is once daily dosing for sotalol  Subjective/Objective   Subjective: Katarina Morris is a 68y o  year old female with symptomatic paroxysmal atrial fibrillation anticoagulated with Eliquis, sick sinus syndrome status post pacemaker, hypothyroidism, history of breast cancer status postchemotherapy and mastectomy, obesity with BMI of 31, and vertigo  She is hospital stay day 2 and has been tolerating sotalol well       She reports feeling well today, she has been peeing frequently given IV diuretics  She was able to walk around the unit with her   TELE: sinus rhythm with atrial pacing       EKG:       Objective:  Vitals: /62   Pulse 63   Temp 98 °F (36 7 °C)   Resp 17   Ht 5' 2" (1 575 m)   Wt 79 3 kg (174 lb 13 2 oz)   SpO2 93%   BMI 31 98 kg/m²     Vitals:    02/20/23 0414   Weight: 79 3 kg (174 lb 13 2 oz)     Orthostatic Blood Pressures    Flowsheet Row Most Recent Value   Blood Pressure 114/62 filed at 02/21/2023 0702   Patient Position - Orthostatic VS Lying filed at 02/20/2023 3729            Intake/Output Summary (Last 24 hours) at 2/21/2023 1027  Last data filed at 2/21/2023 0830  Gross per 24 hour   Intake 420 ml   Output 1900 ml   Net -1480 ml       Invasive Devices     Peripheral Intravenous Line  Duration           Peripheral IV 02/20/23 Left;Proximal;Ventral (anterior) Forearm 1 day                          Scheduled Meds:  Current Facility-Administered Medications   Medication Dose Route Frequency Provider Last Rate   • acetaminophen  650 mg Oral Q4H PRN Rochelle Lozano PA-C     • apixaban  5 mg Oral BID Tess JOURDAN Slaughter     • diltiazem  120 mg Oral Daily Tess JOURDAN Slaughter     • docusate sodium  100 mg Oral BID PRN Tess JOURDAN Slaughter     • glycerin-hypromellose-  1 drop Both Eyes BID Tess JOURDAN Slaughter     • hydrOXYzine HCL  50 mg Oral Q6H PRN Tess JOURDAN Slaughter     • levothyroxine  150 mcg Oral Every Other Day Too Guzman PA-C     • [START ON 2/22/2023] levothyroxine  75 mcg Oral Every Other Day Too Guzman PA-C     • meclizine  25 mg Oral Q8H PRN Tess SodJOURDAN manuel     • melatonin  3 mg Oral HS PRN Tess SodenJOURDAN     • potassium chloride  10 mEq Oral Daily Tess SodJOURDAN manuel     • sotalol  120 mg Oral Daily Tess JOURDAN Slaughter       Continuous Infusions:   PRN Meds: •  acetaminophen  •  docusate sodium  •  hydrOXYzine HCL  •  meclizine  •  melatonin    Review of Systems:  ROS as noted above, otherwise 12 point review of systems was performed and is negative  Physical Exam:   GEN: NAD, alert and oriented, well appearing  SKIN: dry without significant lesions or rashes  HEENT: NCAT, PERRL, EOMs intact  NECK: No JVD or carotid bruits appreciated  CARDIOVASCULAR: RRR, normal S1, S2 without murmurs, rubs, or gallops appreciated  LUNGS: Clear to auscultation bilaterally without wheezes, rhonchi, or rales  ABDOMEN: Soft, nontender, nondistended  EXTREMITIES/VASCULAR: perfused without clubbing, cyanosis; +1 pitting edema of lower extremities  PSYCH: Normal mood and affect  NEURO: CN ll-Xll grossly intact              Lab Results: I have personally reviewed pertinent lab results  Results from last 7 days   Lab Units 23  1022   WBC Thousand/uL 5 08   HEMOGLOBIN g/dL 11 7   HEMATOCRIT % 37 9   PLATELETS Thousands/uL 216     Results from last 7 days   Lab Units 23  0440 23  1022   POTASSIUM mmol/L 3 8 3 5   CHLORIDE mmol/L 116* 111*   CO2 mmol/L 23 25   BUN mg/dL 20 20   CREATININE mg/dL 1 14 1 19   CALCIUM mg/dL 8 7 9 3         Results from last 7 days   Lab Units 23  0440 23  1022   MAGNESIUM mg/dL 2 4 2 3       Imaging: I have personally reviewed pertinent reports  Results for orders placed during the hospital encounter of 19    Echo complete with contrast if indicated    Narrative  [address] [place] [address]    Transthoracic Echocardiogram  2D, M-mode, Doppler, and Color Doppler    Study date:  2019    Patient: Lord Lweis  MR number: [id number]  Account number: [de-identified]  : [id number]  Age: 68 years  Gender: Female  Status: Outpatient  Location: [address] [place] [address] and Vascular Center  Height: 62 in  Weight: 178 6 lb  BP: 138/ 74 mmHg    Indications: Shortness of breath, lower exremity edema, PAF      Diagnoses: I48 0 - Atrial fibrillation, R06 02 - Shortness of breath    Sonographer:  CARLOS Vee CS RVT  Primary Physician:  Gee Dias MD  Referring Physician:  SHADI Little  Group:  [id number]  Interpreting Physician:  Hanna Boo MD    SUMMARY    LEFT VENTRICLE:  Systolic function was normal  Ejection fraction was estimated to be 65 %  There were no regional wall motion abnormalities  Doppler parameters were consistent with abnormal left ventricular relaxation (grade 1 diastolic dysfunction)  LEFT ATRIUM:  The atrium was mildly dilated  MITRAL VALVE:  There was mild annular calcification  There was mild regurgitation  AORTIC VALVE:  There was mild regurgitation  TRICUSPID VALVE:  There was mild to moderate regurgitation  HISTORY: PRIOR HISTORY: Right breast cancer, right mastectomy  PRIOR PROCEDURES: Arrhythmia ablation  PROCEDURE: The study was performed in the [facility]  This was a routine study  The transthoracic approach was used  The study included complete 2D imaging, M-mode, complete spectral Doppler, and color Doppler  Images were obtained from the parasternal, apical, subcostal, and suprasternal notch acoustic windows  Image quality was adequate  LEFT VENTRICLE: Size was normal  Systolic function was normal  Ejection fraction was estimated to be 65 %  There were no regional wall motion abnormalities  Wall thickness was normal  DOPPLER: Doppler parameters were consistent with  abnormal left ventricular relaxation (grade 1 diastolic dysfunction)  RIGHT VENTRICLE: The size was normal  Systolic function was normal  Wall thickness was normal     LEFT ATRIUM: The atrium was mildly dilated  RIGHT ATRIUM: Size was normal     MITRAL VALVE: There was mild annular calcification  Valve structure was normal  There was normal leaflet separation  DOPPLER: The transmitral velocity was within the normal range  There was no evidence for stenosis  There was mild  regurgitation  AORTIC VALVE: The valve was trileaflet  Leaflets exhibited normal thickness, normal cuspal separation, and sclerosis  DOPPLER: Transaortic velocity was within the normal range  There was no evidence for stenosis  There was mild  regurgitation  TRICUSPID VALVE: The valve structure was normal  There was normal leaflet separation  DOPPLER: The transtricuspid velocity was within the normal range  There was no evidence for stenosis  There was mild to moderate regurgitation  Pulmonary  artery systolic pressure was mildly increased  PULMONIC VALVE: Leaflets exhibited normal thickness, no calcification, and normal cuspal separation  DOPPLER: The transpulmonic velocity was within the normal range  There was no significant regurgitation  PERICARDIUM: There was no pericardial effusion  The pericardium was normal in appearance  AORTA: The root exhibited normal size  SYSTEMIC VEINS: IVC: The inferior vena cava was normal in size  PULMONARY VEINS: DOPPLER: There was systolic blunting in the pulmonary vein(s)      SYSTEM MEASUREMENT TABLES    2D  %FS: 31 66 %  Ao Diam: 2 23 cm  EDV(Teich): 105 22 ml  EF(Teich): 59 57 %  ESV(Cube): 34 34 ml  ESV(Teich): 42 54 ml  IVSd: 0 81 cm  LA Area: 21 18 cm2  LA Diam: 3 68 cm  LVEDV MOD A4C: 79 17 ml  LVEF MOD A4C: 66 73 %  LVESV MOD A4C: 26 34 ml  LVIDd: 4 76 cm  LVIDs: 3 25 cm  LVLd A4C: 6 9 cm  LVLs A4C: 5 58 cm  LVPWd: 0 76 cm  RA Area: 13 38 cm2  RV Diam : 3 39 cm  SI(Cube): 40 02 ml/m2  SI(Teich): 34 25 ml/m2  SV MOD A4C: 52 83 ml  SV(Cube): 73 23 ml  SV(Teich): 62 68 ml    CW  AR Dec Harney: 2 07 m/s2  AR Dec Time: 2636 88 ms  AR PHT: 764 69 ms  AR Vmax: 4 56 m/s  AR maxP 02 mmHg  TR Vmax: 3 1 m/s  TR maxP 41 mmHg    MM  TAPSE: 2 23 cm    PW  E': 0 06 m/s  E/E': 15 62  MV A Obed: 0 32 m/s  MV Dec Harney: 2 53 m/s2  MV DecT: 397 07 ms  MV E Obed: 1 m/s  MV E/A Ratio: 3 16    [employer] Accredited Echocardiography Laboratory    Prepared and electronically signed by    Jaja Nagel MD  Signed 03-Apr-2019 12:44:18      VTE Pharmacologic Prophylaxis: Eliquis  VTE Mechanical Prophylaxis: sequential compression device

## 2023-02-22 LAB
ANION GAP SERPL CALCULATED.3IONS-SCNC: 2 MMOL/L (ref 4–13)
ATRIAL RATE: 70 BPM
ATRIAL RATE: 71 BPM
ATRIAL RATE: 71 BPM
BUN SERPL-MCNC: 21 MG/DL (ref 5–25)
CALCIUM SERPL-MCNC: 8.8 MG/DL (ref 8.3–10.1)
CHLORIDE SERPL-SCNC: 116 MMOL/L (ref 96–108)
CO2 SERPL-SCNC: 24 MMOL/L (ref 21–32)
CREAT SERPL-MCNC: 1.24 MG/DL (ref 0.6–1.3)
GFR SERPL CREATININE-BSD FRML MDRD: 41 ML/MIN/1.73SQ M
GLUCOSE SERPL-MCNC: 84 MG/DL (ref 65–140)
MAGNESIUM SERPL-MCNC: 2.4 MG/DL (ref 1.6–2.6)
P AXIS: 54 DEGREES
P AXIS: 76 DEGREES
POTASSIUM SERPL-SCNC: 4.2 MMOL/L (ref 3.5–5.3)
PR INTERVAL: 218 MS
PR INTERVAL: 238 MS
PR INTERVAL: 242 MS
QRS AXIS: -13 DEGREES
QRS AXIS: -18 DEGREES
QRS AXIS: -9 DEGREES
QRSD INTERVAL: 82 MS
QRSD INTERVAL: 82 MS
QRSD INTERVAL: 84 MS
QT INTERVAL: 456 MS
QT INTERVAL: 496 MS
QT INTERVAL: 506 MS
QTC INTERVAL: 492 MS
QTC INTERVAL: 538 MS
QTC INTERVAL: 549 MS
SODIUM SERPL-SCNC: 142 MMOL/L (ref 135–147)
T WAVE AXIS: 2 DEGREES
T WAVE AXIS: 4 DEGREES
T WAVE AXIS: 9 DEGREES
VENTRICULAR RATE: 70 BPM
VENTRICULAR RATE: 71 BPM
VENTRICULAR RATE: 71 BPM

## 2023-02-22 RX ORDER — FUROSEMIDE 40 MG/1
40 TABLET ORAL DAILY
Status: DISCONTINUED | OUTPATIENT
Start: 2023-02-23 | End: 2023-02-23 | Stop reason: HOSPADM

## 2023-02-22 RX ORDER — SOTALOL HYDROCHLORIDE 80 MG/1
80 TABLET ORAL DAILY
Status: DISCONTINUED | OUTPATIENT
Start: 2023-02-23 | End: 2023-02-23 | Stop reason: HOSPADM

## 2023-02-22 RX ORDER — SOTALOL HYDROCHLORIDE 80 MG/1
80 TABLET ORAL DAILY
Status: DISCONTINUED | OUTPATIENT
Start: 2023-02-22 | End: 2023-02-22

## 2023-02-22 RX ADMIN — APIXABAN 5 MG: 5 TABLET, FILM COATED ORAL at 17:00

## 2023-02-22 RX ADMIN — MECLIZINE HYDROCHLORIDE 25 MG: 25 TABLET ORAL at 05:24

## 2023-02-22 RX ADMIN — FUROSEMIDE 40 MG: 10 INJECTION, SOLUTION INTRAVENOUS at 09:15

## 2023-02-22 RX ADMIN — SOTALOL HYDROCHLORIDE 80 MG: 80 TABLET ORAL at 11:03

## 2023-02-22 RX ADMIN — LEVOTHYROXINE SODIUM 75 MCG: 75 TABLET ORAL at 05:24

## 2023-02-22 RX ADMIN — POTASSIUM CHLORIDE 10 MEQ: 750 TABLET, EXTENDED RELEASE ORAL at 09:15

## 2023-02-22 RX ADMIN — DILTIAZEM HYDROCHLORIDE 120 MG: 120 CAPSULE, COATED, EXTENDED RELEASE ORAL at 09:15

## 2023-02-22 RX ADMIN — APIXABAN 5 MG: 5 TABLET, FILM COATED ORAL at 09:15

## 2023-02-22 RX ADMIN — GLYCERIN 1 DROP: .002; .002; .01 SOLUTION/ DROPS OPHTHALMIC at 09:13

## 2023-02-22 NOTE — CASE MANAGEMENT
Case Management Assessment & Discharge Planning Note    Patient name Huang Mendiola  [facility] MRN [id number]  : 1945 Date 2023       Current Admission Date: 2023  Current Admission Diagnosis:Atrial fibrillation [facility])   Patient Active Problem List    Diagnosis Date Noted   • Occipital neuralgia of right side 2022   • Cervical myofascial pain syndrome 2022   • Cervical spondylosis 2022   • Platelets decreased ([facility] ) 2022   • Stage 3b chronic kidney disease ([facility] ) 2022   • History of ductal carcinoma in situ (DCIS) of breast 2021   • SSS (sick sinus syndrome) ([facility] ) 2021   • Cardiac pacemaker 2021   • Bradycardia 2021   • RICHARDS (dyspnea on exertion) 2020   • Cortical age-related cataract of both eyes 2020   • Current use of long term anticoagulation 2019   • Other idiopathic peripheral autonomic neuropathy 2019   • Hyperglycemia 2019   • Mixed hyperlipidemia 2019   • Weight loss 2018   • SOB (shortness of breath) 2018   • Abnormal chest CT 03/15/2018   • Pulmonary nodule seen on imaging study 03/15/2018   • Candidal esophagitis ([facility] ) 2018   • Bronchitis with bronchospasm 2018   • Long QT interval 10/17/2017   • BPPV (benign paroxysmal positional vertigo) 2017   • Lymphedema 2017   • Bilateral edema of lower extremity 2017   • Peripheral neuropathy 2017   • History of right breast cancer 2016   • Hypokalemia 2016   • Benign essential hypertension 2016   • Cardiomyopathy ([facility] ) 2016   • [facility] 2016   • BMI 30 0-30 9,adult 2016   • Cervical stenosis of spine 10/02/2015   • Overactive bladder 2013   • Edema 2013   • Osteopenia 2013   • Homocysteinemia 2012   • Acquired hypothyroidism 2012   • Vitamin D deficiency 2012      LOS (days): 1  Geometric Mean LOS (GMLOS) (days): 1 80  Days to GMLOS:0 4     OBJECTIVE:    Risk of Unplanned Readmission Score: 10 62         Current admission status: Inpatient       Preferred Pharmacy:   [id number] - [address] [address]  Phone: [phone] Fax: [phone]    Primary Care Provider: Melo Zuluaga MD    Primary Insurance: [facility]  Secondary Insurance:     ASSESSMENT:  [place] [facility] Proxies    There are no active Health Care Proxies on file  Readmission Root Cause  30 Day Readmission: No    Patient Information  Admitted from[de-identified] Home  Mental Status: Alert  During Assessment patient was accompanied by: Not accompanied during assessment  Assessment information provided by[de-identified] Patient  Primary Caregiver: Self  Support Systems: Spouse/significant other, Self  [place] of Residence: [address] [place] [address] do you live in?: [facility] entry access options   Select all that apply : Stairs  Number of steps to enter home : 2  Do the steps have railings?: Yes  Type of Current Residence: [address] [place] home  Upon entering residence, is there a bedroom on the main floor (no further steps)?: Yes  Upon entering residence, is there a bathroom on the main floor (no further steps)?: Yes  In the last 12 months, was there a time when you were not able to pay the mortgage or rent on time?: No  In the last 12 months, how many places have you lived?: 1  In the last 12 months, was there a time when you did not have a steady place to sleep or slept in a shelter (including now)?: No  Homeless/housing insecurity resource given?: N/A  Living Arrangements: Lives w/ Spouse/significant other, Lives w/ Son  Is patient a ?: No    Activities of Daily Living Prior to Admission  Functional Status: Independent  Completes ADLs independently?: Yes  Ambulates independently?: Yes  Does patient use assisted devices?: No  Does patient currently own DME?: Yes  What DME does the patient currently own?: [employer]  Does patient have a history of Outpatient Therapy (PT/OT)?: No  Does the patient have a history of Short-Term Rehab?: No  Does patient have a history of HHC?: No  Does patient currently have [facility]?: No         Patient Information Continued  Income Source: SSI/SSD  Does patient have prescription coverage?: Yes  Within the past 12 months, you worried that your food would run out before you got the money to buy more : Never true  Within the past 12 months, the food you bought just didn't last and you didn't have money to get more : Never true  Food insecurity resource given?: N/A  Does patient receive dialysis treatments?: No  Does patient have a history of substance abuse?: No  Does patient have a history of Mental Health Diagnosis?: No         Means of Transportation  Means of Transport to Appts[de-identified] Drives Self  In the past 12 months, has lack of transportation kept you from medical appointments or from getting medications?: No  In the past 12 months, has lack of transportation kept you from meetings, work, or from getting things needed for daily living?: No  Was application for public transport provided?: N/A        DISCHARGE DETAILS:    Discharge planning discussed with[de-identified] patient  Freedom of Choice: Yes     CM contacted family/caregiver?: No- see comments  Were Treatment Team discharge recommendations reviewed with patient/caregiver?: Yes  Did patient/caregiver verbalize understanding of patient care needs?: Yes  Were patient/caregiver advised of the risks associated with not following Treatment Team discharge recommendations?: Yes    Contacts  Patient Contacts: [place]-spouse  Relationship to Patient[de-identified] Family  Contact Method: Phone  Phone Number: [phone]  Reason/Outcome: Continuity of Care, Emergency Contact, Discharge [address]         Is the patient interested in [facility] at discharge?: No

## 2023-02-22 NOTE — PROGRESS NOTES
Progress Note - Electrophysiology  Ella Darby 68 y o  female MRN: [id number]  Unit/Bed#: [facility] 531-01 Encounter: [id number]      Assessment:  1  Symptomatic paroxysmal atrial fibrillation  - anticoagulated with Eliquis / Mkmpr1Kicy score of 3 (age, sex)  - EF of 65% per echo 6/2020 / mild dilation of LA noted  - rate control: diltiazem  - antiarrhythmic therapy: being started sotalol / was on flecainide with breakthrough   - prior ablation: cryo of atrial fibrillation with pulmonary vein isolation by Dr Gerry Taylor 8/2016  2  Medtronic dual-chamber pacemaker in situ  -Implanted by Dr Gorge Eden on 6/7/2021 due to sick sinus syndrome  3  Hypothyroidism  4  History of breast cancer   - status post chemotherapy  5  Obesity BMI of 31  6  Benign paroxysmal positional vertigo  - utilizes meclizine as needed    Plan:  Patient is currently still maintaining sinus rhythm  She has already received 3 doses of sotalol  She had been on 120mg daily but given some QT prolongation she was decreased to 80mg this morning with follow up EKG showing stable QTc  Will continue to this dosing and continue to check EKGs two hours post  Will also monitor electrolytes and telemetry as well which has not shown ventricular ectopy thus far  If QT remains stable, can be discharged likely tomorrow  Will transition from IV to oral diuretic tomorrow  Subjective/Objective   Subjective: Ella Darby is a 68y o  year old female with symptomatic paroxysmal atrial fibrillation anticoagulated with Eliquis, sick sinus syndrome status post pacemaker, hypothyroidism, history of breast cancer status postchemotherapy and mastectomy, obesity with BMI of 31, and vertigo  She is hospital stay day 3 and has been tolerating sotalol well  She reports doing well and wants to go home today   Otherwise peeing frequently and walking around    TELE: atrially paced at 70 beats per minute, no ventricular arrhythmias    EKG: after 80mg dose decrease - Objective:  Vitals: /67   Pulse 72   Temp 97 8 °F (36 6 °C)   Resp 20   Ht 5' 2" (1 575 m)   Wt 79 3 kg (174 lb 13 2 oz)   SpO2 97%   BMI 31 98 kg/m²     Vitals:    02/20/23 9567   Weight: 79 3 kg (174 lb 13 2 oz)     Orthostatic Blood Pressures    Flowsheet Row Most Recent Value   Blood Pressure 121/67 filed at 02/22/2023 1121   Patient Position - Orthostatic VS Lying filed at 02/21/2023 1554            Intake/Output Summary (Last 24 hours) at 2/22/2023 1355  Last data filed at 2/22/2023 1253  Gross per 24 hour   Intake 1057 ml   Output 1300 ml   Net -243 ml       Invasive Devices     Peripheral Intravenous Line  Duration           Peripheral IV 02/20/23 Left;Proximal;Ventral (anterior) Forearm 2 days                          Scheduled Meds:  Current Facility-Administered Medications   Medication Dose Route Frequency Provider Last Rate   • acetaminophen  650 mg Oral Q4H PRN Troy Hernandez PA-C     • apixaban  5 mg Oral BID Tess SodJOURDAN manuel     • diltiazem  120 mg Oral Daily Tess SodJOURDAN manuel     • docusate sodium  100 mg Oral BID PRN Tess Soden, JOURDAN     • glycerin-hypromellose-  1 drop Both Eyes BID Tess Soden, JOURDAN     • hydrOXYzine HCL  50 mg Oral Q6H PRN Tess Soden, PA-C     • levothyroxine  150 mcg Oral Every Other Day Tess Soden, PA-C     • levothyroxine  75 mcg Oral Every Other Day Tess Soden, PA-C     • meclizine  25 mg Oral Q8H PRN Tess Soden, PA-TAYLOR     • melatonin  3 mg Oral HS PRN Tess Soden, PA-C     • potassium chloride  10 mEq Oral Daily Tess Soden, PA-TAYLOR     • sotalol  80 mg Oral Daily Tess Soden, PAAsimC       Continuous Infusions:   PRN Meds: •  acetaminophen  •  docusate sodium  •  hydrOXYzine HCL  •  meclizine  •  melatonin    Review of Systems:  ROS as noted above, otherwise 12 point review of systems was performed and is negative       Physical Exam:   GEN: NAD, alert and oriented, well appearing  SKIN: dry without significant lesions or rashes  HEENT: NCAT, PERRL, EOMs intact  NECK: No JVD or carotid bruits appreciated  CARDIOVASCULAR: RRR, normal S1, S2 without murmurs, rubs, or gallops appreciated  LUNGS: Clear to auscultation bilaterally without wheezes, rhonchi, or rales  ABDOMEN: Soft, nontender, nondistended  EXTREMITIES/VASCULAR: perfused without clubbing, cyanosis; edema improved  PSYCH: Normal mood and affect  NEURO: CN ll-Xll grossly intact              Lab Results: I have personally reviewed pertinent lab results  Results from last 7 days   Lab Units 23  1022   WBC Thousand/uL 5 08   HEMOGLOBIN g/dL 11 7   HEMATOCRIT % 37 9   PLATELETS Thousands/uL 216     Results from last 7 days   Lab Units 23  0535 23  0440 23  1022   POTASSIUM mmol/L 4 2 3 8 3 5   CHLORIDE mmol/L 116* 116* 111*   CO2 mmol/L 24 23 25   BUN mg/dL 21 20 20   CREATININE mg/dL 1 24 1 14 1 19   CALCIUM mg/dL 8 8 8 7 9 3         Results from last 7 days   Lab Units 23  0535 23  0440 23  1022   MAGNESIUM mg/dL 2 4 2 4 2 3       Imaging: I have personally reviewed pertinent reports  Results for orders placed during the hospital encounter of 19    Echo complete with contrast if indicated    Narrative  [address] [place] [address]    Transthoracic Echocardiogram  2D, M-mode, Doppler, and Color Doppler    Study date:  2019    Patient: Radha Hopson  MR number: [id number]  Account number: [de-identified]  : [id number]  Age: 68 years  Gender: Female  Status: Outpatient  Location: [address] [place] [address] [facility] [place]  Height: 62 in  Weight: 178 6 lb  BP: 138/ 74 mmHg    Indications: Shortness of breath, lower exremity edema, PAF      Diagnoses: I48 0 - Atrial fibrillation, R06 02 - Shortness of breath    Sonographer:  CARLOS [facility] RVT  Primary Physician:  aZchary Becerra MD  Referring Physician:  SHADI Schwartz  Group:  [facility]  Interpreting Physician: Jennifer Cordova MD    SUMMARY    LEFT VENTRICLE:  Systolic function was normal  Ejection fraction was estimated to be 65 %  There were no regional wall motion abnormalities  Doppler parameters were consistent with abnormal left ventricular relaxation (grade 1 diastolic dysfunction)  LEFT ATRIUM:  The atrium was mildly dilated  MITRAL VALVE:  There was mild annular calcification  There was mild regurgitation  AORTIC VALVE:  There was mild regurgitation  TRICUSPID VALVE:  There was mild to moderate regurgitation  HISTORY: PRIOR HISTORY: Right breast cancer, right mastectomy  PRIOR PROCEDURES: Arrhythmia ablation  PROCEDURE: The study was performed in the [facility]  This was a routine study  The transthoracic approach was used  The study included complete 2D imaging, M-mode, complete spectral Doppler, and color Doppler  Images were obtained from the parasternal, apical, subcostal, and suprasternal notch acoustic windows  Image quality was adequate  LEFT VENTRICLE: Size was normal  Systolic function was normal  Ejection fraction was estimated to be 65 %  There were no regional wall motion abnormalities  Wall thickness was normal  DOPPLER: Doppler parameters were consistent with  abnormal left ventricular relaxation (grade 1 diastolic dysfunction)  RIGHT VENTRICLE: The size was normal  Systolic function was normal  Wall thickness was normal     LEFT ATRIUM: The atrium was mildly dilated  RIGHT ATRIUM: Size was normal     MITRAL VALVE: There was mild annular calcification  Valve structure was normal  There was normal leaflet separation  DOPPLER: The transmitral velocity was within the normal range  There was no evidence for stenosis  There was mild  regurgitation  AORTIC VALVE: The valve was trileaflet  Leaflets exhibited normal thickness, normal cuspal separation, and sclerosis  DOPPLER: Transaortic velocity was within the normal range   There was no evidence for stenosis  There was mild  regurgitation  TRICUSPID VALVE: The valve structure was normal  There was normal leaflet separation  DOPPLER: The transtricuspid velocity was within the normal range  There was no evidence for stenosis  There was mild to moderate regurgitation  Pulmonary  artery systolic pressure was mildly increased  PULMONIC VALVE: Leaflets exhibited normal thickness, no calcification, and normal cuspal separation  DOPPLER: The transpulmonic velocity was within the normal range  There was no significant regurgitation  PERICARDIUM: There was no pericardial effusion  The pericardium was normal in appearance  AORTA: The root exhibited normal size  SYSTEMIC VEINS: IVC: The inferior vena cava was normal in size  PULMONARY VEINS: DOPPLER: There was systolic blunting in the pulmonary vein(s)      SYSTEM MEASUREMENT TABLES    2D  %FS: 31 66 %  Ao Diam: 2 23 cm  EDV(Teich): 105 22 ml  EF(Teich): 59 57 %  ESV(Cube): 34 34 ml  ESV(Teich): 42 54 ml  IVSd: 0 81 cm  LA Area: 21 18 cm2  LA Diam: 3 68 cm  LVEDV MOD A4C: 79 17 ml  LVEF MOD A4C: 66 73 %  LVESV MOD A4C: 26 34 ml  LVIDd: 4 76 cm  LVIDs: 3 25 cm  LVLd A4C: 6 9 cm  LVLs A4C: 5 58 cm  LVPWd: 0 76 cm  RA Area: 13 38 cm2  RV Diam : 3 39 cm  SI(Cube): 40 02 ml/m2  SI(Teich): 34 25 ml/m2  SV MOD A4C: 52 83 ml  SV(Cube): 73 23 ml  SV(Teich): 62 68 ml    CW  AR Dec Power: 2 07 m/s2  AR Dec Time: 2636 88 ms  AR PHT: 764 69 ms  AR Vmax: 4 56 m/s  AR maxP 02 mmHg  TR Vmax: 3 1 m/s  TR maxP 41 mmHg    MM  TAPSE: 2 23 cm    PW  E': 0 06 m/s  E/E': 15 62  MV A Obed: 0 32 m/s  MV Dec Power: 2 53 m/s2  MV DecT: 397 07 ms  MV E Obed: 1 m/s  MV E/A Ratio: 3 16    [employer] Accredited Echocardiography Laboratory    Prepared and electronically signed by    Tiffanie Beckett MD  Signed 2019 12:44:18      VTE Pharmacologic Prophylaxis: Eliquis  VTE Mechanical Prophylaxis: sequential compression device

## 2023-02-23 VITALS
OXYGEN SATURATION: 96 % | SYSTOLIC BLOOD PRESSURE: 120 MMHG | HEART RATE: 70 BPM | WEIGHT: 174.82 LBS | TEMPERATURE: 97.9 F | DIASTOLIC BLOOD PRESSURE: 67 MMHG | HEIGHT: 62 IN | RESPIRATION RATE: 14 BRPM | BODY MASS INDEX: 32.17 KG/M2

## 2023-02-23 LAB
ANION GAP SERPL CALCULATED.3IONS-SCNC: 6 MMOL/L (ref 4–13)
ATRIAL RATE: 70 BPM
ATRIAL RATE: 71 BPM
ATRIAL RATE: 71 BPM
BUN SERPL-MCNC: 23 MG/DL (ref 5–25)
CALCIUM SERPL-MCNC: 9.1 MG/DL (ref 8.3–10.1)
CHLORIDE SERPL-SCNC: 113 MMOL/L (ref 96–108)
CO2 SERPL-SCNC: 23 MMOL/L (ref 21–32)
CREAT SERPL-MCNC: 1.17 MG/DL (ref 0.6–1.3)
GFR SERPL CREATININE-BSD FRML MDRD: 45 ML/MIN/1.73SQ M
GLUCOSE SERPL-MCNC: 78 MG/DL (ref 65–140)
P AXIS: 41 DEGREES
P AXIS: 75 DEGREES
POTASSIUM SERPL-SCNC: 3.7 MMOL/L (ref 3.5–5.3)
PR INTERVAL: 210 MS
PR INTERVAL: 232 MS
PR INTERVAL: 232 MS
QRS AXIS: -11 DEGREES
QRS AXIS: -13 DEGREES
QRS AXIS: -9 DEGREES
QRSD INTERVAL: 80 MS
QRSD INTERVAL: 80 MS
QRSD INTERVAL: 82 MS
QT INTERVAL: 438 MS
QT INTERVAL: 440 MS
QT INTERVAL: 448 MS
QTC INTERVAL: 475 MS
QTC INTERVAL: 475 MS
QTC INTERVAL: 487 MS
SODIUM SERPL-SCNC: 142 MMOL/L (ref 135–147)
T WAVE AXIS: 13 DEGREES
T WAVE AXIS: 3 DEGREES
T WAVE AXIS: 8 DEGREES
VENTRICULAR RATE: 70 BPM
VENTRICULAR RATE: 71 BPM
VENTRICULAR RATE: 71 BPM

## 2023-02-23 RX ORDER — SOTALOL HYDROCHLORIDE 80 MG/1
80 TABLET ORAL DAILY
Qty: 30 TABLET | Refills: 0 | Status: SHIPPED | OUTPATIENT
Start: 2023-02-23 | End: 2023-03-17

## 2023-02-23 RX ORDER — POTASSIUM CHLORIDE 750 MG/1
20 CAPSULE, EXTENDED RELEASE ORAL DAILY
Qty: 90 CAPSULE | Refills: 0
Start: 2023-02-23

## 2023-02-23 RX ADMIN — SOTALOL HYDROCHLORIDE 80 MG: 80 TABLET ORAL at 10:09

## 2023-02-23 RX ADMIN — MECLIZINE HYDROCHLORIDE 25 MG: 25 TABLET ORAL at 05:40

## 2023-02-23 RX ADMIN — LEVOTHYROXINE SODIUM 150 MCG: 75 TABLET ORAL at 05:38

## 2023-02-23 RX ADMIN — APIXABAN 5 MG: 5 TABLET, FILM COATED ORAL at 09:04

## 2023-02-23 RX ADMIN — POTASSIUM CHLORIDE 10 MEQ: 750 TABLET, EXTENDED RELEASE ORAL at 09:03

## 2023-02-23 RX ADMIN — DILTIAZEM HYDROCHLORIDE 120 MG: 120 CAPSULE, COATED, EXTENDED RELEASE ORAL at 09:03

## 2023-02-23 RX ADMIN — FUROSEMIDE 40 MG: 40 TABLET ORAL at 09:03

## 2023-02-23 NOTE — DISCHARGE SUMMARY
Discharge Summary - Ryan Anguiano 68 y o  female MRN: [id number]    Unit/Bed#: [facility] 531-01 Encounter: [id number]      Admission Date: 2/20/2023   Discharge Date: 2/23/2023    Discharge Diagnosis:   1  Symptomatic paroxysmal atrial fibrillation  - anticoagulated with Eliquis / Gsqms6Vrsr score of 3 (age, sex)  - EF of 65% per echo 6/2020 / mild dilation of LA noted  - rate control: diltiazem  - antiarrhythmic therapy: being started sotalol / was on flecainide with breakthrough   - prior ablation: cryo of atrial fibrillation with pulmonary vein isolation by Dr Sara Silva 8/2016  2  Medtronic dual-chamber pacemaker in situ  -Implanted by Dr Mariano Arcos on 6/7/2021 due to sick sinus syndrome  3  Hypothyroidism  4  History of breast cancer   - status post chemotherapy  5  Obesity BMI of 31  6  Benign paroxysmal positional vertigo  - utilizes meclizine as needed    Procedures Performed: None  No orders of the defined types were placed in this encounter  Consultants: None    HPI: Please refer to the initial history and physical as well as procedure notes for full details  Briefly, Ryan Anguiano is a 68y o  year old female with past medical history as stated above  She has been followed by Dr Mariano Arcos and in the past had been maintained on flecainide therapy  She was also found to be bradycardic with sick sinus syndrome and she therefore underwent pacemaker implantation in June 2021  More recently she has had more episodes of atrial fibrillation and flutter, breaking through flecainide  Her main symptom is shortness of breath and not being able to get from one room to the other without experiencing this  She felt as though she has had atrial fibrillation earlier this morning  It was discussed over the phone and she was interested in trying a different antiarrhythmic  She therefore discontinued flecainide 2 weeks ago  Patient presented this hospital admission to undergo initiation of sotalol       Hospital Course:   Martina Nick is a 68 y o  female who was admitted elective for antiarrhythmic medication initiation  Patient was talked to by Dr Bakari Villagomez over the phone and recommended to initiate therapy with sotalol  She has been on chronic anticoagulation therapy with Eliquis without missed dose  She presents to the hospital in sinus rhythm atrial paced  She was initiated on sotalol at 120 mg daily as it was seen that QT did somewhat prolonged, she was decreased to 80 mg twice daily after couple doses  Serial EKGs were performed 2 hours after each dose of antiarrhythmic medication  There were no significant changes in QT/QTc with intiating doses  There were no significant occurrence of ventricular ectopy on telemetry  Electrolytes and renal function were monitored throughout her stay  She underwent a total of 4 doses, with her last EKG as follows:      Physical exam on the day of discharge was as follows:  GEN: NAD, alert and oriented, well appearing  SKIN: dry without significant lesions or rashes  HEENT: NCAT, PERRL, EOMs intact  NECK: No JVD or carotid bruits appreciated  CARDIOVASCULAR: RRR, normal S1, S2 without murmurs, rubs, or gallops appreciated  LUNGS: Clear to auscultation bilaterally without wheezes, rhonchi, or rales  ABDOMEN: Soft, nontender, nondistended  EXTREMITIES/VASCULAR: perfused without clubbing, cyanosis, or edema b/l  PSYCH: Normal mood and affect  NEURO: CN ll-Xll grossly intact    Patient had received a couple doses of IV diuretic while inpatient as she had lower extremity edema that was painful to touch  She reported that this was much improved during her stay  At time of discharge, patient was ambulating the cardiac unit without complaints of  lightheadedness, presyncope, syncope, chest pain, shortness of breath, orthopnea, PND, palpitations, or bleeding problems   She received education regarding sotalol therapy, avoiding missed doses, pharmacy moitoring for drug to drug interactions, and concerning signs and symptoms that would prompt urgent evaluation  She was given a one-week EKG appointment after starting Adderall along with a follow up with Too Guzman PA-C in 4-6 weeks in the office  In terms of patient's medications, she was on sotalol 80 mg once daily  She was advised to not take flecainide  She will continue both diltiazem and Eliquis  She was asked to continue her same dose of oral diuretic but will call our office if she has issues with lower extremity edema  Will increase potassium to 20 mill equivalents daily  She is stable for discharge at this time with all questions answered  She was discussed in detail with Dr Brisa Heath who is in agreement with this discharge summary  Discharge Medications:  See after visit summary for reconciled discharge medications provided to patient and family      Medications Prior to Admission   Medication   • cyanocobalamin (VITAMIN B-12) 500 mcg tablet   • Dilt- MG 24 hr capsule   • Eliquis 5 MG   • flecainide (TAMBOCOR) 100 mg tablet   • furosemide (LASIX) 40 mg tablet   • levothyroxine 150 mcg tablet   • meclizine (ANTIVERT) 25 mg tablet   • oxybutynin (DITROPAN-XL) 10 MG 24 hr tablet   • potassium chloride (MICRO-K) 10 MEQ CR capsule   • RESTASIS 0 05 % ophthalmic emulsion   • predniSONE 10 mg tablet         Pertininet Labs/diagnostics:  CBC with diff:   Results from last 7 days   Lab Units 02/20/23  1022   WBC Thousand/uL 5 08   HEMOGLOBIN g/dL 11 7   HEMATOCRIT % 37 9   MCV fL 90   PLATELETS Thousands/uL 216   MCH pg 27 7   MCHC g/dL 30 9*   RDW % 16 1*   MPV fL 11 4       BMP:  Results from last 7 days   Lab Units 02/23/23  0441 02/22/23  0535 02/21/23  0440 02/20/23  1022   POTASSIUM mmol/L 3 7 4 2 3 8 3 5   CHLORIDE mmol/L 113* 116* 116* 111*   CO2 mmol/L 23 24 23 25   BUN mg/dL 23 21 20 20   CREATININE mg/dL 1 17 1 24 1 14 1 19   CALCIUM mg/dL 9 1 8 8 8 7 9 3       Magnesium:   Results from last 7 days   Lab Units 02/22/23  0535 02/21/23  0440 02/20/23  1022   MAGNESIUM mg/dL 2 4 2 4 2 3       Coags:       Complications: none    Condition at Discharge: good     Discharge instructions/Information to patient and family:   See after visit summary for information provided to patient and family  Provisions for Follow-Up Care:  See after visit summary for information related to follow-up care and any pertinent home health orders  Disposition: Home    Planned Readmission: No    Discharge Statement   I spent 45 minutes minutes discharging the patient  This time was spent on the day of discharge  I had direct contact with the patient on the day of discharge  Additional documentation is required if more than 30 minutes were spent on discharge   Evaluating the incision, discussing discharge instructions and restrictions, arranging follow up appointments, discussing medications

## 2023-02-23 NOTE — CASE MANAGEMENT
Case Management Discharge Planning Note    Patient name Galilea Fernandez  Location [facility] 531/[facility] 790-25 MRN [id number]  : 1945 Date 2023       Current Admission Date: 2023  Current Admission Diagnosis:Atrial fibrillation [facility])   Patient Active Problem List    Diagnosis Date Noted   • Occipital neuralgia of right side 2022   • Cervical myofascial pain syndrome 2022   • Cervical spondylosis 2022   • Platelets decreased ([facility] ) 2022   • Stage 3b chronic kidney disease ([facility] ) 2022   • History of ductal carcinoma in situ (DCIS) of breast 2021   • SSS (sick sinus syndrome) ([facility] ) 2021   • Cardiac pacemaker 2021   • Bradycardia 2021   • RICHARDS (dyspnea on exertion) 2020   • Cortical age-related cataract of both eyes 2020   • Current use of long term anticoagulation 2019   • Other idiopathic peripheral autonomic neuropathy 2019   • Hyperglycemia 2019   • Mixed hyperlipidemia 2019   • Weight loss 2018   • SOB (shortness of breath) 2018   • Abnormal chest CT 03/15/2018   • Pulmonary nodule seen on imaging study 03/15/2018   • Candidal esophagitis ([facility] ) 2018   • Bronchitis with bronchospasm 2018   • Long QT interval 10/17/2017   • BPPV (benign paroxysmal positional vertigo) 2017   • Lymphedema 2017   • Bilateral edema of lower extremity 2017   • Peripheral neuropathy 2017   • History of right breast cancer 2016   • Hypokalemia 2016   • Benign essential hypertension 2016   • Cardiomyopathy ([facility] ) 2016   • Atrial fibrillation ([facility] ) 2016   • BMI 30 0-30 9,adult 2016   • Cervical stenosis of spine 10/02/2015   • Overactive bladder 2013   • Edema 2013   • Osteopenia 2013   • Homocysteinemia 2012   • Acquired hypothyroidism 2012   • Vitamin D deficiency 2012      LOS (days): 3  Geometric Mean LOS (GMLOS) (days): 1 80  Days to GMLOS:-1 3     OBJECTIVE:  Risk of Unplanned Readmission Score: 10 81         Current admission status: Inpatient   Preferred Pharmacy:   [facility]/pharmacy #[id number] - [address] [address]  Phone: [phone] Fax: [phone]    Primary Care Provider: Gee Dias MD    Primary Insurance: [facility]  Secondary Insurance:     DISCHARGE DETAILS:    IMM Given (Date):: 02/23/23 (12:30pm)  IMM Given to[de-identified] Patient

## 2023-02-23 NOTE — DISCHARGE INSTR - AVS FIRST PAGE
MEDICATION CHANGES:  Please STOP taking flecainide  Please START taking sotalol 80mg daily  Please continue your water pill and INCREASE potassium to 20mEq daily  In terms of your lower extremity edema (fluid), please make sure you monitor your weight at home  If you gain more than 2 pounds overnight or 5 pounds over a couple weeks, please call the office at [phone] to make us aware  You can call the same number for any further questions or concerns

## 2023-02-24 ENCOUNTER — TRANSITIONAL CARE MANAGEMENT (OUTPATIENT)
Dept: FAMILY MEDICINE CLINIC | Facility: HOSPITAL | Age: 78
End: 2023-02-24

## 2023-02-24 NOTE — UTILIZATION REVIEW
NOTIFICATION OF ADMISSION DISCHARGE   This is a Notification of Discharge from [facility]  Please be advised that this patient has been discharge from our facility  Below you will find the admission and discharge date and time including the patient’s disposition  UTILIZATION REVIEW CONTACT:  Reyes Mena  Utilization   Network Utilization Review Department  Phone: [phone] x carefully listen to the prompts  All voicemails are confidential   Email: [email or web address]     ADMISSION INFORMATION  PRESENTATION DATE: 2/20/2023  9:12 AM  OBERVATION ADMISSION DATE:   INPATIENT ADMISSION DATE: 2/20/23  9:12 AM   DISCHARGE DATE: 2/23/2023  3:15 PM   DISPOSITION:Home/Self Care    IMPORTANT INFORMATION:  Send all requests for admission clinical reviews, approved or denied determinations and any other requests to dedicated fax number below belonging to the campus where the patient is receiving treatment   List of dedicated fax numbers:  [address] DENIALS (Administrative/Medical Necessity) [phone]   [address] (Maternity/NICU/Pediatrics) [phone]   [facility] [phone]   [facility] 87 [phone]   [address] [phone]   220 [facility] [phone]   90 [facility] [phone]   [address] [place] [address] 119 [phone]   [facility]  [phone]   4053 [facility] [phone]   412 [facility] 850 [address] [facility] [phone]

## 2023-03-01 ENCOUNTER — IN-CLINIC DEVICE VISIT (OUTPATIENT)
Dept: CARDIOLOGY CLINIC | Facility: CLINIC | Age: 78
End: 2023-03-01

## 2023-03-01 ENCOUNTER — CLINICAL SUPPORT (OUTPATIENT)
Dept: CARDIOLOGY CLINIC | Facility: CLINIC | Age: 78
End: 2023-03-01

## 2023-03-01 VITALS
BODY MASS INDEX: 31.76 KG/M2 | WEIGHT: 172.6 LBS | SYSTOLIC BLOOD PRESSURE: 122 MMHG | HEART RATE: 83 BPM | HEIGHT: 62 IN | DIASTOLIC BLOOD PRESSURE: 68 MMHG

## 2023-03-01 DIAGNOSIS — Z95.0 PRESENCE OF CARDIAC PACEMAKER: Primary | ICD-10-CM

## 2023-03-01 DIAGNOSIS — I48.19 PERSISTENT ATRIAL FIBRILLATION (HCC): Primary | ICD-10-CM

## 2023-03-01 NOTE — PROGRESS NOTES
Results for orders placed or performed in visit on 03/01/23   Cardiac EP device report    Narrative    MDT DUAL PM/ ACTIVE SYSTEM IS MRI CONDITIONAL  DEVICE INTERROGATED IN THE [facility]ry [facility]arkins OFFICE: BATTERY VOLTAGE ADEQUATE (12 YRS)  AP: 99 9%  : <0 1% (MVP-ON)  ALL LEAD PARAMETERS WITHIN NORMAL LIMITS  NO SIGNIFICANT HIGH RATE EPISODES  NO PROGRAMMING CHANGES MADE TO DEVICE PARAMETERS  S/P CHEMICAL CARDIOVERSION @ [facility] [place] [place]: PT TAKES SOTALOL, ELIQUIS, CARDIZEM CD, FLECAINIDE  EF 65% (ECHO 6/12/20)  PACEMAKER FUNCTIONING APPROPRIATELY    [address] [place] [address]

## 2023-03-01 NOTE — PROGRESS NOTES
Pt here for 1 week EKG after starting Sotalol per [employer]  Pt has no cardiac complaints  Meds and allergies reviewed  Vitals and EKG obtained  /68  P 83    EKG read by Dr Alexandra Moyer  EKG OK  Pt to continue meds and follow up as scheduled  Pt to call with any problems

## 2023-03-03 ENCOUNTER — TELEPHONE (OUTPATIENT)
Dept: CARDIOLOGY CLINIC | Facility: CLINIC | Age: 78
End: 2023-03-03

## 2023-03-03 NOTE — TELEPHONE ENCOUNTER
3/3/23 l/m for pt to send manual carelink tx today around 1:30-2pm per dr HOLMAN due to pt had recent chemical cardioversion and is now back in afl as of yesterday~ch

## 2023-03-09 ENCOUNTER — REMOTE DEVICE CLINIC VISIT (OUTPATIENT)
Dept: CARDIOLOGY CLINIC | Facility: CLINIC | Age: 78
End: 2023-03-09

## 2023-03-09 DIAGNOSIS — Z95.0 CARDIAC PACEMAKER IN SITU: Primary | ICD-10-CM

## 2023-03-09 NOTE — PROGRESS NOTES
Results for orders placed or performed in visit on 03/09/23   Cardiac EP device report    Narrative    MDT 22 Jagdeep Pastrana PER DR HOLMAN TO CHECK AF- NB: BATTERY VOLTAGE ADEQUATE (12 YRS)  AP-66%, -14%  ALL AVAILABLE LEAD PARAMETERS WITHIN NORMAL LIMITS  7 AF/AFL EPISODES MAX DURATION 19 1 HRS  AF BURDEN SINCE 3/3/23- 32 5%  PT ON ELIQUIS, DILTIAZEM & SOTALOL WHICH WAS INITIATED ON 2/20/23  NORMAL DEVICE FUNCTION   GV

## 2023-03-17 DIAGNOSIS — I48.19 PERSISTENT ATRIAL FIBRILLATION (HCC): ICD-10-CM

## 2023-03-17 RX ORDER — SOTALOL HYDROCHLORIDE 80 MG/1
TABLET ORAL
Qty: 90 TABLET | Refills: 1 | Status: SHIPPED | OUTPATIENT
Start: 2023-03-17

## 2023-03-26 DIAGNOSIS — H81.10 BENIGN PAROXYSMAL POSITIONAL VERTIGO, UNSPECIFIED LATERALITY: ICD-10-CM

## 2023-03-26 RX ORDER — MECLIZINE HYDROCHLORIDE 25 MG/1
25 TABLET ORAL EVERY 8 HOURS PRN
Qty: 60 TABLET | Refills: 2 | Status: SHIPPED | OUTPATIENT
Start: 2023-03-26

## 2023-04-03 DIAGNOSIS — I48.91 ATRIAL FIBRILLATION, UNSPECIFIED TYPE (HCC): ICD-10-CM

## 2023-04-03 RX ORDER — APIXABAN 5 MG/1
TABLET, FILM COATED ORAL
Qty: 60 TABLET | Refills: 6 | Status: SHIPPED | OUTPATIENT
Start: 2023-04-03

## 2023-04-08 NOTE — TELEPHONE ENCOUNTER
Problem: Hemodialysis  Goal: Dialysis: Safe, effective, and comfortable hemodialysis treatment (Hemodialysis nurse only)  Outcome: Outcome Met, Continue evaluating goal progress toward completion  Note: Completed 3 hours of 3 hour treatment. Removed 2 kg of 1-2 kg ordered.     Goal: Dialysis: Free of complications related to initiation/termination of dialysis (Hemodialysis nurse only)  Outcome: Outcome Met, Continue evaluating goal progress toward completion  Note: Obtained flow without any issues.  maintained.  Lines capped with sodium citrate        Sent to health call

## 2023-06-13 ENCOUNTER — REMOTE DEVICE CLINIC VISIT (OUTPATIENT)
Dept: CARDIOLOGY CLINIC | Facility: CLINIC | Age: 78
End: 2023-06-13
Payer: COMMERCIAL

## 2023-06-13 DIAGNOSIS — Z95.0 CARDIAC PACEMAKER IN SITU: Primary | ICD-10-CM

## 2023-06-13 PROCEDURE — 93296 REM INTERROG EVL PM/IDS: CPT | Performed by: INTERNAL MEDICINE

## 2023-06-13 PROCEDURE — 93294 REM INTERROG EVL PM/LDLS PM: CPT | Performed by: INTERNAL MEDICINE

## 2023-06-13 NOTE — PROGRESS NOTES
Results for orders placed or performed in visit on 06/13/23   Cardiac EP device report    Narrative    MDT DUAL PM/ ACTIVE SYSTEM IS MRI CONDITIONAL  CARELINK TRANSMISSION: BATTERY VOLTAGE ADEQUATE (11 6 YRS)  AP-95%, -1%  ALL AVAILABLE LEAD PARAMETERS WITHIN NORMAL LIMITS  29 AF/AFL EPISODES MAX DURATION 12 1 HRS  AF BURDEN- 2 6%  PT ON ELIQUIS, DILTIAZEM & SOTALOL  NORMAL DEVICE FUNCTION   GV

## 2023-06-21 DIAGNOSIS — E87.6 HYPOKALEMIA: ICD-10-CM

## 2023-06-21 RX ORDER — POTASSIUM CHLORIDE 750 MG/1
20 CAPSULE, EXTENDED RELEASE ORAL DAILY
Qty: 180 CAPSULE | Refills: 3 | Status: SHIPPED | OUTPATIENT
Start: 2023-06-21 | End: 2023-06-29

## 2023-06-26 DIAGNOSIS — R60.9 EDEMA, UNSPECIFIED TYPE: ICD-10-CM

## 2023-06-26 DIAGNOSIS — H81.10 BENIGN PAROXYSMAL POSITIONAL VERTIGO, UNSPECIFIED LATERALITY: ICD-10-CM

## 2023-06-26 RX ORDER — FUROSEMIDE 40 MG/1
40 TABLET ORAL DAILY
Qty: 90 TABLET | Refills: 1 | Status: SHIPPED | OUTPATIENT
Start: 2023-06-26 | End: 2023-06-28 | Stop reason: SDUPTHER

## 2023-06-26 RX ORDER — MECLIZINE HYDROCHLORIDE 25 MG/1
25 TABLET ORAL EVERY 8 HOURS PRN
Qty: 60 TABLET | Refills: 2 | Status: SHIPPED | OUTPATIENT
Start: 2023-06-26

## 2023-06-28 DIAGNOSIS — R60.9 EDEMA, UNSPECIFIED TYPE: ICD-10-CM

## 2023-06-28 RX ORDER — FUROSEMIDE 40 MG/1
40 TABLET ORAL 2 TIMES DAILY
Qty: 180 TABLET | Refills: 1 | Status: SHIPPED | OUTPATIENT
Start: 2023-06-28

## 2023-06-28 NOTE — TELEPHONE ENCOUNTER
Patient tried to  Lasix but was told by the pharmacy that she could not  medication until July 17  Patient said that she has been doubling this medication per Dr Joana Forbes and has run of out of meds  Patient stressed that she needs to take this medication daily  [facility]    Please advise

## 2023-06-29 DIAGNOSIS — E03.9 ACQUIRED HYPOTHYROIDISM: ICD-10-CM

## 2023-06-29 DIAGNOSIS — E87.6 HYPOKALEMIA: ICD-10-CM

## 2023-06-29 RX ORDER — LEVOTHYROXINE SODIUM 0.15 MG/1
TABLET ORAL
Qty: 90 TABLET | Refills: 0 | Status: SHIPPED | OUTPATIENT
Start: 2023-06-29

## 2023-06-29 RX ORDER — POTASSIUM CHLORIDE 750 MG/1
CAPSULE, EXTENDED RELEASE ORAL
Qty: 90 CAPSULE | Refills: 1 | Status: SHIPPED | OUTPATIENT
Start: 2023-06-29

## 2023-07-18 LAB
ALBUMIN SERPL-MCNC: 3.4 G/DL (ref 3.8–4.8)
ALBUMIN/GLOB SERPL: 1.1 {RATIO} (ref 1.2–2.2)
ALP SERPL-CCNC: 104 IU/L (ref 44–121)
ALT SERPL-CCNC: 7 IU/L (ref 0–32)
AST SERPL-CCNC: 22 IU/L (ref 0–40)
BASOPHILS # BLD AUTO: 0.1 X10E3/UL (ref 0–0.2)
BASOPHILS NFR BLD AUTO: 1 %
BILIRUB SERPL-MCNC: 0.7 MG/DL (ref 0–1.2)
BUN SERPL-MCNC: 12 MG/DL (ref 8–27)
BUN/CREAT SERPL: 11 (ref 12–28)
CALCIUM SERPL-MCNC: 9.3 MG/DL (ref 8.7–10.3)
CHLORIDE SERPL-SCNC: 107 MMOL/L (ref 96–106)
CHOLEST SERPL-MCNC: 117 MG/DL (ref 100–199)
CO2 SERPL-SCNC: 24 MMOL/L (ref 20–29)
CREAT SERPL-MCNC: 1.14 MG/DL (ref 0.57–1)
EGFR: 50 ML/MIN/1.73
EOSINOPHIL # BLD AUTO: 0.2 X10E3/UL (ref 0–0.4)
EOSINOPHIL NFR BLD AUTO: 3 %
ERYTHROCYTE [DISTWIDTH] IN BLOOD BY AUTOMATED COUNT: 14.4 % (ref 11.7–15.4)
EST. AVERAGE GLUCOSE BLD GHB EST-MCNC: 114 MG/DL
GLOBULIN SER-MCNC: 3 G/DL (ref 1.5–4.5)
GLUCOSE SERPL-MCNC: 91 MG/DL (ref 70–99)
HBA1C MFR BLD: 5.6 % (ref 4.8–5.6)
HCT VFR BLD AUTO: 38.1 % (ref 34–46.6)
HDLC SERPL-MCNC: 37 MG/DL
HGB BLD-MCNC: 12.3 G/DL (ref 11.1–15.9)
IMM GRANULOCYTES # BLD: 0 X10E3/UL (ref 0–0.1)
IMM GRANULOCYTES NFR BLD: 0 %
LDLC SERPL CALC-MCNC: 66 MG/DL (ref 0–99)
LDLC/HDLC SERPL: 1.8 RATIO (ref 0–3.2)
LYMPHOCYTES # BLD AUTO: 1.1 X10E3/UL (ref 0.7–3.1)
LYMPHOCYTES NFR BLD AUTO: 22 %
MCH RBC QN AUTO: 28.9 PG (ref 26.6–33)
MCHC RBC AUTO-ENTMCNC: 32.3 G/DL (ref 31.5–35.7)
MCV RBC AUTO: 90 FL (ref 79–97)
MONOCYTES # BLD AUTO: 0.6 X10E3/UL (ref 0.1–0.9)
MONOCYTES NFR BLD AUTO: 11 %
NEUTROPHILS # BLD AUTO: 3.3 X10E3/UL (ref 1.4–7)
NEUTROPHILS NFR BLD AUTO: 63 %
PLATELET # BLD AUTO: 203 X10E3/UL (ref 150–450)
POTASSIUM SERPL-SCNC: 3.8 MMOL/L (ref 3.5–5.2)
PROT SERPL-MCNC: 6.4 G/DL (ref 6–8.5)
RBC # BLD AUTO: 4.25 X10E6/UL (ref 3.77–5.28)
SL AMB VLDL CHOLESTEROL CALC: 14 MG/DL (ref 5–40)
SODIUM SERPL-SCNC: 143 MMOL/L (ref 134–144)
TRIGL SERPL-MCNC: 67 MG/DL (ref 0–149)
TSH SERPL DL<=0.005 MIU/L-ACNC: 1.91 UIU/ML (ref 0.45–4.5)
WBC # BLD AUTO: 5.2 X10E3/UL (ref 3.4–10.8)

## 2023-07-19 ENCOUNTER — OFFICE VISIT (OUTPATIENT)
Dept: FAMILY MEDICINE CLINIC | Facility: HOSPITAL | Age: 78
End: 2023-07-19
Payer: COMMERCIAL

## 2023-07-19 VITALS
BODY MASS INDEX: 32.06 KG/M2 | SYSTOLIC BLOOD PRESSURE: 124 MMHG | DIASTOLIC BLOOD PRESSURE: 78 MMHG | TEMPERATURE: 97.6 F | OXYGEN SATURATION: 99 % | HEIGHT: 62 IN | WEIGHT: 174.2 LBS | HEART RATE: 75 BPM

## 2023-07-19 DIAGNOSIS — Z79.01 CURRENT USE OF LONG TERM ANTICOAGULATION: ICD-10-CM

## 2023-07-19 DIAGNOSIS — Z00.00 MEDICARE ANNUAL WELLNESS VISIT, SUBSEQUENT: Primary | ICD-10-CM

## 2023-07-19 DIAGNOSIS — Z95.0 CARDIAC PACEMAKER: ICD-10-CM

## 2023-07-19 DIAGNOSIS — I48.19 PERSISTENT ATRIAL FIBRILLATION (HCC): ICD-10-CM

## 2023-07-19 DIAGNOSIS — N18.32 STAGE 3B CHRONIC KIDNEY DISEASE (HCC): ICD-10-CM

## 2023-07-19 DIAGNOSIS — B37.0 THRUSH, ORAL: ICD-10-CM

## 2023-07-19 DIAGNOSIS — Z85.3 HISTORY OF RIGHT BREAST CANCER: ICD-10-CM

## 2023-07-19 DIAGNOSIS — E78.2 MIXED HYPERLIPIDEMIA: ICD-10-CM

## 2023-07-19 DIAGNOSIS — I10 BENIGN ESSENTIAL HYPERTENSION: ICD-10-CM

## 2023-07-19 DIAGNOSIS — D69.6 PLATELETS DECREASED (HCC): ICD-10-CM

## 2023-07-19 DIAGNOSIS — I42.8 OTHER CARDIOMYOPATHY (HCC): ICD-10-CM

## 2023-07-19 DIAGNOSIS — E03.9 ACQUIRED HYPOTHYROIDISM: ICD-10-CM

## 2023-07-19 PROCEDURE — 99214 OFFICE O/P EST MOD 30 MIN: CPT | Performed by: FAMILY MEDICINE

## 2023-07-19 PROCEDURE — G0439 PPPS, SUBSEQ VISIT: HCPCS | Performed by: FAMILY MEDICINE

## 2023-07-19 RX ORDER — CLOTRIMAZOLE 10 MG/1
10 LOZENGE ORAL; TOPICAL
Qty: 35 TABLET | Refills: 1 | Status: SHIPPED | OUTPATIENT
Start: 2023-07-19

## 2023-07-19 NOTE — PATIENT INSTRUCTIONS
Medicare Preventive Visit Patient Instructions  Thank you for completing your Welcome to Medicare Visit or Medicare Annual Wellness Visit today. Your next wellness visit will be due in one year (7/19/2024). The screening/preventive services that you may require over the next 5-10 years are detailed below. Some tests may not apply to you based off risk factors and/or age. Screening tests ordered at today's visit but not completed yet may show as past due. Also, please note that scanned in results may not display below. Preventive Screenings:  Service Recommendations Previous Testing/Comments   Colorectal Cancer Screening  * Colonoscopy    * Fecal Occult Blood Test (FOBT)/Fecal Immunochemical Test (FIT)  * Fecal DNA/Cologuard Test  * Flexible Sigmoidoscopy Age: 43-73 years old   Colonoscopy: every 10 years (may be performed more frequently if at higher risk)  OR  FOBT/FIT: every 1 year  OR  Cologuard: every 3 years  OR  Sigmoidoscopy: every 5 years  Screening may be recommended earlier than age 39 if at higher risk for colorectal cancer. Also, an individualized decision between you and your healthcare provider will decide whether screening between the ages of 77-80 would be appropriate. Colonoscopy: Not on file  FOBT/FIT: Not on file  Cologuard: Not on file  Sigmoidoscopy: Not on file          Breast Cancer Screening Age: 36 years old  Frequency: every 1-2 years  Not required if history of left and right mastectomy Mammogram: 09/01/2017    Screening Not Indicated  History Breast Cancer   Cervical Cancer Screening Between the ages of 21-29, pap smear recommended once every 3 years. Between the ages of 32-69, can perform pap smear with HPV co-testing every 5 years.    Recommendations may differ for women with a history of total hysterectomy, cervical cancer, or abnormal pap smears in past. Pap Smear: Not on file    Screening Not Indicated   Hepatitis C Screening Once for adults born between [address] [place] [address] frequently in patients at high risk for Hepatitis C Hep C Antibody: 05/18/2022    Screening Current   Diabetes Screening 1-2 times per year if you're at risk for diabetes or have pre-diabetes Fasting glucose: 97 mg/dL (6/7/2021)  A1C: 5.6 % (7/17/2023)  Screening Current   Cholesterol Screening Once every 5 years if you don't have a lipid disorder. May order more often based on risk factors. Lipid panel: 07/17/2023    Screening Not Indicated  History Lipid Disorder     Other Preventive Screenings Covered by Medicare:  1. Abdominal Aortic Aneurysm (AAA) Screening: covered once if your at risk. You're considered to be at risk if you have a family history of AAA. 2. Lung Cancer Screening: covers low dose CT scan once per year if you meet all of the following conditions: (1) Age 48-67; (2) No signs or symptoms of lung cancer; (3) Current smoker or have quit smoking within the last 15 years; (4) You have a tobacco smoking history of at least 20 pack years (packs per day multiplied by number of years you smoked); (5) You get a written order from a healthcare provider. 3. Glaucoma Screening: covered annually if you're considered high risk: (1) You have diabetes OR (2) Family history of glaucoma OR (3)  aged 48 and older OR (3)  American aged 72 and older  3. Osteoporosis Screening: covered every 2 years if you meet one of the following conditions: (1) You're estrogen deficient and at risk for osteoporosis based off medical history and other findings; (2) Have a vertebral abnormality; (3) On glucocorticoid therapy for more than 3 months; (4) Have primary hyperparathyroidism; (5) On osteoporosis medications and need to assess response to drug therapy. · Last bone density test (DXA Scan): 06/24/2014.  5. HIV Screening: covered annually if you're between the age of 15-65. Also covered annually if you are younger than 13 and older than 72 with risk factors for HIV infection.  For pregnant patients, it is covered up to 3 times per pregnancy. Immunizations:  Immunization Recommendations   Influenza Vaccine Annual influenza vaccination during flu season is recommended for all persons aged >= 6 months who do not have contraindications   Pneumococcal Vaccine   * Pneumococcal conjugate vaccine = PCV13 (Prevnar 13), PCV15 (Vaxneuvance), PCV20 (Prevnar 20)  * Pneumococcal polysaccharide vaccine = PPSV23 (Pneumovax) Adults 20-63 years old: 1-3 doses may be recommended based on certain risk factors  Adults 72 years old: 1-2 doses may be recommended based off what pneumonia vaccine you previously received   Hepatitis B Vaccine 3 dose series if at intermediate or high risk (ex: diabetes, end stage renal disease, liver disease)   Tetanus (Td) Vaccine - COST NOT COVERED BY MEDICARE PART B Following completion of primary series, a booster dose should be given every 10 years to maintain immunity against tetanus. Td may also be given as tetanus wound prophylaxis. Tdap Vaccine - COST NOT COVERED BY MEDICARE PART B Recommended at least once for all adults. For pregnant patients, recommended with each pregnancy. Shingles Vaccine (Shingrix) - COST NOT COVERED BY MEDICARE PART B  2 shot series recommended in those aged 48 and above     Health Maintenance Due:      Topic Date Due   • DXA SCAN  01/19/2024 (Originally 6/24/2016)   • Hepatitis C Screening  Completed     Immunizations Due:      Topic Date Due   • COVID-19 Vaccine (4 - Moderna series) 01/13/2022   • Influenza Vaccine (1) 09/01/2023     Advance Directives   What are advance directives? Advance directives are legal documents that state your wishes and plans for medical care. These plans are made ahead of time in case you lose your ability to make decisions for yourself. Advance directives can apply to any medical decision, such as the treatments you want, and if you want to donate organs. What are the types of advance directives?   There are many types of advance directives, and each state has rules about how to use them. You may choose a combination of any of the following:  · Living will: This is a written record of the treatment you want. You can also choose which treatments you do not want, which to limit, and which to stop at a certain time. This includes surgery, medicine, IV fluid, and tube feedings. · Durable power of  for healthcare [facility]): This is a written record that states who you want to make healthcare choices for you when you are unable to make them for yourself. This person, called a proxy, is usually a family member or a friend. You may choose more than 1 proxy. · Do not resuscitate (DNR) order:  A DNR order is used in case your heart stops beating or you stop breathing. It is a request not to have certain forms of treatment, such as CPR. A DNR order may be included in other types of advance directives. · Medical directive: This covers the care that you want if you are in a coma, near death, or unable to make decisions for yourself. You can list the treatments you want for each condition. Treatment may include pain medicine, surgery, blood transfusions, dialysis, IV or tube feedings, and a ventilator (breathing machine). · Values history: This document has questions about your views, beliefs, and how you feel and think about life. This information can help others choose the care that you would choose. Why are advance directives important? An advance directive helps you control your care. Although spoken wishes may be used, it is better to have your wishes written down. Spoken wishes can be misunderstood, or not followed. Treatments may be given even if you do not want them. An advance directive may make it easier for your family to make difficult choices about your care. Fall Prevention    Fall prevention  includes ways to make your home and other areas safer. It also includes ways you can move more carefully to prevent a fall.  Health conditions that cause changes in your blood pressure, vision, or muscle strength and coordination may increase your risk for falls. Medicines may also increase your risk for falls if they make you dizzy, weak, or sleepy. Fall prevention tips:   · Stand or sit up slowly. · Use assistive devices as directed. · Wear shoes that fit well and have soles that . · Wear a personal alarm. · Stay active. · Manage your medical conditions. Home Safety Tips:  · Add items to prevent falls in the bathroom. · Keep paths clear. · Install bright lights in your home. · Keep items you use often on shelves within reach. · Paint or place reflective tape on the edges of your stairs. Weight Management   Why it is important to manage your weight:  Being overweight increases your risk of health conditions such as heart disease, high blood pressure, type 2 diabetes, and certain types of cancer. It can also increase your risk for osteoarthritis, sleep apnea, and other respiratory problems. Aim for a slow, steady weight loss. Even a small amount of weight loss can lower your risk of health problems. How to lose weight safely:  A safe and healthy way to lose weight is to eat fewer calories and get regular exercise. You can lose up about 1 pound a week by decreasing the number of calories you eat by 500 calories each day. Healthy meal plan for weight management:  A healthy meal plan includes a variety of foods, contains fewer calories, and helps you stay healthy. A healthy meal plan includes the following:  · Eat whole-grain foods more often. A healthy meal plan should contain fiber. Fiber is the part of grains, fruits, and vegetables that is not broken down by your body. Whole-grain foods are healthy and provide extra fiber in your diet. Some examples of whole-grain foods are whole-wheat breads and pastas, oatmeal, brown rice, and bulgur. · Eat a variety of vegetables every day.   Include dark, leafy greens such as spinach, kale, umberto greens, and mustard greens. Eat yellow and orange vegetables such as carrots, sweet potatoes, and winter squash. · Eat a variety of fruits every day. Choose fresh or canned fruit (canned in its own juice or light syrup) instead of juice. Fruit juice has very little or no fiber. · Eat low-fat dairy foods. Drink fat-free (skim) milk or 1% milk. Eat fat-free yogurt and low-fat cottage cheese. Try low-fat cheeses such as mozzarella and other reduced-fat cheeses. · Choose meat and other protein foods that are low in fat. Choose beans or other legumes such as split peas or lentils. Choose fish, skinless poultry (chicken or turkey), or lean cuts of red meat (beef or pork). Before you cook meat or poultry, cut off any visible fat. · Use less fat and oil. Try baking foods instead of frying them. Add less fat, such as margarine, sour cream, regular salad dressing and mayonnaise to foods. Eat fewer high-fat foods. Some examples of high-fat foods include french fries, doughnuts, ice cream, and cakes. · Eat fewer sweets. Limit foods and drinks that are high in sugar. This includes candy, cookies, regular soda, and sweetened drinks. Exercise:  Exercise at least 30 minutes per day on most days of the week. Some examples of exercise include walking, biking, dancing, and swimming. You can also fit in more physical activity by taking the stairs instead of the elevator or parking farther away from stores. Ask your healthcare provider about the best exercise plan for you. © Copyright [employer] 2018 Information is for End User's use only and may not be sold, redistributed or otherwise used for commercial purposes.  All illustrations and images included in [employer]Notes® are the copyrighted property of [employer] or [address] [place] [address]

## 2023-07-19 NOTE — PROGRESS NOTES
Assessment and Plan:     Problem List Items Addressed This Visit        Digestive    Thrush, oral    Relevant Medications    clotrimazole (MYCELEX) 10 mg brendon       Endocrine    Acquired hypothyroidism     TSH euthyroid            Cardiovascular and Mediastinum    Atrial fibrillation (HCC)    Benign essential hypertension     Very good BP control         Cardiomyopathy ([address])       Genitourinary    Stage 3b chronic kidney disease ([address])     Lab Results   Component Value Date    EGFR 50 (L) 07/17/2023    EGFR 45 02/23/2023    EGFR 41 02/22/2023    CREATININE 1.14 (H) 07/17/2023    CREATININE 1.17 02/23/2023    CREATININE 1.24 02/22/2023               Other    History of right breast cancer    BMI 31.0-31.9,adult    Mixed hyperlipidemia    Current use of long term anticoagulation    Cardiac pacemaker    Platelets decreased ([address])    RESOLVED: Medicare annual wellness visit, subsequent - Primary        Preventive health issues were discussed with patient, and age appropriate screening tests were ordered as noted in patient's After Visit Summary. Personalized health advice and appropriate referrals for health education or preventive services given if needed, as noted in patient's After Visit Summary. History of Present Illness:     Patient presents for a Medicare Wellness Visit    TCM and follow up medical issues    Having 4 weeks of sore throat, trouble swallowing and has to bring some of it back up  Tongue is sore as well     Patient Care Team:  Jolene Weber MD as PCP - Jason Pires MD as PCP - [address] [place] [address] (RTE)  MD Jolene Fraser MD Tammy Carota, MD as Surgeon (Surgical Oncology)  Raynold Sacks, MD (Cardiology)     Review of Systems:     Review of Systems   Constitutional: Negative for unexpected weight change. Respiratory: Negative. Cardiovascular: Positive for leg swelling. Genitourinary: Negative. Musculoskeletal: Positive for arthralgias.    All other systems reviewed and are negative.        Problem List:     Patient Active Problem List   Diagnosis   • Atrial fibrillation (HCC)   • Benign essential hypertension   • Bilateral edema of lower extremity   • BPPV (benign paroxysmal positional vertigo)   • Cardiomyopathy (HCC)   • Cervical stenosis of spine   • Edema   • Homocysteinemia   • Hypokalemia   • Acquired hypothyroidism   • Long QT interval   • Lymphedema   • History of right breast cancer   • BMI 31.0-31.9,adult   • Osteopenia   • Overactive bladder   • Peripheral neuropathy   • Vitamin D deficiency   • Bronchitis with bronchospasm   • Candidal esophagitis ([facility])   • Abnormal chest CT   • Pulmonary nodule seen on imaging study   • SOB (shortness of breath)   • Weight loss   • Hyperglycemia   • Mixed hyperlipidemia   • Other idiopathic peripheral autonomic neuropathy   • Current use of long term anticoagulation   • RICHARDS (dyspnea on exertion)   • Cortical age-related cataract of both eyes   • Bradycardia   • SSS (sick sinus syndrome) ([facility])   • Cardiac pacemaker   • History of ductal carcinoma in situ (DCIS) of breast   • Platelets decreased ([address])   • Stage 3b chronic kidney disease ([address])   • Occipital neuralgia of right side   • Cervical myofascial pain syndrome   • Cervical spondylosis   • Thrush, oral      Past Medical and Surgical History:     Past Medical History:   Diagnosis Date   • Abnormal chest CT     last assessed - [id number]   • Abnormal glucose     Resolved - [id number]   • Arrhythmia    • Arthritis     Slight   • BCC (basal cell carcinoma of skin)     last assessed - [id number]   • BRCA gene mutation negative 09/30/2021    [employer]; 36 gene panel   • Breast cancer ([address]) 09/12/2016    rt   • Cancer ([address])    • Cardiomyopathy ([address])    • Cervical spinal stenosis    • Discharge from left nipple     last assessed - [id number]   • Disease of thyroid gland     hypothyroidism   • Full dentures     Upper and Lower   • History of atrial fibrillation    • Homocysteinemia    • Hypertension    • Hypokalemia    • Intraductal papilloma of breast     last assessed - [id number]   • Limb alert care status     right upper ext   • Malignant neoplasm of skin     basal cell on face, Moh's surgery   • Memory loss     last assessed - [id number]   • Obesity    • Open wound     last assessed - [id number]   • Osteopenia    • Overactive bladder    • Palmar plantar erythrodysaesthesia     Resolved - [id number]   • Pulmonary nodule seen on imaging study    • Rosacea    • UTI (urinary tract infection)     recently   • Vertigo    • Vitamin D deficiency      Past Surgical History:   Procedure Laterality Date   • APPENDECTOMY     • ATRIAL ABLATION SURGERY      last assessed - [id number]   • BACK SURGERY  1997    Lumbar spinal stenosis   • BREAST BIOPSY      2014? left breast; 9/12/16 right breast x 3   • BREAST BIOPSY Left 03/30/2010   • BREAST BIOPSY Right 09/12/2016    us bx- invasive br ca   • BREAST SURGERY      needle bx.    • BREAST SURGERY Right 03/16/2017    excision of right chest wall lesion   • CARDIAC ELECTROPHYSIOLOGY STUDY AND ABLATION     • CARDIOVERSION     • DENTAL SURGERY     • HAND SURGERY      Joint replaced with silicone joint 5th finger right hand   • HYSTERECTOMY  1993    Complete   • LAMINECTOMY      Decompressive up to two lumbar segments   • MASTECTOMY Right 10/28/2016   • OOPHORECTOMY  1993   • ND EXCISION MAL LESION TRUNK/ARM/LEG 0.5 CM/< Right 03/16/2017    Procedure: EXCISION CHEST WALL LESION; NEEDLE LOC @ 1400;  Surgeon: Shawna Mcmahon MD;  Location:  MAIN OR;  Service: Surgical Oncology   • ND INTRAOP SENTINEL LYMPH NODE ID W/DYE INJECTION Right 10/28/2016    Procedure: BREAST MASTECTOMY WITH SENTINAL LYMPH NODE BIOPSY; Myron Hua; LYMPHATIC MAPPING; 0800 NUC MED INJECTION;  Surgeon: Shawna Mcmahon MD;  Location: [facility];  Service: Surgical Oncology   • ND MASTECTOMY SIMPLE COMPLETE Left 10/22/2021    Procedure: BREAST MASTECTOMY SIMPLE MARTHA  GUIDED;   Surgeon: Boyd Nguyen MD;  Location: [facility];  Service: Surgical Oncology   • SENTINEL LYMPH NODE BIOPSY Right    • US BREAST NEEDLE LOC LEFT Left 10/19/2021   • US BREAST NEEDLE LOC RIGHT Right 03/16/2017   • US GUIDANCE BREAST BIOPSY LEFT EACH ADDITIONAL Left 09/21/2021   • US GUIDANCE BREAST BIOPSY RIGHT EACH ADDITIONAL Right 09/12/2016   • US GUIDANCE BREAST BIOPSY RIGHT EACH ADDITIONAL Right 09/12/2016   • US GUIDED BREAST BIOPSY LEFT COMPLETE Left 09/21/2021   • US GUIDED BREAST BIOPSY RIGHT COMPLETE Right 09/12/2016   • US GUIDED BREAST BIOPSY RIGHT COMPLETE Right 10/1/2021      Family History:     Family History   Problem Relation Age of Onset   • Coronary artery disease Mother         CABG   • Diabetes Mother    • Heart attack Mother    • Diabetes Sister    • Parkinsonism Sister    • Coronary artery disease Brother         CABG   • Diabetes Brother    • Heart attack Brother    • Heart attack Son    • Kidney cancer Son 48   • No Known Problems Son    • No Known Problems Son    • Emphysema Sister    • No Known Problems Sister    • Heart attack Brother    • No Known Problems Brother    • No Known Problems Maternal Grandmother    • No Known Problems Maternal Grandfather    • No Known Problems Paternal Grandmother    • No Known Problems Paternal Grandfather    • No Known Problems Paternal Aunt    • No Known Problems Paternal Aunt    • No Known Problems Paternal Aunt    • No Known Problems Paternal Aunt       Social History:     Social History     Socioeconomic History   • Marital status: /Civil Union     Spouse name: None   • Number of children: None   • Years of education: None   • Highest education level: None   Occupational History   • Occupation: Retired   Tobacco Use   • Smoking status: Never   • Smokeless tobacco: Never   Vaping Use   • Vaping Use: Never used   Substance and Sexual Activity   • Alcohol use: Never   • Drug use: No   • Sexual activity: Not Currently     Comment: Resides with    Other Topics Concern   • None   Social History Narrative    Always uses seat belt    Convalescence after chemotherapy     Social Determinants of Health     Financial Resource Strain: Low Risk  (7/19/2023)    Overall Financial Resource Strain (CARDIA)    • Difficulty of Paying Living Expenses: Not hard at all   Food Insecurity: No Food Insecurity (2/21/2023)    Hunger Vital Sign    • Worried About Running Out of Food in the Last Year: Never true    • Ran Out of Food in the Last Year: Never true   Transportation Needs: No Transportation Needs (7/19/2023)    PRAPARE - Transportation    • Lack of Transportation (Medical): No    • Lack of Transportation (Non-Medical):  No   Physical Activity: Not on file   Stress: Not on file   Social Connections: Not on file   Intimate Partner Violence: Not on file   Housing Stability: Low Risk  (2/21/2023)    Housing Stability Vital Sign    • Unable to Pay for Housing in the Last Year: No    • Number of Places Lived in the Last Year: 1    • Unstable Housing in the Last Year: No      Medications and Allergies:     Current Outpatient Medications   Medication Sig Dispense Refill   • clotrimazole (MYCELEX) 10 mg brenodn Take 1 tablet (10 mg total) by mouth 5 (five) times a day 35 tablet 1   • cyanocobalamin (VITAMIN B-12) 500 mcg tablet Take 1 tablet by mouth daily     • Dilt- MG 24 hr capsule TAKE 1 CAPSULE BY MOUTH EVERY DAY 90 capsule 2   • Eliquis 5 MG TAKE 1 TABLET BY MOUTH TWICE A DAY 60 tablet 6   • furosemide (LASIX) 40 mg tablet Take 1 tablet (40 mg total) by mouth 2 (two) times a day As directed 180 tablet 1   • levothyroxine 150 mcg tablet Take 1 tablet 4 days a week and 1/2 tablet 3 days a week 90 tablet 0   • meclizine (ANTIVERT) 25 mg tablet Take 1 tablet (25 mg total) by mouth every 8 (eight) hours as needed for dizziness 60 tablet 2   • oxybutynin (DITROPAN-XL) 10 MG 24 hr tablet Take 1 tablet (10 mg total) by mouth daily at bedtime 90 tablet 1   • potassium chloride (MICRO-K) 10 MEQ CR capsule TAKE 1 CAPSULE BY MOUTH EVERY DAY 90 capsule 1   • RESTASIS 0.05 % ophthalmic emulsion Administer 1 drop to both eyes every 12 (twelve) hours     • sotalol (BETAPACE) 120 mg tablet TAKE 0.5 TABLETS (60 MG TOTAL) BY MOUTH DAILY 45 tablet 1     No current facility-administered medications for this visit. Allergies   Allergen Reactions   • Morphine And Related GI Intolerance      Immunizations:     Immunization History   Administered Date(s) Administered   • COVID-19 MODERNA VACC 0.5 ML IM 01/22/2021, 02/17/2021, 11/18/2021   • INFLUENZA 10/23/2007, 11/06/2010, 10/13/2011, 10/17/2012, 10/11/2013, 10/29/2014, 11/08/2016, 10/27/2017, 12/07/2022   • Influenza, high dose seasonal 0.7 mL 11/18/2020, 12/03/2021, 12/07/2022   • Pneumococcal Conjugate 13-Valent 04/09/2015   • Pneumococcal Polysaccharide PPV23 06/29/2017   • Tdap 06/14/2016   • Zoster 03/08/2013      Health Maintenance:         Topic Date Due   • DXA SCAN  01/19/2024 (Originally 6/24/2016)   • Hepatitis C Screening  Completed         Topic Date Due   • COVID-19 Vaccine (4 - Lonne Cleaves series) 01/13/2022   • Influenza Vaccine (1) 09/01/2023      Medicare Screening Tests and Risk Assessments:     Angel Cabot is here for her Subsequent Wellness visit. Last Medicare Wellness visit information reviewed, patient interviewed and updates made to the record today. Health Risk Assessment:   Patient rates overall health as good. Patient feels that their physical health rating is same. Patient is very satisfied with their life. Eyesight was rated as slightly worse. Hearing was rated as same. Patient feels that their emotional and mental health rating is same. Patients states they are always angry. Patient states they are never, rarely unusually tired/fatigued. Pain experienced in the last 7 days has been none. Patient states that she has experienced no weight loss or gain in last 6 months.      Depression Screening:   PHQ-2 Score: 0      Fall Risk Screening: In the past year, patient has experienced: history of falling in past year    Number of falls: 2 or more  Injured during fall?: No    Feels unsteady when standing or walking?: Yes    Worried about falling?: Yes      Urinary Incontinence Screening:   Patient has not leaked urine accidently in the last six months. Home Safety:  Patient has trouble with stairs inside or outside of their home. Patient has no working smoke alarms and has working carbon monoxide detector. Home safety hazards include: none. Nutrition:   Current diet is Regular. Medications:   Patient is currently taking over-the-counter supplements. OTC medications include: see medication list. Patient is able to manage medications. Activities of Daily Living (ADLs)/Instrumental Activities of Daily Living (IADLs):   Walk and transfer into and out of bed and chair?: Yes  Dress and groom yourself?: Yes    Bathe or shower yourself?: Yes    Feed yourself?  Yes  Do your laundry/housekeeping?: Yes  Manage your money, pay your bills and track your expenses?: Yes  Make your own meals?: Yes    Do your own shopping?: Yes    Previous Hospitalizations:   Any hospitalizations or ED visits within the last 12 months?: Yes    How many hospitalizations have you had in the last year?: 1-2    Advance Care Planning:   Living will: No    Durable POA for healthcare: No    Advanced directive: No    Advanced directive counseling given: Yes    Five wishes given: Yes    Patient declined ACP directive: No    End of Life Decisions reviewed with patient: Yes    Provider agrees with end of life decisions: Yes      Cognitive Screening:   Provider or family/friend/caregiver concerned regarding cognition?: No    PREVENTIVE SCREENINGS      Cardiovascular Screening:    General: Screening Not Indicated and History Lipid Disorder      Diabetes Screening:     General: Screening Current      Colorectal Cancer Screening:     General: Screening Not Indicated      Breast Cancer Screening:     General: Screening Not Indicated and History Breast Cancer      Cervical Cancer Screening:    General: Screening Not Indicated      Osteoporosis Screening:    General: Screening Current      Abdominal Aortic Aneurysm (AAA) Screening:        General: Screening Not Indicated      Lung Cancer Screening:     General: Screening Not Indicated      Hepatitis C Screening:    General: Screening Current    Screening, Brief Intervention, and Referral to Treatment (SBIRT)    Screening      Single Item Drug Screening:  How often have you used an illegal drug (including marijuana) or a prescription medication for non-medical reasons in the past year? never    Single Item Drug Screen Score: 0  Interpretation: Negative screen for possible drug use disorder    Vision Screening    Right eye Left eye Both eyes   Without correction 20/20 20/40 20/20   With correction           Physical Exam:     /78   Pulse 75   Temp 97.6 °F (36.4 °C)   Ht 5' 2" (1.575 m)   Wt 79 kg (174 lb 3.2 oz)   SpO2 99%   BMI 31.86 kg/m²     Physical Exam  Vitals and nursing note reviewed. Constitutional:       Appearance: Normal appearance. Neck:      Vascular: No carotid bruit. Cardiovascular:      Rate and Rhythm: Normal rate. Rhythm irregular. Pulses: Normal pulses. Heart sounds: Normal heart sounds. Pulmonary:      Effort: Pulmonary effort is normal.      Breath sounds: Normal breath sounds. Musculoskeletal:      Right lower leg: Edema present. Left lower leg: Edema present.    Psychiatric:         Mood and Affect: Mood normal.          Tim Wilson MD

## 2023-08-02 ENCOUNTER — OFFICE VISIT (OUTPATIENT)
Dept: CARDIOLOGY CLINIC | Facility: CLINIC | Age: 78
End: 2023-08-02
Payer: COMMERCIAL

## 2023-08-02 ENCOUNTER — TELEPHONE (OUTPATIENT)
Dept: CARDIOLOGY CLINIC | Facility: CLINIC | Age: 78
End: 2023-08-02

## 2023-08-02 ENCOUNTER — PREP FOR PROCEDURE (OUTPATIENT)
Dept: CARDIOLOGY CLINIC | Facility: CLINIC | Age: 78
End: 2023-08-02

## 2023-08-02 VITALS
HEIGHT: 62 IN | DIASTOLIC BLOOD PRESSURE: 70 MMHG | WEIGHT: 174 LBS | HEART RATE: 75 BPM | SYSTOLIC BLOOD PRESSURE: 114 MMHG | BODY MASS INDEX: 32.02 KG/M2

## 2023-08-02 DIAGNOSIS — I48.19 PERSISTENT ATRIAL FIBRILLATION (HCC): ICD-10-CM

## 2023-08-02 DIAGNOSIS — I48.91 ATRIAL FIBRILLATION, UNSPECIFIED TYPE (HCC): Primary | ICD-10-CM

## 2023-08-02 DIAGNOSIS — I49.5 SSS (SICK SINUS SYNDROME) (HCC): Primary | ICD-10-CM

## 2023-08-02 DIAGNOSIS — I47.1 ATRIAL TACHYCARDIA (HCC): ICD-10-CM

## 2023-08-02 DIAGNOSIS — Z95.0 PRESENCE OF PERMANENT CARDIAC PACEMAKER: ICD-10-CM

## 2023-08-02 PROCEDURE — 93000 ELECTROCARDIOGRAM COMPLETE: CPT | Performed by: PHYSICIAN ASSISTANT

## 2023-08-02 PROCEDURE — 99214 OFFICE O/P EST MOD 30 MIN: CPT | Performed by: PHYSICIAN ASSISTANT

## 2023-08-02 RX ORDER — SOTALOL HYDROCHLORIDE 120 MG/1
60 TABLET ORAL DAILY
Qty: 15 TABLET | Refills: 1 | Status: SHIPPED | OUTPATIENT
Start: 2023-08-02

## 2023-08-02 NOTE — TELEPHONE ENCOUNTER
Patient scheduled for YANIV/A fib at [facility] on 09/29/2023 with Dr Jodee Skinner    Patient at the office aware of general instructions, labs test required. Meds holds: Lasix to hold the morning of. Eliquis and Sotalol no holds.     Can we please check insurance for approval.

## 2023-08-03 NOTE — PROGRESS NOTES
Electrophysiology Overdue Follow Up  Heart & Vascular [address] [facility], [facility]    Name: Kareem Ahumada  :   MRN: [id number]    ASSESSMENT:  1. Atrial fibrillation, unspecified type ([address])  POCT ECG      2. Persistent atrial fibrillation (HCC)  POCT ECG    sotalol (BETAPACE) 120 mg tablet      3. Atrial tachycardia (HCC)  POCT ECG      4. Presence of permanent cardiac pacemaker  POCT ECG          PLAN:  1. Symptomatic paroxysmal atrial fibrillation  - anticoagulated with Eliquis / [id number] score of 3 (age, sex)  - EF of 65% per echo 2020 / mild dilation of LA noted  - rate control: diltiazem  - antiarrhythmic therapy: sotalol 80mg daily prior to visit /breakthrough on flecainide, side effects to dronedarone  - prior ablation: cryo of atrial fibrillation with pulmonary vein isolation by Dr. Bermudez Naomi 2016  2. Medtronic dual-chamber pacemaker in situ  - implanted by Dr. Catracho Joseph on 2021 due to sick sinus syndrome  3. Hypothyroidism  4. History of breast cancer   - status post chemotherapy  5. Obesity BMI of 31  6. Benign paroxysmal positional vertigo  - utilizes meclizine as needed    IMPRESSION:  1. Sotalol -patient presents today to follow-up February medication admit. Unfortunate, QT does appear somewhat prolonged today on EKG. Therefore, did discuss with attending and will lower her sotalol to 60 mg daily. 2. Atrial fibrillation - With the need to lower her sotalol and patient's graphic trend on pacemaker which can be seen below, there was concern for the fact that 60 mg may not be enough to maintain patient in normal rhythm. Therefore, attending did come in and also discuss the possibility of repeat ablation. First ablation had been in 2016. Other option that had been mentioned had been AV node ablation to keep her from requiring any antiarrhythmic whatsoever.     We will start with repeat A-fib ablation and see how Diabetes Follow up note:    Chief complaint: T2DM    Interval Hx: BG values 150-220s over past 24 hours. Pt seen at bedside w/son present. Tolerating TF @ goal rate. To have s/s eval tomorrow. No further seizure activities.     Review of Systems:  General: no complaints.   GI: Tolerating Tfs. Denies N/V/D/Abd pain  CV: Denies CP/SOB  ENDO: No S&Sx of hypoglycemia    MEDS:  finasteride 5 milliGRAM(s) Oral daily  insulin lispro (ADMELOG) corrective regimen sliding scale   SubCutaneous three times a day before meals  insulin lispro (ADMELOG) corrective regimen sliding scale   SubCutaneous at bedtime  levothyroxine Injectable 37.5 MICROGram(s) IV Push at bedtime  predniSONE   Tablet 20 milliGRAM(s) Oral daily    trimethoprim   80 mG/sulfamethoxazole 400 mG 1 Tablet(s) Oral daily  valGANciclovir 450 milliGRAM(s) Oral <User Schedule>    Allergies    No Known Allergies        PE:  General: Male lying in bed. NAD.   Vital Signs Last 24 Hrs  T(C): 36.7 (07 Aug 2022 11:00), Max: 37.1 (06 Aug 2022 20:00)  T(F): 98.1 (07 Aug 2022 11:00), Max: 98.8 (06 Aug 2022 20:00)  HR: 54 (07 Aug 2022 14:00) (53 - 551)  BP: 149/66 (07 Aug 2022 14:00) (115/717 - 187/74)  BP(mean): 95 (07 Aug 2022 14:00) (86 - 106)  RR: 21 (07 Aug 2022 14:00) (10 - 30)  SpO2: 98% (07 Aug 2022 14:00) (96% - 100%)    Parameters below as of 06 Aug 2022 15:00  Patient On (Oxygen Delivery Method): room air    HEENT: NGT in place.   Abd: Soft, NT,ND,   Extremities: Warm  Neuro: A&O X3    LABS:  POCT Blood Glucose.: 227 mg/dL (08-07-22 @ 12:05)  POCT Blood Glucose.: 172 mg/dL (08-07-22 @ 05:22)  POCT Blood Glucose.: 162 mg/dL (08-06-22 @ 17:45)  POCT Blood Glucose.: 180 mg/dL (08-06-22 @ 11:06)  POCT Blood Glucose.: 181 mg/dL (08-06-22 @ 00:39)  POCT Blood Glucose.: 182 mg/dL (08-05-22 @ 17:41)  POCT Blood Glucose.: 204 mg/dL (08-05-22 @ 11:07)  POCT Blood Glucose.: 200 mg/dL (08-05-22 @ 05:08)  POCT Blood Glucose.: 152 mg/dL (08-04-22 @ 17:20)  POCT Blood Glucose.: 109 mg/dL (08-04-22 @ 14:51)                            9.3    19.38 )-----------( 365      ( 07 Aug 2022 06:05 )             28.9       08-07    134<L>  |  93<L>  |  58<H>  ----------------------------<  183<H>  4.3   |  22  |  3.68<H>    eGFR: 17<L>    Ca    8.6      08-07  Mg     2.2     08-07  Phos  4.8     08-07    TPro  5.9<L>  /  Alb  2.1<L>  /  TBili  0.2  /  DBili  0.1  /  AST  20  /  ALT  19  /  AlkPhos  286<H>  08-07      A1C with Estimated Average Glucose Result: 6.0 % (06-30-22 @ 05:49)  A1C with Estimated Average Glucose Result: 6.6 % (04-24-22 @ 17:12)  A1C with Estimated Average Glucose Result: 7.3 % (02-04-22 @ 13:42)  A1C with Estimated Average Glucose Result: 7.2 % (02-04-22 @ 05:48)          Contact number: serge [phone] or [phone]       patient tolerates this. She will be sent down to the schedulers and there will be no hold on Eliquis or sotalol. Patient is to follow up in our EP office after repeat ablation. She is to call our office with any further questions or concerns in the meantime. HPI:   Interim history: Danelle Horton is a 68 y.o. female with past medical history as stated above. She presents today for hospital follow-up. Patient is well-known to the electrophysiology service. She has carried the diagnosis of atrial fibrillation which had been more persistent in 2016. She did undergo multiple cardioversions (dronedarone had been started prior to one cardioversion but she did have side effects and this was discontinued) but was eventually referred to electrophysiology and underwent ablation of atrial fibrillation with pulmonary vein isolation in August 2016 by Dr. Naif Damon. She tolerated the procedure well and did have some atrial fibrillation moving forward for which she was actually placed on flecainide. She had been on this medication for some time and in December 2020 her main symptom was dyspnea on exertion and fatigue. It was felt that her bradycardia was a component to this and she underwent dual-chamber pacemaker implantation on 6/7/2021 by Dr. Corry Greer for sick sinus syndrome. Through device interrogations, it was seen that she was having breakthrough on flecainide and therefore presented in February 2023 and underwent sotalol initiation. Given kidney function and QT prolongation, she was sent out on 80 mg daily. Follow-up has been difficult given the fact they live in [place] and it is difficult to get the KRUUNUPYY. Only complaint today is still shortness of breath. She does have lower extremity edema but reports that she did not take her water pill today given the fact that she had an appointment.         EKG: Normal sinus rhythm with prolonged QT at 503 ms at 75 bpm    ROS:   Review of Systems Constitutional: Negative for chills, diaphoresis, fever and malaise/fatigue. Cardiovascular: Negative for chest pain, claudication, cyanosis, dyspnea on exertion, irregular heartbeat, leg swelling, near-syncope, orthopnea, palpitations, paroxysmal nocturnal dyspnea and syncope. Respiratory: Positive for shortness of breath. Negative for sleep disturbances due to breathing. Neurological: Negative for dizziness and light-headedness. All other systems reviewed and are negative. OBJECTIVE:   Vitals:   /70 (BP Location: Left arm, Patient Position: Sitting, Cuff Size: Large)   Pulse 75   Ht 5' 2" (1.575 m)   Wt 78.9 kg (174 lb)   BMI 31.83 kg/m²       Physical Exam:   Physical Exam  Vitals and nursing note reviewed. Constitutional:       General: She is not in acute distress. Appearance: She is well-developed. She is not diaphoretic. HENT:      Head: Normocephalic and atraumatic. Eyes:      Pupils: Pupils are equal, round, and reactive to light. Neck:      Vascular: No JVD. Cardiovascular:      Rate and Rhythm: Normal rate and regular rhythm. Heart sounds: No murmur heard. No friction rub. No gallop. Pulmonary:      Effort: Pulmonary effort is normal. No respiratory distress. Breath sounds: Normal breath sounds. No wheezing. Abdominal:      Palpations: Abdomen is soft. Musculoskeletal:         General: Normal range of motion. Cervical back: Normal range of motion. Skin:     General: Skin is warm and dry. Neurological:      Mental Status: She is alert and oriented to person, place, and time.          Medications:      Current Outpatient Medications:   •  clotrimazole (MYCELEX) 10 mg brendon, Take 1 tablet (10 mg total) by mouth 5 (five) times a day, Disp: 35 tablet, Rfl: 1  •  cyanocobalamin (VITAMIN B-12) 500 mcg tablet, Take 1 tablet by mouth daily, Disp: , Rfl:   •  Dilt- MG 24 hr capsule, TAKE 1 CAPSULE BY MOUTH EVERY DAY, Disp: 90 capsule, Rfl: 2  •  Eliquis 5 MG, TAKE 1 TABLET BY MOUTH TWICE A DAY, Disp: 60 tablet, Rfl: 6  •  furosemide (LASIX) 40 mg tablet, Take 1 tablet (40 mg total) by mouth 2 (two) times a day As directed, Disp: 180 tablet, Rfl: 1  •  levothyroxine 150 mcg tablet, Take 1 tablet 4 days a week and 1/2 tablet 3 days a week, Disp: 90 tablet, Rfl: 0  •  meclizine (ANTIVERT) 25 mg tablet, Take 1 tablet (25 mg total) by mouth every 8 (eight) hours as needed for dizziness, Disp: 60 tablet, Rfl: 2  •  oxybutynin (DITROPAN-XL) 10 MG 24 hr tablet, Take 1 tablet (10 mg total) by mouth daily at bedtime, Disp: 90 tablet, Rfl: 1  •  potassium chloride (MICRO-K) 10 MEQ CR capsule, TAKE 1 CAPSULE BY MOUTH EVERY DAY, Disp: 90 capsule, Rfl: 1  •  RESTASIS 0.05 % ophthalmic emulsion, Administer 1 drop to both eyes every 12 (twelve) hours, Disp: , Rfl:   •  sotalol (BETAPACE) 120 mg tablet, Take 0.5 tablets (60 mg total) by mouth daily, Disp: 15 tablet, Rfl: 1     Family History   Problem Relation Age of Onset   • Coronary artery disease Mother         CABG   • Diabetes Mother    • Heart attack Mother    • Diabetes Sister    • Parkinsonism Sister    • Coronary artery disease Brother         CABG   • Diabetes Brother    • Heart attack Brother    • Heart attack Son    • Kidney cancer Son 48   • No Known Problems Son    • No Known Problems Son    • Emphysema Sister    • No Known Problems Sister    • Heart attack Brother    • No Known Problems Brother    • No Known Problems Maternal Grandmother    • No Known Problems Maternal Grandfather    • No Known Problems Paternal Grandmother    • No Known Problems Paternal Grandfather    • No Known Problems Paternal Aunt    • No Known Problems Paternal Aunt    • No Known Problems Paternal Aunt    • No Known Problems Paternal Aunt      Social History     Socioeconomic History   • Marital status: /Civil Union     Spouse name: Not on file   • Number of children: Not on file   • Years of education: Not on file   • Highest education level: Not on file   Occupational History   • Occupation: Retired   Tobacco Use   • Smoking status: Never   • Smokeless tobacco: Never   Vaping Use   • Vaping Use: Never used   Substance and Sexual Activity   • Alcohol use: Never   • Drug use: No   • Sexual activity: Not Currently     Comment: Resides with    Other Topics Concern   • Not on file   Social History Narrative    Always uses seat belt    Convalescence after chemotherapy     Social Determinants of Health     Financial Resource Strain: Low Risk  (7/19/2023)    Overall Financial Resource Strain (CARDIA)    • Difficulty of Paying Living Expenses: Not hard at all   Food Insecurity: No Food Insecurity (2/21/2023)    Hunger Vital Sign    • Worried About Running Out of Food in the Last Year: Never true    • Ran Out of Food in the Last Year: Never true   Transportation Needs: No Transportation Needs (7/19/2023)    PRAPARE - Transportation    • Lack of Transportation (Medical): No    • Lack of Transportation (Non-Medical): No   Physical Activity: Not on file   Stress: Not on file   Social Connections: Not on file   Intimate Partner Violence: Not on file   Housing Stability: Low Risk  (2/21/2023)    Housing Stability Vital Sign    • Unable to Pay for Housing in the Last Year: No    • Number of Places Lived in the Last Year: 1    • Unstable Housing in the Last Year: No     Social History     Tobacco Use   Smoking Status Never   Smokeless Tobacco Never     Social History     Substance and Sexual Activity   Alcohol Use Never       Labs & Results:  Below is the patient's most recent value for Albumin, ALT, AST, BUN, Calcium, Chloride, Cholesterol, CO2, Creatinine, GFR, Glucose, HDL, Hematocrit, Hemoglobin, Hemoglobin A1C, LDL, Magnesium, Phosphorus, Platelets, Potassium, PSA, Sodium, Triglycerides, and WBC.    Lab Results   Component Value Date    ALT 7 07/17/2023    AST 22 07/17/2023    BUN 12 07/17/2023    CALCIUM 9.1 02/23/2023     (H) 2023    CHOL 110 2017    CO2 24 2023    CREATININE 1.14 (H) 2023    HDL 37 (L) 2023    HCT 38.1 2023    HGB 12.3 2023    HGBA1C 5.6 2023    MG 2.4 2023     2023    K 3.8 2023     2017     2017    TRIG 67 2023    WBC 5.2 2023     Note: for a comprehensive list of the patient's lab results, access the Results Review activity. CARDIAC TESTING:   ECHO:   Results for orders placed during the hospital encounter of 19    Echo complete with contrast if indicated    Narrative  [address] [place] [address], [address] [place] [address]    Transthoracic Echocardiogram  2D, M-mode, Doppler, and Color Doppler    Study date:  2019    Patient: Rahat Triplett  MR number: [id number]  Account number: [de-identified]  : [id number]  Age: 68 years  Gender: Female  Status: Outpatient  Location: [address] [place] [address] [facility]  Height: 62 in  Weight: 178.6 lb  BP: 138/ 74 mmHg    Indications: Shortness of breath, lower exremity edema, PAF. Diagnoses: I48.0 - Atrial fibrillation, R06.02 - Shortness of breath    Sonographer:  CARLOS Meredith RD RVT  Primary Physician:  Barron Yee MD  Referring Physician:  SHADI Gotti  Group:  [facility]  Interpreting Physician:  Fatemeh Banda MD    SUMMARY    LEFT VENTRICLE:  Systolic function was normal. Ejection fraction was estimated to be 65 %. There were no regional wall motion abnormalities. Doppler parameters were consistent with abnormal left ventricular relaxation (grade 1 diastolic dysfunction). LEFT ATRIUM:  The atrium was mildly dilated. MITRAL VALVE:  There was mild annular calcification. There was mild regurgitation. AORTIC VALVE:  There was mild regurgitation. TRICUSPID VALVE:  There was mild to moderate regurgitation. HISTORY: PRIOR HISTORY: Right breast cancer, right mastectomy. PRIOR PROCEDURES: Arrhythmia ablation. PROCEDURE: The study was performed in the [facility]. This was a routine study. The transthoracic approach was used. The study included complete 2D imaging, M-mode, complete spectral Doppler, and color Doppler. Images were obtained from the parasternal, apical, subcostal, and suprasternal notch acoustic windows. Image quality was adequate. LEFT VENTRICLE: Size was normal. Systolic function was normal. Ejection fraction was estimated to be 65 %. There were no regional wall motion abnormalities. Wall thickness was normal. DOPPLER: Doppler parameters were consistent with  abnormal left ventricular relaxation (grade 1 diastolic dysfunction). RIGHT VENTRICLE: The size was normal. Systolic function was normal. Wall thickness was normal.    LEFT ATRIUM: The atrium was mildly dilated. RIGHT ATRIUM: Size was normal.    MITRAL VALVE: There was mild annular calcification. Valve structure was normal. There was normal leaflet separation. DOPPLER: The transmitral velocity was within the normal range. There was no evidence for stenosis. There was mild  regurgitation. AORTIC VALVE: The valve was trileaflet. Leaflets exhibited normal thickness, normal cuspal separation, and sclerosis. DOPPLER: Transaortic velocity was within the normal range. There was no evidence for stenosis. There was mild  regurgitation. TRICUSPID VALVE: The valve structure was normal. There was normal leaflet separation. DOPPLER: The transtricuspid velocity was within the normal range. There was no evidence for stenosis. There was mild to moderate regurgitation. Pulmonary  artery systolic pressure was mildly increased. PULMONIC VALVE: Leaflets exhibited normal thickness, no calcification, and normal cuspal separation. DOPPLER: The transpulmonic velocity was within the normal range. There was no significant regurgitation. PERICARDIUM: There was no pericardial effusion.  The pericardium was normal in appearance. AORTA: The root exhibited normal size. SYSTEMIC VEINS: IVC: The inferior vena cava was normal in size. PULMONARY VEINS: DOPPLER: There was systolic blunting in the pulmonary vein(s).     SYSTEM MEASUREMENT TABLES    2D  %FS: 31.66 %  Ao Diam: 2.23 cm  EDV(Teich): 105.22 ml  EF(Teich): 59.57 %  ESV(Cube): 34.34 ml  ESV(Teich): 42.54 ml  IVSd: 0.81 cm  LA Area: 21.18 cm2  LA Diam: 3.68 cm  LVEDV MOD A4C: 79.17 ml  LVEF MOD A4C: 66.73 %  LVESV MOD A4C: 26.34 ml  LVIDd: 4.76 cm  LVIDs: 3.25 cm  LVLd A4C: 6.9 cm  LVLs A4C: 5.58 cm  LVPWd: 0.76 cm  RA Area: 13.38 cm2  RV Diam.: 3.39 cm  SI(Cube): 40.02 ml/m2  SI(Teich): 34.25 ml/m2  SV MOD A4C: 52.83 ml  SV(Cube): 73.23 ml  SV(Teich): 62.68 ml    CW  AR Dec Inyo: 2.07 m/s2  AR Dec Time: 2636.88 ms  AR PHT: 764.69 ms  AR Vmax: 4.56 m/s  AR maxP.02 mmHg  TR Vmax: 3.1 m/s  TR maxP.41 mmHg    MM  TAPSE: 2.23 cm    PW  E': 0.06 m/s  E/E': 15.62  MV A Obed: 0.32 m/s  MV Dec Inyo: 2.53 m/s2  MV DecT: 397.07 ms  MV E Obed: 1 m/s  MV E/A Ratio: 3.16    [employer] Accredited Echocardiography Laboratory    Prepared and electronically signed by    Bryn Mcnamara MD  Signed 2019 12:44:18    Results for orders placed during the hospital encounter of 16    YANIV    Narrative  [address] [place] [address] [phone]    Transesophageal Echocardiogram  2D, Doppler, and Color Doppler    Study date:  12-Aug-2016    Patient: Fannie Ureña  MR number: [id number]  Account number: [de-identified]  : [id number]  Age: 79 years  Gender: Female  Status: Outpatient  Location: Cath lab  Height: 62 in  Weight: 190 lb  BP: 148/ 97 mmHg    Indications: Afib    Diagnoses: I48.0 - Atrial fibrillation    Sonographer:  SOLE Huffman  Primary Physician:  Yandy Rolle MD  Referring Physician:  Martha Yanes MD  Group:  [facility]  Interpreting Physician:  Chris Prabhakar MD    SUMMARY    LEFT VENTRICLE:  Systolic function was mildly reduced. Ejection fraction was estimated to be 50  %. LEFT ATRIUM:  The atrium was dilated. LEFT ATRIAL APPENDAGE:  The appendage was dilated. No thrombus was identified. ATRIAL SEPTUM:  No defect or patent foramen ovale was identified. RIGHT ATRIUM:  The atrium was dilated. MITRAL VALVE:  There was mild regurgitation. AORTIC VALVE:  There was mild regurgitation. TRICUSPID VALVE:  There was moderate to severe regurgitation. PULMONIC VALVE:  There was trace regurgitation. HISTORY: PRIOR HISTORY: Obesity, Afib, Edema, Cardiomyopathy, HTN    PROCEDURE: The procedure was performed in the catheterization laboratory. This  was a routine study. The risks and alternatives of the procedure were explained  to the patient and informed consent was obtained. The transesophageal approach  was used. The study included complete 2D imaging, complete spectral Doppler,  and color Doppler. The heart rate was 118 bpm, at the start of the study. An  adult omniplane probe was inserted by the attending cardiologist. Intubated  with ease. One intubation attempt(s). There was no blood detected on the probe. Image quality was adequate. MEDICATIONS: Anesthesia. LEFT VENTRICLE: Size was normal. Systolic function was mildly reduced. Ejection  fraction was estimated to be 50 %. This study was inadequate for the evaluation  of regional wall motion. Wall thickness was normal.    RIGHT VENTRICLE: The size was normal. Systolic function was low normal.    LEFT ATRIUM: The atrium was dilated. APPENDAGE: The appendage was dilated. No  thrombus was identified. ATRIAL SEPTUM: No defect or patent foramen ovale was identified. RIGHT ATRIUM: The atrium was dilated. MITRAL VALVE: Valve structure was normal. There was normal leaflet separation. DOPPLER: The transmitral velocity was within the normal range.  There was no  evidence for stenosis. There was mild regurgitation. AORTIC VALVE: The valve was trileaflet. Leaflets exhibited normal thickness and  normal cuspal separation. DOPPLER: Transaortic velocity was within the normal  range. There was no evidence for stenosis. There was mild regurgitation. TRICUSPID VALVE: The valve structure was normal. There was normal leaflet  separation. DOPPLER: There was moderate to severe regurgitation. Estimated peak  PA pressure was 40 mmHg. PULMONIC VALVE: Leaflets exhibited normal thickness, no calcification, and  normal cuspal separation. DOPPLER: There was trace regurgitation. PERICARDIUM: There was no pericardial effusion. AORTA: The root exhibited normal size. The ascending aorta was normal in size. There was no significant atheroma. PULMONARY VEINS: DOPPLER: Doppler flow pattern was normal in the pulmonary  vein(s). [facility] Accredited Echocardiography Laboratory    Prepared and electronically signed by    Maryanne Zuniga MD  Signed [id number] 17:28:53      CATH:  No results found for this or any previous visit.       STRESS TEST:  Results for orders placed during the hospital encounter of 19    NM myocardial perfusion spect (stress and/or rest)    Narrative  [address] [place] [address]    Stress Gated SPECT Myocardial Perfusion Imaging after Regadenoson    Patient: Booker Serrano  MR number: [id number]  Account number: [de-identified]  : 1945  Age: 68 years  Gender: Female  Status: Outpatient  Location: [address] [place] [address] Vascular Center  Height: 62 in  Weight: 178 lb  BP: 141/ 80 mmHg    Allergies: MORPHINE AND RELATED, OXYCODONE-ACETAMINOPHEN    Diagnosis: R06.09 - Other forms of dyspnea    Primary Physician:  Chandrakant Santos MD  RN:  Ladonna Mcgill RN  Referring Physician:  SHADI Arrington  Technician:  Keyur Lucero  Group:  [facility]  Report Prepared By[de-identified]  Zak Lam RENETTA Fuentes  Interpreting Physician:  Stella Ryder MD    INDICATIONS: Detection of coronary artery disease. HISTORY: Hx right breast CA The patient is a 68year old  female. Chest pain status: chest pain. Other symptoms: dyspnea and lower extremity edema. Coronary artery disease risk factors: dyslipidemia, hypertension, family history  of premature coronary artery disease, and post-menopausal state. Cardiovascular history: arrhythmia(hx atrial fibrillation/PAF) Medications: include a beta blocker, a diuretic, and thyroid medications. Previous test results: abnormal ECG. PHYSICAL EXAM: Baseline physical exam screening: no wheezes audible. REST ECG: Sinus bradycardia ST segments and T waves were normal. The ECG showed occasional premature atrial contractions. PROCEDURE: The study was performed in the the [facility]. A regadenoson infusion pharmacologic stress test was performed. Gated SPECT myocardial perfusion imaging was performed during stress. Systolic blood  pressure was 141 mmHg, at the start of the study. Diastolic blood pressure was 80 mmHg, at the start of the study. The heart rate was 57 bpm, at the start of the study. Regadenoson protocol:  HR bpm SBP mmHg DBP mmHg Symptoms  Baseline 57 141 80 none  Immediate 100 146 82 dyspnea, fatigue  1 min 98 155 86 subsiding  2 min 69 153 85 none  The patient also performed low level exercise. STRESS SUMMARY: Duration of pharmacologic stress was 3 min and 0 sec. Maximal heart rate during stress was 101 bpm. The rate-pressure product for the peak heart rate and blood pressure was 66082. There was no chest pain during stress. The  stress test was terminated due to protocol completion. Pre oxygen saturation: 99 %. Peak oxygen saturation: 95 %. The stress ECG was negative for ischemia and normal. There were no stress arrhythmias or conduction abnormalities.     ISOTOPE ADMINISTRATION:  The radiopharmaceutical was injected at the peak effect of pharmacologic stress. PERFUSION DEFECTS:  -  There were no perfusion defects. GATED SPECT:  The calculated left ventricular ejection fraction was 72 %. There was no left ventricular regional abnormality. SUMMARY:  -  Stress results: There was no chest pain during stress. -  ECG conclusions: The stress ECG was negative for ischemia and normal.  -  Perfusion imaging: There were no perfusion defects.  -  Gated SPECT: The calculated left ventricular ejection fraction was 72 %. There was no left ventricular regional abnormality. IMPRESSIONS: Normal study after pharmacologic vasodilation without reproduction of symptoms. Myocardial perfusion imaging was normal at rest and with stress.     Prepared and signed by    Maranda Gorman MD  Signed 05/01/2019 09:21:35

## 2023-08-09 ENCOUNTER — CLINICAL SUPPORT (OUTPATIENT)
Dept: CARDIOLOGY CLINIC | Facility: CLINIC | Age: 78
End: 2023-08-09
Payer: COMMERCIAL

## 2023-08-09 VITALS
WEIGHT: 170 LBS | HEART RATE: 80 BPM | DIASTOLIC BLOOD PRESSURE: 64 MMHG | BODY MASS INDEX: 31.28 KG/M2 | SYSTOLIC BLOOD PRESSURE: 122 MMHG | HEIGHT: 62 IN

## 2023-08-09 DIAGNOSIS — I48.91 ATRIAL FIBRILLATION, UNSPECIFIED TYPE (HCC): Primary | ICD-10-CM

## 2023-08-09 DIAGNOSIS — I47.1 ATRIAL TACHYCARDIA (HCC): ICD-10-CM

## 2023-08-09 PROCEDURE — 93000 ELECTROCARDIOGRAM COMPLETE: CPT

## 2023-08-09 PROCEDURE — 93000 ELECTROCARDIOGRAM COMPLETE: CPT | Performed by: INTERNAL MEDICINE

## 2023-08-09 NOTE — PROGRESS NOTES
Pt came into office today for a 1 week EKG per Too Guzman after decreasing her Sotalol to 60 mg daily for a prolonged QT interval.     EKG done in office today was reviewed by Dr. Ashely Escobar and he states it is stable and there has been a slight change in that the QT interval and is a bit more than previously. He has no med changes, and recommends the pt to have her ablation sooner than the 9/29 appt that is already scheduled.      Pt VS are as follows  BP- 122/64 HR 80 .0lb

## 2023-08-15 ENCOUNTER — TELEPHONE (OUTPATIENT)
Dept: HEMATOLOGY ONCOLOGY | Facility: CLINIC | Age: 78
End: 2023-08-15

## 2023-08-15 NOTE — TELEPHONE ENCOUNTER
Appointment Change  Cancel, Reschedule, Change to Virtual      Who are you speaking with? Patient   If it is not the patient, are they listed on an active communication consent form? N/A   Which provider is the appointment scheduled with? SHADI Schafer   When is the appointment scheduled? Please list date and time 8/16/23 1:30PM   At which location is the appointment scheduled to take place? [facility]   Was the appointment rescheduled or changed from an in person visit to a virtual visit? If so, please list the details of the change. 9/19/23 2:30PM   What is the reason for the appointment change? Patient does not have a vehicle currently    Was STAR transport scheduled for this visit? No   Does STAR transport need to be scheduled for the new visit (if applicable) No   Does the patient need an infusion appointment rescheduled? No   Does the patient have an infusion appointment scheduled? If so, when? No   Is the patient undergoing chemotherapy? No   Was the no-show policy reviewed for appointments being changed with less then 24 hours of notice?  Yes

## 2023-08-24 DIAGNOSIS — I48.19 PERSISTENT ATRIAL FIBRILLATION (HCC): ICD-10-CM

## 2023-08-24 RX ORDER — SOTALOL HYDROCHLORIDE 120 MG/1
60 TABLET ORAL DAILY
Qty: 45 TABLET | Refills: 1 | Status: SHIPPED | OUTPATIENT
Start: 2023-08-24

## 2023-09-12 ENCOUNTER — REMOTE DEVICE CLINIC VISIT (OUTPATIENT)
Dept: CARDIOLOGY CLINIC | Facility: CLINIC | Age: 78
End: 2023-09-12
Payer: COMMERCIAL

## 2023-09-12 DIAGNOSIS — Z95.0 PRESENCE OF CARDIAC PACEMAKER: Primary | ICD-10-CM

## 2023-09-12 PROCEDURE — 93296 REM INTERROG EVL PM/IDS: CPT | Performed by: INTERNAL MEDICINE

## 2023-09-12 PROCEDURE — 93294 REM INTERROG EVL PM/LDLS PM: CPT | Performed by: INTERNAL MEDICINE

## 2023-09-12 NOTE — PROGRESS NOTES
Results for orders placed or performed in visit on 09/12/23   Cardiac EP device report    Narrative    MDT DUAL PM/ ACTIVE SYSTEM IS MRI CONDITIONAL  CARELINK TRANSMISSION: BATTERY VOLTAGE ADEQUATE (11.3 YRS). AP: 88.8%. : 2.5% (MVP-ON). ALL AVAILABLE LEAD PARAMETERS WITHIN NORMAL LIMITS. 28 AT/AF EPISODES W/ AVAIL EGMS SHOWING AF, MAX DURATION 10 HRS 13 MINS. AF BURDEN: 7.1%. (PT SCHEDULED FOR AFIB ABL 9/29/23) PT TAKES SOTALOL, ELIQUIS, DILT XR. EF: 65% (ECHO 6/12/20). NORMAL DEVICE FUNCTION.  [id number] [address]

## 2023-09-17 PROBLEM — Z00.00 MEDICARE ANNUAL WELLNESS VISIT, SUBSEQUENT: Status: RESOLVED | Noted: 2019-01-16 | Resolved: 2023-09-17

## 2023-09-18 ENCOUNTER — TELEPHONE (OUTPATIENT)
Dept: HEMATOLOGY ONCOLOGY | Facility: CLINIC | Age: 78
End: 2023-09-18

## 2023-09-18 NOTE — TELEPHONE ENCOUNTER
Appointment Change  Cancel, Reschedule, Change to Virtual      Who are you speaking with? Patient   If it is not the patient, is the caller listed on the communication consent form? N/A   Which provider is the appointment scheduled with? SHADI Alfred   When was the original appointment scheduled? Please list date and time 9/19/23 @ 2:30pm   At which location is the appointment scheduled to take place[facility]   Was the appointment rescheduled? Was the appointment changed from an in person visit to a virtual visit? If so, please list the details of the change. Yes 9/25/23 @ 10am   What is the reason for the appointment change? Patient had a family emergency       Was STAR transport scheduled? no   Does STAR transport need to be scheduled for the new visit (if applicable) no   Does the patient need an infusion appointment rescheduled? no   Does the patient have an upcoming infusion appointment scheduled? If so, when? no   Is the patient undergoing chemotherapy? no   For appointments cancelled with less than 24 hours:  Was the no-show policy reviewed?  yes

## 2023-09-18 NOTE — ASSESSMENT & PLAN NOTE
Lab Results   Component Value Date    EGFR 50 (L) 07/17/2023    EGFR 45 02/23/2023    EGFR 41 02/22/2023    CREATININE 1.14 (H) 07/17/2023    CREATININE 1.17 02/23/2023    CREATININE 1.24 02/22/2023

## 2023-09-23 LAB
ALBUMIN SERPL-MCNC: 3.5 G/DL (ref 3.8–4.8)
ALBUMIN/GLOB SERPL: 1.2 {RATIO} (ref 1.2–2.2)
ALP SERPL-CCNC: 104 IU/L (ref 44–121)
ALT SERPL-CCNC: 11 IU/L (ref 0–32)
AST SERPL-CCNC: 24 IU/L (ref 0–40)
BASOPHILS # BLD AUTO: 0 X10E3/UL (ref 0–0.2)
BASOPHILS NFR BLD AUTO: 1 %
BILIRUB SERPL-MCNC: 0.5 MG/DL (ref 0–1.2)
BUN SERPL-MCNC: 12 MG/DL (ref 8–27)
BUN/CREAT SERPL: 12 (ref 12–28)
CALCIUM SERPL-MCNC: 9.1 MG/DL (ref 8.7–10.3)
CHLORIDE SERPL-SCNC: 108 MMOL/L (ref 96–106)
CO2 SERPL-SCNC: 22 MMOL/L (ref 20–29)
CREAT SERPL-MCNC: 0.99 MG/DL (ref 0.57–1)
EGFR: 59 ML/MIN/1.73
EOSINOPHIL # BLD AUTO: 0.1 X10E3/UL (ref 0–0.4)
EOSINOPHIL NFR BLD AUTO: 3 %
ERYTHROCYTE [DISTWIDTH] IN BLOOD BY AUTOMATED COUNT: 14.4 % (ref 11.7–15.4)
GLOBULIN SER-MCNC: 3 G/DL (ref 1.5–4.5)
GLUCOSE SERPL-MCNC: 91 MG/DL (ref 70–99)
HCT VFR BLD AUTO: 37.7 % (ref 34–46.6)
HGB BLD-MCNC: 11.9 G/DL (ref 11.1–15.9)
IMM GRANULOCYTES # BLD: 0 X10E3/UL (ref 0–0.1)
IMM GRANULOCYTES NFR BLD: 0 %
INR PPP: 1.1 (ref 0.9–1.2)
LYMPHOCYTES # BLD AUTO: 1 X10E3/UL (ref 0.7–3.1)
LYMPHOCYTES NFR BLD AUTO: 24 %
MCH RBC QN AUTO: 28.3 PG (ref 26.6–33)
MCHC RBC AUTO-ENTMCNC: 31.6 G/DL (ref 31.5–35.7)
MCV RBC AUTO: 90 FL (ref 79–97)
MONOCYTES # BLD AUTO: 0.4 X10E3/UL (ref 0.1–0.9)
MONOCYTES NFR BLD AUTO: 9 %
NEUTROPHILS # BLD AUTO: 2.7 X10E3/UL (ref 1.4–7)
NEUTROPHILS NFR BLD AUTO: 63 %
PLATELET # BLD AUTO: 178 X10E3/UL (ref 150–450)
POTASSIUM SERPL-SCNC: 5 MMOL/L (ref 3.5–5.2)
PROT SERPL-MCNC: 6.5 G/DL (ref 6–8.5)
PROTHROMBIN TIME: 12.1 SEC (ref 9.1–12)
RBC # BLD AUTO: 4.2 X10E6/UL (ref 3.77–5.28)
SODIUM SERPL-SCNC: 144 MMOL/L (ref 134–144)
WBC # BLD AUTO: 4.3 X10E3/UL (ref 3.4–10.8)

## 2023-09-25 ENCOUNTER — OFFICE VISIT (OUTPATIENT)
Dept: SURGICAL ONCOLOGY | Facility: CLINIC | Age: 78
End: 2023-09-25
Payer: COMMERCIAL

## 2023-09-25 ENCOUNTER — TELEPHONE (OUTPATIENT)
Dept: CARDIOLOGY CLINIC | Facility: CLINIC | Age: 78
End: 2023-09-25

## 2023-09-25 VITALS
TEMPERATURE: 97.1 F | BODY MASS INDEX: 31.28 KG/M2 | RESPIRATION RATE: 20 BRPM | HEART RATE: 80 BPM | HEIGHT: 62 IN | WEIGHT: 170 LBS | SYSTOLIC BLOOD PRESSURE: 110 MMHG | DIASTOLIC BLOOD PRESSURE: 68 MMHG | OXYGEN SATURATION: 97 %

## 2023-09-25 DIAGNOSIS — Z85.3 HISTORY OF LEFT BREAST CANCER: ICD-10-CM

## 2023-09-25 DIAGNOSIS — Z85.3 HISTORY OF RIGHT BREAST CANCER: ICD-10-CM

## 2023-09-25 DIAGNOSIS — Z86.000 HISTORY OF DUCTAL CARCINOMA IN SITU (DCIS) OF BREAST: Primary | ICD-10-CM

## 2023-09-25 DIAGNOSIS — N32.81 OAB (OVERACTIVE BLADDER): ICD-10-CM

## 2023-09-25 PROCEDURE — 99213 OFFICE O/P EST LOW 20 MIN: CPT

## 2023-09-25 RX ORDER — OXYBUTYNIN CHLORIDE 10 MG/1
10 TABLET, EXTENDED RELEASE ORAL
Qty: 90 TABLET | Refills: 1 | Status: SHIPPED | OUTPATIENT
Start: 2023-09-25

## 2023-09-25 NOTE — TELEPHONE ENCOUNTER
----- Message from Caro Aguiar MD sent at 9/25/2023 10:24 AM EDT -----  Please call the patient about normal lab results. Thank you.

## 2023-09-25 NOTE — PROGRESS NOTES
Surgical Oncology Follow Up       [address] [facility] [place]  [address] [facility] [address]    Johnathan Salomon  1945  [id number]  [address] [facility] [place]  [address] [address]    Chief Complaint   Patient presents with   • office visit       Assessment/Plan:  1. History of ductal carcinoma in situ (DCIS) of breast  - 6 mo follow up    2. History of left breast cancer    3. History of right breast cancer    Discussion/Summary: Patient is a 44-year-old female presenting today for follow-up for bilateral breast cancer. 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 She underwent a right breast mastectomy with sentinel node biopsy with Dr. Shannen Clements. She completed adjuvant chemotherapy. She underwent a right chest wall lesion excision in 2017 for residual invasive carcinoma. 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 She underwent a left breast mastectomy. She had no lymphovascular invasion. She had genetic testing which was negative. She is currently on observation. There were no concerns on her cbe. Biopsy proven fat necrosis in right axilla palpable on exam. I will see the patient back in 6 months or sooner should the need arise. She was instructed to call with any questions or concerns prior to this time. All questions were answered today. History of Present Illness:     Oncology History   History of right breast cancer   9/12/2016 Biopsy    Right breast 3 site biopsy  Invasive breast carcinoma  Grade 2  ER 0, AR 0, HER2      10/28/2016 Surgery    Right breast mastectomy with SLN biopsy (Dr. Shannen Clements)  Invasive mammary carcinoma of no special type (ductal)  Grade 3  2.8 cm  Margins negative  0/9 Lymph nodes  Stage IIA     12/7/2016 - 2/1/2017 Chemotherapy    Taxotere/Cytoxan. Scheduled for every 3 weeks x 4 cycles. Completed 3 cycles under the guidance of Dr Anrdeia Galloway. 3/16/2017 Surgery    Right chest wall lesion excision (Dr. Griffith)  Residual microscopic focus of invasive carcinoma  4 mm  Margins negative     History of ductal carcinoma in situ (DCIS) of breast   9/21/2021 Biopsy    Left breast ultrasound-guided biopsy  A. 3 o'clock, 6 cm from nipple  Intraductal papilloma  Focal stromal changes suggestive of PASH    B. 2 o'clock, 5 cm from nipple  Ductal carcinoma in situ  Grade 1        Concordant. Carcinoma measured up to 6mm on US. Left axilla US negative. If the papilloma is not excised at surgery, a 6 MO F/U US is recommended. 9/30/2021 Genetic Testing    A total of 36 genes were evaluated, including: FELIPE, BRCA1, BRCA2, CDH1, CHEK2, PALB2, PTEN, STK11, TP53  Negative result. No pathogenic sequence variants or deletions/dupllications identified  [employer]     10/22/2021 Surgery    Left breast simple mastectomy  Ductal carcinoma in situ  Grade 2  Size cannot be determined (multifocal)  Margins negative  0/1 Intramammary lymph node  Stage 0          -Interval History: Patient is a 80-year-old female presenting today for follow-up for bilateral breast cancer. She is currently on observation. She notes SOB secondary to afib. She's undergoing her 2nd ablation this Friday. Patient denies changes on her breast exam. She denies persistent headaches, bone pain, back pain,  or abdominal pain. Review of Systems:  Review of Systems   Constitutional: Negative for activity change, appetite change, fatigue and unexpected weight change. Respiratory: Positive for shortness of breath. Negative for cough. Cardiovascular: Negative for chest pain. Gastrointestinal: Negative for abdominal pain, diarrhea, nausea and vomiting. Endocrine: Negative for heat intolerance. Musculoskeletal: Negative for arthralgias, back pain and myalgias. Skin: Negative for rash. Neurological: Negative for weakness and headaches. Hematological: Negative for adenopathy.        Patient Active Problem List   Diagnosis   • Atrial fibrillation ([address])   • Benign essential hypertension   • Bilateral edema of lower extremity   • BPPV (benign paroxysmal positional vertigo)   • Cardiomyopathy (HCC)   • Cervical stenosis of spine   • Edema   • Homocysteinemia   • Hypokalemia   • Acquired hypothyroidism   • Long QT interval   • Lymphedema   • History of right breast cancer   • BMI 31.0-31.9,adult   • Osteopenia   • Overactive bladder   • Peripheral neuropathy   • Vitamin D deficiency   • Bronchitis with bronchospasm   • Candidal esophagitis ([facility])   • Abnormal chest CT   • Pulmonary nodule seen on imaging study   • SOB (shortness of breath)   • Weight loss   • Hyperglycemia   • Mixed hyperlipidemia   • Other idiopathic peripheral autonomic neuropathy   • Current use of long term anticoagulation   • RICHARDS (dyspnea on exertion)   • Cortical age-related cataract of both eyes   • Bradycardia   • SSS (sick sinus syndrome) ([facility])   • Cardiac pacemaker   • History of ductal carcinoma in situ (DCIS) of breast   • Platelets decreased ([address])   • Stage 3b chronic kidney disease (HCC)   • Occipital neuralgia of right side   • Cervical myofascial pain syndrome   • Cervical spondylosis   • Thrush, oral     Past Medical History:   Diagnosis Date   • Abnormal chest CT     last assessed - [id number]   • Abnormal glucose     Resolved - [id number]   • Arrhythmia    • Arthritis     Slight   • BCC (basal cell carcinoma of skin)     last assessed - [id number]   • BRCA gene mutation negative 09/30/2021    [employer]; 36 gene panel   • Breast cancer ([address]) 09/12/2016    rt   • Cancer ([address])    • Cardiomyopathy ([address])    • Cervical spinal stenosis    • Discharge from left nipple     last assessed - [id number]   • Disease of thyroid gland     hypothyroidism   • Full dentures     Upper and Lower   • History of atrial fibrillation    • Homocysteinemia    • Hypertension    • Hypokalemia    • Intraductal papilloma of breast     last assessed - [id number]   • Limb alert care status     right upper ext   • Malignant neoplasm of skin     basal cell on face, Moh's surgery   • Memory loss     last assessed - [id number]   • Obesity    • Open wound     last assessed - [id number]   • Osteopenia    • Overactive bladder    • Palmar plantar erythrodysaesthesia     Resolved - [id number]   • Pulmonary nodule seen on imaging study    • Rosacea    • UTI (urinary tract infection)     recently   • Vertigo    • Vitamin D deficiency      Past Surgical History:   Procedure Laterality Date   • APPENDECTOMY     • ATRIAL ABLATION SURGERY      last assessed - [id number]   • BACK SURGERY  1997    Lumbar spinal stenosis   • BREAST BIOPSY      2014? left breast; 9/12/16 right breast x 3   • BREAST BIOPSY Left 03/30/2010   • BREAST BIOPSY Right 09/12/2016    us bx- invasive br ca   • BREAST SURGERY      needle bx. • BREAST SURGERY Right 03/16/2017    excision of right chest wall lesion   • CARDIAC ELECTROPHYSIOLOGY STUDY AND ABLATION     • CARDIOVERSION     • DENTAL SURGERY     • HAND SURGERY      Joint replaced with silicone joint 5th finger right hand   • HYSTERECTOMY  1993    Complete   • LAMINECTOMY      Decompressive up to two lumbar segments   • MASTECTOMY Right 10/28/2016   • OOPHORECTOMY  1993   • ID EXCISION MAL LESION TRUNK/ARM/LEG 0.5 CM/< Right 03/16/2017    Procedure: EXCISION CHEST WALL LESION; NEEDLE LOC @ 1400;  Surgeon: Von Robb MD;  Location: [facility];  Service: Surgical Oncology   • ID INTRAOP SENTINEL LYMPH NODE ID W/DYE INJECTION Right 10/28/2016    Procedure: BREAST MASTECTOMY WITH SENTINAL LYMPH NODE BIOPSY; Blondie Solders; LYMPHATIC MAPPING; 0800 NUC MED INJECTION;  Surgeon: Von Robb MD;  Location: [facility];  Service: Surgical Oncology   • ID MASTECTOMY SIMPLE COMPLETE Left 10/22/2021    Procedure: BREAST MASTECTOMY SIMPLE MARTHA  GUIDED;   Surgeon: Oscar Dorman MD;  Location: [facility]; Service: Surgical Oncology   • SENTINEL LYMPH NODE BIOPSY Right    • US BREAST NEEDLE LOC LEFT Left 10/19/2021   • US BREAST NEEDLE LOC RIGHT Right 03/16/2017   • US GUIDANCE BREAST BIOPSY LEFT EACH ADDITIONAL Left 09/21/2021   • US GUIDANCE BREAST BIOPSY RIGHT EACH ADDITIONAL Right 09/12/2016   • US GUIDANCE BREAST BIOPSY RIGHT EACH ADDITIONAL Right 09/12/2016   • US GUIDED BREAST BIOPSY LEFT COMPLETE Left 09/21/2021   • US GUIDED BREAST BIOPSY RIGHT COMPLETE Right 09/12/2016   • US GUIDED BREAST BIOPSY RIGHT COMPLETE Right 10/1/2021     Family History   Problem Relation Age of Onset   • Coronary artery disease Mother         CABG   • Diabetes Mother    • Heart attack Mother    • Diabetes Sister    • Parkinsonism Sister    • Coronary artery disease Brother         CABG   • Diabetes Brother    • Heart attack Brother    • Heart attack Son    • Kidney cancer Son 48   • No Known Problems Son    • No Known Problems Son    • Emphysema Sister    • No Known Problems Sister    • Heart attack Brother    • No Known Problems Brother    • No Known Problems Maternal Grandmother    • No Known Problems Maternal Grandfather    • No Known Problems Paternal Grandmother    • No Known Problems Paternal Grandfather    • No Known Problems Paternal Aunt    • No Known Problems Paternal Aunt    • No Known Problems Paternal Aunt    • No Known Problems Paternal Aunt      Social History     Socioeconomic History   • Marital status: /Civil Union     Spouse name: Not on file   • Number of children: Not on file   • Years of education: Not on file   • Highest education level: Not on file   Occupational History   • Occupation: Retired   Tobacco Use   • Smoking status: Never   • Smokeless tobacco: Never   Vaping Use   • Vaping Use: Never used   Substance and Sexual Activity   • Alcohol use: Never   • Drug use: No   • Sexual activity: Not Currently     Comment: Resides with    Other Topics Concern   • Not on file   Social History Narrative    Always uses seat belt    Convalescence after chemotherapy     Social Determinants of Health     Financial Resource Strain: Low Risk  (7/19/2023)    Overall Financial Resource Strain (CARDIA)    • Difficulty of Paying Living Expenses: Not hard at all   Food Insecurity: No Food Insecurity (2/21/2023)    Hunger Vital Sign    • Worried About Running Out of Food in the Last Year: Never true    • Ran Out of Food in the Last Year: Never true   Transportation Needs: No Transportation Needs (7/19/2023)    PRAPARE - Transportation    • Lack of Transportation (Medical): No    • Lack of Transportation (Non-Medical):  No   Physical Activity: Not on file   Stress: Not on file   Social Connections: Not on file   Intimate Partner Violence: Not on file   Housing Stability: Low Risk  (2/21/2023)    Housing Stability Vital Sign    • Unable to Pay for Housing in the Last Year: No    • Number of Places Lived in the Last Year: 1    • Unstable Housing in the Last Year: No       Current Outpatient Medications:   •  clotrimazole (MYCELEX) 10 mg brendon, Take 1 tablet (10 mg total) by mouth 5 (five) times a day, Disp: 35 tablet, Rfl: 1  •  cyanocobalamin (VITAMIN B-12) 500 mcg tablet, Take 1 tablet by mouth daily, Disp: , Rfl:   •  Dilt- MG 24 hr capsule, TAKE 1 CAPSULE BY MOUTH EVERY DAY, Disp: 90 capsule, Rfl: 2  •  Eliquis 5 MG, TAKE 1 TABLET BY MOUTH TWICE A DAY, Disp: 60 tablet, Rfl: 6  •  furosemide (LASIX) 40 mg tablet, Take 1 tablet (40 mg total) by mouth 2 (two) times a day As directed, Disp: 180 tablet, Rfl: 1  •  levothyroxine 150 mcg tablet, Take 1 tablet 4 days a week and 1/2 tablet 3 days a week, Disp: 90 tablet, Rfl: 0  •  meclizine (ANTIVERT) 25 mg tablet, Take 1 tablet (25 mg total) by mouth every 8 (eight) hours as needed for dizziness, Disp: 60 tablet, Rfl: 2  •  oxybutynin (DITROPAN-XL) 10 MG 24 hr tablet, TAKE 1 TABLET BY MOUTH DAILY AT BEDTIME, Disp: 90 tablet, Rfl: 1  •  potassium chloride (MICRO-K) 10 MEQ CR capsule, TAKE 1 CAPSULE BY MOUTH EVERY DAY, Disp: 90 capsule, Rfl: 1  •  RESTASIS 0.05 % ophthalmic emulsion, Administer 1 drop to both eyes every 12 (twelve) hours, Disp: , Rfl:   •  sotalol (BETAPACE) 120 mg tablet, TAKE 0.5 TABLETS (60 MG TOTAL) BY MOUTH DAILY, Disp: 45 tablet, Rfl: 1  Allergies   Allergen Reactions   • Morphine And Related GI Intolerance     Vitals:    09/25/23 0952   Resp: 16       Physical Exam  Constitutional:       General: She is not in acute distress. Appearance: Normal appearance. Cardiovascular:      Rate and Rhythm: Normal rate and regular rhythm. Pulses: Normal pulses. Heart sounds: Normal heart sounds. Pulmonary:      Effort: Pulmonary effort is normal.      Breath sounds: Normal breath sounds. Chest:      Chest wall: No mass. Breasts:     Right: No swelling, bleeding, inverted nipple, mass, nipple discharge, skin change or tenderness. Left: No swelling, bleeding, inverted nipple, mass, nipple discharge, skin change or tenderness. Abdominal:      General: Abdomen is flat. Palpations: Abdomen is soft. Lymphadenopathy:      Upper Body:      Right upper body: No supraclavicular, axillary or pectoral adenopathy. Left upper body: No supraclavicular, axillary or pectoral adenopathy. Skin:     General: Skin is warm. Neurological:      General: No focal deficit present. Mental Status: She is alert and oriented to person, place, and time. Psychiatric:         Mood and Affect: Mood normal.         Behavior: Behavior normal.           Results:    Imaging  Cardiac EP device report    Result Date: 9/12/2023  Narrative: MDT DUAL PM/ ACTIVE SYSTEM IS MRI CONDITIONAL CARELINK TRANSMISSION: BATTERY VOLTAGE ADEQUATE (11.3 YRS). AP: 88.8%. : 2.5% (MVP-ON). ALL AVAILABLE LEAD PARAMETERS WITHIN NORMAL LIMITS. 28 AT/AF EPISODES W/ AVAIL EGMS SHOWING AF, MAX DURATION 10 HRS 13 MINS. AF BURDEN: 7.1%.  (PT SCHEDULED FOR AFIB ABL 9/29/23) PT TAKES SOTALOL, ELIQUIS, DILT XR. EF: 65% (ECHO 6/12/20). NORMAL DEVICE FUNCTION. [facility]       I reviewed the above imaging data. Advance Care Planning/Advance Directives:  Discussed disease status, cancer treatment plans and/or cancer treatment goals with the patient.

## 2023-09-28 NOTE — H&P
[address] [facility]  H&P: Electrophysiology  Date of Service: 9/28/2023  Hospital Day: 0  Name: Francheska Pastor  MRN: [id number]  Unit/Bed#:       Assessment/Plan   1. Symptomatic paroxysmal atrial fibrillation  - anticoagulated with Eliquis / [id number] score of 3 (age, sex)  - EF of 65% per echo 6/2020 / mild dilation of LA noted  - rate control: diltiazem  - antiarrhythmic therapy: sotalol 80mg daily prior to visit /breakthrough on flecainide, side effects to dronedarone. Decreased during August 2023 appointment to 60 mg daily secondary to QTc prolongation  - prior ablation: cryo of atrial fibrillation with pulmonary vein isolation by Dr. Karin Bailey 8/2016  2. Medtronic dual-chamber pacemaker in situ  - implanted by Dr. Ariana Fields on 6/7/2021 due to sick sinus syndrome  3. Hypothyroidism  4. History of breast cancer   - status post chemotherapy  5. Obesity BMI of 31  6. Benign paroxysmal positional vertigo  - utilizes meclizine as needed    Plan for repeat A-fib ablation today with Dr. Ariana Fields       History of Present Illness   HPI: Yaritza Jones is a 68y.o. year old female with medical history as stated above who presents for atrial fibrillation ablation with Dr. Ariana Fields today. She will underwent initial ablation for A-fib with PVI in August 2016 with Dr. Karin Bailey. After recurrence she was initiated on flecainide and then in December 2020 she developed dyspnea on exertion. She was noted to be bradycardic and underwent dual-chamber pacemaker placement for sick sinus syndrome. Since early 2023 she has been having breakthrough on flecainide and in February 2023 she underwent hospitalization for sotalol initiation. Since that time she has been maintained on sotalol 80 mg daily secondary to kidney function and QT prolongation. She continues to remain short of breath and pacemaker interrogation demonstrates that she is not maintaining sinus rhythm.   Given her symptoms and low tolerance of sotalol it was recommended she undergo repeat atrial fibrillation ablation    Cardiac testin2020-echo EF 65%, normal RV    EKG: ***    Historical Information   Past Medical History:  No date: Abnormal chest CT      Comment:  last assessed - [id number]  No date: Abnormal glucose      Comment:  Resolved - [id number]  No date: Arrhythmia  No date: Arthritis      Comment:  Slight  No date: BCC (basal cell carcinoma of skin)      Comment:  last assessed - [id number]  2021: BRCA gene mutation negative      Comment:  Liane; 36 gene panel  2016: Breast cancer ([address])      Comment:  rt  No date: Cancer ([address])  No date: Cardiomyopathy ([address])  No date: Cervical spinal stenosis  No date: Discharge from left nipple      Comment:  last assessed - [id number]  No date: Disease of thyroid gland      Comment:  hypothyroidism  No date: Full dentures      Comment:  Upper and Lower  No date: History of atrial fibrillation  No date: Homocysteinemia  No date: Hypertension  No date: Hypokalemia  No date: Intraductal papilloma of breast      Comment:  last assessed - 2012  No date: Limb alert care status      Comment:  right upper ext  No date: Malignant neoplasm of skin      Comment:  basal cell on face, Moh's surgery  No date: Memory loss      Comment:  last assessed - [id number]  No date: Obesity  No date: Open wound      Comment:  last assessed - [id number]  No date: Osteopenia  No date: Overactive bladder  No date: Palmar plantar erythrodysaesthesia      Comment:  Resolved - [id number]  No date: Pulmonary nodule seen on imaging study  No date: Rosacea  No date: UTI (urinary tract infection)      Comment:  recently  No date: Vertigo  No date: Vitamin D deficiency Past Surgical History:  No date: APPENDECTOMY  No date: ATRIAL ABLATION SURGERY      Comment:  last assessed - 2016: BACK SURGERY      Comment:  Lumbar spinal stenosis  No date: BREAST BIOPSY      Comment:  ?  left breast; 16 right breast x 3  03/30/2010: BREAST BIOPSY; Left  09/12/2016: BREAST BIOPSY; Right      Comment:  us bx- invasive br ca  No date: BREAST SURGERY      Comment:  needle bx.  03/16/2017: BREAST SURGERY; Right      Comment:  excision of right chest wall lesion  No date: CARDIAC ELECTROPHYSIOLOGY STUDY AND ABLATION  No date: CARDIOVERSION  No date: DENTAL SURGERY  No date: HAND SURGERY      Comment:  Joint replaced with silicone joint 5th finger right hand  1993: HYSTERECTOMY      Comment:  Complete  No date: LAMINECTOMY      Comment:  Decompressive up to two lumbar segments  10/28/2016: MASTECTOMY; Right  1993: OOPHORECTOMY  03/16/2017: [place] EXCISION MAL LESION TRUNK/ARM/LEG 0.5 CM/<; Right      Comment:  Procedure: EXCISION CHEST WALL LESION; NEEDLE LOC @                1400;  Surgeon: Fely Bedoya MD;  Location: [facility];                Service: Surgical Oncology  10/28/2016: NY INTRAOP SENTINEL LYMPH NODE ID W/DYE INJECTION; Right      Comment:  Procedure: BREAST MASTECTOMY WITH SENTINAL LYMPH NODE                BIOPSY; LYMPHSCINITIGRAPHY; LYMPHATIC MAPPING; 0800 NUC                MED INJECTION;  Surgeon: Fely Bedoya MD;  Location: [facility]                Main OR;  Service: Surgical Oncology  10/22/2021: [place] MASTECTOMY SIMPLE COMPLETE; Left      Comment:  Procedure: BREAST MASTECTOMY SIMPLE MARTHA  GUIDED; Surgeon: Niraj Gillespie MD;  Location: [facility];  Service:                Surgical Oncology  No date: SENTINEL LYMPH NODE BIOPSY; Right  10/19/2021: US BREAST NEEDLE LOC LEFT; Left  03/16/2017: US BREAST NEEDLE LOC RIGHT;  Right  09/21/2021: US GUIDANCE BREAST BIOPSY LEFT EACH ADDITIONAL; Left  09/12/2016: US GUIDANCE BREAST BIOPSY RIGHT EACH ADDITIONAL; Right  09/12/2016: US GUIDANCE BREAST BIOPSY RIGHT EACH ADDITIONAL; Right  09/21/2021: US GUIDED BREAST BIOPSY LEFT COMPLETE; Left  09/12/2016: US GUIDED BREAST BIOPSY RIGHT COMPLETE; Right  10/1/2021: [place] GUIDED BREAST BIOPSY RIGHT COMPLETE; Right Current Outpatient Medications   Medication Instructions   • clotrimazole (MYCELEX) 10 mg, Oral, 5 times daily   • cyanocobalamin (VITAMIN B-12) 500 mcg tablet 1 tablet, Oral, Daily   • Dilt- MG 24 hr capsule TAKE 1 CAPSULE BY MOUTH EVERY DAY   • Eliquis 5 MG TAKE 1 TABLET BY MOUTH TWICE A DAY   • furosemide (LASIX) 40 mg, Oral, 2 times daily, As directed   • levothyroxine 150 mcg tablet Take 1 tablet 4 days a week and 1/2 tablet 3 days a week   • meclizine (ANTIVERT) 25 mg, Oral, Every 8 hours PRN   • oxybutynin (DITROPAN-XL) 10 mg, Oral, Daily at bedtime   • potassium chloride (MICRO-K) 10 MEQ CR capsule TAKE 1 CAPSULE BY MOUTH EVERY DAY   • RESTASIS 0.05 % ophthalmic emulsion 1 drop, Both Eyes, Every 12 hours   • sotalol (BETAPACE) 60 mg, Oral, Daily    Allergies   Allergen Reactions   • Morphine And Related GI Intolerance      Social History     Tobacco Use   • Smoking status: Never   • Smokeless tobacco: Never   Vaping Use   • Vaping Use: Never used   Substance Use Topics   • Alcohol use: Never   • Drug use: No    Family History   Problem Relation Age of Onset   • Coronary artery disease Mother         CABG   • Diabetes Mother    • Heart attack Mother    • Diabetes Sister    • Parkinsonism Sister    • Coronary artery disease Brother         CABG   • Diabetes Brother    • Heart attack Brother    • Heart attack Son    • Kidney cancer Son 48   • No Known Problems Son    • No Known Problems Son    • Emphysema Sister    • No Known Problems Sister    • Heart attack Brother    • No Known Problems Brother    • No Known Problems Maternal Grandmother    • No Known Problems Maternal Grandfather    • No Known Problems Paternal Grandmother    • No Known Problems Paternal Grandfather    • No Known Problems Paternal Aunt    • No Known Problems Paternal Aunt    • No Known Problems Paternal Aunt    • No Known Problems Paternal Aunt             Objective                            Vitals I/O    Most Recent Min/Max in 24hrs   Temp   No data recorded   Pulse   No data recorded   Resp   No data recorded   BP   No data recorded   O2 Sat   No data recorded    No intake or output data in the 24 hours ending 09/28/23 1626                     Physical Exam   GEN: NAD, alert and oriented x 3, well appearing  SKIN: dry without significant lesions or rashes  CARDIOVASCULAR: RRR, normal S1, S2 without murmurs, rubs, or gallops appreciated  LUNGS: Clear to auscultation bilaterally without wheezes, rhonchi, or rales  ABDOMEN: Soft, nontender, nondistended  EXTREMITIES/VASCULAR: perfused without LE edema b/l  PSYCH: Normal mood and affect       Labs:  { I Disappearing Data Review I Results Review I Micro :[id number]}  CBC  No recent results  BMP  No recent results    Coags  No recent results     Additional Electrolytes  No recent results       Blood Gas  No recent results  No recent results LFTs  No recent results    Infectious  No recent results  Glucose  No recent results        SIGNATURE: Evelia Vera PA-C

## 2023-09-29 ENCOUNTER — ANESTHESIA EVENT (OUTPATIENT)
Dept: NON INVASIVE DIAGNOSTICS | Facility: HOSPITAL | Age: 78
End: 2023-09-29
Payer: COMMERCIAL

## 2023-09-29 ENCOUNTER — HOSPITAL ENCOUNTER (OUTPATIENT)
Facility: HOSPITAL | Age: 78
Setting detail: OUTPATIENT SURGERY
Discharge: HOME | End: 2023-09-30
Attending: INTERNAL MEDICINE | Admitting: INTERNAL MEDICINE
Payer: COMMERCIAL

## 2023-09-29 ENCOUNTER — APPOINTMENT (OUTPATIENT)
Dept: NON INVASIVE DIAGNOSTICS | Facility: HOSPITAL | Age: 78
End: 2023-09-29
Attending: INTERNAL MEDICINE
Payer: COMMERCIAL

## 2023-09-29 ENCOUNTER — ANESTHESIA (OUTPATIENT)
Dept: NON INVASIVE DIAGNOSTICS | Facility: HOSPITAL | Age: 78
End: 2023-09-29
Payer: COMMERCIAL

## 2023-09-29 DIAGNOSIS — Z86.79 HISTORY OF RADIOFREQUENCY ABLATION (RFA) FOR COMPLEX LEFT ATRIAL ARRHYTHMIA: Primary | ICD-10-CM

## 2023-09-29 DIAGNOSIS — I49.5 SSS (SICK SINUS SYNDROME) (HCC): ICD-10-CM

## 2023-09-29 DIAGNOSIS — Z98.890 HISTORY OF RADIOFREQUENCY ABLATION (RFA) FOR COMPLEX LEFT ATRIAL ARRHYTHMIA: Primary | ICD-10-CM

## 2023-09-29 DIAGNOSIS — I48.91 ATRIAL FIBRILLATION, UNSPECIFIED TYPE (HCC): ICD-10-CM

## 2023-09-29 LAB
ANION GAP SERPL CALCULATED.3IONS-SCNC: 7 MMOL/L
ATRIAL RATE: 70 BPM
ATRIAL RATE: 71 BPM
BASOPHILS # BLD AUTO: 0.03 THOUSANDS/ÂΜL (ref 0–0.1)
BASOPHILS NFR BLD AUTO: 1 % (ref 0–1)
BUN SERPL-MCNC: 12 MG/DL (ref 5–25)
CALCIUM SERPL-MCNC: 9.1 MG/DL (ref 8.4–10.2)
CHLORIDE SERPL-SCNC: 108 MMOL/L (ref 96–108)
CO2 SERPL-SCNC: 25 MMOL/L (ref 21–32)
CREAT SERPL-MCNC: 1.12 MG/DL (ref 0.6–1.3)
EOSINOPHIL # BLD AUTO: 0.17 THOUSAND/ÂΜL (ref 0–0.61)
EOSINOPHIL NFR BLD AUTO: 3 % (ref 0–6)
ERYTHROCYTE [DISTWIDTH] IN BLOOD BY AUTOMATED COUNT: 14.6 % (ref 11.6–15.1)
GFR SERPL CREATININE-BSD FRML MDRD: 47 ML/MIN/1.73SQ M
GLUCOSE P FAST SERPL-MCNC: 91 MG/DL (ref 65–99)
GLUCOSE SERPL-MCNC: 91 MG/DL (ref 65–140)
HCT VFR BLD AUTO: 36.4 % (ref 34.8–46.1)
HGB BLD-MCNC: 12.3 G/DL (ref 11.5–15.4)
IMM GRANULOCYTES # BLD AUTO: 0.02 THOUSAND/UL (ref 0–0.2)
IMM GRANULOCYTES NFR BLD AUTO: 0 % (ref 0–2)
INR PPP: 1.22 (ref 0.84–1.19)
KCT BLD-ACNC: 327 SEC (ref 89–137)
KCT BLD-ACNC: 369 SEC (ref 89–137)
KCT BLD-ACNC: 398 SEC (ref 89–137)
KCT BLD-ACNC: 398 SEC (ref 89–137)
KCT BLD-ACNC: 412 SEC (ref 89–137)
KCT BLD-ACNC: 476 SEC (ref 89–137)
LYMPHOCYTES # BLD AUTO: 1.38 THOUSANDS/ÂΜL (ref 0.6–4.47)
LYMPHOCYTES NFR BLD AUTO: 25 % (ref 14–44)
MAGNESIUM SERPL-MCNC: 2.1 MG/DL (ref 1.9–2.7)
MCH RBC QN AUTO: 29.1 PG (ref 26.8–34.3)
MCHC RBC AUTO-ENTMCNC: 33.8 G/DL (ref 31.4–37.4)
MCV RBC AUTO: 86 FL (ref 82–98)
MONOCYTES # BLD AUTO: 0.58 THOUSAND/ÂΜL (ref 0.17–1.22)
MONOCYTES NFR BLD AUTO: 11 % (ref 4–12)
NEUTROPHILS # BLD AUTO: 3.34 THOUSANDS/ÂΜL (ref 1.85–7.62)
NEUTS SEG NFR BLD AUTO: 60 % (ref 43–75)
NRBC BLD AUTO-RTO: 0 /100 WBCS
P AXIS: -8 DEGREES
P AXIS: 23 DEGREES
PLATELET # BLD AUTO: 181 THOUSANDS/UL (ref 149–390)
PMV BLD AUTO: 11.2 FL (ref 8.9–12.7)
POTASSIUM SERPL-SCNC: 4.9 MMOL/L (ref 3.5–5.3)
PR INTERVAL: 200 MS
PR INTERVAL: 202 MS
PROTHROMBIN TIME: 15.7 SECONDS (ref 11.6–14.5)
QRS AXIS: -9 DEGREES
QRS AXIS: 12 DEGREES
QRSD INTERVAL: 78 MS
QRSD INTERVAL: 83 MS
QT INTERVAL: 466 MS
QT INTERVAL: 508 MS
QTC INTERVAL: 506 MS
QTC INTERVAL: 549 MS
RBC # BLD AUTO: 4.22 MILLION/UL (ref 3.81–5.12)
SODIUM SERPL-SCNC: 140 MMOL/L (ref 135–147)
SPECIMEN SOURCE: ABNORMAL
T WAVE AXIS: 23 DEGREES
T WAVE AXIS: 32 DEGREES
VENTRICULAR RATE: 70 BPM
VENTRICULAR RATE: 71 BPM
WBC # BLD AUTO: 5.52 THOUSAND/UL (ref 4.31–10.16)

## 2023-09-29 PROCEDURE — 93655 ICAR CATH ABLTJ DSCRT ARRHYT: CPT | Performed by: INTERNAL MEDICINE

## 2023-09-29 PROCEDURE — C1894 INTRO/SHEATH, NON-LASER: HCPCS | Performed by: INTERNAL MEDICINE

## 2023-09-29 PROCEDURE — 85025 COMPLETE CBC W/AUTO DIFF WBC: CPT | Performed by: PHYSICIAN ASSISTANT

## 2023-09-29 PROCEDURE — NC001 PR NO CHARGE: Performed by: PHYSICIAN ASSISTANT

## 2023-09-29 PROCEDURE — 93657 TX L/R ATRIAL FIB ADDL: CPT | Performed by: INTERNAL MEDICINE

## 2023-09-29 PROCEDURE — 93623 PRGRMD STIMJ&PACG IV RX NFS: CPT | Performed by: INTERNAL MEDICINE

## 2023-09-29 PROCEDURE — 76937 US GUIDE VASCULAR ACCESS: CPT | Performed by: INTERNAL MEDICINE

## 2023-09-29 PROCEDURE — C1730 CATH, EP, 19 OR FEW ELECT: HCPCS | Performed by: INTERNAL MEDICINE

## 2023-09-29 PROCEDURE — 93005 ELECTROCARDIOGRAM TRACING: CPT

## 2023-09-29 PROCEDURE — C9113 INJ PANTOPRAZOLE SODIUM, VIA: HCPCS | Performed by: PHYSICIAN ASSISTANT

## 2023-09-29 PROCEDURE — 93656 COMPRE EP EVAL ABLTJ ATR FIB: CPT | Performed by: INTERNAL MEDICINE

## 2023-09-29 PROCEDURE — 80048 BASIC METABOLIC PNL TOTAL CA: CPT | Performed by: PHYSICIAN ASSISTANT

## 2023-09-29 PROCEDURE — C1759 CATH, INTRA ECHOCARDIOGRAPHY: HCPCS | Performed by: INTERNAL MEDICINE

## 2023-09-29 PROCEDURE — C1766 INTRO/SHEATH,STRBLE,NON-PEEL: HCPCS | Performed by: INTERNAL MEDICINE

## 2023-09-29 PROCEDURE — C1769 GUIDE WIRE: HCPCS | Performed by: INTERNAL MEDICINE

## 2023-09-29 PROCEDURE — 85610 PROTHROMBIN TIME: CPT | Performed by: PHYSICIAN ASSISTANT

## 2023-09-29 PROCEDURE — 83735 ASSAY OF MAGNESIUM: CPT | Performed by: PHYSICIAN ASSISTANT

## 2023-09-29 PROCEDURE — C1893 INTRO/SHEATH, FIXED,NON-PEEL: HCPCS | Performed by: INTERNAL MEDICINE

## 2023-09-29 PROCEDURE — 85347 COAGULATION TIME ACTIVATED: CPT

## 2023-09-29 PROCEDURE — C1732 CATH, EP, DIAG/ABL, 3D/VECT: HCPCS | Performed by: INTERNAL MEDICINE

## 2023-09-29 PROCEDURE — 93010 ELECTROCARDIOGRAM REPORT: CPT | Performed by: INTERNAL MEDICINE

## 2023-09-29 RX ORDER — ADENOSINE 3 MG/ML
INJECTION INTRAVENOUS CODE/TRAUMA/SEDATION MEDICATION
Status: DISCONTINUED | OUTPATIENT
Start: 2023-09-29 | End: 2023-09-29 | Stop reason: HOSPADM

## 2023-09-29 RX ORDER — FUROSEMIDE 40 MG/1
40 TABLET ORAL 2 TIMES DAILY
Status: DISCONTINUED | OUTPATIENT
Start: 2023-09-29 | End: 2023-09-30 | Stop reason: HOSPADM

## 2023-09-29 RX ORDER — EPHEDRINE SULFATE 50 MG/ML
INJECTION INTRAVENOUS AS NEEDED
Status: DISCONTINUED | OUTPATIENT
Start: 2023-09-29 | End: 2023-09-29

## 2023-09-29 RX ORDER — DILTIAZEM HYDROCHLORIDE 120 MG/1
120 CAPSULE, COATED, EXTENDED RELEASE ORAL DAILY
Status: DISCONTINUED | OUTPATIENT
Start: 2023-09-29 | End: 2023-09-30 | Stop reason: HOSPADM

## 2023-09-29 RX ORDER — OXYBUTYNIN CHLORIDE 10 MG/1
10 TABLET, EXTENDED RELEASE ORAL
Status: DISCONTINUED | OUTPATIENT
Start: 2023-09-29 | End: 2023-09-30 | Stop reason: HOSPADM

## 2023-09-29 RX ORDER — PROPOFOL 10 MG/ML
INJECTION, EMULSION INTRAVENOUS AS NEEDED
Status: DISCONTINUED | OUTPATIENT
Start: 2023-09-29 | End: 2023-09-29

## 2023-09-29 RX ORDER — LEVOTHYROXINE SODIUM 0.07 MG/1
150 TABLET ORAL
Status: DISCONTINUED | OUTPATIENT
Start: 2023-09-29 | End: 2023-09-30 | Stop reason: HOSPADM

## 2023-09-29 RX ORDER — CEFAZOLIN SODIUM 2 G/50ML
SOLUTION INTRAVENOUS AS NEEDED
Status: DISCONTINUED | OUTPATIENT
Start: 2023-09-29 | End: 2023-09-29

## 2023-09-29 RX ORDER — HEPARIN SODIUM 10000 [USP'U]/100ML
INJECTION, SOLUTION INTRAVENOUS
Status: DISCONTINUED | OUTPATIENT
Start: 2023-09-29 | End: 2023-09-29 | Stop reason: HOSPADM

## 2023-09-29 RX ORDER — LIDOCAINE HYDROCHLORIDE 10 MG/ML
INJECTION, SOLUTION EPIDURAL; INFILTRATION; INTRACAUDAL; PERINEURAL AS NEEDED
Status: DISCONTINUED | OUTPATIENT
Start: 2023-09-29 | End: 2023-09-29

## 2023-09-29 RX ORDER — SODIUM CHLORIDE 9 MG/ML
20 INJECTION, SOLUTION INTRAVENOUS CONTINUOUS
Status: DISCONTINUED | OUTPATIENT
Start: 2023-09-29 | End: 2023-09-30 | Stop reason: HOSPADM

## 2023-09-29 RX ORDER — ACETAMINOPHEN 325 MG/1
650 TABLET ORAL EVERY 4 HOURS PRN
Status: DISCONTINUED | OUTPATIENT
Start: 2023-09-29 | End: 2023-09-30 | Stop reason: HOSPADM

## 2023-09-29 RX ORDER — ONDANSETRON 2 MG/ML
4 INJECTION INTRAMUSCULAR; INTRAVENOUS ONCE AS NEEDED
Status: DISCONTINUED | OUTPATIENT
Start: 2023-09-29 | End: 2023-09-29 | Stop reason: HOSPADM

## 2023-09-29 RX ORDER — SODIUM CHLORIDE 9 MG/ML
125 INJECTION, SOLUTION INTRAVENOUS CONTINUOUS
Status: DISCONTINUED | OUTPATIENT
Start: 2023-09-29 | End: 2023-09-30 | Stop reason: HOSPADM

## 2023-09-29 RX ORDER — GLYCOPYRROLATE 0.2 MG/ML
INJECTION INTRAMUSCULAR; INTRAVENOUS AS NEEDED
Status: DISCONTINUED | OUTPATIENT
Start: 2023-09-29 | End: 2023-09-29

## 2023-09-29 RX ORDER — FENTANYL CITRATE/PF 50 MCG/ML
25 SYRINGE (ML) INJECTION
Status: DISCONTINUED | OUTPATIENT
Start: 2023-09-29 | End: 2023-09-29 | Stop reason: HOSPADM

## 2023-09-29 RX ORDER — SOTALOL HYDROCHLORIDE 120 MG/1
60 TABLET ORAL DAILY
Status: DISCONTINUED | OUTPATIENT
Start: 2023-09-29 | End: 2023-09-30 | Stop reason: HOSPADM

## 2023-09-29 RX ORDER — PANTOPRAZOLE SODIUM 40 MG/10ML
40 INJECTION, POWDER, LYOPHILIZED, FOR SOLUTION INTRAVENOUS ONCE
Status: COMPLETED | OUTPATIENT
Start: 2023-09-29 | End: 2023-09-29

## 2023-09-29 RX ORDER — FENTANYL CITRATE 50 UG/ML
INJECTION, SOLUTION INTRAMUSCULAR; INTRAVENOUS AS NEEDED
Status: DISCONTINUED | OUTPATIENT
Start: 2023-09-29 | End: 2023-09-29

## 2023-09-29 RX ORDER — ONDANSETRON 2 MG/ML
INJECTION INTRAMUSCULAR; INTRAVENOUS AS NEEDED
Status: DISCONTINUED | OUTPATIENT
Start: 2023-09-29 | End: 2023-09-29

## 2023-09-29 RX ORDER — HEPARIN SODIUM 1000 [USP'U]/ML
INJECTION, SOLUTION INTRAVENOUS; SUBCUTANEOUS CODE/TRAUMA/SEDATION MEDICATION
Status: DISCONTINUED | OUTPATIENT
Start: 2023-09-29 | End: 2023-09-29 | Stop reason: HOSPADM

## 2023-09-29 RX ORDER — POTASSIUM CHLORIDE 750 MG/1
10 TABLET, EXTENDED RELEASE ORAL DAILY
Status: DISCONTINUED | OUTPATIENT
Start: 2023-09-30 | End: 2023-09-30 | Stop reason: HOSPADM

## 2023-09-29 RX ORDER — SUCCINYLCHOLINE/SOD CL,ISO/PF 100 MG/5ML
SYRINGE (ML) INTRAVENOUS AS NEEDED
Status: DISCONTINUED | OUTPATIENT
Start: 2023-09-29 | End: 2023-09-29

## 2023-09-29 RX ORDER — PANTOPRAZOLE SODIUM 40 MG/1
40 TABLET, DELAYED RELEASE ORAL
Status: DISCONTINUED | OUTPATIENT
Start: 2023-09-30 | End: 2023-09-30 | Stop reason: HOSPADM

## 2023-09-29 RX ORDER — PROTAMINE SULFATE 10 MG/ML
INJECTION, SOLUTION INTRAVENOUS AS NEEDED
Status: DISCONTINUED | OUTPATIENT
Start: 2023-09-29 | End: 2023-09-29

## 2023-09-29 RX ORDER — SODIUM CHLORIDE 9 MG/ML
INJECTION, SOLUTION INTRAVENOUS CONTINUOUS PRN
Status: DISCONTINUED | OUTPATIENT
Start: 2023-09-29 | End: 2023-09-29

## 2023-09-29 RX ORDER — ASPIRIN 81 MG/1
81 TABLET, CHEWABLE ORAL DAILY
Status: DISCONTINUED | OUTPATIENT
Start: 2023-09-29 | End: 2023-09-30 | Stop reason: HOSPADM

## 2023-09-29 RX ORDER — DEXAMETHASONE SODIUM PHOSPHATE 10 MG/ML
INJECTION, SOLUTION INTRAMUSCULAR; INTRAVENOUS AS NEEDED
Status: DISCONTINUED | OUTPATIENT
Start: 2023-09-29 | End: 2023-09-29

## 2023-09-29 RX ADMIN — LEVOTHYROXINE SODIUM 150 MCG: 75 TABLET ORAL at 13:58

## 2023-09-29 RX ADMIN — SOTALOL HYDROCHLORIDE 60 MG: 120 TABLET ORAL at 13:57

## 2023-09-29 RX ADMIN — Medication 100 MG: at 08:23

## 2023-09-29 RX ADMIN — SODIUM CHLORIDE: 0.9 INJECTION, SOLUTION INTRAVENOUS at 08:15

## 2023-09-29 RX ADMIN — FENTANYL CITRATE 50 MCG: 50 INJECTION, SOLUTION INTRAMUSCULAR; INTRAVENOUS at 08:31

## 2023-09-29 RX ADMIN — ONDANSETRON 4 MG: 2 INJECTION INTRAMUSCULAR; INTRAVENOUS at 08:31

## 2023-09-29 RX ADMIN — APIXABAN 5 MG: 5 TABLET, FILM COATED ORAL at 18:32

## 2023-09-29 RX ADMIN — GLYCOPYRROLATE 0.1 MG: 0.2 INJECTION, SOLUTION INTRAMUSCULAR; INTRAVENOUS at 08:31

## 2023-09-29 RX ADMIN — PHENYLEPHRINE HYDROCHLORIDE 20 MCG/MIN: 10 INJECTION INTRAVENOUS at 08:33

## 2023-09-29 RX ADMIN — FENTANYL CITRATE 50 MCG: 50 INJECTION, SOLUTION INTRAMUSCULAR; INTRAVENOUS at 08:23

## 2023-09-29 RX ADMIN — OXYBUTYNIN 10 MG: 10 TABLET, FILM COATED, EXTENDED RELEASE ORAL at 21:54

## 2023-09-29 RX ADMIN — DILTIAZEM HYDROCHLORIDE 120 MG: 120 CAPSULE, COATED, EXTENDED RELEASE ORAL at 13:58

## 2023-09-29 RX ADMIN — PANTOPRAZOLE SODIUM 40 MG: 40 INJECTION, POWDER, FOR SOLUTION INTRAVENOUS at 08:39

## 2023-09-29 RX ADMIN — ASPIRIN 81 MG CHEWABLE TABLET 81 MG: 81 TABLET CHEWABLE at 13:58

## 2023-09-29 RX ADMIN — EPHEDRINE SULFATE 10 MG: 50 INJECTION INTRAVENOUS at 08:35

## 2023-09-29 RX ADMIN — CEFAZOLIN SODIUM 2000 MG: 2 SOLUTION INTRAVENOUS at 08:35

## 2023-09-29 RX ADMIN — LIDOCAINE HYDROCHLORIDE 50 MG: 10 INJECTION, SOLUTION EPIDURAL; INFILTRATION; INTRACAUDAL; PERINEURAL at 08:23

## 2023-09-29 RX ADMIN — PROPOFOL 150 MG: 10 INJECTION, EMULSION INTRAVENOUS at 08:23

## 2023-09-29 RX ADMIN — PROTAMINE SULFATE 60 MG: 10 INJECTION, SOLUTION INTRAVENOUS at 11:31

## 2023-09-29 RX ADMIN — DEXAMETHASONE SODIUM PHOSPHATE 10 MG: 10 INJECTION, SOLUTION INTRAMUSCULAR; INTRAVENOUS at 08:31

## 2023-09-29 NOTE — ANESTHESIA POSTPROCEDURE EVALUATION
Post-Op Assessment Note    CV Status:  Stable    Pain management: adequate     Mental Status:  Alert and awake   Hydration Status:  Euvolemic   PONV Controlled:  Controlled   Airway Patency:  Patent      Post Op Vitals Reviewed: Yes      Staff: Anesthesiologist, CRNA         No notable events documented.     BP      Temp      Pulse     Resp      SpO2

## 2023-09-29 NOTE — DISCHARGE SUMMARY
Discharge Summary - Reed Client 68 y.o. female MRN: [id number]    Unit/Bed#: [facility] 378-84 Encounter: [id number]      Admission Date: 9/29/2023   Discharge Date:  9/30/2023    Discharge Diagnosis:  1. Symptomatic paroxysmal atrial fibrillation  - anticoagulated with Eliquis / [id number] score of 3 (age, sex)  - EF of 65% per echo 6/2020 / mild dilation of LA noted  - rate control: diltiazem  - antiarrhythmic therapy: sotalol 80mg daily prior to visit /breakthrough on flecainide, side effects to dronedarone  - prior ablation: cryo of atrial fibrillation with pulmonary vein isolation by Dr. Misa Higgins 8/2016  - now status post PWI, mitral and flutter line by Dr. Cyndra Spatz on 9/29/2023  2. Medtronic dual-chamber pacemaker in situ  - implanted by Dr. Cyndra Spatz on 6/7/2021 due to sick sinus syndrome  3. Hypothyroidism  4. History of breast cancer   - status post chemotherapy  5. Obesity BMI of 31  6. Benign paroxysmal positional vertigo  - utilizes meclizine as needed    Procedures Performed:     Successful redo Afib ablation 9/29/2023 by Dr. Cruz Ripley:   Successful isolation of the posterior wall.    Empiric anterior mitral flutter line placement   RPVs and LPVs were isolated from previously performed PVI  Successful empiric ablation of CTI line  Normal HV interval.   PLAN   - Bedrest for 4 hours with both legs straight  - Serial neurologic checks to watch for any evidence of stroke post-ablation  - Post-operative close vital sign monitoring and femoral access site serial examination  - Telemetry  - Resume anticoagulation (dose this evening)  - Aspirin 81mg PO daily x 30 days  - Resume outpatient medications  - Call me with any questions, issues, or concerns  - Plan for follow-up office visit with me (Dr. Cyndra Spatz)      Orders Placed This Encounter   Procedures   • Cardiac ep lab eps/ablations     Consultants: none    HPI: Please refer to the initial history and physical as well as procedure notes for full details. Briefly, Raudel Castañeda is a 68 y.o. female with a history of Afib s/p cryo Afib ablation August 2016 maintained on sotalol and Elqiuis now with breakthrough, SSS s/p Medtronic dual chamber PPM 2021, hypothyroidism, h/o breast cancer, obesity, and BPV who presented to [facility] for redo Afib ablation. Hospital Course: Raudel Castañeda presented in sinus rhythm (Ap-Vs). She underwent successful redo AFib ablation by Dr. Breana Galindo. The procedure consisted of isolation of the posterior wall, anterior mitral flutter line placement, and CTI ablation. The pulmonary veins were isolated at baseline from prior procedure. Please see operative notes by Dr. Breana Galindo for full details. She tolerated the procedure well. She was then monitored overnight for further observation. There were no acute issues or events overnight. The following morning she denied all cardiac complaints, including chest pain/pressure, dyspnea, palpitations, dizziness, lightheadedness, or syncope. Her vital signs were reviewed and labs are stable. Telemetry showed sinus rhythm in the 70's. Her groins were soft without significant hematoma or recurrent bleeding, and sutures were removed without incident. Physical exam:  GEN: NAD, alert and oriented x 3, well appearing  SKIN: dry without significant lesions or rashes  HEENT: NCAT, PERRL, EOMs intact  NECK: No JVD appreciated  CARDIOVASCULAR: RRR, normal S1, S2 without murmurs, rubs, or gallops appreciated  LUNGS: Clear to auscultation bilaterally without wheezes, rhonchi, or rales  ABDOMEN: Soft, nontender, nondistended  EXTREMITIES/VASCULAR: perfused without clubbing, cyanosis, or LE edema b/l  PSYCH: Normal mood and affect  NEURO: CN ll-Xll grossly intact    She was given routine post ablation discharge instructions and restrictions, and these were explained in detail.  She was given a follow up appointment with Mary Navas PA-C, and she was instructed to follow up with her primary cardiologist as previously instructed. In terms of her medications, she will continue Eliquis 5 mg every 12 hours, sotalol 60 mg once daily, and diltiazem 120 mg daily. She will take a PPI daily for 30 days and aspirin 81 mg daily for 30 days. She will also watch her volume status the next couple days and if she gains weight, she will take an extra dose of diuretic. Of note, QT is still reported as long on EKG but in checking is not. We will continue sotalol daily dosing. She is stable for discharge at this time with all questions answered -will place formal discharge order once Wan has been removed and patient has peed. She was discussed in detail with Dr. Lucila Grover who is in agreement with this discharge summary. Discharge Medications:  See after visit summary for reconciled discharge medications provided to patient and family. Medications Prior to Admission   Medication   • cyanocobalamin (VITAMIN B-12) 500 mcg tablet   • Dilt- MG 24 hr capsule   • Eliquis 5 MG   • furosemide (LASIX) 40 mg tablet   • levothyroxine 150 mcg tablet   • oxybutynin (DITROPAN-XL) 10 MG 24 hr tablet   • potassium chloride (MICRO-K) 10 MEQ CR capsule   • RESTASIS 0.05 % ophthalmic emulsion   • sotalol (BETAPACE) 120 mg tablet   • clotrimazole (MYCELEX) 10 mg brendon   • meclizine (ANTIVERT) 25 mg tablet       Pertininet Labs/diagnostics:  CBC with diff:   Results from last 7 days   Lab Units 09/29/23  0653 09/22/23  1030   WBC Thousand/uL 5.52  --    WHITE BLOOD CELL COUNT. x10E3/uL  --  4.3   HEMOGLOBIN g/dL 12.3  --    HEMOGLOBIN. g/dL  --  11.9   HEMATOCRIT % 36.4  --    HEMATOCRIT. %  --  37.7   MCV fL 86  --    MCV.  fL  --  90   PLATELETS Thousands/uL 181  --    PLATELETS. K80V8/VQ  --  178   RBC Million/uL 4.22  --    RED BLOOD CELL COUNT. x10E6/uL  --  4.20   MCH pg 29.1  --    MCH. pg  --  28.3   MCHC g/dL 33.8  --    MCHC. g/dL  --  31.6   RDW % 14.6  --    RDW. %  --  14.4   MPV fL 11.2  --    NRBC AUTO /100 WBCs 0  --        BMP:  Results from last 7 days   Lab Units 09/29/23  0653 09/22/23  1030   POTASSIUM mmol/L 4.9 5.0   CHLORIDE mmol/L 108 108*   CO2 mmol/L 25 22   BUN mg/dL 12 12   CREATININE mg/dL 1.12 0.99   CALCIUM mg/dL 9.1  --        Magnesium:   Results from last 7 days   Lab Units 09/29/23  0653   MAGNESIUM mg/dL 2.1       Coags:   Results from last 7 days   Lab Units 09/29/23  0653 09/22/23  1030   INR  7.61* 1.1       Complications: none    Condition at Discharge: good     Discharge instructions/Information to patient and family:   See after visit summary for information provided to patient and family. Provisions for Follow-Up Care:  See after visit summary for information related to follow-up care and any pertinent home health orders. Disposition: Home    Planned Readmission: No    Discharge Statement   I spent 45 minutes minutes discharging the patient. This time was spent on the day of discharge. I had direct contact with the patient on the day of discharge. Additional documentation is required if more than 30 minutes were spent on discharge.  Evaluating the incision, discussing discharge instructions and restrictions, arranging follow up appointments, discussing medications

## 2023-09-29 NOTE — H&P
H&P Exam - Electrophysiology - Cardiology   Tres Barrios 68 y.o. female MRN: [id number]  Unit/Bed#: BE CATH LAB ROOM Encounter: [id number]    Assessment/Plan     Assessment:  1. Symptomatic paroxysmal atrial fibrillation  - breakthrough PAF despite sotalol 60 mg once daily  - prior cryo Afib ablation ([facility]) by Dr. Sarah Vickers 8/2016   - h/o breakthrough Afib on flecainide, side effects to dronedarone  - anticoagulated with Eliquis / [id number] score of 3 (age, sex)  - EF of 65% per echo 6/2020 / mild dilation of LA noted  - rate control: diltiazem    2. Medtronic dual-chamber pacemaker in situ  - implanted by Dr. Sammy العراقي on 6/7/2021 due to sick sinus syndrome    3. Hypothyroidism  - continue levothyroxine    4. History of breast cancer   - status post chemotherapy    5. Obesity BMI of 31    6. Benign paroxysmal positional vertigo  - meclizine as needed    Plan:  -- NPO for Afib ablation today  -- presents in sinus rhythm (Ap-Vs)  -- last dose of Elqiuis was this morning, no missed doses in past 30 days  -- last dose of sotalol was yesterday morning  -- bedrest post procedure x 4 hours  -- admit to med/surg telemetry overnight  -- remove groin sutures tomorrow  -- no lifting more than 10 pounds for 1 week  -- D/C home tomorrow if stable      History of Present Illness   HPI:  Tres Barrios is a 68 y.o. female with a history of Afib s/p cryo Afib ablation August 2016 maintained on sotalol and Elqiuis, SSS s/p Medtronic dual chamber PPM, hypothyroidism, h/o breast cancer, obesity, and BPV who presents for redo Afib ablation today. She had more frequent Afib episodes in 2016 requiring multiple cardioversions. She was trialed on dronedarone but was intolerant to this. She underwent cryo ablation of atrial fibrillation with pulmonary vein isolation in August 2016 by Dr. Sarah Vickers. She had recurrent Afib and was started on flecainide. She developed bradycardia, fatigue, and dyspnea on exertion.  She underwent implantation of a Medtronic dual chamber PPM by Dr. Hermelinda Kim on 6/7/2021 for SSS. Device interrogations revealed breakthrough Afib on flecainide. Therefore, this was discontinued and she was initiated on sotalol 80 mg once daily (daily dosing due to QT prolongation and renal function). She has chronic LE edema. More recently, sotalol was decreased to 60 mg once daily. She continued to have breakthrough Afib despite sotalol. She reports palpitations, fatigue, and dyspnea related to Afib. Given that she is symptomatic with Afib and has failed multiple antiarrhythmic medications, redo Afib ablation was recommended. She presents to the hospital today accompanied by her . She has been feeling well. She denies chest pain, SOB, lightheadedness, dizziness, orthopnea, or PND. Her last dose of sotalol was yesterday morning. Her last dose of Eliquis was this morning and she has not missed any doses in the past 30 days. EKG:  Atrial paced, ventricular sensed rhythm HR 70 bpm,  ms, QRS 78 ms, QTc 506 ms    Review of Systems   Constitutional: Negative for fatigue. HENT: Negative. Eyes: Negative. Respiratory: Positive for shortness of breath. Negative for chest tightness. Cardiovascular: Positive for palpitations and leg swelling. Negative for chest pain. Gastrointestinal: Negative for abdominal pain, nausea and vomiting. Endocrine: Negative. Genitourinary: Negative. Musculoskeletal: Negative. Skin: Negative for rash. Allergic/Immunologic: Negative. Neurological: Negative for dizziness and syncope. Hematological: Negative. Psychiatric/Behavioral: Negative. ROS as noted above, otherwise 12 point review of systems was performed and is negative.      Historical Information   Past Medical History:   Diagnosis Date   • Abnormal chest CT     last assessed - [id number]   • Abnormal glucose     Resolved - 22Jun2015   • Arrhythmia    • Arthritis     Slight   • BCC (basal cell carcinoma of skin)     last assessed - [id number]   • BRCA gene mutation negative 09/30/2021    [employer]; 36 gene panel   • Breast cancer ([address]) 09/12/2016    rt   • Cancer ([address])    • Cardiomyopathy [facility])    • Cervical spinal stenosis    • Discharge from left nipple     last assessed - [id number]   • Disease of thyroid gland     hypothyroidism   • Full dentures     Upper and Lower   • History of atrial fibrillation    • Homocysteinemia    • Hypertension    • Hypokalemia    • Intraductal papilloma of breast     last assessed - [id number]   • Limb alert care status     right upper ext   • Malignant neoplasm of skin     basal cell on face, Moh's surgery   • Memory loss     last assessed - [id number]   • Obesity    • Open wound     last assessed - 11QYA5316   • Osteopenia    • Overactive bladder    • Palmar plantar erythrodysaesthesia     Resolved - [id number]   • Pulmonary nodule seen on imaging study    • Rosacea    • UTI (urinary tract infection)     recently   • Vertigo    • Vitamin D deficiency      Past Surgical History:   Procedure Laterality Date   • APPENDECTOMY     • ATRIAL ABLATION SURGERY      last assessed - [id number]   • BACK SURGERY  1997    Lumbar spinal stenosis   • BREAST BIOPSY      2014? left breast; 9/12/16 right breast x 3   • BREAST BIOPSY Left 03/30/2010   • BREAST BIOPSY Right 09/12/2016    us bx- invasive br ca   • BREAST SURGERY      needle bx.    • BREAST SURGERY Right 03/16/2017    excision of right chest wall lesion   • CARDIAC ELECTROPHYSIOLOGY STUDY AND ABLATION     • CARDIOVERSION     • DENTAL SURGERY     • HAND SURGERY      Joint replaced with silicone joint 5th finger right hand   • HYSTERECTOMY  1993    Complete   • LAMINECTOMY      Decompressive up to two lumbar segments   • MASTECTOMY Right 10/28/2016   • OOPHORECTOMY  1993   • MA EXCISION MAL LESION TRUNK/ARM/LEG 0.5 CM/< Right 03/16/2017    Procedure: EXCISION CHEST WALL LESION; NEEDLE LOC @ 1400;  Surgeon: Vangie Masters MD;  Location: [facility]; Service: Surgical Oncology   • TN INTRAOP SENTINEL LYMPH NODE ID W/DYE INJECTION Right 10/28/2016    Procedure: BREAST MASTECTOMY WITH SENTINAL LYMPH NODE BIOPSY; LYMPHSCINITIGRAPHY; LYMPHATIC MAPPING; 0800 NUC MED INJECTION;  Surgeon: Migdalia Habermann, MD;  Location: [facility];  Service: Surgical Oncology   • TN MASTECTOMY SIMPLE COMPLETE Left 10/22/2021    Procedure: BREAST MASTECTOMY SIMPLE MARTHA  GUIDED;   Surgeon: Bebeto Baptiste MD;  Location: [facility];  Service: Surgical Oncology   • SENTINEL LYMPH NODE BIOPSY Right    • US BREAST NEEDLE LOC LEFT Left 10/19/2021   • US BREAST NEEDLE LOC RIGHT Right 03/16/2017   • US GUIDANCE BREAST BIOPSY LEFT EACH ADDITIONAL Left 09/21/2021   • US GUIDANCE BREAST BIOPSY RIGHT EACH ADDITIONAL Right 09/12/2016   • US GUIDANCE BREAST BIOPSY RIGHT EACH ADDITIONAL Right 09/12/2016   • US GUIDED BREAST BIOPSY LEFT COMPLETE Left 09/21/2021   • US GUIDED BREAST BIOPSY RIGHT COMPLETE Right 09/12/2016   • US GUIDED BREAST BIOPSY RIGHT COMPLETE Right 10/1/2021     Family History:   Family History   Problem Relation Age of Onset   • Coronary artery disease Mother         CABG   • Diabetes Mother    • Heart attack Mother    • Diabetes Sister    • Parkinsonism Sister    • Coronary artery disease Brother         CABG   • Diabetes Brother    • Heart attack Brother    • Heart attack Son    • Kidney cancer Son 48   • No Known Problems Son    • No Known Problems Son    • Emphysema Sister    • No Known Problems Sister    • Heart attack Brother    • No Known Problems Brother    • No Known Problems Maternal Grandmother    • No Known Problems Maternal Grandfather    • No Known Problems Paternal Grandmother    • No Known Problems Paternal Grandfather    • No Known Problems Paternal Aunt    • No Known Problems Paternal Aunt    • No Known Problems Paternal Aunt    • No Known Problems Paternal Aunt        Social History   Social History     Substance and Sexual Activity   Alcohol Use Never     Social History     Substance and Sexual Activity   Drug Use No     Social History     Tobacco Use   Smoking Status Never   Smokeless Tobacco Never       Meds/Allergies   all medications and allergies reviewed  Home Medications:   Medications Prior to Admission   Medication   • cyanocobalamin (VITAMIN B-12) 500 mcg tablet   • Dilt- MG 24 hr capsule   • Eliquis 5 MG   • furosemide (LASIX) 40 mg tablet   • levothyroxine 150 mcg tablet   • oxybutynin (DITROPAN-XL) 10 MG 24 hr tablet   • potassium chloride (MICRO-K) 10 MEQ CR capsule   • RESTASIS 0.05 % ophthalmic emulsion   • sotalol (BETAPACE) 120 mg tablet   • clotrimazole (MYCELEX) 10 mg brendon   • meclizine (ANTIVERT) 25 mg tablet       Allergies   Allergen Reactions   • Morphine And Related GI Intolerance       Objective   Vitals: Blood pressure 141/72, pulse 90, temperature 98 °F (36.7 °C), temperature source Temporal, resp. rate 20, height 5' 2" (1.575 m), weight 77.1 kg (169 lb 15.6 oz). Orthostatic Blood Pressures    Flowsheet Row Most Recent Value   Blood Pressure 141/72 filed at 09/29/2023 7514          No intake or output data in the 24 hours ending 09/29/23 0741    Invasive Devices     Peripheral Intravenous Line  Duration           Peripheral IV 09/29/23 Right Wrist <1 day                Physical Exam   GEN: NAD, alert and oriented, well appearing  SKIN: dry without significant lesions or rashes. Left chest implant site C/D/I, no signs of infection  HEENT: NCAT, PERRL, EOMs intact  NECK: No JVD or carotid bruits appreciated  CARDIOVASCULAR: RRR, normal S1, S2 without murmurs, rubs, or gallops appreciated  LUNGS: Clear to auscultation bilaterally without wheezes, rhonchi, or rales  ABDOMEN: Soft, nontender, nondistended  EXTREMITIES/VASCULAR: perfused without clubbing, cyanosis. Bilateral LE edema  PSYCH: Normal mood and affect  NEURO: CN ll-Xll grossly intact    Lab Results: I have personally reviewed pertinent lab results.     Results from last 7 days Lab Units 23  0653 23  1030   WBC Thousand/uL 5.52  --    WHITE BLOOD CELL COUNT. x10E3/uL  --  4.3   HEMOGLOBIN g/dL 12.3  --    HEMOGLOBIN. g/dL  --  11.9   HEMATOCRIT % 36.4  --    HEMATOCRIT. %  --  37.7   PLATELETS Thousands/uL 181  --    PLATELETS. V42Z0/LH  --  178     Results from last 7 days   Lab Units 23  1030   POTASSIUM mmol/L 5.0   CHLORIDE mmol/L 108*   CO2 mmol/L 22   BUN mg/dL 12   CREATININE mg/dL 0.99     Results from last 7 days   Lab Units 23  1030   INR  1.1           Imaging: I have personally reviewed pertinent reports. Results for orders placed during the hospital encounter of 19    Echo complete with contrast if indicated    Narrative  [address] [place] [address], [address] [place] [address]    Transthoracic Echocardiogram  2D, M-mode, Doppler, and Color Doppler    Study date:  2019    Patient: Melida Lares  MR number: [id number]  Account number: [de-identified]  : [id number]  Age: 68 years  Gender: Female  Status: Outpatient  Location: [address] [place] [address] [facility]  Height: 62 in  Weight: 178.6 lb  BP: 138/ 74 mmHg    Indications: Shortness of breath, lower exremity edema, PAF. Diagnoses: I48.0 - Atrial fibrillation, R06.02 - Shortness of breath    Sonographer:  CARLOS Cotto RD RVT  Primary Physician:  Milagros Cadet MD  Referring Physician:  SHADI Mccann  Group:  [facility]  Interpreting Physician:  Quynh Amanda MD    SUMMARY    LEFT VENTRICLE:  Systolic function was normal. Ejection fraction was estimated to be 65 %. There were no regional wall motion abnormalities. Doppler parameters were consistent with abnormal left ventricular relaxation (grade 1 diastolic dysfunction). LEFT ATRIUM:  The atrium was mildly dilated. MITRAL VALVE:  There was mild annular calcification. There was mild regurgitation. AORTIC VALVE:  There was mild regurgitation.     TRICUSPID VALVE:  There was mild to moderate regurgitation. HISTORY: PRIOR HISTORY: Right breast cancer, right mastectomy. PRIOR PROCEDURES: Arrhythmia ablation. PROCEDURE: The study was performed in the [facility] [place]. This was a routine study. The transthoracic approach was used. The study included complete 2D imaging, M-mode, complete spectral Doppler, and color Doppler. Images were obtained from the parasternal, apical, subcostal, and suprasternal notch acoustic windows. Image quality was adequate. LEFT VENTRICLE: Size was normal. Systolic function was normal. Ejection fraction was estimated to be 65 %. There were no regional wall motion abnormalities. Wall thickness was normal. DOPPLER: Doppler parameters were consistent with  abnormal left ventricular relaxation (grade 1 diastolic dysfunction). RIGHT VENTRICLE: The size was normal. Systolic function was normal. Wall thickness was normal.    LEFT ATRIUM: The atrium was mildly dilated. RIGHT ATRIUM: Size was normal.    MITRAL VALVE: There was mild annular calcification. Valve structure was normal. There was normal leaflet separation. DOPPLER: The transmitral velocity was within the normal range. There was no evidence for stenosis. There was mild  regurgitation. AORTIC VALVE: The valve was trileaflet. Leaflets exhibited normal thickness, normal cuspal separation, and sclerosis. DOPPLER: Transaortic velocity was within the normal range. There was no evidence for stenosis. There was mild  regurgitation. TRICUSPID VALVE: The valve structure was normal. There was normal leaflet separation. DOPPLER: The transtricuspid velocity was within the normal range. There was no evidence for stenosis. There was mild to moderate regurgitation. Pulmonary  artery systolic pressure was mildly increased. PULMONIC VALVE: Leaflets exhibited normal thickness, no calcification, and normal cuspal separation.  DOPPLER: The transpulmonic velocity was within the normal range. There was no significant regurgitation. PERICARDIUM: There was no pericardial effusion. The pericardium was normal in appearance. AORTA: The root exhibited normal size. SYSTEMIC VEINS: IVC: The inferior vena cava was normal in size. PULMONARY VEINS: DOPPLER: There was systolic blunting in the pulmonary vein(s). SYSTEM MEASUREMENT TABLES    2D  %FS: 31.66 %  Ao Diam: 2.23 cm  EDV(Teich): 105.22 ml  EF(Teich): 59.57 %  ESV(Cube): 34.34 ml  ESV(Teich): 42.54 ml  IVSd: 0.81 cm  LA Area: 21.18 cm2  LA Diam: 3.68 cm  LVEDV MOD A4C: 79.17 ml  LVEF MOD A4C: 66.73 %  LVESV MOD A4C: 26.34 ml  LVIDd: 4.76 cm  LVIDs: 3.25 cm  LVLd A4C: 6.9 cm  LVLs A4C: 5.58 cm  LVPWd: 0.76 cm  RA Area: 13.38 cm2  RV Diam.: 3.39 cm  SI(Cube): 40.02 ml/m2  SI(Teich): 34.25 ml/m2  SV MOD A4C: 52.83 ml  SV(Cube): 73.23 ml  SV(Teich): 62.68 ml    CW  AR Dec [place]: 2.07 m/s2  AR Dec Time: 2636.88 ms  AR PHT: 764.69 ms  AR Vmax: 4.56 m/s  AR maxP.02 mmHg  TR Vmax: 3.1 m/s  TR maxP.41 mmHg    MM  TAPSE: 2.23 cm    PW  E': 0.06 m/s  E/E': 15.62  MV A Obed: 0.32 m/s  MV Dec [place]: 2.53 m/s2  MV DecT: 397.07 ms  MV E Obed: 1 m/s  MV E/A Ratio: 3.16    [employer] Commission Accredited Echocardiography Laboratory    Prepared and electronically signed by    Alfred Rizzo MD  Signed 2019 12:44:18    Code Status: Level 1 - Full Code    ** Please Note: Dictation voice to text software may have been used in the creation of this document.  **

## 2023-09-29 NOTE — Clinical Note
[facility]    Progress Note    Cheryal Offer Patient Status:  Inpatient    10/1/1962 MRN [id number]   Location [facility] 3W/SW Attending Margarita Fischer, [address] [facility] [address] Day # 5 PCP PHYSICIAN NONSTAFF       Subjective:     Pt says \"the 8:05          Us Carotid Doppler Bilat - Diag Img (cpt=93880)    Result Date: 5/30/2019  CONCLUSION:  1. Bilateral carotid bifurcation/proximal ICA plaque without hemodynamic significance.   2. Antegrade flow within both vertebral arteries    Dictated by (C

## 2023-09-29 NOTE — ANESTHESIA PREPROCEDURE EVALUATION
Procedure:  Cardiac eps/afib ablation (Chest)    Relevant Problems   CARDIO   (+) Atrial fibrillation (HCC)   (+) Benign essential hypertension   (+) RICHARDS (dyspnea on exertion)   (+) Mixed hyperlipidemia   (+) SSS (sick sinus syndrome) (HCC)      ENDO   (+) Acquired hypothyroidism      /RENAL   (+) Stage 3b chronic kidney disease (HCC)      MUSCULOSKELETAL   (+) Cervical myofascial pain syndrome   (+) Cervical spondylosis      NEURO/PSYCH   (+) Cervical myofascial pain syndrome   (+) Occipital neuralgia of right side      PULMONARY   (+) RICHARDS (dyspnea on exertion)   (+) SOB (shortness of breath)        Echo 6/12/2020:   LEFT VENTRICLE:  Systolic function was normal. Ejection fraction was estimated to be 65 %. There were no regional wall motion abnormalities.     RIGHT VENTRICLE:  The size was normal.  Systolic function was normal.     LEFT ATRIUM:  The atrium was mildly dilated.     MITRAL VALVE:  There was mild annular calcification.     TRICUSPID VALVE:  There was mild regurgitation. Pulmonary artery systolic pressure was mildly increased.      Lab Results   Component Value Date    WBC 5.52 09/29/2023    HGB 12.3 09/29/2023    HCT 36.4 09/29/2023    MCV 86 09/29/2023     09/29/2023     Lab Results   Component Value Date    SODIUM 144 09/22/2023    K 5.0 09/22/2023     (H) 09/22/2023    CO2 22 09/22/2023    BUN 12 09/22/2023    CREATININE 0.99 09/22/2023    GLUC 91 09/22/2023    CALCIUM 9.1 02/23/2023     Lab Results   Component Value Date    INR 1.1 09/22/2023    INR 1.54 (H) 06/07/2021    INR 1.2 05/28/2021    PROTIME 12.1 (H) 09/22/2023    PROTIME 18.4 (H) 06/07/2021    PROTIME 13.0 (H) 05/28/2021     Lab Results   Component Value Date    HGBA1C 5.6 07/17/2023          Physical Exam    Airway    Mallampati score: II  TM Distance: >3 FB  Neck ROM: full     Dental   Comment: Edentulous,     Cardiovascular      Pulmonary      Other Findings        Anesthesia Plan  ASA Score- 3     Anesthesia Type- general with ASA Monitors. Additional Monitors:   Airway Plan: ETT. Plan Factors-Exercise tolerance (METS): >4 METS. Chart reviewed. EKG reviewed. Existing labs reviewed. Patient summary reviewed. Patient is not a current smoker. Obstructive sleep apnea risk education given perioperatively. Induction- intravenous. Postoperative Plan- . Planned trial extubation    Informed Consent- Anesthetic plan and risks discussed with patient. I personally reviewed this patient with the CRNA. Discussed and agreed on the Anesthesia Plan with the CRNA. Ryanne Ferreira

## 2023-09-29 NOTE — DISCHARGE INSTR - AVS FIRST PAGE
MEDICATION INSTRUCTIONS/CHANGES:  Please start taking Protonix 40mg once daily for 1 month in the post ablation setting. Please take aspirin 81mg once daily for 1 month  Continue Eliquis 5 mg every 12 hours  Continue sotalol 60 mg once daily    RESTRICTIONS:   No heavy lifting (more than 5-10 pounds) or strenuous activity for one week. No soaking in a bath tub/hot tub/swimming pool for one week or until groin heals. You may shower - please let soap and water run over the groins, no scrubbing, and pat the area dry. Please place band-aid on groins daily for up to five days, but you may remove sooner if no issues are noted. If you notice ongoing bleeding, swelling, or firm lumps in groin near ablation incision, please contact Dr. Otero Field office - [phone]. If you have any significant issues after hours or on the weekends, please call the on call cardiology number at [phone].

## 2023-09-30 VITALS
RESPIRATION RATE: 16 BRPM | DIASTOLIC BLOOD PRESSURE: 58 MMHG | TEMPERATURE: 97.7 F | OXYGEN SATURATION: 100 % | WEIGHT: 169.97 LBS | HEART RATE: 70 BPM | HEIGHT: 62 IN | SYSTOLIC BLOOD PRESSURE: 121 MMHG | BODY MASS INDEX: 31.28 KG/M2

## 2023-09-30 LAB
ANION GAP SERPL CALCULATED.3IONS-SCNC: 7 MMOL/L
BUN SERPL-MCNC: 13 MG/DL (ref 5–25)
CALCIUM SERPL-MCNC: 8.2 MG/DL (ref 8.4–10.2)
CHLORIDE SERPL-SCNC: 111 MMOL/L (ref 96–108)
CO2 SERPL-SCNC: 21 MMOL/L (ref 21–32)
CREAT SERPL-MCNC: 1.11 MG/DL (ref 0.6–1.3)
ERYTHROCYTE [DISTWIDTH] IN BLOOD BY AUTOMATED COUNT: 14.7 % (ref 11.6–15.1)
GFR SERPL CREATININE-BSD FRML MDRD: 48 ML/MIN/1.73SQ M
GLUCOSE SERPL-MCNC: 138 MG/DL (ref 65–140)
HCT VFR BLD AUTO: 33.9 % (ref 34.8–46.1)
HGB BLD-MCNC: 11.1 G/DL (ref 11.5–15.4)
INR PPP: 1.65 (ref 0.84–1.19)
MCH RBC QN AUTO: 28.8 PG (ref 26.8–34.3)
MCHC RBC AUTO-ENTMCNC: 32.7 G/DL (ref 31.4–37.4)
MCV RBC AUTO: 88 FL (ref 82–98)
PLATELET # BLD AUTO: 143 THOUSANDS/UL (ref 149–390)
PMV BLD AUTO: 11.4 FL (ref 8.9–12.7)
POTASSIUM SERPL-SCNC: 4.1 MMOL/L (ref 3.5–5.3)
PROTHROMBIN TIME: 19.7 SECONDS (ref 11.6–14.5)
RBC # BLD AUTO: 3.86 MILLION/UL (ref 3.81–5.12)
SODIUM SERPL-SCNC: 139 MMOL/L (ref 135–147)
WBC # BLD AUTO: 9.02 THOUSAND/UL (ref 4.31–10.16)

## 2023-09-30 PROCEDURE — 80048 BASIC METABOLIC PNL TOTAL CA: CPT | Performed by: INTERNAL MEDICINE

## 2023-09-30 PROCEDURE — 85027 COMPLETE CBC AUTOMATED: CPT | Performed by: INTERNAL MEDICINE

## 2023-09-30 PROCEDURE — NC001 PR NO CHARGE: Performed by: PHYSICIAN ASSISTANT

## 2023-09-30 PROCEDURE — 85610 PROTHROMBIN TIME: CPT | Performed by: INTERNAL MEDICINE

## 2023-09-30 RX ORDER — PANTOPRAZOLE SODIUM 40 MG/1
40 TABLET, DELAYED RELEASE ORAL
Qty: 30 TABLET | Refills: 0 | Status: SHIPPED | OUTPATIENT
Start: 2023-10-01

## 2023-09-30 RX ORDER — ASPIRIN 81 MG/1
81 TABLET, CHEWABLE ORAL DAILY
Qty: 30 TABLET | Refills: 0
Start: 2023-10-01

## 2023-09-30 RX ADMIN — LEVOTHYROXINE SODIUM 150 MCG: 75 TABLET ORAL at 06:03

## 2023-09-30 RX ADMIN — FUROSEMIDE 40 MG: 40 TABLET ORAL at 08:21

## 2023-09-30 RX ADMIN — DILTIAZEM HYDROCHLORIDE 120 MG: 120 CAPSULE, COATED, EXTENDED RELEASE ORAL at 08:21

## 2023-09-30 RX ADMIN — POTASSIUM CHLORIDE 10 MEQ: 750 TABLET, EXTENDED RELEASE ORAL at 08:21

## 2023-09-30 RX ADMIN — PANTOPRAZOLE SODIUM 40 MG: 40 TABLET, DELAYED RELEASE ORAL at 06:03

## 2023-09-30 RX ADMIN — SOTALOL HYDROCHLORIDE 60 MG: 120 TABLET ORAL at 08:24

## 2023-09-30 RX ADMIN — ASPIRIN 81 MG CHEWABLE TABLET 81 MG: 81 TABLET CHEWABLE at 08:21

## 2023-09-30 RX ADMIN — APIXABAN 5 MG: 5 TABLET, FILM COATED ORAL at 08:21

## 2023-09-30 NOTE — QUICK NOTE
We will await Wan removal and once patient has urinated on her own, will place formal discharge order. Once Wan is removed, she can be taken off of stepdown (no telemetry boxes were available), so that she can go to the bathroom on her own.

## 2023-09-30 NOTE — NURSING NOTE
This RN gave discharge instructions to the patient and reviewed with the patient and spouse in room. All parties understanding of the instructions provided to the patient. IV removed. All belongings checked and accounted for. Patient left facility via wheelchair and PCA assistance.

## 2023-10-03 LAB
ATRIAL RATE: 71 BPM
P AXIS: 38 DEGREES
PR INTERVAL: 192 MS
QRS AXIS: -18 DEGREES
QRSD INTERVAL: 80 MS
QT INTERVAL: 512 MS
QTC INTERVAL: 556 MS
T WAVE AXIS: 12 DEGREES
VENTRICULAR RATE: 71 BPM

## 2023-10-03 PROCEDURE — 93010 ELECTROCARDIOGRAM REPORT: CPT | Performed by: INTERNAL MEDICINE

## 2023-10-11 DIAGNOSIS — B37.0 THRUSH, ORAL: ICD-10-CM

## 2023-10-11 RX ORDER — CLOTRIMAZOLE 10 MG/1
10 LOZENGE ORAL; TOPICAL
Qty: 35 TABLET | Refills: 1 | Status: SHIPPED | OUTPATIENT
Start: 2023-10-11

## 2023-10-16 ENCOUNTER — TELEPHONE (OUTPATIENT)
Dept: FAMILY MEDICINE CLINIC | Facility: HOSPITAL | Age: 78
End: 2023-10-16

## 2023-10-16 DIAGNOSIS — H81.10 BENIGN PAROXYSMAL POSITIONAL VERTIGO, UNSPECIFIED LATERALITY: ICD-10-CM

## 2023-10-16 RX ORDER — MECLIZINE HYDROCHLORIDE 25 MG/1
25 TABLET ORAL EVERY 8 HOURS PRN
Qty: 60 TABLET | Refills: 2 | Status: CANCELLED | OUTPATIENT
Start: 2023-10-16

## 2023-10-16 RX ORDER — MECLIZINE HYDROCHLORIDE 25 MG/1
25 TABLET ORAL EVERY 8 HOURS PRN
Qty: 60 TABLET | Refills: 2 | Status: SHIPPED | OUTPATIENT
Start: 2023-10-16

## 2023-10-16 NOTE — TELEPHONE ENCOUNTER
Pt states she needs a refill for meclizine (ANTIVERT) 25 mg tablet sent to [facility] in [facility]

## 2023-11-27 ENCOUNTER — TELEPHONE (OUTPATIENT)
Dept: CARDIOLOGY CLINIC | Facility: CLINIC | Age: 78
End: 2023-11-27

## 2023-11-27 NOTE — TELEPHONE ENCOUNTER
Hi, I would like to talk to Too Guzman if I could. Today sometime. My number is [phone]. I want to get directions to the office and I need a refill.   Thanks, Niurka.

## 2023-11-28 DIAGNOSIS — I47.19 ATRIAL TACHYCARDIA: ICD-10-CM

## 2023-11-28 RX ORDER — DILTIAZEM HYDROCHLORIDE 120 MG/1
120 CAPSULE, EXTENDED RELEASE ORAL DAILY
Qty: 90 CAPSULE | Refills: 3 | Status: SHIPPED | OUTPATIENT
Start: 2023-11-28

## 2023-12-12 ENCOUNTER — REMOTE DEVICE CLINIC VISIT (OUTPATIENT)
Dept: CARDIOLOGY CLINIC | Facility: CLINIC | Age: 78
End: 2023-12-12
Payer: COMMERCIAL

## 2023-12-12 DIAGNOSIS — Z95.0 CARDIAC PACEMAKER IN SITU: Primary | ICD-10-CM

## 2023-12-12 PROCEDURE — 93294 REM INTERROG EVL PM/LDLS PM: CPT | Performed by: STUDENT IN AN ORGANIZED HEALTH CARE EDUCATION/TRAINING PROGRAM

## 2023-12-12 PROCEDURE — 93296 REM INTERROG EVL PM/IDS: CPT | Performed by: STUDENT IN AN ORGANIZED HEALTH CARE EDUCATION/TRAINING PROGRAM

## 2023-12-12 NOTE — PROGRESS NOTES
Results for orders placed or performed in visit on 12/12/23   Cardiac EP device report    Narrative    MDT DUAL PM/ ACTIVE SYSTEM IS MRI CONDITIONAL  CARELINK TRANSMISSION: BATTERY STATUS "11 YRS" AP 89%  0%. ALL AVAILABLE LEAD PARAMETERS WITHIN NORMAL LIMITS. 1 NSVT NOTED; APPROX 8 BEATS@ 179 BPM. PT ON SOTALOL & EF 65% (ECHO 2020). NORMAL DEVICE FUNCTION.  NC

## 2023-12-23 DIAGNOSIS — R60.9 EDEMA, UNSPECIFIED TYPE: ICD-10-CM

## 2023-12-24 RX ORDER — FUROSEMIDE 40 MG/1
40 TABLET ORAL 2 TIMES DAILY
Qty: 180 TABLET | Refills: 1 | Status: SHIPPED | OUTPATIENT
Start: 2023-12-24

## 2024-02-01 NOTE — RESTORATIVE TECHNICIAN NOTE
Restorative Technician Note      Patient Name: Carmela Tomas     Ambulating Independently
Restorative Technician Note      Patient Name: Gerardo Traylor     Note Type: Mobility      Ambulating Independently
31-Jan-2024

## 2024-02-05 DIAGNOSIS — E03.9 ACQUIRED HYPOTHYROIDISM: ICD-10-CM

## 2024-02-05 RX ORDER — LEVOTHYROXINE SODIUM 0.15 MG/1
TABLET ORAL
Qty: 90 TABLET | Refills: 0 | Status: SHIPPED | OUTPATIENT
Start: 2024-02-05

## 2024-02-26 ENCOUNTER — OFFICE VISIT (OUTPATIENT)
Dept: FAMILY MEDICINE CLINIC | Facility: HOSPITAL | Age: 79
End: 2024-02-26
Payer: COMMERCIAL

## 2024-02-26 ENCOUNTER — TELEPHONE (OUTPATIENT)
Dept: FAMILY MEDICINE CLINIC | Facility: HOSPITAL | Age: 79
End: 2024-02-26

## 2024-02-26 ENCOUNTER — HOSPITAL ENCOUNTER (OUTPATIENT)
Dept: CT IMAGING | Facility: HOSPITAL | Age: 79
Discharge: HOME | End: 2024-02-26
Payer: COMMERCIAL

## 2024-02-26 VITALS
HEIGHT: 62 IN | OXYGEN SATURATION: 99 % | TEMPERATURE: 97.5 F | DIASTOLIC BLOOD PRESSURE: 73 MMHG | BODY MASS INDEX: 32.24 KG/M2 | WEIGHT: 175.2 LBS | SYSTOLIC BLOOD PRESSURE: 111 MMHG | HEART RATE: 75 BPM

## 2024-02-26 DIAGNOSIS — H53.2 DIPLOPIA: ICD-10-CM

## 2024-02-26 DIAGNOSIS — N18.32 STAGE 3B CHRONIC KIDNEY DISEASE (HCC): ICD-10-CM

## 2024-02-26 DIAGNOSIS — D69.6 PLATELETS DECREASED (HCC): ICD-10-CM

## 2024-02-26 DIAGNOSIS — J01.90 SUBACUTE SINUSITIS, UNSPECIFIED LOCATION: Primary | ICD-10-CM

## 2024-02-26 DIAGNOSIS — R73.9 HYPERGLYCEMIA: ICD-10-CM

## 2024-02-26 DIAGNOSIS — I10 BENIGN ESSENTIAL HYPERTENSION: ICD-10-CM

## 2024-02-26 DIAGNOSIS — E78.2 MIXED HYPERLIPIDEMIA: ICD-10-CM

## 2024-02-26 DIAGNOSIS — I48.19 PERSISTENT ATRIAL FIBRILLATION (HCC): ICD-10-CM

## 2024-02-26 DIAGNOSIS — N32.81 OVERACTIVE BLADDER: ICD-10-CM

## 2024-02-26 DIAGNOSIS — E03.9 ACQUIRED HYPOTHYROIDISM: Primary | ICD-10-CM

## 2024-02-26 DIAGNOSIS — I89.0 LYMPHEDEMA: ICD-10-CM

## 2024-02-26 PROCEDURE — G2211 COMPLEX E/M VISIT ADD ON: HCPCS | Performed by: FAMILY MEDICINE

## 2024-02-26 PROCEDURE — 70450 CT HEAD/BRAIN W/O DYE: CPT

## 2024-02-26 PROCEDURE — 99214 OFFICE O/P EST MOD 30 MIN: CPT | Performed by: FAMILY MEDICINE

## 2024-02-26 RX ORDER — CEFUROXIME AXETIL 500 MG/1
500 TABLET ORAL EVERY 12 HOURS SCHEDULED
Qty: 14 TABLET | Refills: 0 | Status: SHIPPED | OUTPATIENT
Start: 2024-02-26 | End: 2024-03-04

## 2024-02-26 RX ORDER — TOLTERODINE TARTRATE 1 MG/1
1 TABLET, EXTENDED RELEASE ORAL 2 TIMES DAILY
Qty: 30 TABLET | Refills: 1 | Status: SHIPPED | OUTPATIENT
Start: 2024-02-26

## 2024-02-26 NOTE — TELEPHONE ENCOUNTER
stopped for copy of blood work you want Josefina to get.  There is nothing currently in system.  He says he is waiting in lobby until you give it to him as he is here now.

## 2024-02-26 NOTE — PROGRESS NOTES
Name: Josefina Mendoza      : 1945      MRN: [id number]  Encounter Provider: Louis Cardenas MD  Encounter Date: 2024   Encounter department: [facility] PRIMARY CARE [address]     Assessment & Plan     1. Acquired hypothyroidism  -     TSH, 3rd generation with Free T4 reflex; Future  -     TSH, 3rd generation with Free T4 reflex    2. Benign essential hypertension    3. Persistent atrial fibrillation (HCC)    4. Mixed hyperlipidemia    5. Lymphedema    6. Hyperglycemia  -     Comprehensive metabolic panel; Future  -     Hemoglobin A1C; Future  -     Comprehensive metabolic panel  -     Hemoglobin A1C    7. Overactive bladder  -     tolterodine (DETROL) 1 mg tablet; Take 1 tablet (1 mg total) by mouth 2 (two) times a day    8. Platelets decreased ([facility])    9. Stage 3b chronic kidney disease ([facility])    10. Diplopia  -     CT head wo contrast; Future; Expected date: 2024  -     CBC and differential; Future  -     C-reactive protein; Future  -     CBC and differential  -     C-reactive protein        Depression Screening and Follow-up Plan: Patient was screened for depression during today's encounter. They screened negative with a PHQ-2 score of 0.    Falls Plan of Care: balance, strength, and gait training instructions were provided. Recommended assistive device to help with gait and balance. Medications that increase falls were reviewed. Assessed feet and footwear.         Subjective     6 month follow up.    Main issue is with balance  Seems to be getting worse since last week- sees double at times  No nausea or vomiting  She does have vertigo but only partial benefit with Meclizine  Has tremors in UE's and restless legs      Review of Systems   Constitutional:  Positive for unexpected weight change.   Eyes:  Positive for visual disturbance.   Respiratory: Negative.     Cardiovascular: Negative.    Gastrointestinal: Negative.    Musculoskeletal:  Positive for arthralgias.    Psychiatric/Behavioral:  Positive for dysphoric mood.    All other systems reviewed and are negative.      Past Medical History:   Diagnosis Date    Abnormal chest CT     last assessed - 10Mar2017    Abnormal glucose     Resolved - 22Jun2015    Arrhythmia     Arthritis     Slight    BCC (basal cell carcinoma of skin)     last assessed - [id number]    BRCA gene mutation negative 09/30/2021    [employer]; 36 gene panel    Breast cancer (HCC) 09/12/2016    rt    Cancer (HCC)     Cardiomyopathy (HCC)     Cervical spinal stenosis     Discharge from left nipple     last assessed - [id number]    Disease of thyroid gland     hypothyroidism    Full dentures     Upper and Lower    History of atrial fibrillation     Homocysteinemia     Hypertension     Hypokalemia     Intraductal papilloma of breast     last assessed - [id number]    Limb alert care status     right upper ext    Malignant neoplasm of skin     basal cell on face, Moh's surgery    Memory loss     last assessed - 30Mar2017    Obesity     Open wound     last assessed - 01Jun2017    Osteopenia     Overactive bladder     Palmar plantar erythrodysaesthesia     Resolved - 22Feb2017    Pulmonary nodule seen on imaging study     Rosacea     s/p redo AFib ablation (posterior wall isolation, mitral flutter line, CTI line) 9/29/2023 9/29/2023    UTI (urinary tract infection)     recently    Vertigo     Vitamin D deficiency      Past Surgical History:   Procedure Laterality Date    APPENDECTOMY      ATRIAL ABLATION SURGERY      last assessed - [id number]    BACK SURGERY  1997    Lumbar spinal stenosis    BREAST BIOPSY      2014? left breast; 9/12/16 right breast x 3    BREAST BIOPSY Left 03/30/2010    BREAST BIOPSY Right 09/12/2016    us bx- invasive br ca    BREAST SURGERY      needle bx.    BREAST SURGERY Right 03/16/2017    excision of right chest wall lesion    CARDIAC ELECTROPHYSIOLOGY PROCEDURE N/A 9/29/2023    Procedure: Cardiac eps/afib ablation;  Surgeon: Bernard  MD Judith;  Location: [facility];  Service: Cardiology    CARDIAC ELECTROPHYSIOLOGY STUDY AND ABLATION      CARDIOVERSION      DENTAL SURGERY      HAND SURGERY      Joint replaced with silicone joint 5th finger right hand    HYSTERECTOMY  1993    Complete    LAMINECTOMY      Decompressive up to two lumbar segments    MASTECTOMY Right 10/28/2016    OOPHORECTOMY  1993    SD EXCISION MAL LESION TRUNK/ARM/LEG 0.5 CM/< Right 03/16/2017    Procedure: EXCISION CHEST WALL LESION; NEEDLE LOC @ 1400;  Surgeon: Nicolette Young MD;  Location: [facility];  Service: Surgical Oncology    SD INTRAOP SENTINEL LYMPH NODE ID W/DYE INJECTION Right 10/28/2016    Procedure: BREAST MASTECTOMY WITH SENTINAL LYMPH NODE BIOPSY; LYMPHSCINITIGRAPHY; LYMPHATIC MAPPING; 0800 NUC MED INJECTION;  Surgeon: Nicolette Young MD;  Location: [facility];  Service: Surgical Oncology    SD MASTECTOMY SIMPLE COMPLETE Left 10/22/2021    Procedure: BREAST MASTECTOMY SIMPLE MARTHA  GUIDED;  Surgeon: Brody Christine MD;  Location: [facility];  Service: Surgical Oncology    SENTINEL LYMPH NODE BIOPSY Right     US BREAST NEEDLE LOC LEFT Left 10/19/2021    US BREAST NEEDLE LOC RIGHT Right 03/16/2017    US GUIDANCE BREAST BIOPSY LEFT EACH ADDITIONAL Left 09/21/2021    US GUIDANCE BREAST BIOPSY RIGHT EACH ADDITIONAL Right 09/12/2016    US GUIDANCE BREAST BIOPSY RIGHT EACH ADDITIONAL Right 09/12/2016    US GUIDED BREAST BIOPSY LEFT COMPLETE Left 09/21/2021    US GUIDED BREAST BIOPSY RIGHT COMPLETE Right 09/12/2016    US GUIDED BREAST BIOPSY RIGHT COMPLETE Right 10/1/2021     Family History   Problem Relation Age of Onset    Coronary artery disease Mother         CABG    Diabetes Mother     Heart attack Mother     Diabetes Sister     Parkinsonism Sister     Coronary artery disease Brother         CABG    Diabetes Brother     Heart attack Brother     Heart attack Son     Kidney cancer Son 50    No Known Problems Son     No Known Problems Son     Emphysema Sister      No Known Problems Sister     Heart attack Brother     No Known Problems Brother     No Known Problems Maternal Grandmother     No Known Problems Maternal Grandfather     No Known Problems Paternal Grandmother     No Known Problems Paternal Grandfather     No Known Problems Paternal Aunt     No Known Problems Paternal Aunt     No Known Problems Paternal Aunt     No Known Problems Paternal Aunt      Social History     Socioeconomic History    Marital status: /Civil Union     Spouse name: None    Number of children: None    Years of education: None    Highest education level: None   Occupational History    Occupation: Retired   Tobacco Use    Smoking status: Never    Smokeless tobacco: Never   Vaping Use    Vaping status: Never Used   Substance and Sexual Activity    Alcohol use: Never    Drug use: No    Sexual activity: Not Currently     Comment: Resides with    Other Topics Concern    None   Social History Narrative    Always uses seat belt    Convalescence after chemotherapy     Social Determinants of Health     Financial Resource Strain: Low Risk  (7/19/2023)    Overall Financial Resource Strain (CARDIA)     Difficulty of Paying Living Expenses: Not hard at all   Food Insecurity: No Food Insecurity (2/21/2023)    Hunger Vital Sign     Worried About Running Out of Food in the Last Year: Never true     Ran Out of Food in the Last Year: Never true   Transportation Needs: No Transportation Needs (7/19/2023)    PRAPARE - Transportation     Lack of Transportation (Medical): No     Lack of Transportation (Non-Medical): No   Physical Activity: Not on file   Stress: Not on file   Social Connections: Not on file   Intimate Partner Violence: Not on file   Housing Stability: Low Risk  (2/21/2023)    Housing Stability Vital Sign     Unable to Pay for Housing in the Last Year: No     Number of Places Lived in the Last Year: 1     Unstable Housing in the Last Year: No     Current Outpatient Medications on File  "Prior to Visit   Medication Sig    aspirin 81 mg chewable tablet Chew 1 tablet (81 mg total) daily Do not start before October 1, 2023.    clotrimazole (MYCELEX) 10 mg brendon Take 1 tablet (10 mg total) by mouth 5 (five) times a day    cyanocobalamin (VITAMIN B-12) 500 mcg tablet Take 1 tablet by mouth daily    diltiazem (Dilt-XR) 120 MG 24 hr capsule Take 1 capsule (120 mg total) by mouth daily    furosemide (LASIX) 40 mg tablet TAKE 1 TABLET BY MOUTH TWICE A DAY AS DIRECTED    meclizine (ANTIVERT) 25 mg tablet Take 1 tablet (25 mg total) by mouth every 8 (eight) hours as needed for dizziness    pantoprazole (PROTONIX) 40 mg tablet Take 1 tablet (40 mg total) by mouth daily in the early morning Do not start before October 1, 2023.    potassium chloride (MICRO-K) 10 MEQ CR capsule TAKE 1 CAPSULE BY MOUTH EVERY DAY    RESTASIS 0.05 % ophthalmic emulsion Administer 1 drop to both eyes every 12 (twelve) hours    apixaban (Eliquis) 5 mg Take 1 tablet (5 mg total) by mouth 2 (two) times a day     Allergies   Allergen Reactions    Morphine And Related GI Intolerance     Immunization History   Administered Date(s) Administered    COVID-19 MODERNA VACC 0.5 ML IM 01/22/2021, 02/17/2021, 11/18/2021, 06/08/2022    COVID-19 Moderna Vac BIVALENT 12 Yr+ IM 0.5 ML 11/12/2022    COVID-19 Moderna mRNA Vaccine 12 Yr+ 50 mcg/0.5 mL (Spikevax) 11/08/2023    INFLUENZA 10/23/2007, 11/06/2010, 10/13/2011, 10/17/2012, 10/11/2013, 10/29/2014, 11/08/2016, 10/27/2017, 12/07/2022    Influenza, high dose seasonal 0.7 mL 11/18/2020, 12/03/2021, 12/07/2022    Pneumococcal Conjugate 13-Valent 04/09/2015    Pneumococcal Polysaccharide PPV23 06/29/2017    Tdap 06/14/2016    Zoster 03/08/2013       Objective     /73 (BP Location: Left arm, Patient Position: Sitting, Cuff Size: Large)   Pulse 75   Temp 97.5 °F (36.4 °C) (Tympanic)   Ht 5' 2\" (1.575 m)   Wt 79.5 kg (175 lb 3.2 oz)   SpO2 99%   BMI 32.04 kg/m²     Physical Exam  Vitals and " nursing note reviewed.   Cardiovascular:      Rate and Rhythm: Rhythm irregular.      Heart sounds: Normal heart sounds.   Pulmonary:      Breath sounds: Normal breath sounds.   Musculoskeletal:      Right lower leg: Edema present.      Left lower leg: Edema present.   Neurological:      Mental Status: She is alert and oriented to person, place, and time.   Psychiatric:         Mood and Affect: Mood normal.       Louis Cardenas MD

## 2024-02-28 LAB
ALBUMIN SERPL-MCNC: 3.6 G/DL (ref 3.8–4.8)
ALBUMIN/GLOB SERPL: 1.1 {RATIO} (ref 1.2–2.2)
ALP SERPL-CCNC: 96 IU/L (ref 44–121)
ALT SERPL-CCNC: 16 IU/L (ref 0–32)
AST SERPL-CCNC: 36 IU/L (ref 0–40)
BASOPHILS # BLD AUTO: 0.1 X10E3/UL (ref 0–0.2)
BASOPHILS NFR BLD AUTO: 1 %
BILIRUB SERPL-MCNC: 0.5 MG/DL (ref 0–1.2)
BUN SERPL-MCNC: 14 MG/DL (ref 8–27)
BUN/CREAT SERPL: 11 (ref 12–28)
CALCIUM SERPL-MCNC: 9.3 MG/DL (ref 8.7–10.3)
CHLORIDE SERPL-SCNC: 109 MMOL/L (ref 96–106)
CO2 SERPL-SCNC: 22 MMOL/L (ref 20–29)
CREAT SERPL-MCNC: 1.24 MG/DL (ref 0.57–1)
CRP SERPL-MCNC: <1 MG/L (ref 0–10)
EGFR: 45 ML/MIN/1.73
EOSINOPHIL # BLD AUTO: 0.2 X10E3/UL (ref 0–0.4)
EOSINOPHIL NFR BLD AUTO: 3 %
ERYTHROCYTE [DISTWIDTH] IN BLOOD BY AUTOMATED COUNT: 15.3 % (ref 11.7–15.4)
EST. AVERAGE GLUCOSE BLD GHB EST-MCNC: 117 MG/DL
GLOBULIN SER-MCNC: 3.2 G/DL (ref 1.5–4.5)
GLUCOSE SERPL-MCNC: 84 MG/DL (ref 70–99)
HBA1C MFR BLD: 5.7 % (ref 4.8–5.6)
HCT VFR BLD AUTO: 39.4 % (ref 34–46.6)
HGB BLD-MCNC: 12.7 G/DL (ref 11.1–15.9)
IMM GRANULOCYTES # BLD: 0 X10E3/UL (ref 0–0.1)
IMM GRANULOCYTES NFR BLD: 0 %
LYMPHOCYTES # BLD AUTO: 1.5 X10E3/UL (ref 0.7–3.1)
LYMPHOCYTES NFR BLD AUTO: 28 %
MCH RBC QN AUTO: 28.3 PG (ref 26.6–33)
MCHC RBC AUTO-ENTMCNC: 32.2 G/DL (ref 31.5–35.7)
MCV RBC AUTO: 88 FL (ref 79–97)
MONOCYTES # BLD AUTO: 0.4 X10E3/UL (ref 0.1–0.9)
MONOCYTES NFR BLD AUTO: 7 %
NEUTROPHILS # BLD AUTO: 3.4 X10E3/UL (ref 1.4–7)
NEUTROPHILS NFR BLD AUTO: 61 %
PLATELET # BLD AUTO: 214 X10E3/UL (ref 150–450)
POTASSIUM SERPL-SCNC: 4.7 MMOL/L (ref 3.5–5.2)
PROT SERPL-MCNC: 6.8 G/DL (ref 6–8.5)
RBC # BLD AUTO: 4.49 X10E6/UL (ref 3.77–5.28)
SL AMB T4, FREE (DIRECT): 0.42 NG/DL (ref 0.82–1.77)
SODIUM SERPL-SCNC: 144 MMOL/L (ref 134–144)
TSH SERPL DL<=0.005 MIU/L-ACNC: 411 UIU/ML (ref 0.45–4.5)
WBC # BLD AUTO: 5.6 X10E3/UL (ref 3.4–10.8)

## 2024-03-04 ENCOUNTER — TELEPHONE (OUTPATIENT)
Dept: FAMILY MEDICINE CLINIC | Facility: HOSPITAL | Age: 79
End: 2024-03-04

## 2024-03-04 DIAGNOSIS — E03.9 ACQUIRED HYPOTHYROIDISM: Primary | ICD-10-CM

## 2024-03-12 ENCOUNTER — TELEPHONE (OUTPATIENT)
Dept: FAMILY MEDICINE CLINIC | Facility: HOSPITAL | Age: 79
End: 2024-03-12

## 2024-03-12 NOTE — TELEPHONE ENCOUNTER
Spoke to patient. She states that her dizziness has not improved much. She feels unstable on her feet due to the dizzyness. She is asking what she should do? Asking if you want to see her again or if you have other recommmendations?   Please advise. TY

## 2024-03-12 NOTE — TELEPHONE ENCOUNTER
LM ON OUR VM @ 1104 AM    WANTS TO SPEAK TO THE NURSE ABOUT HER DIZZINESS AND WHAT ITS CAUSED BY    PCB

## 2024-03-13 DIAGNOSIS — H81.10 BENIGN PAROXYSMAL POSITIONAL VERTIGO, UNSPECIFIED LATERALITY: Primary | ICD-10-CM

## 2024-03-19 ENCOUNTER — EVALUATION (OUTPATIENT)
Facility: CLINIC | Age: 79
End: 2024-03-19
Payer: COMMERCIAL

## 2024-03-19 DIAGNOSIS — H81.10 BENIGN PAROXYSMAL POSITIONAL VERTIGO, UNSPECIFIED LATERALITY: ICD-10-CM

## 2024-03-19 PROCEDURE — 97163 PT EVAL HIGH COMPLEX 45 MIN: CPT

## 2024-03-19 NOTE — PROGRESS NOTES
PT Evaluation     Today's date: 3/19/2024  Patient name: Josefina Mendoza  : 1945  MRN: [id number]  Referring provider: Louis Cardenas MD  Dx:   Encounter Diagnosis     ICD-10-CM    1. Benign paroxysmal positional vertigo, unspecified laterality  H81.10 Ambulatory Referral to Physical Therapy          Start Time: 1630  Stop Time: 1738  Total time in clinic (min): 68 minutes    Assessment  Assessment details: Patient is a 78 year old female who presents with s/s consistent with posterior canal BPPV.  Based on presentation the patient has a negative Shakila-Hallpike test but has subjective feelings of dizziness consistent with BPPV, reports latency of symptoms and crescendo-decrescendo spinning, which indicates right posterior canal canalithiasis.  Patient responded well to a particle repositioning technique for left posterior canal canalithiasis after 2 trials, max a x2 to assist due to cervical restrictions. Oculomotor screen reveals abnormal smooth pursuit with saccadic motion and she exhibits generally slowed saccades especially in vertical plane, requiring re-fixation to target. mCTSIB and Flowers suggests that she had vestibular dysfunction and difficulty performing static tasks which affect her to perform ADLs and functional tasks. The Flowers suggests she is at a high fall risk, scoring 11/56. The ABC also suggests she is at fall risk, scoring 1.25%. At end of session patient noted with resolution of symptoms and was able to stand and walk by herself. Pt was educated to continue walking with  as support to retain safety at home until she feels confident her dizziness is resolved. Pt was recommended to schedule an appointment with a neurologist due to her having diplopia.  Patient will call clinic with questions or concerns.  Impairments: abnormal coordination, abnormal gait, abnormal muscle tone, abnormal or restricted ROM, abnormal movement, activity intolerance, impaired balance, impaired physical  strength, lacks appropriate home exercise program, safety issue and weight-bearing intolerance     Prognosis: good    Goals  In 4 weeks, patient will:  1.  Improve mCTSIB condition eyes closed firm surface to at least 20 seconds to demonstrate improved sensory re-weighting.  2.  Improve 5xSTS to below cutoff score for age-matched peers, 14.8 seconds, to demonstrate improved LE stability to complete transfers safely.  3.  Improve TUG to below cutoff score for age matched peers, 13.5 seconds, to demonstrate improved functional mobility.  4.  Free from s/s of BPPV posterior canals canalithiasis bilaterally.    In 4-10 weeks, patient will:  1.  Demonstrate consistent carryover with HEP and walking program.  2.  Improve gait speed by at least 0.12 m/s to meet MCID value for older adults and reduce fall risk.  3.  Improve 6MWT by at least 150ft to demonstrate improvement in ability in community ambulation and meet MCID value.  4.  Improve ABC to at least above 67% to show reduced risk of falling and improved balance confidence.  5.  Improve Flowers by at least 8 points to meet MCID value and show improved static balance.        Plan  Patient would benefit from: skilled physical therapy  Planned therapy interventions: neuromuscular re-education, manual therapy, patient education, home exercise program, therapeutic exercise, therapeutic activities and gait training  Frequency: 2x week  Duration in weeks: 8  Plan of Care beginning date: 3/19/2024  Plan of Care expiration date: 5/18/2024  Treatment plan discussed with: patient      Subjective Evaluation    History of Present Illness  Mechanism of injury: Pt and  reported that she noticed symptoms a couple of weeks ago. She had a CT scan ordered after hitting her head from a fall in December that noted a sinus infection.     2-3 weeks of more dizziness. Hit her head 2/26 CTScan which was fine. sinus infection was incidental. vertigo on/off for a long time. TV is blurry and  sees double, seems to happen randomly.     Vertigo = lightheaded and unsteady on your feet. when she lays down the room spins, sometimes when she sits down. since seeing pcp she feels things have worsened    needs help walking around the house. up/down steps  needs to be there. This started a few weeks ago. Otherwise was independent before the dizziness got worse. Not currently driving.  Patient Goals  Patient goals for therapy: improved balance    Pain  No pain reported      Diagnostic Tests  CT scan: normal  Treatments  No previous or current treatments        Objective        Dysequilibrium: Yes  Lightheadedness: Yes  Vertigo: Yes  Rocking or Swaying: No         Oscillopsia: No  Diplopia: Yes  Motion sickness: No  Floating, Swimming, Disconnected: No    Exacerbation Factors:  Bending over: Yes  Turning Head: No  Rolling in bed: Yes  Walking: Yes  Looking up: Yes  Supine to/from sitting: Yes  Optokinetic movement: No  Walking in busy environment: Yes    Duration of Symptoms:     Concurrent Complaints:  Tinnitus:No  Aural Fullness:No  Known hearing loss:No  Nausea, Vomiting: No  Altered Vision: No  Poor Concentration: No  Memory Loss: No  Peripheral Neuropathy:No  Cervical Pain: Yes   Headache: No      PHYSICAL FINDINGS:  Oculomotor ROM :  Resting nystagmus: No  Gaze holding nystagmus No   Smooth pursuit Abnormal    Vertical Saccades:Abnormal ?attention to maintain eye contact on target  Horizontal Saccades:Abnormal - ?attention to maintain eye contact on target  Convergence: Abnormal    Cover/Uncover/Crosscover Test: Normal    Head thrust (room light): Unable to perform due to lack of cervical ROM.      MCTSIB  20 eyes open firm surface   3 eyes closed form surface      Positional testing: Right Left   Hardy Vizcarra pike WNL subjective bppv -   Roll test:         Cervical ROM: All motions limited, R rotation slightly more than L rotation.       Gait speed fast 0.26 m/s  Comfortable 0.24 m/s         Precautions:  falls  Re-eval Date: 4/19/2024    Date 3/19       Visit Count 1       FOTO See IE       Pain In See IE       Pain Out                Testing        Dixhallpike/sidelying R sided subjective        Roll test        DVA        Kristie  11/56       Gait speed 0.26 m/s fast; 0.24 m/s slow       Neuro Re-Ed        Mod epley X2 R max a x2       BBQ        VORx1        VORx1 vergence                                Ther Ex                                                                        Ther Activity        ADL                Gait Training        Divided Attention        Head turns        Modalities         prn

## 2024-03-22 ENCOUNTER — OFFICE VISIT (OUTPATIENT)
Facility: CLINIC | Age: 79
End: 2024-03-22
Payer: COMMERCIAL

## 2024-03-22 DIAGNOSIS — H81.10 BENIGN PAROXYSMAL POSITIONAL VERTIGO, UNSPECIFIED LATERALITY: Primary | ICD-10-CM

## 2024-03-22 PROCEDURE — 97112 NEUROMUSCULAR REEDUCATION: CPT

## 2024-03-22 NOTE — PROGRESS NOTES
Daily Note     Today's date: 3/22/2024  Patient name: Josefina Mendoza  : 1945  MRN: [id number]  Referring provider: Louis Cardenas MD  Dx:   Encounter Diagnosis     ICD-10-CM    1. Benign paroxysmal positional vertigo, unspecified laterality  H81.10           Start Time: 1100  Stop Time: 1145  Total time in clinic (min): 45 minutes    Subjective: Pt reports she is definitely better but not all the way. Still having some dizziness laying down.      Objective: See treatment diary below      Assessment:  Pt does not exhibit any nystagmus as well as symptoms bilaterally x2 with loaded dale-hallpike and sidelying tests. Roll tests also negative. FGA does place her at high fall risk, . Able to complete condition 2 of mCTSIB for 30s which is significant improvement from IE. Pt instructed on beatty-daroff exercises for home, pt verbalized understanding. Patient would benefit from continued PT to improve dizziness and balance.      Plan: Continue per plan of care.      Precautions: falls  Re-eval Date: 2024    Date 3/19       Visit Count 1       FOTO See IE       Pain In See IE       Pain Out                Testing        Dixhallpike/sidelying R sided subjective  (-) x2 bilat      Roll test  (-) x1      DVA        FGA        Flowers         Gait speed 0.26 m/s fast; 0.24 m/s slow       Neuro Re-Ed        Mod epley X2 R max a x2       BBQ        VORx1        VORx1 vergence                                Ther Ex                                                                        Ther Activity        ADL                Gait Training        Divided Attention        Head turns        Modalities         prn

## 2024-03-25 ENCOUNTER — TELEPHONE (OUTPATIENT)
Dept: HEMATOLOGY ONCOLOGY | Facility: CLINIC | Age: 79
End: 2024-03-25

## 2024-03-25 ENCOUNTER — APPOINTMENT (OUTPATIENT)
Facility: CLINIC | Age: 79
End: 2024-03-25
Payer: COMMERCIAL

## 2024-03-25 NOTE — TELEPHONE ENCOUNTER
Appointment Change  Cancel, Reschedule, Change to Virtual      Who are you speaking with? Spouse   If it is not the patient, is the caller listed on the communication consent form? Yes   Which provider is the appointment scheduled with? SHADI Davey   When was the original appointment scheduled?    Please list date and time 3/26/24 @ 10:30am   At which location is the appointment scheduled to take place? [facility]   Was the appointment rescheduled?     Was the appointment changed from an in person visit to a virtual visit?    If so, please list the details of the change. No patient will call back   What is the reason for the appointment change? Patient is sick       Was STAR transport scheduled? no   Does STAR transport need to be scheduled for the new visit (if applicable) no   Does the patient need an infusion appointment rescheduled? no   Does the patient have an upcoming infusion appointment scheduled? If so, when? no   Is the patient undergoing chemotherapy? no   For appointments cancelled with less than 24 hours:  Was the no-show policy reviewed? yes

## 2024-03-27 ENCOUNTER — TELEPHONE (OUTPATIENT)
Dept: FAMILY MEDICINE CLINIC | Facility: HOSPITAL | Age: 79
End: 2024-03-27

## 2024-03-27 NOTE — TELEPHONE ENCOUNTER
Patient asking if she can start taking restless leg medication again.    Reports her legs are constantly jumping and twitching.    pcb

## 2024-03-28 ENCOUNTER — OFFICE VISIT (OUTPATIENT)
Facility: CLINIC | Age: 79
End: 2024-03-28
Payer: COMMERCIAL

## 2024-03-28 DIAGNOSIS — H81.10 BENIGN PAROXYSMAL POSITIONAL VERTIGO, UNSPECIFIED LATERALITY: Primary | ICD-10-CM

## 2024-03-28 DIAGNOSIS — G25.81 RESTLESS LEGS SYNDROME: Primary | ICD-10-CM

## 2024-03-28 PROCEDURE — 97140 MANUAL THERAPY 1/> REGIONS: CPT

## 2024-03-28 PROCEDURE — 97110 THERAPEUTIC EXERCISES: CPT

## 2024-03-28 PROCEDURE — 97112 NEUROMUSCULAR REEDUCATION: CPT

## 2024-03-28 RX ORDER — PRAMIPEXOLE DIHYDROCHLORIDE 0.25 MG/1
0.25 TABLET ORAL EVERY EVENING
Qty: 30 TABLET | Refills: 1 | Status: SHIPPED | OUTPATIENT
Start: 2024-03-28

## 2024-03-28 NOTE — PROGRESS NOTES
Daily Note     Today's date: 3/28/2024  Patient name: Josefina Mendoza  : 1945  MRN: [id number]  Referring provider: Louis Cardenas MD  Dx:   Encounter Diagnosis     ICD-10-CM    1. Benign paroxysmal positional vertigo, unspecified laterality  H81.10             Start Time: 1115  Stop Time: 1200  Total time in clinic (min): 45 minutes    Subjective: Pt does not feel dizziness in terms of spinning. No more double vision recently. Instability > dizziness is what she is noticing. Headaches and neck pain recently, more prominent on  and Monday.       Objective: See treatment diary below      Assessment: Cervical manuals performed with good response in headache intensity and cervical ROM, ROM still very limited L > R. Shows cautious gait when performing backward walking or walking with head turns. Slowed performance with sit<>Stand and step ups but appears stable. Most difficulty with navigating obstacles, which may be due to cervical ROM partly, other due to instability. Reviewed cervical HEP for ROM and gentle soft tissue release. Patient would benefit from continued PT to improve dizziness and balance.      Plan: Continue per plan of care.      Precautions: falls  Re-eval Date: 2024    Date 3/19  3/28     Visit Count 1  3     FOTO See IE       Pain In See IE       Pain Out                Testing        Dixhallpike/sidelying R sided subjective  (-) x2 bilat      Roll test  (-) x1      DVA        FGA        Flowers         Gait speed 0.26 m/s fast; 0.24 m/s slow       Neuro Re-Ed        Mod epley X2 R max a x2       BBQ        VORx1        VORx1 vergence        Dynamic balance   Bwd stepping 20ft x2 CGA     Gait HT 20ft x4 CGA    Low hurdles cw stepping x3 HHA CGA    4' step up x5 ea LE                     Ther Ex        STS   x15                                                     Cervical HEP   AF, printed and reviewd     Ther Activity        ADL                Gait Training        Divided  Attention        Head turns        Manuals        Cervical   AF SOR PROM Rotation

## 2024-04-01 ENCOUNTER — OFFICE VISIT (OUTPATIENT)
Facility: CLINIC | Age: 79
End: 2024-04-01
Payer: COMMERCIAL

## 2024-04-01 DIAGNOSIS — H81.10 BENIGN PAROXYSMAL POSITIONAL VERTIGO, UNSPECIFIED LATERALITY: Primary | ICD-10-CM

## 2024-04-01 PROCEDURE — 97140 MANUAL THERAPY 1/> REGIONS: CPT

## 2024-04-01 NOTE — PROGRESS NOTES
"Daily Note     Today's date: 2024  Patient name: Josefina Mendoza  : 1945  MRN: [id number]  Referring provider: Louis Cardenas MD  Dx:   Encounter Diagnosis     ICD-10-CM    1. Benign paroxysmal positional vertigo, unspecified laterality  H81.10             Start Time: 1400  Stop Time: 1430  Total time in clinic (min): 30 minutes    Subjective: Pt reports dizziness only happens at night when she lays down. Her neck is been bothering her and she has a headache now.        Objective: See treatment diary below      Assessment: Cervical manuals performed with good response in headache intensity and cervical ROM, ROM still very limited L > R. After cervical manuals, performed epley maneuver, pt reports consistently having dizziness laying into bed. No dizziness upon R dale-hallpike with hip bridge, however, does exhibit <10s upbeat R torsional nystagmus, crescendo-decrescendo in nature. Tolerates x2 maneuvers, however, question if successful given head/neck stiffness and position through the maneuver.  Patient would benefit from continued PT to improve dizziness and balance.      Plan: Continue per plan of care. Discuss potential transfer to closer clinic in [place].     Precautions: falls  Re-eval Date: 2024    Date 3/19  3/28     Visit Count 1  3     FOTO See IE       Pain In See IE       Pain Out                Testing        Dixhallpike/sidelying R sided subjective  (-) x2 bilat  WNL R dale-hallpike, upbeat R torsional nystagmus no reports of dizziness    Roll test  (-) x1      DVA        FGA        Flowers         Gait speed 0.26 m/s fast; 0.24 m/s slow       Neuro Re-Ed        Mod epley X2 R max a x2   X2 R w hip bridge    BBQ        VORx1        VORx1 vergence        Dynamic balance   Bwd stepping 20ft x2 CGA     Gait HT 20ft x4 CGA    Low hurdles cw stepping x3 HHA CGA    4' step up x5 ea LE     Static balance    10\" x2 EC FT            Ther Ex        STS   x15                           "                           Cervical HEP   AF, printed and reviewd     Ther Activity        ADL                Gait Training        Divided Attention        Head turns        Manuals        Cervical   AF SOR PROM Rotation AF SOR PROM Rot

## 2024-04-05 ENCOUNTER — OFFICE VISIT (OUTPATIENT)
Facility: CLINIC | Age: 79
End: 2024-04-05
Payer: COMMERCIAL

## 2024-04-05 DIAGNOSIS — H81.10 BENIGN PAROXYSMAL POSITIONAL VERTIGO, UNSPECIFIED LATERALITY: Primary | ICD-10-CM

## 2024-04-05 PROCEDURE — 97140 MANUAL THERAPY 1/> REGIONS: CPT

## 2024-04-05 PROCEDURE — 97112 NEUROMUSCULAR REEDUCATION: CPT

## 2024-04-05 NOTE — PROGRESS NOTES
Daily Note     Today's date: 2024  Patient name: Josefina Mendoza  : 1945  MRN: [id number]  Referring provider: Louis Cardenas MD  Dx:   Encounter Diagnosis     ICD-10-CM    1. Benign paroxysmal positional vertigo, unspecified laterality  H81.10             Start Time: 1315  Stop Time: 1400  Total time in clinic (min): 45 minutes    Subjective: Pt continues to report slight dizziness when laying down. She is still feeling better vs prior to PT.      Objective: See treatment diary below      Assessment: Cervical manuals performed with good response in headache intensity and cervical ROM, ROM still very limited L > R. After cervical manuals, performed epley maneuver. Use of trendelenburg mat and cues to relax head/neck seemed to assist with gaining rotational and extension ROM. Pt reports slight dizziness with upbeat + R torsional nystagmus < 30s. SPT and PT assisted with body and head position due to limited cervical ROM. Reports of dizziness in position 3 of epley + [place] like event occurred upon sitting up. PT & SPT assisted in keeping pt upright until she felt safe and no retropulsion noted. Patient would benefit from continued PT to improve dizziness and balance.      Plan: Continue per plan of care. Discuss potential transfer to closer clinic in [place].     Precautions: falls  Re-eval Date: 2024    Date 3/19  3/28  4/5   Visit Count 1  3  5   FOTO See IE       Pain In See IE       Pain Out                Testing        Dixhallpike/sidelying R sided subjective  (-) x2 bilat  WNL R dale-hallpike, upbeat R torsional nystagmus no reports of dizziness (+) R dale hallpike upbeat R torsional nystagmus no reports    Roll test  (-) x1      DVA        FGA        Flowers         Gait speed 0.26 m/s fast; 0.24 m/s slow       Neuro Re-Ed        Mod epley X2 R max a x2   X2 R w hip bridge X2 max a x2 for head/body position   BBQ        VORx1        VORx1 vergence        Dynamic balance   Bwd  "stepping 20ft x2 CGA     Gait HT 20ft x4 CGA    Low hurdles cw stepping x3 HHA CGA    4' step up x5 ea LE     Static balance    10\" x2 EC FT            Ther Ex        STS   x15                                                     Cervical HEP   AF, printed and reviewd     Ther Activity        ADL                Gait Training        Divided Attention        Head turns        Manuals        Cervical   AF SOR PROM Rotation AF SOR PROM Rot DP SOR PA CPA C2-C7 G3                    "

## 2024-04-10 ENCOUNTER — OFFICE VISIT (OUTPATIENT)
Facility: CLINIC | Age: 79
End: 2024-04-10
Payer: COMMERCIAL

## 2024-04-10 DIAGNOSIS — H81.10 BENIGN PAROXYSMAL POSITIONAL VERTIGO, UNSPECIFIED LATERALITY: Primary | ICD-10-CM

## 2024-04-10 PROCEDURE — 97110 THERAPEUTIC EXERCISES: CPT

## 2024-04-10 PROCEDURE — 97112 NEUROMUSCULAR REEDUCATION: CPT

## 2024-04-10 NOTE — PROGRESS NOTES
Unplanned discharge/Daily Note     Today's date: 4/10/2024  Patient name: Josefina Mendoza  : 1945  MRN: [id number]  Referring provider: Louis Cardenas MD  Dx:   Encounter Diagnosis     ICD-10-CM    1. Benign paroxysmal positional vertigo, unspecified laterality  H81.10           Start Time: 1145  Stop Time: 1230  Total time in clinic (min): 45 minutes    Subjective: Pt reported that she does not get dizzy when laying in bed, but yesterday she was washing dishes and started to feel lightheaded and dizzy.  reported that she was pale when she felt dizzy last night.       Objective: See treatment diary below      Assessment: R [place]-Hallpike was positive and Epley was performed twice with the Pt reporting the second R [place]-Hallpike did not invoke symptoms of dizziness. Dynamic balance was also tested and she had difficulty with walking with EC and ambulating with a narrow base of support. OH testing was conducted and showed little to no change in BP in supine>seated>stand. Transfer of care was communicated to other treating therapist at a facility closer to the Pt.      Plan: Pt transferring to other facility.      Precautions: falls  Re-eval Date: 2024    Date 3/19  3/28  4/5 4/10   Visit Count 1  3  5 6   FOTO See IE        Pain In See IE        Pain Out                 Testing         Dixhallpike/sidelying R sided subjective  (-) x2 bilat  WNL R dale-hallpike, upbeat R torsional nystagmus no reports of dizziness (+) R dale hallpike upbeat R torsional nystagmus no reports  (+) R dale hallpike upbeat R torsional nystagmus reported of dizziness   Roll test  (-) x1       DVA         FGA         Flowers          Gait speed 0.26 m/s fast; 0.24 m/s slow        Neuro Re-Ed         Mod epley X2 R max a x2   X2 R w hip bridge X2 max a x2 for head/body position X2 max a x2 for head/body position   BBQ         VORx1         VORx1 vergence         Dynamic balance   Bwd stepping 20ft x2 CGA     Gait HT 20ft  "x4 CGA    Low hurdles cw stepping x3 HHA CGA    4' step up x5 ea LE      Static balance    10\" x2 EC FT              Ther Ex         STS   x15      Vitals       DP                                                Cervical HEP   AF, printed and reviewd      Ther Activity         ADL                  Gait Training         Divided Attention         Head turns         Manuals         Cervical   AF SOR PROM Rotation AF SOR PROM Rot DP SOR PA CPA C2-C7 G3                        "

## 2024-04-16 ENCOUNTER — EVALUATION (OUTPATIENT)
Dept: PHYSICAL THERAPY | Facility: CLINIC | Age: 79
End: 2024-04-16
Payer: COMMERCIAL

## 2024-04-16 DIAGNOSIS — M54.2 NECK PAIN: ICD-10-CM

## 2024-04-16 DIAGNOSIS — R42 VERTIGO: ICD-10-CM

## 2024-04-16 DIAGNOSIS — R26.81 UNSTEADY GAIT: ICD-10-CM

## 2024-04-16 DIAGNOSIS — R26.89 BALANCE DISORDER: Primary | ICD-10-CM

## 2024-04-16 PROCEDURE — 97110 THERAPEUTIC EXERCISES: CPT | Performed by: PHYSICAL THERAPIST

## 2024-04-16 PROCEDURE — 97162 PT EVAL MOD COMPLEX 30 MIN: CPT | Performed by: PHYSICAL THERAPIST

## 2024-04-16 NOTE — PROGRESS NOTES
PT Evaluation     Today's date: 2024  Patient name: Josefina Mendoza  : 1945  MRN: [id number]  Referring provider: Louis Cardenas MD  Dx:   Encounter Diagnosis     ICD-10-CM    1. Balance disorder  R26.89       2. Unsteady gait  R26.81       3. Neck pain  M54.2       4. Vertigo  R42               Assessment  Assessment details: Patient is a 78 year old female who was prior treated at another clinic with s/s consistent with posterior canal BPPV.  Based on presentation the patient has a negative Pass Christian-Hallpike test but has subjective feelings of dizziness consistent with BPPV, reports latency of symptoms and crescendo-decrescendo spinning, which indicates right posterior canal canalithiasis.  Patient responded well to a particle repositioning technique for left posterior canal canalithiasis after 2 trials, max a x2 to assist due to cervical restrictions. She received treatment to address these findings which have been resolved. She presents today for continuation of therapy to address the following: Oculomotor screen reveals abnormal smooth pursuit with saccadic motion and she exhibits generally slowed saccades especially in vertical plane, requiring re-fixation to target. mCTSIB and Flowers suggests that she had vestibular dysfunction and difficulty performing static tasks which affect her to perform ADLs and functional tasks. The Flowers suggests she is at a high fall risk, scoring 11/56. The ABC also suggests she is at fall risk, scoring 1.25%. Pt was educated to continue walking with  as support to retain safety at home until she feels confident her dizziness is resolved. Patient benefits from continued skilled PT services to address her limitations in order to improve her functional mobility and reduce her risk of falls.   Impairments: abnormal coordination, abnormal gait, abnormal muscle tone, abnormal or restricted ROM, abnormal movement, activity intolerance, impaired balance, impaired physical  strength, lacks appropriate home exercise program, safety issue and weight-bearing intolerance     Prognosis: good    Goals  In 4 weeks, patient will:  1.  Improve mCTSIB condition eyes closed firm surface to at least 20 seconds to demonstrate improved sensory re-weighting.  2.  Improve 5xSTS to below cutoff score for age-matched peers, 14.8 seconds, to demonstrate improved LE stability to complete transfers safely.  3.  Improve TUG to below cutoff score for age matched peers, 13.5 seconds, to demonstrate improved functional mobility.  4.  Free from s/s of BPPV posterior canals canalithiasis bilaterally.  5. Pt will perform at least 10 sit<>stands without UE support in 30 seconds to demonstrate improved BLE strength and balance.     In 12 weeks, patient will:  1.  Demonstrate consistent carryover with HEP and walking program.  2.  Improve gait speed by at least 0.12 m/s to meet MCID value for older adults and reduce fall risk.  3.  Improve 6MWT by at least 150ft to demonstrate improvement in ability in community ambulation and meet MCID value.  4.  Improve ABC to at least above 67% to show reduced risk of falling and improved balance confidence.  5.  Improve Flowers by at least 8 points to meet MCID value and show improved static balance.        Plan  Patient would benefit from: skilled physical therapy  Planned therapy interventions: neuromuscular re-education, manual therapy, patient education, home exercise program, therapeutic exercise, therapeutic activities and gait training  Frequency: 2x week  Duration in weeks: 12  Plan of Care beginning date: 4/16/2024  Plan of Care expiration date: 6/9/2024  Treatment plan discussed with: patient      Subjective Evaluation    History of Present Illness  Mechanism of injury: Pt and  reported that she noticed symptoms in March. She had a CT scan ordered after hitting her head from a fall in December that noted a sinus infection.     2-3 weeks of more dizziness. Hit her  head 2/26 CTScan which was fine. sinus infection was incidental. vertigo on/off for a long time. TV is blurry and sees double, seems to happen randomly.     Vertigo = lightheaded and unsteady on your feet. when she lays down the room spins, sometimes when she sits down. since seeing pcp she feels things have worsened    Today she presents with chief complaint of neck pain and some unsteadiness when she is walking. She reports that her dizziness has improved with her vestibular therapy. She wants to continue to work on her neck sxs as well as balance/general conditioning in order to prepare her for her trip this August to [place].     Patient Goals  Patient goals for therapy: improved balance      Pain  Location: R side of neck  Current: 8/10  Best: 0/10  Worst: 9/10    Alleviate: moving  Aggravated: prolonged sitting (working on computer); difficulty rotating neck    Diagnostic Tests  CT scan: normal  Treatments  No previous or current treatments        Objective        Dysequilibrium: Yes  Lightheadedness: Yes  Vertigo: Yes  Rocking or Swaying: No         Oscillopsia: No  Diplopia: Yes  Motion sickness: No  Floating, Swimming, Disconnected: No    Exacerbation Factors:  Bending over: Yes  Turning Head: No  Rolling in bed: Yes  Walking: Yes  Looking up: Yes  Supine to/from sitting: Yes  Optokinetic movement: No  Walking in busy environment: Yes    Duration of Symptoms:     Concurrent Complaints:  Tinnitus:No  Aural Fullness:No  Known hearing loss:No  Nausea, Vomiting: No  Altered Vision: No  Poor Concentration: No  Memory Loss: No  Peripheral Neuropathy:No  Cervical Pain: Yes   Headache: No      PHYSICAL FINDINGS:  Oculomotor ROM :  Resting nystagmus: No  Gaze holding nystagmus No   Smooth pursuit Abnormal    Vertical Saccades:Abnormal attention to maintain eye contact on target  Horizontal Saccades:Abnormal - attention to maintain eye contact on target  Convergence: Abnormal    Cover/Uncover/Crosscover Test:  Normal    Head thrust (room light): Unable to perform due to lack of cervical ROM.      MCTSIB  20 eyes open firm surface   3 eyes closed form surface      Positional testing: Right Left   [place] Vizcarra pike WNL subjective bppv -   Roll test:         Cervical ROM: (measured in degrees)  Flexion: 15  Ext: 5  Side bend: (R) 5 (L) 10  Rotation: (R) 28 (L) 20    Shoulder: (measured in degrees)   R  L  Flex 140  145  Abd 140  143  ER NT  NT  IR NT  NT      MMT:    Shoulder   R  L  Flex 3/5  3/5  Abd 3/5  3/5  ER 3-/5  3/5  IR 3+/5  3+/5    UT NT  NT  MT NT  NT  LT NT  NT      Gait speed fast 0.26 m/s  Comfortable 0.24 m/s  30 second sit<>stand: 7 times without UE support        Precautions: falls; history of breast cancer with bilateral mastectomy, pacemaker     4/16       Visit Count 1       FOTO See IE       Pain In See IE       Pain Out              Specialty Daily Treatment Diary       Manual 4/16       SOR        Cervical distraction        STM/TPR cervical and B UT/LS        CROM        B UT, LS stretch                Exercise Diary         UBE        RB        Laps                Mod Epley maneuver (x2)                        Floor -> Airex Smooth pursuit        Floor -> airex saccades                VOR 1 static        VOR 1 dynamic        VOR 2 static        VOR 2 dynamic                Hallwalks (horiz, vert, diagonal)                         SNAGs Ext        SNAGs Rotation        UT stretch        LS stretch        Chin tucks        Chin tuck to extension                Scap Retraction -> TB        TB B S Ext                TB B ER c scap retraction                        Chair squats        Marching        St H' ABD        HR/TR                Static NBOS on airex        Static tandem        SLS                Side Steps        Cross overs                Step ups        Lat step ups                1 cone alt LE        2 cone alt LE                Hurdles                                        HEP/EDU Diagnosis,  prognosis, POC, proper responses to exercises, reviewed HEP       Modalities                                   Skin checks performed pre and post application: intact

## 2024-04-19 DIAGNOSIS — G25.81 RESTLESS LEGS SYNDROME: ICD-10-CM

## 2024-04-19 RX ORDER — PRAMIPEXOLE DIHYDROCHLORIDE 0.25 MG/1
0.25 TABLET ORAL EVERY EVENING
Qty: 90 TABLET | Refills: 1 | Status: SHIPPED | OUTPATIENT
Start: 2024-04-19

## 2024-04-22 ENCOUNTER — TELEPHONE (OUTPATIENT)
Dept: HEMATOLOGY ONCOLOGY | Facility: CLINIC | Age: 79
End: 2024-04-22

## 2024-04-22 DIAGNOSIS — I48.19 PERSISTENT ATRIAL FIBRILLATION (HCC): ICD-10-CM

## 2024-04-22 NOTE — TELEPHONE ENCOUNTER
Patient Call    Who are you speaking with? Patient    If it is not the patient, are they listed on an active communication consent form? N/A   What is the reason for this call? Pt is calling in regards to a letter she received regarding scheduling a mammogram. Pt had a double mastectomy done and pt is asking how the mammogram would be done. Pt would like to speak to someone at Dr Christine's office regarding this please.    Does this require a call back? Yes   If a call back is required, please list best call back number [phone]   If a call back is required, advise that a message will be forwarded to their care team and someone will return their call as soon as possible.   Did you relay this information to the patient? Yes

## 2024-04-22 NOTE — TELEPHONE ENCOUNTER
Pt contacted Call Center requested refill of their medication.        Doctor Name: Dr. Wong      Medication Name: Sotalol      Dosage of Med: 120 mg tablet      Frequency of Med: 1/2 tablet daily      Remaining Medication: none      Pharmacy and Location: [facility] [place]        Pt. Preferred Callback Phone Number: [phone]      Thank you.       PLEASE ADVISE PATIENTS:    REFILL REQUESTS WILL BE PROCESSED WITHIN 24-48 HOURS.

## 2024-04-22 NOTE — TELEPHONE ENCOUNTER
Patient Call    Who are you speaking with? [facility]      If it is not the patient, are they listed on an active communication consent form? N/A   What is the reason for this call? Norma is calling because the patient received a letter from the office stating that she no showed and she is supposed to have a US completed but the patient had a mastectomy.    Norma states the patient is very confused as to why she received this letter and would like clarification.    Does this require a call back? Yes   If a call back is required, please list best call back number Josefina: [phone]   If a call back is required, advise that a message will be forwarded to their care team and someone will return their call as soon as possible.   Did you relay this information to the patient? Yes

## 2024-04-23 ENCOUNTER — OFFICE VISIT (OUTPATIENT)
Dept: PHYSICAL THERAPY | Facility: CLINIC | Age: 79
End: 2024-04-23
Payer: COMMERCIAL

## 2024-04-23 ENCOUNTER — TELEPHONE (OUTPATIENT)
Dept: SURGICAL ONCOLOGY | Facility: CLINIC | Age: 79
End: 2024-04-23

## 2024-04-23 DIAGNOSIS — R26.81 UNSTEADY GAIT: ICD-10-CM

## 2024-04-23 DIAGNOSIS — M54.2 NECK PAIN: ICD-10-CM

## 2024-04-23 DIAGNOSIS — R26.89 BALANCE DISORDER: ICD-10-CM

## 2024-04-23 DIAGNOSIS — H81.10 BENIGN PAROXYSMAL POSITIONAL VERTIGO, UNSPECIFIED LATERALITY: Primary | ICD-10-CM

## 2024-04-23 DIAGNOSIS — R42 VERTIGO: ICD-10-CM

## 2024-04-23 PROCEDURE — 97110 THERAPEUTIC EXERCISES: CPT | Performed by: PHYSICAL THERAPIST

## 2024-04-23 PROCEDURE — 97140 MANUAL THERAPY 1/> REGIONS: CPT | Performed by: PHYSICAL THERAPIST

## 2024-04-23 PROCEDURE — 97112 NEUROMUSCULAR REEDUCATION: CPT | Performed by: PHYSICAL THERAPIST

## 2024-04-23 NOTE — TELEPHONE ENCOUNTER
Called the patient to further discuss but there was no answer. Left a message stating that she did not need a mammogram due to her mastectomy history; however, she was still overdue for a follow up with our office (which is why the letter was sent). A direct phone number was provided for the patient to call back and coordinate her follow up.

## 2024-04-23 NOTE — TELEPHONE ENCOUNTER
Left message for patient to please ignore the letter regarding the imaging that she needed to get as regards to breast imaging.  She did not need a US for that. Per Jessica Sutton

## 2024-04-23 NOTE — TELEPHONE ENCOUNTER
Sotalol - Refill must be reviewed and completed by the office or provider. The refill is unable to be approved or denied by the medication management team.

## 2024-04-23 NOTE — PROGRESS NOTES
Daily Note     Today's date: 2024  Patient name: Josefina Mendoza  : 1945  MRN: [id number]  Referring provider: Louis Cardenas MD  Dx:   Encounter Diagnosis     ICD-10-CM    1. Benign paroxysmal positional vertigo, unspecified laterality  H81.10       2. Balance disorder  R26.89       3. Unsteady gait  R26.81       4. Neck pain  M54.2       5. Vertigo  R42                      Subjective: She reports that her neck feels sore/stiff today.       Objective: See treatment diary below      Assessment: Tolerated treatment well; initiated POC with MH to neck in sitting for tissue prep with there ex. Vcs provided on proper sequencing with exercises; session focused on neck mobility today. MT performed after receiving consent from pt; she denies having pain. Discussed postural awareness and proper sitting posture especially while using laptop. Discussed proper responses to exercises with DOMS and modalities to use at home.   Patient demonstrated fatigue post treatment and would benefit from continued PT      Plan: Continue per plan of care.        Precautions: falls; history of breast cancer with bilateral mastectomy, pacemaker            Visit Count 1       FOTO See IE       Pain In See IE       Pain Out              Specialty Daily Treatment Diary       Manual       SOR  SMF      Cervical distraction  SMF      STM/TPR cervical and B UT/LS  SMF      CROM  SMF (rotation only)      B UT, LS stretch                Exercise Diary         UBE        RB        Laps                Mod Epley maneuver (x2)                        Floor -> Airex Smooth pursuit        Floor -> airex saccades                VOR 1 static        VOR 1 dynamic        VOR 2 static        VOR 2 dynamic                Hallwalks (horiz, vert, diagonal)                         SNAGs Ext  2 sec x 10      SNAGs Rotation  2 sec; 2x5 ea      Cervical Rotation        UT stretch        LS stretch        Chin tucks  Seated: 5 sec;  2x10  Supine: 5 sec x 20      Chin tuck to extension  Seated: 2 sec; 2x10              Scap Retraction -> TB  No TB: 5 sec; 2x10      TB B S Ext                TB B ER c scap retraction                        Chair squats        Marching        St H' ABD        HR/TR                Static NBOS on airex        Static tandem        SLS                Side Steps        Cross overs                Step ups        Lat step ups                1 cone alt LE        2 cone alt LE                Hurdles                                        HEP/EDU Diagnosis, prognosis, POC, proper responses to exercises, reviewed HEP       Modalities                                   Skin checks performed pre and post application: intact

## 2024-04-23 NOTE — TELEPHONE ENCOUNTER
The patient called back and verbalized understanding of the message that was left. She accepted an appointment that was offered for tomorrow with SHADI Davey at 3:30. The patient verified appointment details.

## 2024-04-24 ENCOUNTER — OFFICE VISIT (OUTPATIENT)
Dept: SURGICAL ONCOLOGY | Facility: CLINIC | Age: 79
End: 2024-04-24
Payer: COMMERCIAL

## 2024-04-24 VITALS
OXYGEN SATURATION: 95 % | BODY MASS INDEX: 32.94 KG/M2 | SYSTOLIC BLOOD PRESSURE: 138 MMHG | RESPIRATION RATE: 16 BRPM | WEIGHT: 179 LBS | DIASTOLIC BLOOD PRESSURE: 80 MMHG | HEART RATE: 99 BPM | HEIGHT: 62 IN | TEMPERATURE: 97.7 F

## 2024-04-24 DIAGNOSIS — Z86.000 HISTORY OF DUCTAL CARCINOMA IN SITU (DCIS) OF BREAST: Primary | ICD-10-CM

## 2024-04-24 DIAGNOSIS — Z85.3 HISTORY OF LEFT BREAST CANCER: ICD-10-CM

## 2024-04-24 PROCEDURE — 99213 OFFICE O/P EST LOW 20 MIN: CPT

## 2024-04-24 RX ORDER — SOTALOL HYDROCHLORIDE 120 MG/1
60 TABLET ORAL DAILY
Qty: 45 TABLET | Refills: 1 | Status: SHIPPED | OUTPATIENT
Start: 2024-04-24

## 2024-04-24 NOTE — PROGRESS NOTES
Surgical Oncology Follow Up       [facility] [place]  [address]  [place] [address]    Josefina Mendoza  1945  [id number]  [facility] [place]  [address]  [place] [address]    Chief Complaint   Patient presents with   • Follow-up       Assessment/Plan:  1. History of ductal carcinoma in situ (DCIS) of breast  - 6 mo f/u    2. History of left breast cancer       Discussion/Summary: Patient is a 78-year-old female presenting today for follow-up for bilateral breast cancer. 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 She underwent a right breast mastectomy with sentinel node biopsy with Dr. Young. She completed adjuvant chemotherapy. She underwent a right chest wall lesion excision in 2017 for residual invasive carcinoma. 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 She underwent a left breast mastectomy.  She had no lymphovascular invasion. She had genetic testing which was negative. She is currently on observation. There were no concerns on her cbe. Biopsy proven fat necrosis in right axilla palpable on exam. I will see the patient back in 6 months or sooner should the need arise. She was instructed to call with any questions or concerns prior to this time. All questions were answered today.     History of Present Illness:     Oncology History   History of left breast cancer   9/12/2016 Biopsy    Right breast 3 site biopsy  Invasive breast carcinoma  Grade 2  ER 0, WI 0, HER2      10/28/2016 Surgery    Right breast mastectomy with SLN biopsy (Dr. Young)  Invasive mammary carcinoma of no special type (ductal)  Grade 3  2.8 cm  Margins negative  0/9 Lymph nodes  Stage IIA     12/7/2016 - 2/1/2017 Chemotherapy    Taxotere/Cytoxan. Scheduled for every 3 weeks x 4 cycles.  Completed 3 cycles under the guidance of   Epifanio.      3/16/2017 Surgery    Right chest wall lesion excision (Dr. Young)  Residual microscopic focus of invasive carcinoma  4 mm  Margins negative     History of ductal carcinoma in situ (DCIS) of breast   9/21/2021 Biopsy    Left breast ultrasound-guided biopsy  A. 3 o'clock, 6 cm from nipple  Intraductal papilloma  Focal stromal changes suggestive of PASH    B. 2 o'clock, 5 cm from nipple  Ductal carcinoma in situ  Grade 1        Concordant. Carcinoma measured up to 6mm on US. Left axilla US negative. If the papilloma is not excised at surgery, a 6 MO F/U US is recommended.     9/30/2021 Genetic Testing    A total of 36 genes were evaluated, including: FELIPE, BRCA1, BRCA2, CDH1, CHEK2, PALB2, PTEN, STK11, TP53  Negative result. No pathogenic sequence variants or deletions/dupllications identified  [employer]     10/22/2021 Surgery    Left breast simple mastectomy  Ductal carcinoma in situ  Grade 2  Size cannot be determined (multifocal)  Margins negative  0/1 Intramammary lymph node  Stage 0          -Interval History: Patient is a 78-year-old female presenting today for follow-up for bilateral breast cancer.She is currently on observation. Patient denies changes on her breast exam. She denies persistent headaches, bone pain, back pain, shortness of breath, or abdominal pain.      Review of Systems:  Review of Systems   Constitutional:  Negative for activity change, appetite change, fatigue and unexpected weight change.   Respiratory:  Negative for cough and shortness of breath.    Cardiovascular:  Negative for chest pain.   Gastrointestinal:  Negative for abdominal pain, diarrhea, nausea and vomiting.   Endocrine: Negative for heat intolerance.   Musculoskeletal:  Negative for arthralgias, back pain and myalgias.   Skin:  Negative for rash.   Neurological:  Negative for weakness and headaches.   Hematological:  Negative for adenopathy.       Patient Active Problem List   Diagnosis   • Atrial  fibrillation (HCC)   • Benign essential hypertension   • Bilateral edema of lower extremity   • BPPV (benign paroxysmal positional vertigo)   • Cardiomyopathy (HCC)   • Cervical stenosis of spine   • Edema   • Homocysteinemia   • Hypokalemia   • Acquired hypothyroidism   • Long QT interval   • Lymphedema   • History of left breast cancer   • BMI 31.0-31.9,adult   • Osteopenia   • Overactive bladder   • Peripheral neuropathy   • Vitamin D deficiency   • Bronchitis with bronchospasm   • Candidal esophagitis (HCC)   • Abnormal chest CT   • Pulmonary nodule seen on imaging study   • SOB (shortness of breath)   • Weight loss   • Hyperglycemia   • Mixed hyperlipidemia   • Other idiopathic peripheral autonomic neuropathy   • Current use of long term anticoagulation   • RICHARDS (dyspnea on exertion)   • Cortical age-related cataract of both eyes   • Bradycardia   • SSS (sick sinus syndrome) (HCC)   • Cardiac pacemaker   • History of ductal carcinoma in situ (DCIS) of breast   • Platelets decreased (HCC)   • Stage 3b chronic kidney disease (HCC)   • Occipital neuralgia of right side   • Cervical myofascial pain syndrome   • Cervical spondylosis   • Thrush, oral   • s/p redo AFib ablation (posterior wall isolation, mitral flutter line, CTI line) 9/29/2023     Past Medical History:   Diagnosis Date   • Abnormal chest CT     last assessed - 10Mar2017   • Abnormal glucose     Resolved - 22Jun2015   • Arrhythmia    • Arthritis     Slight   • BCC (basal cell carcinoma of skin)     last assessed - 24Sep2014   • BRCA gene mutation negative 09/30/2021    [employer]; 36 gene panel   • Breast cancer (HCC) 09/12/2016    rt   • Cancer (HCC)    • Cardiomyopathy (HCC)    • Cervical spinal stenosis    • Discharge from left nipple     last assessed - 16Feb2017   • Disease of thyroid gland     hypothyroidism   • Full dentures     Upper and Lower   • History of atrial fibrillation    • Homocysteinemia    • Hypertension    • Hypokalemia    •  Intraductal papilloma of breast     last assessed - 26Sep2012   • Limb alert care status     right upper ext   • Malignant neoplasm of skin     basal cell on face, Moh's surgery   • Memory loss     last assessed - 30Mar2017   • Obesity    • Open wound     last assessed - 01Jun2017   • Osteopenia    • Overactive bladder    • Palmar plantar erythrodysaesthesia     Resolved - 22Feb2017   • Pulmonary nodule seen on imaging study    • Rosacea    • s/p redo AFib ablation (posterior wall isolation, mitral flutter line, CTI line) 9/29/2023 9/29/2023   • UTI (urinary tract infection)     recently   • Vertigo    • Vitamin D deficiency      Past Surgical History:   Procedure Laterality Date   • APPENDECTOMY     • ATRIAL ABLATION SURGERY      last assessed - 25Aug2016   • BACK SURGERY  1997    Lumbar spinal stenosis   • BREAST BIOPSY      2014? left breast; 9/12/16 right breast x 3   • BREAST BIOPSY Left 03/30/2010   • BREAST BIOPSY Right 09/12/2016    us bx- invasive br ca   • BREAST SURGERY      needle bx.   • BREAST SURGERY Right 03/16/2017    excision of right chest wall lesion   • CARDIAC ELECTROPHYSIOLOGY PROCEDURE N/A 9/29/2023    Procedure: Cardiac eps/afib ablation;  Surgeon: Bernard Wong MD;  Location: [facility];  Service: Cardiology   • CARDIAC ELECTROPHYSIOLOGY STUDY AND ABLATION     • CARDIOVERSION     • DENTAL SURGERY     • HAND SURGERY      Joint replaced with silicone joint 5th finger right hand   • HYSTERECTOMY  1993    Complete   • LAMINECTOMY      Decompressive up to two lumbar segments   • MASTECTOMY Right 10/28/2016   • OOPHORECTOMY  1993   • OK EXCISION MAL LESION TRUNK/ARM/LEG 0.5 CM/< Right 03/16/2017    Procedure: EXCISION CHEST WALL LESION; NEEDLE LOC @ 1400;  Surgeon: Nicolette Young MD;  Location:  MAIN OR;  Service: Surgical Oncology   • OK INTRAOP SENTINEL LYMPH NODE ID W/DYE INJECTION Right 10/28/2016    Procedure: BREAST MASTECTOMY WITH SENTINAL LYMPH NODE BIOPSY;  LYMPHSCINITIGRAPHY; LYMPHATIC MAPPING; 0800 NUC MED INJECTION;  Surgeon: Nicoeltte Young MD;  Location: [facility];  Service: Surgical Oncology   • CT MASTECTOMY SIMPLE COMPLETE Left 10/22/2021    Procedure: BREAST MASTECTOMY SIMPLE MARTHA  GUIDED;  Surgeon: Brody Christine MD;  Location: [facility];  Service: Surgical Oncology   • SENTINEL LYMPH NODE BIOPSY Right    • US BREAST NEEDLE LOC LEFT Left 10/19/2021   • US BREAST NEEDLE LOC RIGHT Right 03/16/2017   • US GUIDANCE BREAST BIOPSY LEFT EACH ADDITIONAL Left 09/21/2021   • US GUIDANCE BREAST BIOPSY RIGHT EACH ADDITIONAL Right 09/12/2016   • US GUIDANCE BREAST BIOPSY RIGHT EACH ADDITIONAL Right 09/12/2016   • US GUIDED BREAST BIOPSY LEFT COMPLETE Left 09/21/2021   • US GUIDED BREAST BIOPSY RIGHT COMPLETE Right 09/12/2016   • US GUIDED BREAST BIOPSY RIGHT COMPLETE Right 10/1/2021     Family History   Problem Relation Age of Onset   • Coronary artery disease Mother         CABG   • Diabetes Mother    • Heart attack Mother    • Diabetes Sister    • Parkinsonism Sister    • Coronary artery disease Brother         CABG   • Diabetes Brother    • Heart attack Brother    • Heart attack Son    • Kidney cancer Son 50   • No Known Problems Son    • No Known Problems Son    • Emphysema Sister    • No Known Problems Sister    • Heart attack Brother    • No Known Problems Brother    • No Known Problems Maternal Grandmother    • No Known Problems Maternal Grandfather    • No Known Problems Paternal Grandmother    • No Known Problems Paternal Grandfather    • No Known Problems Paternal Aunt    • No Known Problems Paternal Aunt    • No Known Problems Paternal Aunt    • No Known Problems Paternal Aunt      Social History     Socioeconomic History   • Marital status: /Civil Union     Spouse name: Not on file   • Number of children: Not on file   • Years of education: Not on file   • Highest education level: Not on file   Occupational History   • Occupation: Retired   Tobacco Use    • Smoking status: Never   • Smokeless tobacco: Never   Vaping Use   • Vaping status: Never Used   Substance and Sexual Activity   • Alcohol use: Never   • Drug use: No   • Sexual activity: Not Currently     Comment: Resides with    Other Topics Concern   • Not on file   Social History Narrative    Always uses seat belt    Convalescence after chemotherapy     Social Determinants of Health     Financial Resource Strain: Low Risk  (7/19/2023)    Overall Financial Resource Strain (CARDIA)    • Difficulty of Paying Living Expenses: Not hard at all   Food Insecurity: No Food Insecurity (2/21/2023)    Hunger Vital Sign    • Worried About Running Out of Food in the Last Year: Never true    • Ran Out of Food in the Last Year: Never true   Transportation Needs: No Transportation Needs (7/19/2023)    PRAPARE - Transportation    • Lack of Transportation (Medical): No    • Lack of Transportation (Non-Medical): No   Physical Activity: Not on file   Stress: Not on file   Social Connections: Not on file   Intimate Partner Violence: Not on file   Housing Stability: Low Risk  (2/21/2023)    Housing Stability Vital Sign    • Unable to Pay for Housing in the Last Year: No    • Number of Places Lived in the Last Year: 1    • Unstable Housing in the Last Year: No       Current Outpatient Medications:   •  aspirin 81 mg chewable tablet, Chew 1 tablet (81 mg total) daily Do not start before October 1, 2023., Disp: 30 tablet, Rfl: 0  •  clotrimazole (MYCELEX) 10 mg brendon, Take 1 tablet (10 mg total) by mouth 5 (five) times a day, Disp: 35 tablet, Rfl: 1  •  cyanocobalamin (VITAMIN B-12) 500 mcg tablet, Take 1 tablet by mouth daily, Disp: , Rfl:   •  diltiazem (Dilt-XR) 120 MG 24 hr capsule, Take 1 capsule (120 mg total) by mouth daily, Disp: 90 capsule, Rfl: 3  •  furosemide (LASIX) 40 mg tablet, TAKE 1 TABLET BY MOUTH TWICE A DAY AS DIRECTED, Disp: 180 tablet, Rfl: 1  •  levothyroxine 150 mcg tablet, Take 1 tablet 4 days a week  and 1/2 tablet 3 days a week, Disp: 90 tablet, Rfl: 0  •  meclizine (ANTIVERT) 25 mg tablet, Take 1 tablet (25 mg total) by mouth every 8 (eight) hours as needed for dizziness, Disp: 60 tablet, Rfl: 2  •  pantoprazole (PROTONIX) 40 mg tablet, Take 1 tablet (40 mg total) by mouth daily in the early morning Do not start before October 1, 2023., Disp: 30 tablet, Rfl: 0  •  potassium chloride (MICRO-K) 10 MEQ CR capsule, TAKE 1 CAPSULE BY MOUTH EVERY DAY, Disp: 90 capsule, Rfl: 1  •  pramipexole (MIRAPEX) 0.25 mg tablet, TAKE 1 TABLET (0.25 MG TOTAL) BY MOUTH EVERY EVENING, Disp: 90 tablet, Rfl: 1  •  RESTASIS 0.05 % ophthalmic emulsion, Administer 1 drop to both eyes every 12 (twelve) hours, Disp: , Rfl:   •  sotalol (BETAPACE) 120 mg tablet, Take 0.5 tablets (60 mg total) by mouth daily, Disp: 45 tablet, Rfl: 1  •  tolterodine (DETROL) 1 mg tablet, Take 1 tablet (1 mg total) by mouth 2 (two) times a day, Disp: 30 tablet, Rfl: 1  •  apixaban (Eliquis) 5 mg, Take 1 tablet (5 mg total) by mouth 2 (two) times a day, Disp: 60 tablet, Rfl: 2  Allergies   Allergen Reactions   • Morphine And Related GI Intolerance     Vitals:    04/24/24 1530   BP: 138/80   Pulse: 99   Resp: 16   Temp: 97.7 °F (36.5 °C)   SpO2: 95%       Physical Exam  Constitutional:       General: She is not in acute distress.     Appearance: Normal appearance.   Cardiovascular:      Rate and Rhythm: Normal rate and regular rhythm.      Pulses: Normal pulses.      Heart sounds: Normal heart sounds.   Pulmonary:      Effort: Pulmonary effort is normal.      Breath sounds: Normal breath sounds.   Chest:      Chest wall: No mass.   Breasts:     Right: No swelling, bleeding, mass, skin change or tenderness.      Left: No swelling, bleeding, mass, skin change or tenderness.       Abdominal:      General: Abdomen is flat.      Palpations: Abdomen is soft.   Lymphadenopathy:      Upper Body:      Right upper body: No supraclavicular, axillary or pectoral  adenopathy.      Left upper body: No supraclavicular, axillary or pectoral adenopathy.   Skin:     General: Skin is warm.   Neurological:      General: No focal deficit present.      Mental Status: She is alert and oriented to person, place, and time.   Psychiatric:         Mood and Affect: Mood normal.         Behavior: Behavior normal.           Results:    Imaging  No results found.    I reviewed the above imaging data.      Advance Care Planning/Advance Directives:  Discussed disease status, cancer treatment plans and/or cancer treatment goals with the patient.

## 2024-04-25 ENCOUNTER — APPOINTMENT (OUTPATIENT)
Dept: PHYSICAL THERAPY | Facility: CLINIC | Age: 79
End: 2024-04-25
Payer: COMMERCIAL

## 2024-05-01 ENCOUNTER — APPOINTMENT (OUTPATIENT)
Dept: PHYSICAL THERAPY | Facility: CLINIC | Age: 79
End: 2024-05-01
Payer: COMMERCIAL

## 2024-05-03 ENCOUNTER — OFFICE VISIT (OUTPATIENT)
Dept: PHYSICAL THERAPY | Facility: CLINIC | Age: 79
End: 2024-05-03
Payer: COMMERCIAL

## 2024-05-03 DIAGNOSIS — R26.81 UNSTEADY GAIT: ICD-10-CM

## 2024-05-03 DIAGNOSIS — R26.89 BALANCE DISORDER: ICD-10-CM

## 2024-05-03 DIAGNOSIS — M54.2 NECK PAIN: ICD-10-CM

## 2024-05-03 DIAGNOSIS — R42 VERTIGO: Primary | ICD-10-CM

## 2024-05-03 DIAGNOSIS — E03.9 ACQUIRED HYPOTHYROIDISM: ICD-10-CM

## 2024-05-03 DIAGNOSIS — H81.10 BENIGN PAROXYSMAL POSITIONAL VERTIGO, UNSPECIFIED LATERALITY: ICD-10-CM

## 2024-05-03 PROCEDURE — 97112 NEUROMUSCULAR REEDUCATION: CPT | Performed by: PHYSICAL THERAPIST

## 2024-05-03 PROCEDURE — 97140 MANUAL THERAPY 1/> REGIONS: CPT | Performed by: PHYSICAL THERAPIST

## 2024-05-03 PROCEDURE — 97110 THERAPEUTIC EXERCISES: CPT | Performed by: PHYSICAL THERAPIST

## 2024-05-04 RX ORDER — LEVOTHYROXINE SODIUM 0.15 MG/1
TABLET ORAL
Qty: 72 TABLET | Refills: 1 | Status: SHIPPED | OUTPATIENT
Start: 2024-05-04

## 2024-05-06 ENCOUNTER — OFFICE VISIT (OUTPATIENT)
Dept: PHYSICAL THERAPY | Facility: CLINIC | Age: 79
End: 2024-05-06
Payer: COMMERCIAL

## 2024-05-06 DIAGNOSIS — R42 VERTIGO: ICD-10-CM

## 2024-05-06 DIAGNOSIS — M54.2 NECK PAIN: Primary | ICD-10-CM

## 2024-05-06 DIAGNOSIS — R26.81 UNSTEADY GAIT: ICD-10-CM

## 2024-05-06 DIAGNOSIS — H81.10 BENIGN PAROXYSMAL POSITIONAL VERTIGO, UNSPECIFIED LATERALITY: ICD-10-CM

## 2024-05-06 DIAGNOSIS — R26.89 BALANCE DISORDER: ICD-10-CM

## 2024-05-06 PROCEDURE — 97140 MANUAL THERAPY 1/> REGIONS: CPT | Performed by: PHYSICAL THERAPIST

## 2024-05-06 PROCEDURE — 97110 THERAPEUTIC EXERCISES: CPT | Performed by: PHYSICAL THERAPIST

## 2024-05-06 NOTE — PROGRESS NOTES
Daily Note     Today's date: 2024  Patient name: Josefina Mendoza  : 1945  MRN: [id number]  Referring provider: Louis Cardenas MD  Dx:   Encounter Diagnosis     ICD-10-CM    1. Neck pain  M54.2       2. Vertigo  R42       3. Balance disorder  R26.89       4. Benign paroxysmal positional vertigo, unspecified laterality  H81.10       5. Unsteady gait  R26.81                      Subjective: patient reports that she felt okay after last visit.       Objective: See treatment diary below      Assessment: Tolerated treatment well; initiated POC with MT after receiving consent from pt; she demonstrates increased tolerance with CROM lateral flexion and rotation. Vcs provided on proper sequencing with exercises; she requires less tactile cues on sequencing with exercises.  Patient demonstrated fatigue post treatment and would benefit from continued PT      Plan: Continue per plan of care.      Precautions: falls; history of breast cancer with bilateral mastectomy, pacemaker            Visit Count 1       FOTO See IE       Pain In See IE       Pain Out              Specialty Daily Treatment Diary       Manual 4/16 4/23 5/3 5/6    SOR  SMF SMF SMF    Cervical distraction  SMF SMF SMF    STM/TPR cervical and B UT/LS  SMF SMF S F    CROM  SMF (rotation only) SMF (L Rotation and side bending) SMF ea    B UT, LS stretch                Exercise Diary         UBE        RB        Laps                Mod Epley maneuver (x2)                        Floor -> Airex Smooth pursuit        Floor -> airex saccades                VOR 1 static        VOR 1 dynamic        VOR 2 static        VOR 2 dynamic                Hallwalks (horiz, vert, diagonal)                         SNAGs Ext  2 sec x 10 2 sec x 10 2 sec; 2x10    SNAGs Rotation  2 sec; 2x5 ea 2 sec; 2x5 ea 2 sec; 3x5 ea    Cervical Rotation        UT stretch        LS stretch        Chin tucks  Seated: 5 sec; 2x10  Supine: 5 sec x 20 Supine: 2 sec; 2x10  Seated: 2  sec; 2x10 Supine: 2 sec x 20  Seated: 2 sec x 20    Chin tuck to extension  Seated: 2 sec; 2x10 Seated: 2 sec; 2x10 (therapist OP) Seated: 2 sec; 2x10 (therapist OP)            Scap Retraction -> TB  No TB: 5 sec; 2x10 No TB: 2 sec; 2x10 No TB: 2 sec; 2x10    TB B S Ext                TB B ER c scap retraction                        Chair squats        Marching        St H' ABD        HR/TR                Static NBOS on airex        Static tandem        SLS                Side Steps        Cross overs                Step ups        Lat step ups                1 cone alt LE        2 cone alt LE                Hurdles                                        HEP/EDU Diagnosis, prognosis, POC, proper responses to exercises, reviewed HEP       Modalities        MH   5 mins c chin tucks 5 mins c chin tucks                       Skin checks performed pre and post application: intact

## 2024-05-08 ENCOUNTER — OFFICE VISIT (OUTPATIENT)
Dept: PHYSICAL THERAPY | Facility: CLINIC | Age: 79
End: 2024-05-08
Payer: COMMERCIAL

## 2024-05-08 ENCOUNTER — OFFICE VISIT (OUTPATIENT)
Dept: CARDIOLOGY CLINIC | Facility: CLINIC | Age: 79
End: 2024-05-08
Payer: COMMERCIAL

## 2024-05-08 VITALS
WEIGHT: 177 LBS | HEART RATE: 74 BPM | BODY MASS INDEX: 32.57 KG/M2 | SYSTOLIC BLOOD PRESSURE: 112 MMHG | HEIGHT: 62 IN | DIASTOLIC BLOOD PRESSURE: 58 MMHG

## 2024-05-08 DIAGNOSIS — R26.89 BALANCE DISORDER: ICD-10-CM

## 2024-05-08 DIAGNOSIS — R26.81 UNSTEADY GAIT: Primary | ICD-10-CM

## 2024-05-08 DIAGNOSIS — I48.19 PERSISTENT ATRIAL FIBRILLATION (HCC): Primary | ICD-10-CM

## 2024-05-08 DIAGNOSIS — H81.10 BENIGN PAROXYSMAL POSITIONAL VERTIGO, UNSPECIFIED LATERALITY: ICD-10-CM

## 2024-05-08 DIAGNOSIS — M54.2 NECK PAIN: ICD-10-CM

## 2024-05-08 DIAGNOSIS — R42 VERTIGO: ICD-10-CM

## 2024-05-08 PROCEDURE — 93000 ELECTROCARDIOGRAM COMPLETE: CPT | Performed by: INTERNAL MEDICINE

## 2024-05-08 PROCEDURE — 97110 THERAPEUTIC EXERCISES: CPT | Performed by: PHYSICAL THERAPIST

## 2024-05-08 PROCEDURE — 99215 OFFICE O/P EST HI 40 MIN: CPT | Performed by: INTERNAL MEDICINE

## 2024-05-08 PROCEDURE — 97140 MANUAL THERAPY 1/> REGIONS: CPT | Performed by: PHYSICAL THERAPIST

## 2024-05-08 NOTE — PROGRESS NOTES
Electrophysiology Regular Follow-up Visit    Interval history:    Josefina presents for a follow-up visit.  Since her office visit in October 2022 she underwent sotalol loading as flecainide likely gave her symptoms.  She is maintained on sotalol 60 mg daily.    She underwent atrial fibrillation ablation on September 29, 2023.  She had posterior wall isolation, mitral flutter line placement.  Her pulmonary veins were isolated from prior ablation in 2016.    She has not been taking sotalol for the past 2 to 3 weeks.  She has noticed increased dyspnea on exertion with walking 50 feet.  She otherwise denies any changes in her swelling in lower extremities.  She denies any chest pain, orthopnea, PND or syncope.    Office visit 10/7/2022:    Josefina continued to feel fatigued and had swelling lower extremities.  She did note rapid heart rate when she was laying down in bed at night.  She noted stable blood pressure at home.  She reported compliance with higher dose of flecainide and metoprolol.  She otherwise denied any chest pain, orthopnea, PND or syncope.     Office visit 5/13/2021:    Josefina Mendoza presented for a follow-up. She continued to have symptoms of fatigue and dyspnea with minimal exertion. She reported compliance with her medications. She noted dizziness when standing up and which has led to falling at times. She had not checked her BP at home as she did not have any cuff. She denied any chest pain, orthopnea, PND or syncope.      Office visit 12/11/2020:    Josefina presented for a follow-up visit. Her CT scan showed normal lung parenchyma. She continued to have dyspnea on exertion to walking 100 ft. She reported her heart rate has been low most of the time. She reported fatigue as well. Her symptoms were present prior to flecainide and metoprolol dose but has worsened after being on flecainide.     We noted her heart rate did not increase with exertion and she felt dyspneic with walking. We discussed  pacemaker but patient preferred to wait after COVID vaccine.     Office visit 6/15/2020:  Josefina had an echocardiogram that showed preserved RV and LV function.  She continued to have fatigue mainly occurring with walking.  She was planning to have CT scan of her chest which has been ordered by her oncologist.  She also reported swelling in lower extremities.  She limits drinking fluids as much as she can.  She does urinate well that when she takes furosemide.    Telemedicine visit 04/16/2020  Josefina Mendoza is continued to have fatigue. She reported progressive worsening dyspnea on exertion to walking 50 ft now. She reported gaining weight about 40 lbs since she last took chemotherapy in 2019. She had a MCOT monitor which showed no atrial fibrillation but short episode of AT. She also had stress test in April 2019 showing no ischemia. She recorded no PND, or orthopnea.  She reports compliance with taking furosemide 40 mg daily. When she had lost 40 lbs during her chemotherapy for her breast cancer, she felt improvement in her fatigue and dyspnea on exertion. She stated last dose was in Feb 2019 and since then she has gained 40 lbs weight mostly diet related.     Office visit 12/13/2019:     Patient reports doing better since her last visit with Dr. Leyva. She states her palpitations have discontinued however she does experience occasional shortness of breath with exertion. She did admit experiencing vertigo and LE edema (right more then left) which have been chronic for the past several years however she continues to take Lasix and elevate her legs.    We decided to monitor for her atrial arrhythmias with a loop recorder but it was denied by the insurance. MCOT monitor was ordered instead.      HPI:   It was a pleasure to see Josefina Mendoza in our arrhythmia clinic at [facility]. As you know she is a 74 y.o.  woman with history of lower extremity edema, long QT (460 millisecond),  hypothyroidism, obesity, history of breast cancer status post surgery and chemotherapy, atrial fibrillation status post ablation with Dr. Leyva 3 years ago now with few paroxysmal episodes.    She was last seen in the office with Dr. Leyva on September 18 2019. She was started on Eliquis, metoprolol succinate and flecainide after event monitor showed 2 hour of atrial fibrillation.    Past Medical History:   Diagnosis Date    Abnormal chest CT     last assessed - 10Mar2017    Abnormal glucose     Resolved - 22Jun2015    Arrhythmia     Arthritis     Slight    BCC (basal cell carcinoma of skin)     last assessed - [id number]    BRCA gene mutation negative 09/30/2021    [employer]; 36 gene panel    Breast cancer (HCC) 09/12/2016    rt    Cancer (HCC)     Cardiomyopathy (HCC)     Cervical spinal stenosis     Discharge from left nipple     last assessed - [id number]    Disease of thyroid gland     hypothyroidism    Full dentures     Upper and Lower    History of atrial fibrillation     Homocysteinemia     Hypertension     Hypokalemia     Intraductal papilloma of breast     last assessed - 26Sep2012    Limb alert care status     right upper ext    Malignant neoplasm of skin     basal cell on face, Moh's surgery    Memory loss     last assessed - 30Mar2017    Obesity     Open wound     last assessed - 01Jun2017    Osteopenia     Overactive bladder     Palmar plantar erythrodysaesthesia     Resolved - [id number]    Pulmonary nodule seen on imaging study     Rosacea     s/p redo AFib ablation (posterior wall isolation, mitral flutter line, CTI line) 9/29/2023 9/29/2023    UTI (urinary tract infection)     recently    Vertigo     Vitamin D deficiency        Past Surgical History:   Procedure Laterality Date    APPENDECTOMY      ATRIAL ABLATION SURGERY      last assessed - [id number]    BACK SURGERY  1997    Lumbar spinal stenosis    BREAST BIOPSY      2014? left breast; 9/12/16 right breast x 3    BREAST BIOPSY Left 03/30/2010     BREAST BIOPSY Right 09/12/2016    us bx- invasive br ca    BREAST SURGERY      needle bx.    BREAST SURGERY Right 03/16/2017    excision of right chest wall lesion    CARDIAC ELECTROPHYSIOLOGY PROCEDURE N/A 9/29/2023    Procedure: Cardiac eps/afib ablation;  Surgeon: Bernard Wong MD;  Location: [facility];  Service: Cardiology    CARDIAC ELECTROPHYSIOLOGY STUDY AND ABLATION      CARDIOVERSION      DENTAL SURGERY      HAND SURGERY      Joint replaced with silicone joint 5th finger right hand    HYSTERECTOMY  1993    Complete    LAMINECTOMY      Decompressive up to two lumbar segments    MASTECTOMY Right 10/28/2016    OOPHORECTOMY  1993    MN EXCISION MAL LESION TRUNK/ARM/LEG 0.5 CM/< Right 03/16/2017    Procedure: EXCISION CHEST WALL LESION; NEEDLE LOC @ 1400;  Surgeon: Nicolette Young MD;  Location: [facility];  Service: Surgical Oncology    MN INTRAOP SENTINEL LYMPH NODE ID W/DYE INJECTION Right 10/28/2016    Procedure: BREAST MASTECTOMY WITH SENTINAL LYMPH NODE BIOPSY; LYMPHSCINITIGRAPHY; LYMPHATIC MAPPING; 0800 NUC MED INJECTION;  Surgeon: Nicolette Young MD;  Location: [facility];  Service: Surgical Oncology    MN MASTECTOMY SIMPLE COMPLETE Left 10/22/2021    Procedure: BREAST MASTECTOMY SIMPLE MARTHA  GUIDED;  Surgeon: Brody Christine MD;  Location: [facility];  Service: Surgical Oncology    SENTINEL LYMPH NODE BIOPSY Right     US BREAST NEEDLE LOC LEFT Left 10/19/2021    US BREAST NEEDLE LOC RIGHT Right 03/16/2017    US GUIDANCE BREAST BIOPSY LEFT EACH ADDITIONAL Left 09/21/2021    US GUIDANCE BREAST BIOPSY RIGHT EACH ADDITIONAL Right 09/12/2016    US GUIDANCE BREAST BIOPSY RIGHT EACH ADDITIONAL Right 09/12/2016    US GUIDED BREAST BIOPSY LEFT COMPLETE Left 09/21/2021    US GUIDED BREAST BIOPSY RIGHT COMPLETE Right 09/12/2016    US GUIDED BREAST BIOPSY RIGHT COMPLETE Right 10/1/2021       Current Outpatient Medications   Medication Sig Dispense Refill    apixaban (Eliquis) 5 mg Take 1 tablet (5 mg  total) by mouth 2 (two) times a day 60 tablet 2    cyanocobalamin (VITAMIN B-12) 500 mcg tablet Take 1 tablet by mouth daily      furosemide (LASIX) 40 mg tablet TAKE 1 TABLET BY MOUTH TWICE A DAY AS DIRECTED 180 tablet 1    levothyroxine 150 mcg tablet TAKE 1 TABLET 4 DAYS A WEEK AND 1/2 TABLET 3 DAYS A WEEK (Patient taking differently: Take 150 mcg by mouth daily daily) 72 tablet 1    meclizine (ANTIVERT) 25 mg tablet Take 1 tablet (25 mg total) by mouth every 8 (eight) hours as needed for dizziness 60 tablet 2    pantoprazole (PROTONIX) 40 mg tablet Take 1 tablet (40 mg total) by mouth daily in the early morning Do not start before October 1, 2023. 30 tablet 0    potassium chloride (MICRO-K) 10 MEQ CR capsule TAKE 1 CAPSULE BY MOUTH EVERY DAY (Patient taking differently: Take 20 mEq by mouth daily) 90 capsule 1    pramipexole (MIRAPEX) 0.25 mg tablet TAKE 1 TABLET (0.25 MG TOTAL) BY MOUTH EVERY EVENING 90 tablet 1    sotalol (BETAPACE) 120 mg tablet Take 0.5 tablets (60 mg total) by mouth daily 45 tablet 1    tolterodine (DETROL) 1 mg tablet Take 1 tablet (1 mg total) by mouth 2 (two) times a day 30 tablet 1    diltiazem (Dilt-XR) 120 MG 24 hr capsule Take 1 capsule (120 mg total) by mouth daily 90 capsule 3    RESTASIS 0.05 % ophthalmic emulsion Administer 1 drop to both eyes every 12 (twelve) hours (Patient not taking: Reported on 5/8/2024)       No current facility-administered medications for this visit.        Allergies   Allergen Reactions    Morphine And Related GI Intolerance       Review of Systems   Constitutional:  Positive for fatigue. Negative for chills and fever.   Eyes:  Negative for discharge and visual disturbance.   Respiratory:  Positive for shortness of breath. Negative for wheezing.    Cardiovascular:  Positive for leg swelling.   Gastrointestinal:  Negative for abdominal pain, nausea and vomiting.   Endocrine: Negative.    Genitourinary: Negative.    Musculoskeletal: Negative.  Negative for  "arthralgias.   Skin: Negative.  Negative for rash.   Allergic/Immunologic: Negative.    Neurological:  Positive for dizziness.        Falls due to dizziness   Psychiatric/Behavioral: Negative.  The patient is not nervous/anxious.        Video Exam    Vitals:    05/08/24 1359   BP: 112/58   BP Location: Left arm   Patient Position: Sitting   Cuff Size: Large   Pulse: 74   Weight: 80.3 kg (177 lb)   Height: 5' 2\" (1.575 m)      Baseline weight 175-180 lbs    Objective:   /58 (BP Location: Left arm, Patient Position: Sitting, Cuff Size: Large)   Pulse 74   Ht 5' 2\" (1.575 m)   Wt 80.3 kg (177 lb)   BMI 32.37 kg/m²        Physical Exam:  GEN: NAD, Alert and oriented, well appearing, morbidly obese  HEENT:Head, neck, ears, oral pharynx: Mucus membranes moist, oral pharynx clear, nares clear. External ears normal  EYES: Pupils equal, sclera anicteric  NECK: No JVD  CARDIOVASCULAR: Regular, bradycardic, No murmur, rub, gallops S1,S2  LUNGS: Clear To auscultation bilaterally  ABDOMEN: Soft, nondistended  EXTREMITIES/VASCULAR: +1 edema bilaterally  PSYCH: Normal Affect  NEURO: Grossly intact, moving all extremiteis equal, face symetric  HEME: No bleeding, bruising, petechia  SKIN: No significant rashes       Labs & Results:  Below is the patient's most recent value for Albumin, ALT, AST, BUN, Calcium, Chloride, Cholesterol, CO2, Creatinine, GFR, Glucose, HDL, Hematocrit, Hemoglobin, Hemoglobin A1C, LDL, Magnesium, Phosphorus, Platelets, Potassium, PSA, Sodium, Triglycerides, and WBC.   Lab Results   Component Value Date    ALT 16 02/27/2024    AST 36 02/27/2024    BUN 14 02/27/2024    CALCIUM 8.2 (L) 09/30/2023     (H) 02/27/2024    CHOL 110 09/07/2017    CO2 22 02/27/2024    CREATININE 1.24 (H) 02/27/2024    HDL 37 (L) 07/17/2023    HCT 39.4 02/27/2024    HGB 12.7 02/27/2024    HGBA1C 5.7 (H) 02/27/2024    MG 2.1 09/29/2023     02/27/2024    K 4.7 02/27/2024     09/07/2017     09/07/2017 "    TRIG 67 2023    WBC 5.6 2024     Note: for a comprehensive list of the patient's lab results, access the Results Review activity.          Cardiac testing:   ECG performed in the office reveals sinus rhythm with ventricular sensed rhythm at 74 bpm.      MCOT results 3/5 to 4/3/2020   40 events were transmitted. 0 symptomatic; 40 device triggered   Patient monitored for 26d 14h 2m   SVT occurred 4 time(s) with fastest run 147 BPM, longest duration 7 beats    128,178 PACs with PAC burden of 6%   169 PVCs with PVC burden of <1%    Echocardiograms:  Results for orders placed during the hospital encounter of 19   Echo complete with [place] [address]    Transthoracic Echocardiogram  2D, M-mode, Doppler, and Color Doppler    Study date:  2019    Patient: KARLO CLEVELAND  MR number: [id number]  Account number: [id number]  : 1945  Age: 73 years  Gender: Female  Status: Outpatient  Location: [facility]  Height: 62 in  Weight: 178.6 lb  BP: 138/ 74 mmHg    Indications: Shortness of breath, lower exremity edema, PAF.    Diagnoses: I48.0 - Atrial fibrillation, R06.02 - Shortness of breath    Sonographer:  CARLOS Maier RD RVT  Primary Physician:  Louis Cardenas MD  Referring Physician:  SHADI Kee  Group:  [facility]  Interpreting Physician:  Jay Gallagher MD    SUMMARY    LEFT VENTRICLE:  Systolic function was normal. Ejection fraction was estimated to be 65 %.  There were no regional wall motion abnormalities.  Doppler parameters were consistent with abnormal left ventricular relaxation (grade 1 diastolic dysfunction).    LEFT ATRIUM:  The atrium was mildly dilated.    MITRAL VALVE:  There was mild annular calcification.  There was mild regurgitation.    AORTIC VALVE:  There was mild regurgitation.    TRICUSPID VALVE:  There was  mild to moderate regurgitation.    HISTORY: PRIOR HISTORY: Right breast cancer, right mastectomy. PRIOR PROCEDURES: Arrhythmia ablation.    PROCEDURE: The study was performed in the [facility]. This was a routine study. The transthoracic approach was used. The study included complete 2D imaging, M-mode, complete spectral Doppler, and color Doppler.  Images were obtained from the parasternal, apical, subcostal, and suprasternal notch acoustic windows. Image quality was adequate.    LEFT VENTRICLE: Size was normal. Systolic function was normal. Ejection fraction was estimated to be 65 %. There were no regional wall motion abnormalities. Wall thickness was normal. DOPPLER: Doppler parameters were consistent with  abnormal left ventricular relaxation (grade 1 diastolic dysfunction).    RIGHT VENTRICLE: The size was normal. Systolic function was normal. Wall thickness was normal.    LEFT ATRIUM: The atrium was mildly dilated.    RIGHT ATRIUM: Size was normal.    MITRAL VALVE: There was mild annular calcification. Valve structure was normal. There was normal leaflet separation. DOPPLER: The transmitral velocity was within the normal range. There was no evidence for stenosis. There was mild  regurgitation.    AORTIC VALVE: The valve was trileaflet. Leaflets exhibited normal thickness, normal cuspal separation, and sclerosis. DOPPLER: Transaortic velocity was within the normal range. There was no evidence for stenosis. There was mild  regurgitation.    TRICUSPID VALVE: The valve structure was normal. There was normal leaflet separation. DOPPLER: The transtricuspid velocity was within the normal range. There was no evidence for stenosis. There was mild to moderate regurgitation. Pulmonary  artery systolic pressure was mildly increased.    PULMONIC VALVE: Leaflets exhibited normal thickness, no calcification, and normal cuspal separation. DOPPLER: The transpulmonic velocity was within the normal  range. There was no significant regurgitation.    PERICARDIUM: There was no pericardial effusion. The pericardium was normal in appearance.    AORTA: The root exhibited normal size.    SYSTEMIC VEINS: IVC: The inferior vena cava was normal in size.    PULMONARY VEINS: DOPPLER: There was systolic blunting in the pulmonary vein(s).    SYSTEM MEASUREMENT TABLES    2D  %FS: 31.66 %  Ao Diam: 2.23 cm  EDV(Teich): 105.22 ml  EF(Teich): 59.57 %  ESV(Cube): 34.34 ml  ESV(Teich): 42.54 ml  IVSd: 0.81 cm  LA Area: 21.18 cm2  LA Diam: 3.68 cm  LVEDV MOD A4C: 79.17 ml  LVEF MOD A4C: 66.73 %  LVESV MOD A4C: 26.34 ml  LVIDd: 4.76 cm  LVIDs: 3.25 cm  LVLd A4C: 6.9 cm  LVLs A4C: 5.58 cm  LVPWd: 0.76 cm  RA Area: 13.38 cm2  RV Diam.: 3.39 cm  SI(Cube): 40.02 ml/m2  SI(Teich): 34.25 ml/m2  SV MOD A4C: 52.83 ml  SV(Cube): 73.23 ml  SV(Teich): 62.68 ml    CW  AR Dec [place]: 2.07 m/s2  AR Dec Time: 2636.88 ms  AR PHT: 764.69 ms  AR Vmax: 4.56 m/s  AR maxP.02 mmHg  TR Vmax: 3.1 m/s  TR maxP.41 mmHg    MM  TAPSE: 2.23 cm    PW  E': 0.06 m/s  E/E': 15.62  MV A Obed: 0.32 m/s  MV Dec [place]: 2.53 m/s2  MV DecT: 397.07 ms  MV E Obed: 1 m/s  MV E/A Ratio: 3.16    [employer] Accredited Echocardiography Laboratory    Prepared and electronically signed by    Jay Gallagher MD  Signed 2019 12:44:18       Results for orders placed during the hospital encounter of 16   YANIV    Narrative [address] [address] [phone]    Transesophageal Echocardiogram  2D, Doppler, and Color Doppler    Study date:  12-Aug-2016    Patient: KARLO CLEVELAND  MR number: [id number]  Account number: [id number]  : 1945  Age: 70 years  Gender: Female  Status: Outpatient  Location: Cath lab  Height: 62 in  Weight: 190 lb  BP: 148/ 97 mmHg    Indications: Afib    Diagnoses: I48.0 - Atrial fibrillation    Sonographer:  SOLE Weston  Primary Physician:  Louis  MD Ronald  Referring Physician:  Josue Rhodes MD  Group:  [facility]  Interpreting Physician:  Josue Rohdes MD    SUMMARY    LEFT VENTRICLE:  Systolic function was mildly reduced. Ejection fraction was estimated to be 50  %.    LEFT ATRIUM:  The atrium was dilated.    LEFT ATRIAL APPENDAGE:  The appendage was dilated.  No thrombus was identified.    ATRIAL SEPTUM:  No defect or patent foramen ovale was identified.    RIGHT ATRIUM:  The atrium was dilated.    MITRAL VALVE:  There was mild regurgitation.    AORTIC VALVE:  There was mild regurgitation.    TRICUSPID VALVE:  There was moderate to severe regurgitation.    PULMONIC VALVE:  There was trace regurgitation.    HISTORY: PRIOR HISTORY: Obesity, Afib, Edema, Cardiomyopathy, HTN    PROCEDURE: The procedure was performed in the catheterization laboratory. This  was a routine study. The risks and alternatives of the procedure were explained  to the patient and informed consent was obtained. The transesophageal approach  was used. The study included complete 2D imaging, complete spectral Doppler,  and color Doppler. The heart rate was 118 bpm, at the start of the study. An  adult omniplane probe was inserted by the attending cardiologist. Intubated  with ease. One intubation attempt(s). There was no blood detected on the probe.  Image quality was adequate. MEDICATIONS: Anesthesia.    LEFT VENTRICLE: Size was normal. Systolic function was mildly reduced. Ejection  fraction was estimated to be 50 %. This study was inadequate for the evaluation  of regional wall motion. Wall thickness was normal.    RIGHT VENTRICLE: The size was normal. Systolic function was low normal.    LEFT ATRIUM: The atrium was dilated. APPENDAGE: The appendage was dilated. No  thrombus was identified.    ATRIAL SEPTUM: No defect or patent foramen ovale was identified.    RIGHT ATRIUM: The atrium was dilated.    MITRAL VALVE: Valve structure was normal. There was  normal leaflet separation.  DOPPLER: The transmitral velocity was within the normal range. There was no  evidence for stenosis. There was mild regurgitation.    AORTIC VALVE: The valve was trileaflet. Leaflets exhibited normal thickness and  normal cuspal separation. DOPPLER: Transaortic velocity was within the normal  range. There was no evidence for stenosis. There was mild regurgitation.    TRICUSPID VALVE: The valve structure was normal. There was normal leaflet  separation. DOPPLER: There was moderate to severe regurgitation. Estimated peak  PA pressure was 40 mmHg.    PULMONIC VALVE: Leaflets exhibited normal thickness, no calcification, and  normal cuspal separation. DOPPLER: There was trace regurgitation.    PERICARDIUM: There was no pericardial effusion.    AORTA: The root exhibited normal size. The ascending aorta was normal in size.  There was no significant atheroma.    PULMONARY VEINS: DOPPLER: Doppler flow pattern was normal in the pulmonary  vein(s).    [employer] Accredited Echocardiography Laboratory    Prepared and electronically signed by    Josue Rhodes MD  Signed 13-Aug-2016 17:28:53         Catheterizations:   No results found for this or any previous visit.    Stress Tests:  Results for orders placed during the hospital encounter of 19   NM myocardial perfusion spect (stress and/or [place] [address]    Stress Gated SPECT Myocardial Perfusion Imaging after Regadenoson    Patient: KARLO CLEVELAND  MR number: [id number]  Account number: [id number]  : 1945  Age: 73 years  Gender: Female  Status: Outpatient  Location: [facility]  Height: 62 in  Weight: 178 lb  BP: 141/ 80 mmHg    Allergies: MORPHINE AND RELATED, OXYCODONE-ACETAMINOPHEN    Diagnosis: R06.09 - Other forms of dyspnea    Primary Physician:  Louis Cardenas MD  RN:  Juli Fuentes RN  Referring  Physician:  SHADI Kee  Technician:  Christy Green  Group:  [facility]  Report Prepared By::  Juli Fuentes RN  Interpreting Physician:  Michael Villela MD    INDICATIONS: Detection of coronary artery disease.    HISTORY: Hx right breast CA The patient is a 73 year old  female. Chest pain status: chest pain. Other symptoms: dyspnea and lower extremity edema. Coronary artery disease risk factors: dyslipidemia, hypertension, family history  of premature coronary artery disease, and post-menopausal state. Cardiovascular history: arrhythmia(hx atrial fibrillation/PAF) Medications: include a beta blocker, a diuretic, and thyroid medications. Previous test results: abnormal ECG.    PHYSICAL EXAM: Baseline physical exam screening: no wheezes audible.    REST ECG: Sinus bradycardia ST segments and T waves were normal. The ECG showed occasional premature atrial contractions.    PROCEDURE: The study was performed in the [facility]. A regadenoson infusion pharmacologic stress test was performed. Gated SPECT myocardial perfusion imaging was performed during stress. Systolic blood  pressure was 141 mmHg, at the start of the study. Diastolic blood pressure was 80 mmHg, at the start of the study. The heart rate was 57 bpm, at the start of the study.  Regadenoson protocol:  HR bpm SBP mmHg DBP mmHg Symptoms  Baseline 57 141 80 none  Immediate 100 146 82 dyspnea, fatigue  1 min 98 155 86 subsiding  2 min 69 153 85 none  The patient also performed low level exercise.    STRESS SUMMARY: Duration of pharmacologic stress was 3 min and 0 sec. Maximal heart rate during stress was 101 bpm. The rate-pressure product for the peak heart rate and blood pressure was 86165. There was no chest pain during stress. The  stress test was terminated due to protocol completion. Pre oxygen saturation: 99 %. Peak oxygen saturation: 95 %. The stress ECG was negative for ischemia and  normal. There were no stress arrhythmias or conduction abnormalities.    ISOTOPE ADMINISTRATION:  The radiopharmaceutical was injected at the peak effect of pharmacologic stress.    PERFUSION DEFECTS:  -  There were no perfusion defects.    GATED SPECT:  The calculated left ventricular ejection fraction was 72 %. There was no left ventricular regional abnormality.    SUMMARY:  -  Stress results: There was no chest pain during stress.  -  ECG conclusions: The stress ECG was negative for ischemia and normal.  -  Perfusion imaging: There were no perfusion defects.  -  Gated SPECT: The calculated left ventricular ejection fraction was 72 %. There was no left ventricular regional abnormality.    IMPRESSIONS: Normal study after pharmacologic vasodilation without reproduction of symptoms. Myocardial perfusion imaging was normal at rest and with stress.    Prepared and signed by    Michael Villela MD  Signed 05/01/2019 09:21:35         Holter monitor -   No results found for this or any previous visit.  Results for orders placed during the hospital encounter of 04/30/19   Holter monitor - 24 hour    Narrative INDICATIONS:  Paroxysmal atrial fibrillation status post prior ablation    DESCRIPTION OF FINDINGS:  The patient was monitored for a total of 24 hours.  The patient was   predominantly noted to be in normal sinus rhythm with paroxysmal atrial   fibrillation throughout the study.  The average heart rate was 69 beats   per minute.  The heart rate ranged from a low of 46 beats per minute in   a.m. at 954 to a maximum of 122 beats per minute in a.m. at 930.    Ventricular ectopic activity consisted of 127 (0.1 % of total beats).    There was no sustained or nonsustained ventricular tachycardia.    Supraventricular ectopic activity consisted of 2441 (2.4 % of total   beats).  Patient was noted to have 45 SVT runs.  The longest run  occurred   at 4:57 p.m.and lasted for 1 minutes and 30 seconds.  The run demonstrated    atrial fibrillation with a rapid ventricular response up to a rate of 170.   When the run broke there was a 1 second interval then a junctional complex   and then sinus rhythm.  There were multiple other episodes of brief   paroxysmal atrial fibrillation with rapid ventricular response.  One run   was nine complexes.  One run was 15 complexes.      There were no significant pauses. The longest R-R interval was 1.6   seconds.  There was no evidence of advanced degree heart block.    All there were no symptoms reported during the monitoring interval.        Impression 1. Predominantly normal sinus rhythm throughout the monitoring interval   with brief paroxysms of atrial fibrillation noted.  The longest interval   of time in atrial fibrillation was about 1 minutes and 30 seconds.  The   ventricular response to atrial fibrillation was rapid.  None of the   arrhythmia was associated with symptoms.  2. Low-density of nonsustained premature supraventricular ectopy.  3. No significant bradycardia.  4. No symptoms in general noted during the monitoring interval.  5. Clinical correlation is advised.             ASSESSMENT/PLAN:  1. HTN  - Normotensive in the office.   - continue to monitor    2. Obesity  - Definitely contributing to fatigue and dyspnea on exertion as she does not sound like in CHF  - Have advised her to loose weight in past  - Lost 8 lbs since January 2021 though still feels symptoms     3. Dyspnea on exertion/fatigue  - Unlikely to have CHF exacerbation as no swelling in legs, no orthopnea, and no PND  - No arrhythmias on MCOT monitor either  - Could due to weight gain of 40 lbs over past year  - She did feel better when she had lost 40 lbs during her chemotherapy  - Advised her to loose weight again  -echo showed normal LV and RV function  -CT chest showed normal parynchema   - Could also be due to bradycardia     4. Sinus node dysfunction  - Sinus bradycardia noted at rest  - Did obtain exercise test  and HR did increase however in the office, she felt dyspnea on exertion to walking 100 ft, and HR did not increase beyond 50s  - we decreased her metoprolol to 12.5 mg daily and decrease flecainide to 50 mg twice daily however patient continues to feel symptoms   - Discussed pacemaker procedure and answered questions risks/benefits. She is in agreement to proceed with it.    - she is now status post pacemaker placement on June 7, 2021  -See below    5. Adenocarcinoma of breast s/p chemotherapy  - No evidence of recurrence     6. Hypothyroidism  -TSH was elevated and free T4 was low in February 2024  -Currently on levothyroxine    7. Paroxysmal atrial fibrillation  - Ablation performed 3 years ago  - had palpitations and recurrenced on cardiac event monitor  - Started on metoprolol and flecainide  - MCOT monitor shows no AFIB, brief AT episodes   - Due to bradycardia and poor chronotropic impotence, reduced metoprolol and flecainide  -status post pacemaker placement now   -has been having atrial fibrillation with burden on recent pacemaker about 4.4%  -EGM however appears to be atrial tach  -due to increased burden in the past flecainide was increased to 100 mg twice daily and metoprolol increased to 25 mg daily  -Given symptoms with higher dose of flecainide she was transition to sotalol  -Sotalol dose was decreased to 60 mg daily and given the burden was going to increase she underwent atrial fibrillation ablation  -She had posterior wall isolation, mitral flutter line placement, and empiric CTI line placement  -Currently maintained on sotalol 60 mg daily, diltiazem 120 mg daily, Eliquis 5 mg twice daily  -Has not taken sotalol for 2 weeks; recommend she start it  -Patient also feels dyspnea with minimal exertion; with walking around the office, felt shortness of breath with ADL rate of 95 bpm; Increased ADL rate to 120 bpm and felt better afterwards. Also improved rate response.   -Device check today reveals no  atrial fibrillation since Feb 2024    Follow-up in 6 month

## 2024-05-08 NOTE — PROGRESS NOTES
Daily Note     Today's date: 2024  Patient name: Josefina Mendoza  : 1945  MRN: [id number]  Referring provider: Louis Cardenas MD  Dx:   Encounter Diagnosis     ICD-10-CM    1. Unsteady gait  R26.81       2. Neck pain  M54.2       3. Vertigo  R42       4. Balance disorder  R26.89       5. Benign paroxysmal positional vertigo, unspecified laterality  H81.10                      Subjective: Patient reports that she has a headache today due to weather.       Objective: See treatment diary below      Assessment: Tolerated treatment well; initiated POC with MH to neck in supine for tissue prep. Vcs provided on proper sequencing with exercises; modified program today due to stress. She was instructed on HEP to perform at home.   Patient demonstrated fatigue post treatment and would benefit from continued PT      Plan: Continue per plan of care.      Precautions: falls; history of breast cancer with bilateral mastectomy, pacemaker            Visit Count 1       FOTO See IE       Pain In See IE       Pain Out              Specialty Daily Treatment Diary       Manual 4/16 4/23 5/3 5/6 5/8   SOR  SMF SMF SMF SMF   Cervical distraction  SMF SMF SMF SMF   STM/TPR cervical and B UT/LS  SMF SMF S F SMF   CROM  SMF (rotation only) SMF (L Rotation and side bending) SMF ea SMF ea   B UT, LS stretch                Exercise Diary         UBE        RB        Laps                Mod Epley maneuver (x2)                        Floor -> Airex Smooth pursuit        Floor -> airex saccades                VOR 1 static        VOR 1 dynamic        VOR 2 static        VOR 2 dynamic                Hallwalks (horiz, vert, diagonal)                         SNAGs Ext  2 sec x 10 2 sec x 10 2 sec; 2x10 Reviewed   SNAGs Rotation  2 sec; 2x5 ea 2 sec; 2x5 ea 2 sec; 3x5 ea Reviewed   Cervical Rotation        UT stretch        LS stretch        Chin tucks  Seated: 5 sec; 2x10  Supine: 5 sec x 20 Supine: 2 sec; 2x10  Seated: 2 sec; 2x10  Supine: 2 sec x 20  Seated: 2 sec x 20 Supine: 2 sec x 20  Seated:    Chin tuck to extension  Seated: 2 sec; 2x10 Seated: 2 sec; 2x10 (therapist OP) Seated: 2 sec; 2x10 (therapist OP) Reviewed           Scap Retraction -> TB  No TB: 5 sec; 2x10 No TB: 2 sec; 2x10 No TB: 2 sec; 2x10 Reviewed   TB B S Ext                TB B ER c scap retraction                        Chair squats        Marching        [facility] ABD        HR/TR                Static NBOS on airex        Static tandem        SLS                Side Steps        Cross overs                Step ups        Lat step ups                1 cone alt LE        2 cone alt LE                Hurdles                                        HEP/EDU Diagnosis, prognosis, POC, proper responses to exercises, reviewed HEP       Modalities        MH   5 mins c chin tucks 5 mins c chin tucks 5 mins c chin tucks                      Skin checks performed pre and post application: intact

## 2024-05-15 ENCOUNTER — OFFICE VISIT (OUTPATIENT)
Dept: PHYSICAL THERAPY | Facility: CLINIC | Age: 79
End: 2024-05-15
Payer: COMMERCIAL

## 2024-05-15 DIAGNOSIS — H81.10 BENIGN PAROXYSMAL POSITIONAL VERTIGO, UNSPECIFIED LATERALITY: Primary | ICD-10-CM

## 2024-05-15 DIAGNOSIS — R26.81 UNSTEADY GAIT: ICD-10-CM

## 2024-05-15 DIAGNOSIS — R26.89 BALANCE DISORDER: ICD-10-CM

## 2024-05-15 DIAGNOSIS — M54.2 NECK PAIN: ICD-10-CM

## 2024-05-15 DIAGNOSIS — R42 VERTIGO: ICD-10-CM

## 2024-05-15 PROCEDURE — 97140 MANUAL THERAPY 1/> REGIONS: CPT | Performed by: PHYSICAL THERAPIST

## 2024-05-15 PROCEDURE — 97110 THERAPEUTIC EXERCISES: CPT | Performed by: PHYSICAL THERAPIST

## 2024-05-15 NOTE — PROGRESS NOTES
Daily Note     Today's date: 5/15/2024  Patient name: Josefina Mendoza  : 1945  MRN: [id number]  Referring provider: Louis Cardenas MD  Dx:   Encounter Diagnosis     ICD-10-CM    1. Benign paroxysmal positional vertigo, unspecified laterality  H81.10       2. Unsteady gait  R26.81       3. Neck pain  M54.2       4. Vertigo  R42       5. Balance disorder  R26.89                      Subjective: Patient reports that she has noticed improvements with less pain in her neck.       Objective: See treatment diary below      Assessment: Tolerated treatment well; initiated POC with MT after receiving consent from pt. She demonstrates improvements with CROM with lateral flexion, rotation bilaterally, and flexion with no reports of pain. Vcs provided on proper sequencing with exercises. She demonstrates improved CROM with rotation. She was instructed to continue to increase functional mobility of her neck with turning her neck rather than her body.  Patient demonstrated fatigue post treatment and would benefit from continued PT      Plan: Continue per plan of care.      Precautions: falls; history of breast cancer with bilateral mastectomy, pacemaker            Visit Count 1       FOTO See IE       Pain In See IE       Pain Out              Specialty Daily Treatment Diary       Manual 5/15   5/6 5/8   SOR SMF   SMF SMF   Cervical distraction SMF   SMF SMF   STM/TPR cervical and B UT/LS SMF   S F SMF   CROM SMF ea   SMF ea SMF ea   B UT, LS stretch                Exercise Diary         UBE        RB        Laps                Mod Epley maneuver (x2)                        Floor -> Airex Smooth pursuit        Floor -> airex saccades                VOR 1 static        VOR 1 dynamic        VOR 2 static        VOR 2 dynamic                Hallwalks (horiz, vert, diagonal)                         SNAGs Ext 2 sec; 3x10   2 sec; 2x10 Reviewed   SNAGs Rotation 2 sec; 2x10 ea   2 sec; 3x5 ea Reviewed   Cervical Rotation 10  ea       UT stretch 10 sec x 5 a       LS stretch 10 sec x 3 ea       Chin tucks Supine: 2 sec x 20  Seated: 2 sec x 20   Supine: 2 sec x 20  Seated: 2 sec x 20 Supine: 2 sec x 20  Seated:    Chin tuck to extension Seated: 2 sec x 15   Seated: 2 sec; 2x10 (therapist OP) Reviewed           Scap Retraction -> TB No TB: 2 sec x 20   No TB: 2 sec; 2x10 Reviewed   TB B S Ext                TB B ER c scap retraction                        Chair squats        Marching [facility] ABD        HR/TR                Static NBOS on airex        Static tandem        SLS                Side Steps        Cross overs                Step ups        Lat step ups                1 cone alt LE        2 cone alt LE                Hurdles                                        HEP/EDU        Modalities        MH 5 mins c chin tuck in supine   5 mins c chin tucks 5 mins c chin tucks                      Skin checks performed pre and post application: intact

## 2024-05-17 ENCOUNTER — APPOINTMENT (OUTPATIENT)
Dept: PHYSICAL THERAPY | Facility: CLINIC | Age: 79
End: 2024-05-17
Payer: COMMERCIAL

## 2024-05-22 ENCOUNTER — APPOINTMENT (OUTPATIENT)
Dept: PHYSICAL THERAPY | Facility: CLINIC | Age: 79
End: 2024-05-22
Payer: COMMERCIAL

## 2024-05-24 ENCOUNTER — APPOINTMENT (OUTPATIENT)
Dept: PHYSICAL THERAPY | Facility: CLINIC | Age: 79
End: 2024-05-24
Payer: COMMERCIAL

## 2024-05-27 ENCOUNTER — APPOINTMENT (OUTPATIENT)
Dept: PHYSICAL THERAPY | Facility: CLINIC | Age: 79
End: 2024-05-27
Payer: COMMERCIAL

## 2024-05-31 ENCOUNTER — APPOINTMENT (OUTPATIENT)
Dept: PHYSICAL THERAPY | Facility: CLINIC | Age: 79
End: 2024-05-31
Payer: COMMERCIAL

## 2024-06-10 ENCOUNTER — TELEPHONE (OUTPATIENT)
Dept: SURGICAL ONCOLOGY | Facility: CLINIC | Age: 79
End: 2024-06-10

## 2024-06-10 NOTE — TELEPHONE ENCOUNTER
Regarding provider change in schedule for 10/29. New appt is Weds 11/20 @ 11:30AM. Phone# [phone] can be called if need to reschedule

## 2024-06-14 ENCOUNTER — REMOTE DEVICE CLINIC VISIT (OUTPATIENT)
Dept: CARDIOLOGY CLINIC | Facility: CLINIC | Age: 79
End: 2024-06-14
Payer: COMMERCIAL

## 2024-06-14 DIAGNOSIS — Z95.0 PRESENCE OF PERMANENT CARDIAC PACEMAKER: Primary | ICD-10-CM

## 2024-06-14 PROCEDURE — 93294 REM INTERROG EVL PM/LDLS PM: CPT | Performed by: INTERNAL MEDICINE

## 2024-06-14 PROCEDURE — 93296 REM INTERROG EVL PM/IDS: CPT | Performed by: INTERNAL MEDICINE

## 2024-06-14 NOTE — PROGRESS NOTES
Results for orders placed or performed in visit on 06/14/24   Cardiac EP device report    Narrative    MDT DUAL PM/ ACTIVE SYSTEM IS MRI CONDITIONAL  CARELINK TRANSMISSION: BATTERY VOLTAGE ADEQUATE (10.4 YRS). AP 88.7%  <0.1% ALL AVAILABLE LEAD PARAMETERS WITHIN NORMAL LIMITS. 1 VT-NS EPISODE W/ EGM SHOWING NSVT (6 BEATS  BPM). 1 AT/AF EPISODE LASTING 5 MINS 33 SECS. AT/AF BURDEN <0.1% SINCE 5/8/24. TAKES ELIQUIS, DILTIAZEM, SOTALOL. EF 65% (ECHO 6/12/2020). NORMAL DEVICE FUNCTION. AM

## 2024-07-08 DIAGNOSIS — I48.91 ATRIAL FIBRILLATION, UNSPECIFIED TYPE (HCC): ICD-10-CM

## 2024-08-27 ENCOUNTER — OFFICE VISIT (OUTPATIENT)
Dept: FAMILY MEDICINE CLINIC | Facility: HOSPITAL | Age: 79
End: 2024-08-27
Payer: COMMERCIAL

## 2024-08-27 VITALS
HEART RATE: 108 BPM | SYSTOLIC BLOOD PRESSURE: 124 MMHG | TEMPERATURE: 97.1 F | OXYGEN SATURATION: 96 % | BODY MASS INDEX: 30 KG/M2 | WEIGHT: 163 LBS | DIASTOLIC BLOOD PRESSURE: 74 MMHG | HEIGHT: 62 IN

## 2024-08-27 DIAGNOSIS — Z79.01 CURRENT USE OF LONG TERM ANTICOAGULATION: ICD-10-CM

## 2024-08-27 DIAGNOSIS — E03.9 ACQUIRED HYPOTHYROIDISM: ICD-10-CM

## 2024-08-27 DIAGNOSIS — I42.8 OTHER CARDIOMYOPATHY (HCC): ICD-10-CM

## 2024-08-27 DIAGNOSIS — I10 BENIGN ESSENTIAL HYPERTENSION: ICD-10-CM

## 2024-08-27 DIAGNOSIS — R73.9 HYPERGLYCEMIA: ICD-10-CM

## 2024-08-27 DIAGNOSIS — I48.19 PERSISTENT ATRIAL FIBRILLATION (HCC): ICD-10-CM

## 2024-08-27 DIAGNOSIS — Z86.000 HISTORY OF DUCTAL CARCINOMA IN SITU (DCIS) OF BREAST: ICD-10-CM

## 2024-08-27 DIAGNOSIS — L81.9 PIGMENTED SKIN LESION OF SUSPECTED MALIGNANT NATURE: ICD-10-CM

## 2024-08-27 DIAGNOSIS — N18.32 STAGE 3B CHRONIC KIDNEY DISEASE (HCC): ICD-10-CM

## 2024-08-27 DIAGNOSIS — E78.2 MIXED HYPERLIPIDEMIA: ICD-10-CM

## 2024-08-27 DIAGNOSIS — Z00.00 MEDICARE ANNUAL WELLNESS VISIT, SUBSEQUENT: Primary | ICD-10-CM

## 2024-08-27 PROCEDURE — G0439 PPPS, SUBSEQ VISIT: HCPCS | Performed by: FAMILY MEDICINE

## 2024-08-27 PROCEDURE — 99214 OFFICE O/P EST MOD 30 MIN: CPT | Performed by: FAMILY MEDICINE

## 2024-08-27 NOTE — PROGRESS NOTES
Ambulatory Visit  Name: Josefina Mendoza      : 1945      MRN: [id number]  Encounter Provider: Louis Cardenas MD  Encounter Date: 2024   Encounter department: [facility]     Assessment & Plan   1. Medicare annual wellness visit, subsequent  2. Mixed hyperlipidemia  3. Hyperglycemia  Assessment & Plan:  Diet controlled A1c    4. Current use of long term anticoagulation  5. History of ductal carcinoma in situ (DCIS) of breast  6. Stage 3b chronic kidney disease (HCC)  Assessment & Plan:  Lab Results   Component Value Date    EGFR 56 (L) 2024    EGFR 45 (L) 2024    EGFR 48 2023    CREATININE 1.03 (H) 2024    CREATININE 1.24 (H) 2024    CREATININE 1.11 2023     7. Acquired hypothyroidism  Assessment & Plan:  Needs recheck TSH  8. Benign essential hypertension  Assessment & Plan:  Excellent BP control  9. Persistent atrial fibrillation (HCC)  10. Pigmented skin lesion of suspected malignant nature  -     Ambulatory Referral to Dermatology; Future  11. Other cardiomyopathy (HCC)       Preventive health issues were discussed with patient, and age appropriate screening tests were ordered as noted in patient's After Visit Summary. Personalized health advice and appropriate referrals for health education or preventive services given if needed, as noted in patient's After Visit Summary.    History of Present Illness     AWV and follow up medical issues    Aware she lost weight- 16 lbs in 4 months  Did some travel  Had respiratory infection- congestion and cough       Patient Care Team:  Louis Cardenas MD as PCP - General  Louis Cardenas MD as PCP - PCP-[facility] ([facility])  MD Louis Amador MD Lee Riley, MD as Surgeon (Surgical Oncology)  Bernard Wong MD (Cardiology)    Review of Systems  Medical History Reviewed by provider this encounter:  Tobacco  Allergies  Meds  Problems  Med Hx  Surg Hx  Fam Hx        Annual Wellness Visit Questionnaire   Josefina is here for her Subsequent Wellness visit. Last Medicare Wellness visit information reviewed, patient interviewed and updates made to the record today.      Health Risk Assessment:   Patient rates overall health as good. Patient feels that their physical health rating is same. Patient is dissatisfied with their life. Eyesight was rated as same. Hearing was rated as same. Patient feels that their emotional and mental health rating is same. Patients states they are never, rarely angry. Patient states they are sometimes unusually tired/fatigued. Pain experienced in the last 7 days has been none. Patient states that she has experienced no weight loss or gain in last 6 months.     Depression Screening:   PHQ-2 Score: 0      Fall Risk Screening:   In the past year, patient has experienced: no history of falling in past year      Urinary Incontinence Screening:   Patient has leaked urine accidently in the last six months.     Home Safety:  Patient does not have trouble with stairs inside or outside of their home. Patient has no working smoke alarms and has working carbon monoxide detector. Home safety hazards include: none.     Nutrition:   Current diet is Regular.     Medications:   Patient is not currently taking any over-the-counter supplements. Patient is able to manage medications.     Activities of Daily Living (ADLs)/Instrumental Activities of Daily Living (IADLs):   Walk and transfer into and out of bed and chair?: Yes  Dress and groom yourself?: Yes    Bathe or shower yourself?: Yes    Feed yourself? Yes  Do your laundry/housekeeping?: Yes  Manage your money, pay your bills and track your expenses?: Yes  Make your own meals?: Yes    Do your own shopping?: Yes    Previous Hospitalizations:   Any hospitalizations or ED visits within the last 12 months?: No      Advance Care Planning:   Living will: No    Durable POA for healthcare: No    Advanced directive: No     Advanced directive counseling given: Yes    Five wishes given: No    Patient declined ACP directive: No    End of Life Decisions reviewed with patient: Yes    Provider agrees with end of life decisions: Yes      Cognitive Screening:   Provider or family/friend/caregiver concerned regarding cognition?: No    PREVENTIVE SCREENINGS      Cardiovascular Screening:    General: Screening Not Indicated and History Lipid Disorder      Diabetes Screening:     General: Screening Current      Colorectal Cancer Screening:     General: Screening Current      Breast Cancer Screening:     General: Screening Not Indicated and History Breast Cancer      Cervical Cancer Screening:    General: Screening Not Indicated      Osteoporosis Screening:    General: Patient Declines      Abdominal Aortic Aneurysm (AAA) Screening:        General: Screening Not Indicated      Lung Cancer Screening:     General: Screening Not Indicated      Hepatitis C Screening:    General: Screening Current    Screening, Brief Intervention, and Referral to Treatment (SBIRT)    Screening  Typical number of drinks in a day: 0  Typical number of drinks in a week: 0  Interpretation: Low risk drinking behavior.    Single Item Drug Screening:  How often have you used an illegal drug (including marijuana) or a prescription medication for non-medical reasons in the past year? never    Single Item Drug Screen Score: 0  Interpretation: Negative screen for possible drug use disorder    Social Determinants of Health     Financial Resource Strain: Low Risk  (7/19/2023)    Overall Financial Resource Strain (CARDIA)     Difficulty of Paying Living Expenses: Not hard at all   Food Insecurity: No Food Insecurity (8/27/2024)    Hunger Vital Sign     Worried About Running Out of Food in the Last Year: Never true     Ran Out of Food in the Last Year: Never true   Transportation Needs: No Transportation Needs (8/27/2024)    PRAPARE - Transportation     Lack of Transportation  "(Medical): No     Lack of Transportation (Non-Medical): No   Housing Stability: Low Risk  (8/27/2024)    Housing Stability Vital Sign     Unable to Pay for Housing in the Last Year: No     Number of Times Moved in the Last Year: 0     Homeless in the Last Year: No   Utilities: Not At Risk (8/27/2024)    [facility] Utilities     Threatened with loss of utilities: No     No results found.    Objective     /74 (BP Location: Left arm, Patient Position: Sitting, Cuff Size: Large)   Pulse (!) 108   Temp (!) 97.1 °F (36.2 °C) (Tympanic)   Ht 5' 2\" (1.575 m)   Wt 73.9 kg (163 lb)   SpO2 96%   BMI 29.81 kg/m²     Physical Exam  Vitals and nursing note reviewed.   Constitutional:       Appearance: Normal appearance.   Cardiovascular:      Rate and Rhythm: Normal rate.      Heart sounds: Normal heart sounds.   Pulmonary:      Effort: Pulmonary effort is normal.      Breath sounds: Normal breath sounds.   Musculoskeletal:      Right lower leg: No edema.      Left lower leg: No edema.   Skin:            Comments: Recurrent pigmented area glabella old scar   Neurological:      Mental Status: She is oriented to person, place, and time.           "

## 2024-08-27 NOTE — PATIENT INSTRUCTIONS
Medicare Preventive Visit Patient Instructions  Thank you for completing your Welcome to Medicare Visit or Medicare Annual Wellness Visit today. Your next wellness visit will be due in one year (8/28/2025).  The screening/preventive services that you may require over the next 5-10 years are detailed below. Some tests may not apply to you based off risk factors and/or age. Screening tests ordered at today's visit but not completed yet may show as past due. Also, please note that scanned in results may not display below.  Preventive Screenings:  Service Recommendations Previous Testing/Comments   Colorectal Cancer Screening  * Colonoscopy    * Fecal Occult Blood Test (FOBT)/Fecal Immunochemical Test (FIT)  * Fecal DNA/Cologuard Test  * Flexible Sigmoidoscopy Age: 45-75 years old   Colonoscopy: every 10 years (may be performed more frequently if at higher risk)  OR  FOBT/FIT: every 1 year  OR  Cologuard: every 3 years  OR  Sigmoidoscopy: every 5 years  Screening may be recommended earlier than age 45 if at higher risk for colorectal cancer. Also, an individualized decision between you and your healthcare provider will decide whether screening between the ages of 76-85 would be appropriate. Colonoscopy: Not on file  FOBT/FIT: Not on file  Cologuard: Not on file  Sigmoidoscopy: Not on file          Breast Cancer Screening Age: 40+ years old  Frequency: every 1-2 years  Not required if history of left and right mastectomy Mammogram: 09/01/2017    Screening Not Indicated  History Breast Cancer   Cervical Cancer Screening Between the ages of 21-29, pap smear recommended once every 3 years.   Between the ages of 30-65, can perform pap smear with HPV co-testing every 5 years.   Recommendations may differ for women with a history of total hysterectomy, cervical cancer, or abnormal pap smears in past. Pap Smear: Not on file    Screening Not Indicated   Hepatitis C Screening Once for adults born between 1945 and 1965  More  frequently in patients at high risk for Hepatitis C Hep C Antibody: 05/18/2022    Screening Current   Diabetes Screening 1-2 times per year if you're at risk for diabetes or have pre-diabetes Fasting glucose: 91 mg/dL (9/29/2023)  A1C: 5.7 % (2/27/2024)  Screening Current   Cholesterol Screening Once every 5 years if you don't have a lipid disorder. May order more often based on risk factors. Lipid panel: 07/17/2023    Screening Not Indicated  History Lipid Disorder     Other Preventive Screenings Covered by Medicare:  Abdominal Aortic Aneurysm (AAA) Screening: covered once if your at risk. You're considered to be at risk if you have a family history of AAA.  Lung Cancer Screening: covers low dose CT scan once per year if you meet all of the following conditions: (1) Age 55-77; (2) No signs or symptoms of lung cancer; (3) Current smoker or have quit smoking within the last 15 years; (4) You have a tobacco smoking history of at least 20 pack years (packs per day multiplied by number of years you smoked); (5) You get a written order from a healthcare provider.  Glaucoma Screening: covered annually if you're considered high risk: (1) You have diabetes OR (2) Family history of glaucoma OR (3)  aged 50 and older OR (4)  American aged 65 and older  Osteoporosis Screening: covered every 2 years if you meet one of the following conditions: (1) You're estrogen deficient and at risk for osteoporosis based off medical history and other findings; (2) Have a vertebral abnormality; (3) On glucocorticoid therapy for more than 3 months; (4) Have primary hyperparathyroidism; (5) On osteoporosis medications and need to assess response to drug therapy.   Last bone density test (DXA Scan): 06/24/2014.  HIV Screening: covered annually if you're between the age of 15-65. Also covered annually if you are younger than 15 and older than 65 with risk factors for HIV infection. For pregnant patients, it is covered up  to 3 times per pregnancy.    Immunizations:  Immunization Recommendations   Influenza Vaccine Annual influenza vaccination during flu season is recommended for all persons aged >= 6 months who do not have contraindications   Pneumococcal Vaccine   * Pneumococcal conjugate vaccine = PCV13 (Prevnar 13), PCV15 (Vaxneuvance), PCV20 (Prevnar 20)  * Pneumococcal polysaccharide vaccine = PPSV23 (Pneumovax) Adults 19-63 yo with certain risk factors or if 65+ yo  If never received any pneumonia vaccine: recommend Prevnar 20 (PCV20)  Give PCV20 if previously received 1 dose of PCV13 or PPSV23   Hepatitis B Vaccine 3 dose series if at intermediate or high risk (ex: diabetes, end stage renal disease, liver disease)   Respiratory syncytial virus (RSV) Vaccine - COVERED BY MEDICARE PART D  * RSVPreF3 (Arexvy) CDC recommends that adults 60 years of age and older may receive a single dose of RSV vaccine using shared clinical decision-making (SCDM)   Tetanus (Td) Vaccine - COST NOT COVERED BY MEDICARE PART B Following completion of primary series, a booster dose should be given every 10 years to maintain immunity against tetanus. Td may also be given as tetanus wound prophylaxis.   Tdap Vaccine - COST NOT COVERED BY MEDICARE PART B Recommended at least once for all adults. For pregnant patients, recommended with each pregnancy.   Shingles Vaccine (Shingrix) - COST NOT COVERED BY MEDICARE PART B  2 shot series recommended in those 19 years and older who have or will have weakened immune systems or those 50 years and older     Health Maintenance Due:      Topic Date Due   • DXA SCAN  06/24/2016   • Hepatitis C Screening  Completed     Immunizations Due:      Topic Date Due   • Influenza Vaccine (1) 09/01/2024     Advance Directives   What are advance directives?  Advance directives are legal documents that state your wishes and plans for medical care. These plans are made ahead of time in case you lose your ability to make decisions  for yourself. Advance directives can apply to any medical decision, such as the treatments you want, and if you want to donate organs.   What are the types of advance directives?  There are many types of advance directives, and each state has rules about how to use them. You may choose a combination of any of the following:  Living will:  This is a written record of the treatment you want. You can also choose which treatments you do not want, which to limit, and which to stop at a certain time. This includes surgery, medicine, IV fluid, and tube feedings.   Durable power of  for healthcare (DPAHC):  This is a written record that states who you want to make healthcare choices for you when you are unable to make them for yourself. This person, called a proxy, is usually a family member or a friend. You may choose more than 1 proxy.  Do not resuscitate (DNR) order:  A DNR order is used in case your heart stops beating or you stop breathing. It is a request not to have certain forms of treatment, such as CPR. A DNR order may be included in other types of advance directives.  Medical directive:  This covers the care that you want if you are in a coma, near death, or unable to make decisions for yourself. You can list the treatments you want for each condition. Treatment may include pain medicine, surgery, blood transfusions, dialysis, IV or tube feedings, and a ventilator (breathing machine).  Values history:  This document has questions about your views, beliefs, and how you feel and think about life. This information can help others choose the care that you would choose.  Why are advance directives important?  An advance directive helps you control your care. Although spoken wishes may be used, it is better to have your wishes written down. Spoken wishes can be misunderstood, or not followed. Treatments may be given even if you do not want them. An advance directive may make it easier for your family to make  difficult choices about your care.   Urinary Incontinence   Urinary incontinence (UI)  is when you lose control of your bladder. UI develops because your bladder cannot store or empty urine properly. The 3 most common types of UI are stress incontinence, urge incontinence, or both.  Medicines:   May be given to help strengthen your bladder control. Report any side effects of medication to your healthcare provider.  Do pelvic muscle exercises often:  Your pelvic muscles help you stop urinating. Squeeze these muscles tight for 5 seconds, then relax for 5 seconds. Gradually work up to squeezing for 10 seconds. Do 3 sets of 15 repetitions a day, or as directed. This will help strengthen your pelvic muscles and improve bladder control.  Train your bladder:  Go to the bathroom at set times, such as every 2 hours, even if you do not feel the urge to go. You can also try to hold your urine when you feel the urge to go. For example, hold your urine for 5 minutes when you feel the urge to go. As that becomes easier, hold your urine for 10 minutes.   Self-care:   Keep a UI record.  Write down how often you leak urine and how much you leak. Make a note of what you were doing when you leaked urine.  Drink liquids as directed. You may need to limit the amount of liquid you drink to help control your urine leakage. Do not drink any liquid right before you go to bed. Limit or do not have drinks that contain caffeine or alcohol.   Prevent constipation.  Eat a variety of high-fiber foods. Good examples are high-fiber cereals, beans, vegetables, and whole-grain breads. Walking is the best way to trigger your intestines to have a bowel movement.  Exercise regularly and maintain a healthy weight.  Weight loss and exercise will decrease pressure on your bladder and help you control your leakage.   Use a catheter as directed  to help empty your bladder. A catheter is a tiny, plastic tube that is put into your bladder to drain your urine.    Go to behavior therapy as directed.  Behavior therapy may be used to help you learn to control your urge to urinate.    Weight Management   Why it is important to manage your weight:  Being overweight increases your risk of health conditions such as heart disease, high blood pressure, type 2 diabetes, and certain types of cancer. It can also increase your risk for osteoarthritis, sleep apnea, and other respiratory problems. Aim for a slow, steady weight loss. Even a small amount of weight loss can lower your risk of health problems.  How to lose weight safely:  A safe and healthy way to lose weight is to eat fewer calories and get regular exercise. You can lose up about 1 pound a week by decreasing the number of calories you eat by 500 calories each day.   Healthy meal plan for weight management:  A healthy meal plan includes a variety of foods, contains fewer calories, and helps you stay healthy. A healthy meal plan includes the following:  Eat whole-grain foods more often.  A healthy meal plan should contain fiber. Fiber is the part of grains, fruits, and vegetables that is not broken down by your body. Whole-grain foods are healthy and provide extra fiber in your diet. Some examples of whole-grain foods are whole-wheat breads and pastas, oatmeal, brown rice, and bulgur.  Eat a variety of vegetables every day.  Include dark, leafy greens such as spinach, kale, umberto greens, and mustard greens. Eat yellow and orange vegetables such as carrots, sweet potatoes, and winter squash.   Eat a variety of fruits every day.  Choose fresh or canned fruit (canned in its own juice or light syrup) instead of juice. Fruit juice has very little or no fiber.  Eat low-fat dairy foods.  Drink fat-free (skim) milk or 1% milk. Eat fat-free yogurt and low-fat cottage cheese. Try low-fat cheeses such as mozzarella and other reduced-fat cheeses.  Choose meat and other protein foods that are low in fat.  Choose beans or other legumes  such as split peas or lentils. Choose fish, skinless poultry (chicken or turkey), or lean cuts of red meat (beef or pork). Before you cook meat or poultry, cut off any visible fat.   Use less fat and oil.  Try baking foods instead of frying them. Add less fat, such as margarine, sour cream, regular salad dressing and mayonnaise to foods. Eat fewer high-fat foods. Some examples of high-fat foods include french fries, doughnuts, ice cream, and cakes.  Eat fewer sweets.  Limit foods and drinks that are high in sugar. This includes candy, cookies, regular soda, and sweetened drinks.  Exercise:  Exercise at least 30 minutes per day on most days of the week. Some examples of exercise include walking, biking, dancing, and swimming. You can also fit in more physical activity by taking the stairs instead of the elevator or parking farther away from stores. Ask your healthcare provider about the best exercise plan for you.      © Copyright [employer] 2018 Information is for End User's use only and may not be sold, redistributed or otherwise used for commercial purposes. All illustrations and images included in CareNotes® are the copyrighted property of [employer] or [employer]

## 2024-08-28 LAB
BUN SERPL-MCNC: 10 MG/DL (ref 8–27)
BUN/CREAT SERPL: 10 (ref 12–28)
CALCIUM SERPL-MCNC: 9.5 MG/DL (ref 8.7–10.3)
CHLORIDE SERPL-SCNC: 104 MMOL/L (ref 96–106)
CO2 SERPL-SCNC: 26 MMOL/L (ref 20–29)
CREAT SERPL-MCNC: 1.03 MG/DL (ref 0.57–1)
EGFR: 56 ML/MIN/1.73
GLUCOSE SERPL-MCNC: 103 MG/DL (ref 70–99)
POTASSIUM SERPL-SCNC: 4.2 MMOL/L (ref 3.5–5.2)
SODIUM SERPL-SCNC: 142 MMOL/L (ref 134–144)
TSH SERPL DL<=0.005 MIU/L-ACNC: 0.08 UIU/ML (ref 0.45–4.5)

## 2024-09-01 NOTE — ASSESSMENT & PLAN NOTE
Lab Results   Component Value Date    EGFR 56 (L) 08/27/2024    EGFR 45 (L) 02/27/2024    EGFR 48 09/30/2023    CREATININE 1.03 (H) 08/27/2024    CREATININE 1.24 (H) 02/27/2024    CREATININE 1.11 09/30/2023

## 2024-09-03 ENCOUNTER — TELEPHONE (OUTPATIENT)
Age: 79
End: 2024-09-03

## 2024-09-16 ENCOUNTER — REMOTE DEVICE CLINIC VISIT (OUTPATIENT)
Dept: CARDIOLOGY CLINIC | Facility: CLINIC | Age: 79
End: 2024-09-16
Payer: COMMERCIAL

## 2024-09-16 DIAGNOSIS — Z95.0 CARDIAC PACEMAKER IN SITU: Primary | ICD-10-CM

## 2024-09-16 PROCEDURE — 93296 REM INTERROG EVL PM/IDS: CPT | Performed by: INTERNAL MEDICINE

## 2024-09-16 PROCEDURE — 93294 REM INTERROG EVL PM/LDLS PM: CPT | Performed by: INTERNAL MEDICINE

## 2024-09-16 NOTE — PROGRESS NOTES
Results for orders placed or performed in visit on 09/16/24   Cardiac EP device report    Narrative    MDT DUAL PM/ ACTIVE SYSTEM IS MRI CONDITIONAL  CARELINK TRANSMISSION: BATTERY VOLTAGE ADEQUATE (10.1 YRS). AP-79%, -0.1%. ALL AVAILABLE LEAD PARAMETERS WITHIN NORMAL LIMITS. 1 DEVICE CLASSIFIED NSVT EPISODE- SVT ON EGM. PT ON ELIQUIS, DILTIAZEM & SOTALOL. NORMAL DEVICE FUNCTION. GV

## 2024-09-26 PROBLEM — Z00.00 MEDICARE ANNUAL WELLNESS VISIT, SUBSEQUENT: Status: RESOLVED | Noted: 2019-01-16 | Resolved: 2024-09-26

## 2024-11-03 DIAGNOSIS — I48.19 PERSISTENT ATRIAL FIBRILLATION (HCC): ICD-10-CM

## 2024-11-04 RX ORDER — SOTALOL HYDROCHLORIDE 120 MG/1
TABLET ORAL
Qty: 90 TABLET | Refills: 0 | Status: SHIPPED | OUTPATIENT
Start: 2024-11-04 | End: 2024-11-06

## 2024-11-06 ENCOUNTER — OFFICE VISIT (OUTPATIENT)
Dept: CARDIOLOGY CLINIC | Facility: CLINIC | Age: 79
End: 2024-11-06
Payer: COMMERCIAL

## 2024-11-06 ENCOUNTER — TELEPHONE (OUTPATIENT)
Dept: CARDIOLOGY CLINIC | Facility: CLINIC | Age: 79
End: 2024-11-06

## 2024-11-06 VITALS
DIASTOLIC BLOOD PRESSURE: 68 MMHG | HEIGHT: 62 IN | HEART RATE: 95 BPM | SYSTOLIC BLOOD PRESSURE: 118 MMHG | WEIGHT: 166 LBS | BODY MASS INDEX: 30.55 KG/M2

## 2024-11-06 DIAGNOSIS — M79.89 LEG SWELLING: ICD-10-CM

## 2024-11-06 DIAGNOSIS — I48.19 PERSISTENT ATRIAL FIBRILLATION (HCC): Primary | ICD-10-CM

## 2024-11-06 DIAGNOSIS — I48.0 PAROXYSMAL ATRIAL FIBRILLATION (HCC): ICD-10-CM

## 2024-11-06 DIAGNOSIS — E03.9 HYPOTHYROIDISM, UNSPECIFIED TYPE: ICD-10-CM

## 2024-11-06 PROCEDURE — 99215 OFFICE O/P EST HI 40 MIN: CPT | Performed by: INTERNAL MEDICINE

## 2024-11-06 PROCEDURE — 93000 ELECTROCARDIOGRAM COMPLETE: CPT | Performed by: INTERNAL MEDICINE

## 2024-11-06 RX ORDER — SOTALOL HYDROCHLORIDE 120 MG/1
60 TABLET ORAL DAILY
Start: 2024-11-06

## 2024-11-06 NOTE — PATIENT INSTRUCTIONS
Obtain an echocardiogram    Increase Lasix to 40 mg twice daily     Check kidney function in one week

## 2024-11-06 NOTE — TELEPHONE ENCOUNTER
----- Message from Bernard Wong MD sent at 11/6/2024  2:42 PM EST -----  Regarding: TSH blood work  Can you let patient know to obtain Thyroid blood work with her kidney blood work next week? I forgot to order it when I saw her in clinic today. Thx

## 2024-11-06 NOTE — PROGRESS NOTES
Electrophysiology Regular Follow-up Visit    Interval history:  Yazmin now presents for a follow-up visit.  She reports she was doing well after we adjusted pacemaker last office visit.  About 2 weeks ago she started noticing fatigue with walking as well as increased lower extremity edema.  She denies any changes in medication or diet.  She takes Lasix 40 mg once daily.  Otherwise denies any chest pain, palpitations, orthopnea or PND.    Office visit 5/8/2024:    Josefina presents for a follow-up visit.  Since her office visit in October 2022 she underwent sotalol loading as flecainide likely gave her symptoms.  She is maintained on sotalol 60 mg daily.    She underwent atrial fibrillation ablation on September 29, 2023.  She had posterior wall isolation, mitral flutter line placement.  Her pulmonary veins were isolated from prior ablation in 2016.    She has not been taking sotalol for the past 2 to 3 weeks.  She has noticed increased dyspnea on exertion with walking 50 feet.  She otherwise denies any changes in her swelling in lower extremities.  She denies any chest pain, orthopnea, PND or syncope.    Office visit 10/7/2022:    Josefina continued to feel fatigued and had swelling lower extremities.  She did note rapid heart rate when she was laying down in bed at night.  She noted stable blood pressure at home.  She reported compliance with higher dose of flecainide and metoprolol.  She otherwise denied any chest pain, orthopnea, PND or syncope.     Office visit 5/13/2021:    Josefina Mendoza presented for a follow-up. She continued to have symptoms of fatigue and dyspnea with minimal exertion. She reported compliance with her medications. She noted dizziness when standing up and which has led to falling at times. She had not checked her BP at home as she did not have any cuff. She denied any chest pain, orthopnea, PND or syncope.      Office visit 12/11/2020:    Josefina presented for a follow-up visit. Her CT scan showed  normal lung parenchyma. She continued to have dyspnea on exertion to walking 100 ft. She reported her heart rate has been low most of the time. She reported fatigue as well. Her symptoms were present prior to flecainide and metoprolol dose but has worsened after being on flecainide.     We noted her heart rate did not increase with exertion and she felt dyspneic with walking. We discussed pacemaker but patient preferred to wait after COVID vaccine.     Office visit 6/15/2020:  Josefina had an echocardiogram that showed preserved RV and LV function.  She continued to have fatigue mainly occurring with walking.  She was planning to have CT scan of her chest which has been ordered by her oncologist.  She also reported swelling in lower extremities.  She limits drinking fluids as much as she can.  She does urinate well that when she takes furosemide.    Telemedicine visit 04/16/2020  Josefina Mendoza is continued to have fatigue. She reported progressive worsening dyspnea on exertion to walking 50 ft now. She reported gaining weight about 40 lbs since she last took chemotherapy in 2019. She had a MCOT monitor which showed no atrial fibrillation but short episode of AT. She also had stress test in April 2019 showing no ischemia. She recorded no PND, or orthopnea.  She reports compliance with taking furosemide 40 mg daily. When she had lost 40 lbs during her chemotherapy for her breast cancer, she felt improvement in her fatigue and dyspnea on exertion. She stated last dose was in Feb 2019 and since then she has gained 40 lbs weight mostly diet related.     Office visit 12/13/2019:     Patient reports doing better since her last visit with Dr. Leyva. She states her palpitations have discontinued however she does experience occasional shortness of breath with exertion. She did admit experiencing vertigo and LE edema (right more then left) which have been chronic for the past several years however she continues to take Lasix  and elevate her legs.    We decided to monitor for her atrial arrhythmias with a loop recorder but it was denied by the insurance. MCOT monitor was ordered instead.      HPI:   It was a pleasure to see Josefina Mendoza in our arrhythmia clinic at [facility]. As you know she is a 74 y.o.  woman with history of lower extremity edema, long QT (460 millisecond), hypothyroidism, obesity, history of breast cancer status post surgery and chemotherapy, atrial fibrillation status post ablation with Dr. Leyva 3 years ago now with few paroxysmal episodes.    She was last seen in the office with Dr. Leyva on September 18 2019. She was started on Eliquis, metoprolol succinate and flecainide after event monitor showed 2 hour of atrial fibrillation.    Past Medical History:   Diagnosis Date    Abnormal chest CT     last assessed - 10Mar2017    Abnormal glucose     Resolved - 22Jun2015    Arrhythmia     Arthritis     Slight    BCC (basal cell carcinoma of skin)     last assessed - [id number]    BRCA gene mutation negative 09/30/2021    [employer]; 36 gene panel    Breast cancer (HCC) 09/12/2016    rt    Cancer (HCC)     Cardiomyopathy (HCC)     Cervical spinal stenosis     Discharge from left nipple     last assessed - 16Feb2017    Disease of thyroid gland     hypothyroidism    Full dentures     Upper and Lower    History of atrial fibrillation     Homocysteinemia     Hypertension     Hypokalemia     Intraductal papilloma of breast     last assessed - 26Sep2012    Limb alert care status     right upper ext    Malignant neoplasm of skin     basal cell on face, Moh's surgery    Memory loss     last assessed - 30Mar2017    Obesity     Open wound     last assessed - 01Jun2017    Osteopenia     Overactive bladder     Palmar plantar erythrodysaesthesia     Resolved - 22Feb2017    Pulmonary nodule seen on imaging study     Rosacea     s/p redo AFib ablation (posterior wall isolation, mitral flutter line, CTI line)  9/29/2023 9/29/2023    UTI (urinary tract infection)     recently    Vertigo     Vitamin D deficiency        Past Surgical History:   Procedure Laterality Date    APPENDECTOMY      ATRIAL ABLATION SURGERY      last assessed - [id number]    BACK SURGERY  1997    Lumbar spinal stenosis    BREAST BIOPSY      2014? left breast; 9/12/16 right breast x 3    BREAST BIOPSY Left 03/30/2010    BREAST BIOPSY Right 09/12/2016    us bx- invasive br ca    BREAST SURGERY      needle bx.    BREAST SURGERY Right 03/16/2017    excision of right chest wall lesion    CARDIAC ELECTROPHYSIOLOGY PROCEDURE N/A 9/29/2023    Procedure: Cardiac eps/afib ablation;  Surgeon: Bernard Wong MD;  Location: [facility];  Service: Cardiology    CARDIAC ELECTROPHYSIOLOGY STUDY AND ABLATION      CARDIOVERSION      DENTAL SURGERY      HAND SURGERY      Joint replaced with silicone joint 5th finger right hand    HYSTERECTOMY  1993    Complete    LAMINECTOMY      Decompressive up to two lumbar segments    MASTECTOMY Right 10/28/2016    OOPHORECTOMY  1993    NH EXCISION MAL LESION TRUNK/ARM/LEG 0.5 CM/< Right 03/16/2017    Procedure: EXCISION CHEST WALL LESION; NEEDLE LOC @ 1400;  Surgeon: Nicolette Young MD;  Location: [facility];  Service: Surgical Oncology    NH INTRAOP SENTINEL LYMPH NODE ID W/DYE INJECTION Right 10/28/2016    Procedure: BREAST MASTECTOMY WITH SENTINAL LYMPH NODE BIOPSY; LYMPHSCINITIGRAPHY; LYMPHATIC MAPPING; 0800 NUC MED INJECTION;  Surgeon: Nicolette Young MD;  Location: [facility];  Service: Surgical Oncology    NH MASTECTOMY SIMPLE COMPLETE Left 10/22/2021    Procedure: BREAST MASTECTOMY SIMPLE MARTHA  GUIDED;  Surgeon: Brody Christine MD;  Location: [facility];  Service: Surgical Oncology    SENTINEL LYMPH NODE BIOPSY Right     US BREAST NEEDLE LOC LEFT Left 10/19/2021    US BREAST NEEDLE LOC RIGHT Right 03/16/2017    US GUIDANCE BREAST BIOPSY LEFT EACH ADDITIONAL Left 09/21/2021    US GUIDANCE BREAST BIOPSY RIGHT EACH  ADDITIONAL Right 09/12/2016    US GUIDANCE BREAST BIOPSY RIGHT EACH ADDITIONAL Right 09/12/2016    US GUIDED BREAST BIOPSY LEFT COMPLETE Left 09/21/2021    US GUIDED BREAST BIOPSY RIGHT COMPLETE Right 09/12/2016    US GUIDED BREAST BIOPSY RIGHT COMPLETE Right 10/1/2021       Current Outpatient Medications   Medication Sig Dispense Refill    apixaban (Eliquis) 5 mg Take 1 tablet (5 mg total) by mouth daily 90 tablet 2    cyanocobalamin (VITAMIN B-12) 500 mcg tablet Take 1 tablet by mouth daily      diltiazem (Dilt-XR) 120 MG 24 hr capsule Take 1 capsule (120 mg total) by mouth daily 90 capsule 3    furosemide (LASIX) 40 mg tablet TAKE 1 TABLET BY MOUTH TWICE A DAY AS DIRECTED 180 tablet 1    levothyroxine 150 mcg tablet TAKE 1 TABLET 4 DAYS A WEEK AND 1/2 TABLET 3 DAYS A WEEK (Patient taking differently: Take 150 mcg by mouth daily daily) 72 tablet 1    meclizine (ANTIVERT) 25 mg tablet Take 1 tablet (25 mg total) by mouth every 8 (eight) hours as needed for dizziness 60 tablet 2    pantoprazole (PROTONIX) 40 mg tablet Take 1 tablet (40 mg total) by mouth daily in the early morning Do not start before October 1, 2023. 30 tablet 0    potassium chloride (MICRO-K) 10 MEQ CR capsule TAKE 1 CAPSULE BY MOUTH EVERY DAY (Patient taking differently: Take 20 mEq by mouth daily) 90 capsule 1    pramipexole (MIRAPEX) 0.25 mg tablet TAKE 1 TABLET (0.25 MG TOTAL) BY MOUTH EVERY EVENING 90 tablet 1    RESTASIS 0.05 % ophthalmic emulsion Administer 1 drop to both eyes every 12 (twelve) hours (Patient not taking: Reported on 5/8/2024)      sotalol (BETAPACE) 120 mg tablet TAKE 1/2 TABLET (60 MG TOTAL) BY MOUTH DAILY 90 tablet 0    tolterodine (DETROL) 1 mg tablet Take 1 tablet (1 mg total) by mouth 2 (two) times a day 30 tablet 1     No current facility-administered medications for this visit.        Allergies   Allergen Reactions    Morphine And Codeine GI Intolerance       Review of Systems   Constitutional:  Positive for fatigue.  "Negative for chills and fever.   Eyes:  Negative for discharge and visual disturbance.   Respiratory:  Positive for shortness of breath. Negative for wheezing.    Cardiovascular:  Positive for leg swelling.   Gastrointestinal:  Negative for abdominal pain, nausea and vomiting.   Endocrine: Negative.    Genitourinary: Negative.    Musculoskeletal: Negative.  Negative for arthralgias.   Skin: Negative.  Negative for rash.   Allergic/Immunologic: Negative.    Neurological: Negative.         Falls due to dizziness   Psychiatric/Behavioral: Negative.  The patient is not nervous/anxious.        Vitals:    11/06/24 1353   Height: 5' 2\" (1.575 m)      Baseline weight 175-180 lbs    Objective:   Ht 5' 2\" (1.575 m)   BMI 29.81 kg/m²        Physical Exam:  GEN: NAD, Alert and oriented, well appearing, morbidly obese  HEENT:Head, neck, ears, oral pharynx: Mucus membranes moist, oral pharynx clear, nares clear. External ears normal  EYES: Pupils equal, sclera anicteric  NECK: No JVD  CARDIOVASCULAR: Regular, bradycardic, No murmur, rub, gallops S1,S2  LUNGS: Clear To auscultation bilaterally  ABDOMEN: Soft, nondistended  EXTREMITIES/VASCULAR: +1 edema bilaterally  PSYCH: Normal Affect  NEURO: Grossly intact, moving all extremiteis equal, face symetric  HEME: No bleeding, bruising, petechia  SKIN: No significant rashes       Labs & Results:  Below is the patient's most recent value for Albumin, ALT, AST, BUN, Calcium, Chloride, Cholesterol, CO2, Creatinine, GFR, Glucose, HDL, Hematocrit, Hemoglobin, Hemoglobin A1C, LDL, Magnesium, Phosphorus, Platelets, Potassium, PSA, Sodium, Triglycerides, and WBC.   Lab Results   Component Value Date    ALT 16 02/27/2024    AST 36 02/27/2024    BUN 10 08/27/2024    CALCIUM 8.2 (L) 09/30/2023     08/27/2024    CHOL 110 09/07/2017    CO2 26 08/27/2024    CREATININE 1.03 (H) 08/27/2024    HDL 37 (L) 07/17/2023    HCT 39.4 02/27/2024    HGB 12.7 02/27/2024    HGBA1C 5.7 (H) 02/27/2024    MG " 2.1 2023     2024    K 4.2 2024     2017     2017    TRIG 67 2023    WBC 5.6 2024     Note: for a comprehensive list of the patient's lab results, access the Results Review activity.          Cardiac testing:   ECG performed in the office reveals atrial paced with ventricular sensed rhythm at 95 bpm.    MCOT results 3/5 to 4/3/2020   40 events were transmitted. 0 symptomatic; 40 device triggered   Patient monitored for 26d 14h 2m   SVT occurred 4 time(s) with fastest run 147 BPM, longest duration 7 beats    128,178 PACs with PAC burden of 6%   169 PVCs with PVC burden of <1%    Echocardiograms:  Results for orders placed during the hospital encounter of 19   Echo complete with contrast if [place] [address]    Transthoracic Echocardiogram  2D, M-mode, Doppler, and Color Doppler    Study date:  2019    Patient: KARLO CLEVELAND  MR number: [id number]  Account number: [id number]  : 1945  Age: 73 years  Gender: Female  Status: Outpatient  Location: [facility]  Height: 62 in  Weight: 178.6 lb  BP: 138/ 74 mmHg    Indications: Shortness of breath, lower exremity edema, PAF.    Diagnoses: I48.0 - Atrial fibrillation, R06.02 - Shortness of breath    Sonographer:  CARLOS Maier RD RVT  Primary Physician:  Louis Cardenas MD  Referring Physician:  SHADI Kee  Group:  [facility]  Interpreting Physician:  Jay Gallagher MD    SUMMARY    LEFT VENTRICLE:  Systolic function was normal. Ejection fraction was estimated to be 65 %.  There were no regional wall motion abnormalities.  Doppler parameters were consistent with abnormal left ventricular relaxation (grade 1 diastolic dysfunction).    LEFT ATRIUM:  The atrium was mildly dilated.    MITRAL VALVE:  There was mild annular calcification.  There was  mild regurgitation.    AORTIC VALVE:  There was mild regurgitation.    TRICUSPID VALVE:  There was mild to moderate regurgitation.    HISTORY: PRIOR HISTORY: Right breast cancer, right mastectomy. PRIOR PROCEDURES: Arrhythmia ablation.    PROCEDURE: The study was performed in the [facility]. This was a routine study. The transthoracic approach was used. The study included complete 2D imaging, M-mode, complete spectral Doppler, and color Doppler.  Images were obtained from the parasternal, apical, subcostal, and suprasternal notch acoustic windows. Image quality was adequate.    LEFT VENTRICLE: Size was normal. Systolic function was normal. Ejection fraction was estimated to be 65 %. There were no regional wall motion abnormalities. Wall thickness was normal. DOPPLER: Doppler parameters were consistent with  abnormal left ventricular relaxation (grade 1 diastolic dysfunction).    RIGHT VENTRICLE: The size was normal. Systolic function was normal. Wall thickness was normal.    LEFT ATRIUM: The atrium was mildly dilated.    RIGHT ATRIUM: Size was normal.    MITRAL VALVE: There was mild annular calcification. Valve structure was normal. There was normal leaflet separation. DOPPLER: The transmitral velocity was within the normal range. There was no evidence for stenosis. There was mild  regurgitation.    AORTIC VALVE: The valve was trileaflet. Leaflets exhibited normal thickness, normal cuspal separation, and sclerosis. DOPPLER: Transaortic velocity was within the normal range. There was no evidence for stenosis. There was mild  regurgitation.    TRICUSPID VALVE: The valve structure was normal. There was normal leaflet separation. DOPPLER: The transtricuspid velocity was within the normal range. There was no evidence for stenosis. There was mild to moderate regurgitation. Pulmonary  artery systolic pressure was mildly increased.    PULMONIC VALVE: Leaflets exhibited normal thickness, no  calcification, and normal cuspal separation. DOPPLER: The transpulmonic velocity was within the normal range. There was no significant regurgitation.    PERICARDIUM: There was no pericardial effusion. The pericardium was normal in appearance.    AORTA: The root exhibited normal size.    SYSTEMIC VEINS: IVC: The inferior vena cava was normal in size.    PULMONARY VEINS: DOPPLER: There was systolic blunting in the pulmonary vein(s).    SYSTEM MEASUREMENT TABLES    2D  %FS: 31.66 %  Ao Diam: 2.23 cm  EDV(Teich): 105.22 ml  EF(Teich): 59.57 %  ESV(Cube): 34.34 ml  ESV(Teich): 42.54 ml  IVSd: 0.81 cm  LA Area: 21.18 cm2  LA Diam: 3.68 cm  LVEDV MOD A4C: 79.17 ml  LVEF MOD A4C: 66.73 %  LVESV MOD A4C: 26.34 ml  LVIDd: 4.76 cm  LVIDs: 3.25 cm  LVLd A4C: 6.9 cm  LVLs A4C: 5.58 cm  LVPWd: 0.76 cm  RA Area: 13.38 cm2  RV Diam.: 3.39 cm  SI(Cube): 40.02 ml/m2  SI(Teich): 34.25 ml/m2  SV MOD A4C: 52.83 ml  SV(Cube): 73.23 ml  SV(Teich): 62.68 ml    CW  AR Dec [place]: 2.07 m/s2  AR Dec Time: 2636.88 ms  AR PHT: 764.69 ms  AR Vmax: 4.56 m/s  AR maxP.02 mmHg  TR Vmax: 3.1 m/s  TR maxP.41 mmHg    MM  TAPSE: 2.23 cm    PW  E': 0.06 m/s  E/E': 15.62  MV A Obed: 0.32 m/s  MV Dec [place]: 2.53 m/s2  MV DecT: 397.07 ms  MV E Obed: 1 m/s  MV E/A Ratio: 3.16    Intersoci[employer] Commission Accredited Echocardiography Laboratory    Prepared and electronically signed by    Jay Gallagher MD  Signed 2019 12:44:18       Results for orders placed during the hospital encounter of 16   YANIV    Narrative [address] [address] [phone]    Transesophageal Echocardiogram  2D, Doppler, and Color Doppler    Study date:  12-Aug-2016    Patient: KARLO CLEVELAND  MR number: [id number]  Account number: [id number]  : 1945  Age: 70 years  Gender: Female  Status: Outpatient  Location: Cath lab  Height: 62 in  Weight: 190 lb  BP: 148/ 97 mmHg    Indications:  Afib    Diagnoses: I48.0 - Atrial fibrillation    Sonographer:  SOLE Weston  Primary Physician:  Louis Cardenas MD  Referring Physician:  Josue Rhodes MD  Group:  [facility]  Interpreting Physician:  Josue Rhodes MD    SUMMARY    LEFT VENTRICLE:  Systolic function was mildly reduced. Ejection fraction was estimated to be 50  %.    LEFT ATRIUM:  The atrium was dilated.    LEFT ATRIAL APPENDAGE:  The appendage was dilated.  No thrombus was identified.    ATRIAL SEPTUM:  No defect or patent foramen ovale was identified.    RIGHT ATRIUM:  The atrium was dilated.    MITRAL VALVE:  There was mild regurgitation.    AORTIC VALVE:  There was mild regurgitation.    TRICUSPID VALVE:  There was moderate to severe regurgitation.    PULMONIC VALVE:  There was trace regurgitation.    HISTORY: PRIOR HISTORY: Obesity, Afib, Edema, Cardiomyopathy, HTN    PROCEDURE: The procedure was performed in the catheterization laboratory. This  was a routine study. The risks and alternatives of the procedure were explained  to the patient and informed consent was obtained. The transesophageal approach  was used. The study included complete 2D imaging, complete spectral Doppler,  and color Doppler. The heart rate was 118 bpm, at the start of the study. An  adult omniplane probe was inserted by the attending cardiologist. Intubated  with ease. One intubation attempt(s). There was no blood detected on the probe.  Image quality was adequate. MEDICATIONS: Anesthesia.    LEFT VENTRICLE: Size was normal. Systolic function was mildly reduced. Ejection  fraction was estimated to be 50 %. This study was inadequate for the evaluation  of regional wall motion. Wall thickness was normal.    RIGHT VENTRICLE: The size was normal. Systolic function was low normal.    LEFT ATRIUM: The atrium was dilated. APPENDAGE: The appendage was dilated. No  thrombus was identified.    ATRIAL SEPTUM: No defect or patent foramen ovale was  identified.    RIGHT ATRIUM: The atrium was dilated.    MITRAL VALVE: Valve structure was normal. There was normal leaflet separation.  DOPPLER: The transmitral velocity was within the normal range. There was no  evidence for stenosis. There was mild regurgitation.    AORTIC VALVE: The valve was trileaflet. Leaflets exhibited normal thickness and  normal cuspal separation. DOPPLER: Transaortic velocity was within the normal  range. There was no evidence for stenosis. There was mild regurgitation.    TRICUSPID VALVE: The valve structure was normal. There was normal leaflet  separation. DOPPLER: There was moderate to severe regurgitation. Estimated peak  PA pressure was 40 mmHg.    PULMONIC VALVE: Leaflets exhibited normal thickness, no calcification, and  normal cuspal separation. DOPPLER: There was trace regurgitation.    PERICARDIUM: There was no pericardial effusion.    AORTA: The root exhibited normal size. The ascending aorta was normal in size.  There was no significant atheroma.    PULMONARY VEINS: DOPPLER: Doppler flow pattern was normal in the pulmonary  vein(s).    Inters\A Chronology of [place] [facility]\"" Commission Accredited Echocardiography Laboratory    Prepared and electronically signed by    Josue Rhodes MD  Signed 13-Aug-2016 17:28:53         Catheterizations:   No results found for this or any previous visit.    Stress Tests:  Results for orders placed during the hospital encounter of 19   NM myocardial perfusion spect (stress and/or rest)    [place] [address]    Stress Gated SPECT Myocardial Perfusion Imaging after Regadenoson    Patient: KARLO CLEVELAND  MR number: [id number]  Account number: [id number]  : 1945  Age: 73 years  Gender: Female  Status: Outpatient  Location: [facility]  Height: 62 in  Weight: 178 lb  BP: 141/ 80 mmHg    Allergies: MORPHINE AND RELATED, OXYCODONE-ACETAMINOPHEN    Diagnosis: R06.09 -  Other forms of dyspnea    Primary Physician:  Louis Cardenas MD  RN:  Juli Fuentes RN  Referring Physician:  SHADI Kee  Technician:  Christy Green  Group:  [facility]  Report Prepared By::  Juli Fuentes RN  Interpreting Physician:  Michael Villela MD    INDICATIONS: Detection of coronary artery disease.    HISTORY: Hx right breast CA The patient is a 73 year old  female. Chest pain status: chest pain. Other symptoms: dyspnea and lower extremity edema. Coronary artery disease risk factors: dyslipidemia, hypertension, family history  of premature coronary artery disease, and post-menopausal state. Cardiovascular history: arrhythmia(hx atrial fibrillation/PAF) Medications: include a beta blocker, a diuretic, and thyroid medications. Previous test results: abnormal ECG.    PHYSICAL EXAM: Baseline physical exam screening: no wheezes audible.    REST ECG: Sinus bradycardia ST segments and T waves were normal. The ECG showed occasional premature atrial contractions.    PROCEDURE: The study was performed in the [facility] [place]. A regadenoson infusion pharmacologic stress test was performed. Gated SPECT myocardial perfusion imaging was performed during stress. Systolic blood  pressure was 141 mmHg, at the start of the study. Diastolic blood pressure was 80 mmHg, at the start of the study. The heart rate was 57 bpm, at the start of the study.  Regadenoson protocol:  HR bpm SBP mmHg DBP mmHg Symptoms  Baseline 57 141 80 none  Immediate 100 146 82 dyspnea, fatigue  1 min 98 155 86 subsiding  2 min 69 153 85 none  The patient also performed low level exercise.    STRESS SUMMARY: Duration of pharmacologic stress was 3 min and 0 sec. Maximal heart rate during stress was 101 bpm. The rate-pressure product for the peak heart rate and blood pressure was 15343. There was no chest pain during stress. The  stress test was terminated due to protocol completion. Pre  oxygen saturation: 99 %. Peak oxygen saturation: 95 %. The stress ECG was negative for ischemia and normal. There were no stress arrhythmias or conduction abnormalities.    ISOTOPE ADMINISTRATION:  The radiopharmaceutical was injected at the peak effect of pharmacologic stress.    PERFUSION DEFECTS:  -  There were no perfusion defects.    GATED SPECT:  The calculated left ventricular ejection fraction was 72 %. There was no left ventricular regional abnormality.    SUMMARY:  -  Stress results: There was no chest pain during stress.  -  ECG conclusions: The stress ECG was negative for ischemia and normal.  -  Perfusion imaging: There were no perfusion defects.  -  Gated SPECT: The calculated left ventricular ejection fraction was 72 %. There was no left ventricular regional abnormality.    IMPRESSIONS: Normal study after pharmacologic vasodilation without reproduction of symptoms. Myocardial perfusion imaging was normal at rest and with stress.    Prepared and signed by    Michael Villela MD  Signed 05/01/2019 09:21:35         Holter monitor -   No results found for this or any previous visit.  Results for orders placed during the hospital encounter of 04/30/19   Holter monitor - 24 hour    Narrative INDICATIONS:  Paroxysmal atrial fibrillation status post prior ablation    DESCRIPTION OF FINDINGS:  The patient was monitored for a total of 24 hours.  The patient was   predominantly noted to be in normal sinus rhythm with paroxysmal atrial   fibrillation throughout the study.  The average heart rate was 69 beats   per minute.  The heart rate ranged from a low of 46 beats per minute in   a.m. at 954 to a maximum of 122 beats per minute in a.m. at 930.    Ventricular ectopic activity consisted of 127 (0.1 % of total beats).    There was no sustained or nonsustained ventricular tachycardia.    Supraventricular ectopic activity consisted of 2441 (2.4 % of total   beats).  Patient was noted to have 45 SVT runs.  The  longest run  occurred   at 4:57 p.m.and lasted for 1 minutes and 30 seconds.  The run demonstrated   atrial fibrillation with a rapid ventricular response up to a rate of 170.   When the run broke there was a 1 second interval then a junctional complex   and then sinus rhythm.  There were multiple other episodes of brief   paroxysmal atrial fibrillation with rapid ventricular response.  One run   was nine complexes.  One run was 15 complexes.      There were no significant pauses. The longest R-R interval was 1.6   seconds.  There was no evidence of advanced degree heart block.    All there were no symptoms reported during the monitoring interval.        Impression 1. Predominantly normal sinus rhythm throughout the monitoring interval   with brief paroxysms of atrial fibrillation noted.  The longest interval   of time in atrial fibrillation was about 1 minutes and 30 seconds.  The   ventricular response to atrial fibrillation was rapid.  None of the   arrhythmia was associated with symptoms.  2. Low-density of nonsustained premature supraventricular ectopy.  3. No significant bradycardia.  4. No symptoms in general noted during the monitoring interval.  5. Clinical correlation is advised.             ASSESSMENT/PLAN:  1. HTN  - Normotensive in the office.   - continue to monitor    2. Obesity  - Definitely contributing to fatigue and dyspnea on exertion as she does not sound like in CHF  - Have advised her to loose weight in past  - Lost 8 lbs since January 2021 though still feels symptoms     3. Dyspnea on exertion/fatigue  - Unlikely to have CHF exacerbation as no swelling in legs, no orthopnea, and no PND  - No arrhythmias on MCOT monitor either  - Could due to weight gain of 40 lbs over past year  - She did feel better when she had lost 40 lbs during her chemotherapy  - Advised her to loose weight again  -echo showed normal LV and RV function  -CT chest showed normal parynchema   - Could also be due to  bradycardia, adjusted pacemaker at last visit    4. Sinus node dysfunction  - Sinus bradycardia noted at rest  - Did obtain exercise test and HR did increase however in the office, she felt dyspnea on exertion to walking 100 ft, and HR did not increase beyond 50s  - we decreased her metoprolol to 12.5 mg daily and decrease flecainide to 50 mg twice daily however patient continues to feel symptoms   - Discussed pacemaker procedure and answered questions risks/benefits. She is in agreement to proceed with it.    - she is now status post pacemaker placement on June 7, 2021  -See below    5. Adenocarcinoma of breast s/p chemotherapy  - No evidence of recurrence     6. Hypothyroidism  -TSH was elevated and free T4 was low in February 2024  -TSH was low in August 2024 after being high in February 2024  -Levothyroxine was maintained at the dose she was on  -May need repeat TSH check as she is having more swelling in lower extremities    7. Paroxysmal atrial fibrillation  - Ablation performed 3 years ago  - had palpitations and recurrenced on cardiac event monitor  - Started on metoprolol and flecainide  - MCOT monitor shows no AFIB, brief AT episodes   - Due to bradycardia and poor chronotropic impotence, reduced metoprolol and flecainide  -status post pacemaker placement now   -has been having atrial fibrillation with burden on recent pacemaker about 4.4%  -EGM however appears to be atrial tach  -due to increased burden in the past flecainide was increased to 100 mg twice daily and metoprolol increased to 25 mg daily  -Given symptoms with higher dose of flecainide she was transition to sotalol  -Sotalol dose was decreased to 60 mg daily and given the burden was going to increase she underwent atrial fibrillation ablation  -She had posterior wall isolation, mitral flutter line placement, and empiric CTI line placement  -Currently maintained on sotalol 60 mg daily, diltiazem 120 mg daily, Eliquis 5 mg twice daily  -Has not  taken sotalol for 2 weeks; recommend she start it  -Patient also feels dyspnea with minimal exertion; with walking around the office, felt shortness of breath with ADL rate of 95 bpm; Increased ADL rate to 120 bpm and felt better afterwards. Also improved rate response.   -She felt well up until 2 weeks ago; now noticing swelling in lower extremity and dyspnea exertion  -Will obtain an echocardiogram  -Advised patient to increase Lasix to 40 mg twice daily and repeat BMP in 1 week  -Device check today reveals no atrial fibrillation since Feb 2024    Follow-up in 6 month

## 2024-11-20 ENCOUNTER — OFFICE VISIT (OUTPATIENT)
Dept: SURGICAL ONCOLOGY | Facility: CLINIC | Age: 79
End: 2024-11-20
Payer: COMMERCIAL

## 2024-11-20 VITALS
SYSTOLIC BLOOD PRESSURE: 132 MMHG | OXYGEN SATURATION: 96 % | WEIGHT: 169 LBS | BODY MASS INDEX: 31.1 KG/M2 | HEIGHT: 62 IN | TEMPERATURE: 97.6 F | DIASTOLIC BLOOD PRESSURE: 74 MMHG | RESPIRATION RATE: 18 BRPM | HEART RATE: 105 BPM

## 2024-11-20 DIAGNOSIS — Z85.3 HISTORY OF LEFT BREAST CANCER: Primary | ICD-10-CM

## 2024-11-20 DIAGNOSIS — Z86.000 HISTORY OF DUCTAL CARCINOMA IN SITU (DCIS) OF BREAST: ICD-10-CM

## 2024-11-20 PROCEDURE — 99213 OFFICE O/P EST LOW 20 MIN: CPT

## 2024-11-20 NOTE — PROGRESS NOTES
Name: Josefina Mendoza      : 1945      MRN: [id number]  Encounter Provider: SHADI Estrada  Encounter Date: 2024   Encounter department: [facility] [place]     Cancer Staging   No matching staging information was found for the patient.  :  Assessment & Plan  History of left breast cancer  - 1 year f/u       History of ductal carcinoma in situ (DCIS) of breast           History of Present Illness   Chief Complaint   Patient presents with    Follow-up     Patient is a 79-year-old female presenting today for 6-month follow-up for bilateral breast cancer. 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 She underwent a right breast mastectomy with sentinel node biopsy with Dr. Young. She completed adjuvant chemotherapy. She underwent a right chest wall lesion excision in  for residual invasive carcinoma. She was diagnosed with DCIS of the left breast in 2021. She underwent a left breast mastectomy. She had no lymphovascular invasion. She had genetic testing which was negative. She is currently on observation. Today she reports palpable lesion in the right axilla, as well as persistent soreness / tightening. We discussed biopsy proven fat necrosis in right axilla, which was the area of concern. Firm lesion palpable on exam. Provided reassurance. May consider ultrasound in the future if changes to this area. No evidence of erythema or infection noted. I encouraged pt to perform regular stretching to right shoulder / axilla. Offered PT referral as well. Pt defers and will start with home exercises. There were no concerns on her cbe. She denies other breast changes, persistent cough, SOB, headaches, as well as any new or persistent back, bone, or abdominal pain. I will see the patient back in 6 months or sooner should the need arise. She was instructed to call with any questions or concerns prior to  this time. All questions were answered today.     Staging: Right breast Stage IIA, Left breast DCIS (Stage 0)  Treatment history: See above  Current treatment: None  Disease status: BARBARA    Pertinent Medical History        Oncology History   Oncology History   History of left breast cancer   9/12/2016 Biopsy    Right breast 3 site biopsy  Invasive breast carcinoma  Grade 2  ER 0, SC 0, HER2      10/28/2016 Surgery    Right breast mastectomy with SLN biopsy (Dr. Young)  Invasive mammary carcinoma of no special type (ductal)  Grade 3  2.8 cm  Margins negative  0/9 Lymph nodes  Stage IIA     12/7/2016 - 2/1/2017 Chemotherapy    Taxotere/Cytoxan. Scheduled for every 3 weeks x 4 cycles.  Completed 3 cycles under the guidance of Dr Godfrey.      3/16/2017 Surgery    Right chest wall lesion excision (Dr. Young)  Residual microscopic focus of invasive carcinoma  4 mm  Margins negative     History of ductal carcinoma in situ (DCIS) of breast   9/21/2021 Biopsy    Left breast ultrasound-guided biopsy  A. 3 o'clock, 6 cm from nipple  Intraductal papilloma  Focal stromal changes suggestive of PASH    B. 2 o'clock, 5 cm from nipple  Ductal carcinoma in situ  Grade 1        Concordant. Carcinoma measured up to 6mm on US. Left axilla US negative. If the papilloma is not excised at surgery, a 6 MO F/U US is recommended.     9/30/2021 Genetic Testing    A total of 36 genes were evaluated, including: FELIPE, BRCA1, BRCA2, CDH1, CHEK2, PALB2, PTEN, STK11, TP53  Negative result. No pathogenic sequence variants or deletions/dupllications identified  [employer]     10/22/2021 Surgery    Left breast simple mastectomy  Ductal carcinoma in situ  Grade 2  Size cannot be determined (multifocal)  Margins negative  0/1 Intramammary lymph node  Stage 0          Review of Systems   Constitutional:  Negative for activity change, appetite change, fatigue and unexpected weight change.   Respiratory:  Positive for shortness of breath (improving,  under the care of cardiologist. Reports recent placement of pacemaker). Negative for cough.    Cardiovascular:  Positive for leg swelling. Negative for chest pain.   Gastrointestinal:  Negative for abdominal pain, diarrhea, nausea and vomiting.   Endocrine: Negative for heat intolerance.   Musculoskeletal:  Negative for arthralgias, back pain and myalgias.   Skin:  Negative for rash.   Neurological:  Negative for weakness and headaches.   Hematological:  Negative for adenopathy.    A complete review of systems is negative other than that noted above in the HPI.         Current Outpatient Medications   Medication Sig Dispense Refill    apixaban (Eliquis) 5 mg Take 1 tablet (5 mg total) by mouth daily 90 tablet 2    cyanocobalamin (VITAMIN B-12) 500 mcg tablet Take 1 tablet by mouth daily      diltiazem (Dilt-XR) 120 MG 24 hr capsule Take 1 capsule (120 mg total) by mouth daily 90 capsule 3    furosemide (LASIX) 40 mg tablet TAKE 1 TABLET BY MOUTH TWICE A DAY AS DIRECTED 180 tablet 1    levothyroxine 150 mcg tablet TAKE 1 TABLET 4 DAYS A WEEK AND 1/2 TABLET 3 DAYS A WEEK (Patient taking differently: Take 150 mcg by mouth daily daily) 72 tablet 1    meclizine (ANTIVERT) 25 mg tablet Take 1 tablet (25 mg total) by mouth every 8 (eight) hours as needed for dizziness 60 tablet 2    potassium chloride (MICRO-K) 10 MEQ CR capsule TAKE 1 CAPSULE BY MOUTH EVERY DAY (Patient taking differently: Take 20 mEq by mouth daily) 90 capsule 1    pramipexole (MIRAPEX) 0.25 mg tablet TAKE 1 TABLET (0.25 MG TOTAL) BY MOUTH EVERY EVENING (Patient not taking: Reported on 11/6/2024) 90 tablet 1    RESTASIS 0.05 % ophthalmic emulsion Administer 1 drop to both eyes every 12 (twelve) hours      sotalol (BETAPACE) 120 mg tablet Take 0.5 tablets (60 mg total) by mouth daily      tolterodine (DETROL) 1 mg tablet Take 1 tablet (1 mg total) by mouth 2 (two) times a day 30 tablet 1     No current facility-administered medications for this visit.      Objective   There were no vitals taken for this visit.    There were no vitals filed for this visit.  ECOG    Physical Exam  Constitutional:       General: She is not in acute distress.     Appearance: Normal appearance.   Cardiovascular:      Rate and Rhythm: Normal rate and regular rhythm.      Pulses: Normal pulses.      Heart sounds: Normal heart sounds.   Pulmonary:      Effort: Pulmonary effort is normal.      Breath sounds: Normal breath sounds.   Chest:      Chest wall: No mass.   Breasts:     Right: Mass and skin change (surgical scar) present. No swelling, bleeding or tenderness (soreness in right axilla).      Left: Skin change (surgical scar) present. No swelling, bleeding, mass or tenderness.          Comments: Biopsy proven fat necrosis at axillary tail, firm small lesion identified on exam.   Abdominal:      General: Abdomen is flat.      Palpations: Abdomen is soft.   Lymphadenopathy:      Upper Body:      Right upper body: No supraclavicular or axillary adenopathy.      Left upper body: No supraclavicular or axillary adenopathy.   Skin:     General: Skin is warm.   Neurological:      General: No focal deficit present.      Mental Status: She is alert and oriented to person, place, and time.   Psychiatric:         Mood and Affect: Mood normal.         Behavior: Behavior normal.       Constitutional: General appearance: The Patient is well-developed and well-nourished who appears the stated age in no acute distress. Patient is pleasant and talkative.  HEENT: Normocephalic. Sclerae are anicteric. Mucous membranes are moist. Neck is supple without adenopathy. No JVD.  Chest: The lungs are clear to auscultation.  Cardiac: Heart is regular rate.  Abdomen: Abdomen is soft, non-tender, non-distended and without masses.  Extremities: There is no clubbing or cyanosis. There is no edema. Symmetric.  Neuro: Grossly nonfocal. Gait is normal.  Lymphatic: No evidence of cervical adenopathy bilaterally.  No  evidence of axillary adenopathy bilaterally. No evidence of inguinal adenopathy bilaterally.  Skin: Warm, anicteric.  Psych: Patient is pleasant and talkative    Labs: I have reviewed pertinent labs.       Pathology: I have reviewed pathology reports described above.    Administrative Statements   I have spent a total time of 20 minutes in caring for this patient on the day of the visit/encounter including Instructions for management, Documenting in the medical record, Reviewing / ordering tests, medicine, procedures  , and Obtaining or reviewing history  .

## 2024-11-21 ENCOUNTER — RESULTS FOLLOW-UP (OUTPATIENT)
Dept: CARDIOLOGY CLINIC | Facility: CLINIC | Age: 79
End: 2024-11-21

## 2024-11-21 LAB
SL AMB T4, FREE (DIRECT): 1.31 NG/DL (ref 0.82–1.77)
TSH SERPL DL<=0.005 MIU/L-ACNC: 4.82 UIU/ML (ref 0.45–4.5)

## 2024-11-21 NOTE — TELEPHONE ENCOUNTER
----- Message from Bernard Wong MD sent at 11/21/2024  8:29 AM EST -----  Please call the patient about lab results. TSH hormone is low and free T4 is normal. Recommend talking to PCP about the results to see if dose of levothyroxine need to be adjusted.     Thank you.

## 2024-12-09 ENCOUNTER — HOSPITAL ENCOUNTER (OUTPATIENT)
Dept: NON INVASIVE DIAGNOSTICS | Age: 79
Discharge: HOME | End: 2024-12-09
Payer: COMMERCIAL

## 2024-12-09 VITALS
WEIGHT: 169 LBS | HEART RATE: 94 BPM | HEIGHT: 62 IN | SYSTOLIC BLOOD PRESSURE: 132 MMHG | BODY MASS INDEX: 31.1 KG/M2 | DIASTOLIC BLOOD PRESSURE: 74 MMHG

## 2024-12-09 DIAGNOSIS — M79.89 LEG SWELLING: ICD-10-CM

## 2024-12-09 DIAGNOSIS — I48.0 PAROXYSMAL ATRIAL FIBRILLATION (HCC): ICD-10-CM

## 2024-12-09 PROCEDURE — 93306 TTE W/DOPPLER COMPLETE: CPT | Performed by: INTERNAL MEDICINE

## 2024-12-09 PROCEDURE — 93306 TTE W/DOPPLER COMPLETE: CPT

## 2024-12-10 LAB
AORTIC ROOT: 2.7 CM
AORTIC VALVE MEAN VELOCITY: 10.9 M/S
APICAL FOUR CHAMBER EJECTION FRACTION: 71 %
ASCENDING AORTA: 3.1 CM
AV AREA BY CONTINUOUS VTI: 1.6 CM2
AV AREA PEAK VELOCITY: 1.4 CM2
AV LVOT MEAN GRADIENT: 1 MMHG
AV LVOT PEAK GRADIENT: 3 MMHG
AV MEAN GRADIENT: 5 MMHG
AV PEAK GRADIENT: 12 MMHG
AV VALVE AREA: 1.59 CM2
AV VELOCITY RATIO: 0.5
AVA (PLAN): 1.6 CM2
BSA FOR ECHO PROCEDURE: 1.78 M2
DOP CALC AO PEAK VEL: 1.71 M/S
DOP CALC AO VTI: 35.6 CM
DOP CALC LVOT AREA: 2.83 CM2
DOP CALC LVOT CARDIAC INDEX: 2.11 L/MIN/M2
DOP CALC LVOT CARDIAC OUTPUT: 3.76 L/MIN
DOP CALC LVOT DIAMETER: 1.9 CM
DOP CALC LVOT PEAK VEL VTI: 19.92 CM
DOP CALC LVOT PEAK VEL: 0.85 M/S
DOP CALC LVOT STROKE INDEX: 30.3 ML/M2
DOP CALC LVOT STROKE VOLUME: 56.45
DOP CALC MV VTI: 29.38 CM
E WAVE DECELERATION TIME: 163 MS
E/A RATIO: 2.53
FRACTIONAL SHORTENING: 35 (ref 28–44)
INTERVENTRICULAR SEPTUM IN DIASTOLE (PARASTERNAL SHORT AXIS VIEW): 0.8 CM
INTERVENTRICULAR SEPTUM: 0.8 CM (ref 0.6–1.1)
LAAS-AP2: 20.3 CM2
LAAS-AP4: 21.4 CM2
LEFT ATRIUM SIZE: 4 CM
LEFT ATRIUM VOLUME (MOD BIPLANE): 62 ML
LEFT ATRIUM VOLUME INDEX (MOD BIPLANE): 34.8 ML/M2
LEFT INTERNAL DIMENSION IN SYSTOLE: 3.2 CM (ref 2.1–4)
LEFT VENTRICLE DIASTOLIC VOLUME (MOD BIPLANE): 82 ML
LEFT VENTRICLE DIASTOLIC VOLUME INDEX (MOD BIPLANE): 46.1 ML/M2
LEFT VENTRICLE SYSTOLIC VOLUME (MOD BIPLANE): 24 ML
LEFT VENTRICLE SYSTOLIC VOLUME INDEX (MOD BIPLANE): 13.5 ML/M2
LEFT VENTRICULAR INTERNAL DIMENSION IN DIASTOLE: 4.9 CM (ref 3.5–6)
LEFT VENTRICULAR POSTERIOR WALL IN END DIASTOLE: 0.6 CM
LEFT VENTRICULAR STROKE VOLUME: 72 ML
LV EF: 70 %
LVSV (TEICH): 72 ML
MV E'TISSUE VEL-LAT: 7 CM/S
MV E'TISSUE VEL-SEP: 6 CM/S
MV MEAN GRADIENT: 3 MMHG
MV PEAK A VEL: 0.53 M/S
MV PEAK E VEL: 134 CM/S
MV PEAK GRADIENT: 10 MMHG
MV STENOSIS PRESSURE HALF TIME: 47 MS
MV VALVE AREA BY CONTINUITY EQUATION: 1.92 CM2
MV VALVE AREA P 1/2 METHOD: 4.68
RA PRESSURE ESTIMATED: 15 MMHG
RIGHT ATRIAL 2D VOLUME: 65 ML
RIGHT ATRIUM AREA SYSTOLE A4C: 21 CM2
RIGHT VENTRICLE ID DIMENSION: 3.7 CM
RV PSP: 49 MMHG
SL CV LEFT ATRIUM LENGTH A2C: 5.6 CM
SL CV LV EF: 55
SL CV PED ECHO LEFT VENTRICLE DIASTOLIC VOLUME (MOD BIPLANE) 2D: 111 ML
SL CV PED ECHO LEFT VENTRICLE SYSTOLIC VOLUME (MOD BIPLANE) 2D: 40 ML
TR MAX PG: 34 MMHG
TR PEAK VELOCITY: 2.9 M/S
TRICUSPID ANNULAR PLANE SYSTOLIC EXCURSION: 2.1 CM
TRICUSPID VALVE PEAK REGURGITATION VELOCITY: 2.93 M/S

## 2024-12-12 ENCOUNTER — OFFICE VISIT (OUTPATIENT)
Dept: CARDIOLOGY CLINIC | Facility: CLINIC | Age: 79
End: 2024-12-12
Payer: COMMERCIAL

## 2024-12-12 VITALS
DIASTOLIC BLOOD PRESSURE: 70 MMHG | HEIGHT: 62 IN | BODY MASS INDEX: 32.65 KG/M2 | SYSTOLIC BLOOD PRESSURE: 120 MMHG | WEIGHT: 177.4 LBS | HEART RATE: 88 BPM

## 2024-12-12 DIAGNOSIS — I07.1 TRICUSPID VALVE INSUFFICIENCY, UNSPECIFIED ETIOLOGY: Primary | ICD-10-CM

## 2024-12-12 DIAGNOSIS — I48.19 PERSISTENT ATRIAL FIBRILLATION (HCC): ICD-10-CM

## 2024-12-12 DIAGNOSIS — I10 BENIGN ESSENTIAL HYPERTENSION: ICD-10-CM

## 2024-12-12 PROCEDURE — 99214 OFFICE O/P EST MOD 30 MIN: CPT | Performed by: INTERNAL MEDICINE

## 2024-12-12 RX ORDER — TORSEMIDE 20 MG/1
20 TABLET ORAL 2 TIMES DAILY
Qty: 180 TABLET | Refills: 0 | Status: SHIPPED | OUTPATIENT
Start: 2024-12-12

## 2024-12-12 NOTE — PROGRESS NOTES
Cardiology Follow Up    Josefina Mendoza  1945  [id number]  [facility]  [address]  [place] [id number] [phone]    1. Tricuspid valve insufficiency, unspecified etiology        2. Persistent atrial fibrillation (HCC)        3. Benign essential hypertension            Discussion/Summary:  1.)HFpEF: Patient was last seen in the office for follow-up by Dr. Wong in electrophysiology, when she reported symptoms of fatigue and increased lower extremity edema prompting him to get an echocardiogram.  TTE showed EF of 55%, mild MR, mild to moderate AR, moderate to severe TR with a right systolic pressure of 49 mmHg.  Patient currently on 40 mg of Lasix twice daily.      Patient reports leg swelling, and shortness of breath with exertion for many years, has been taking Lasix 40 mg once daily for these symptoms.  She began taking Lasix twice daily recently, unsure of exactly when.  Patient states she does notice an increase in urination after she takes the Lasix but does note no change in her leg swelling or shortness of breath.  Patient denies chest pain or pressure, arm numbness or tingling, jaw pain.  Recommend to switch patient from Lasix 40 mg twice daily to torsemide 20 mg twice daily.  Order order sent to the pharmacy.    Patient does note that she does not monitor her salt intake, enjoying chips in the evening.  Discussed with patient importance of staying to a low-sodium diet in light of her fluid retention and patient is agreeable to doing so.    Patient's weight today 177 pounds she says she is typically 165 pounds at home, last weight of 169 on December 9.  Spoke with patient about obtaining daily weights first thing in the morning, patient agreeable to doing so and will contact office, for a weight gain of 3 pounds in 1 day and 5 pounds in 1 week.      2.)  Atrial fibrillation.  Patient has a  long history of persistent A-fib followed by Dr. Wong.  Currently on sotalol 60 mg daily, diltiazem 120 mg daily.  Rhythm treated with  A-fib ablations x 2.      3.)  Hypertension.   Patient on sotalol and diltiazem with blood pressure at goal.  Would not recommend any changes    4.)  Permanent pacemaker.  Placed in 2021 for sick sinus syndrome last device check 9/16/2024, device functioning normally, brief paroxysmal A. tach noted.  Last EKG showed atrial paced rhythm with a prolonged QT.     Interval History:   Josefina Mendoza is a 79 y.o. female with a past medical history of hypertension, hypothyrodism, breast cancer, atrial fibrillaion, s/p redo a.fib ablation, sick sinus syndrome, s/p permanent pacemaker, here for consultation secondary to tricuspid valve regurgitation.    Patient was seen by Dr. Wong in November, she at that time was complaining of dyspnea on exertion and bilateral leg swelling prompting him to get an echocardiogram.  Acute echocardiogram showed severe tricuspid regurgitation prompting a general cardiology consultation.    Patient states leg edema and dyspnea on exertion have been a problem for a few years, she had been taking Lasix 40 mg daily but feels as though the symptoms have gotten worse more recently.  Patient states that her last electrophysiology visit adjustments were made to her pacemaker, which seem to have helped with her symptoms for a couple of weeks, but they have since returned.    Medical Problems       Problem List       Atrial fibrillation (HCC)    Benign essential hypertension    Bilateral edema of lower extremity    BPPV (benign paroxysmal positional vertigo)    Cardiomyopathy (HCC)    Cervical stenosis of spine    Edema    Homocysteinemia    Hypokalemia    Acquired hypothyroidism    Long QT interval    Lymphedema    History of left breast cancer    BMI 31.0-31.9,adult    Osteopenia    Overactive bladder    Peripheral neuropathy    Vitamin D deficiency     Bronchitis with bronchospasm    Candidal esophagitis (HCC)    Abnormal chest CT    Overview Signed 3/15/2018  9:44 AM by Nicolette Young MD   last assessed - 10Mar2017         Pulmonary nodule seen on imaging study    SOB (shortness of breath)    Weight loss    Hyperglycemia    Mixed hyperlipidemia    Other idiopathic peripheral autonomic neuropathy    Current use of long term anticoagulation    RICHARDS (dyspnea on exertion)    Cortical age-related cataract of both eyes    Bradycardia    SSS (sick sinus syndrome) (HCC)    Cardiac pacemaker    History of ductal carcinoma in situ (DCIS) of breast    Platelets decreased (HCC)    Stage 3b chronic kidney disease (HCC)    Lab Results   Component Value Date    EGFR 56 (L) 08/27/2024    EGFR 45 (L) 02/27/2024    EGFR 48 09/30/2023    CREATININE 1.03 (H) 08/27/2024    CREATININE 1.24 (H) 02/27/2024    CREATININE 1.11 09/30/2023         Occipital neuralgia of right side    Cervical myofascial pain syndrome    Cervical spondylosis    Thrush, oral    s/p redo AFib ablation (posterior wall isolation, mitral flutter line, CTI line) 9/29/2023        Past Medical History:   Diagnosis Date    Abnormal chest CT     last assessed - 10Mar2017    Abnormal glucose     Resolved - 22Jun2015    Arrhythmia     Arthritis     Slight    BCC (basal cell carcinoma of skin)     last assessed - [id number]    BRCA gene mutation negative 09/30/2021    [employer]; 36 gene panel    Breast cancer (HCC) 09/12/2016    rt    Cancer (HCC)     Cardiomyopathy (HCC)     Cervical spinal stenosis     Discharge from left nipple     last assessed - [id number]    Disease of thyroid gland     hypothyroidism    Full dentures     Upper and Lower    History of atrial fibrillation     Homocysteinemia     Hypertension     Hypokalemia     Intraductal papilloma of breast     last assessed - [id number]    Limb alert care status     right upper ext    Malignant neoplasm of skin     basal cell on face, Moh's surgery    Memory loss      last assessed - 30Mar2017    Obesity     Open wound     last assessed - 01Jun2017    Osteopenia     Overactive bladder     Palmar plantar erythrodysaesthesia     Resolved - [id number]    Pulmonary nodule seen on imaging study     Rosacea     s/p redo AFib ablation (posterior wall isolation, mitral flutter line, CTI line) 9/29/2023 9/29/2023    UTI (urinary tract infection)     recently    Vertigo     Vitamin D deficiency      Social History     Socioeconomic History    Marital status: /Civil Union     Spouse name: Not on file    Number of children: Not on file    Years of education: Not on file    Highest education level: Not on file   Occupational History    Occupation: Retired   Tobacco Use    Smoking status: Never    Smokeless tobacco: Never   Vaping Use    Vaping status: Never Used   Substance and Sexual Activity    Alcohol use: Never    Drug use: No    Sexual activity: Not Currently     Comment: Resides with    Other Topics Concern    Not on file   Social History Narrative    Always uses seat belt    Convalescence after chemotherapy     Social Drivers of Health     Financial Resource Strain: Low Risk  (7/19/2023)    Overall Financial Resource Strain (CARDIA)     Difficulty of Paying Living Expenses: Not hard at all   Food Insecurity: No Food Insecurity (8/27/2024)    Nursing - Inadequate Food Risk Classification     Worried About Running Out of Food in the Last Year: Never true     Ran Out of Food in the Last Year: Never true     Ran Out of Food in the Last Year: Not on file   Transportation Needs: No Transportation Needs (8/27/2024)    PRAPARE - Transportation     Lack of Transportation (Medical): No     Lack of Transportation (Non-Medical): No   Physical Activity: Not on file   Stress: Not on file   Social Connections: Not on file   Intimate Partner Violence: Not on file   Housing Stability: Low Risk  (8/27/2024)    Housing Stability Vital Sign     Unable to Pay for Housing in the Last Year: No      Number of Times Moved in the Last Year: 0     Homeless in the Last Year: No      Family History   Problem Relation Age of Onset    Coronary artery disease Mother         CABG    Diabetes Mother     Heart attack Mother     Diabetes Sister     Parkinsonism Sister     Coronary artery disease Brother         CABG    Diabetes Brother     Heart attack Brother     Heart attack Son     Kidney cancer Son 50    No Known Problems Son     No Known Problems Son     Emphysema Sister     No Known Problems Sister     Heart attack Brother     No Known Problems Brother     No Known Problems Maternal Grandmother     No Known Problems Maternal Grandfather     No Known Problems Paternal Grandmother     No Known Problems Paternal Grandfather     No Known Problems Paternal Aunt     No Known Problems Paternal Aunt     No Known Problems Paternal Aunt     No Known Problems Paternal Aunt      Past Surgical History:   Procedure Laterality Date    APPENDECTOMY      ATRIAL ABLATION SURGERY      last assessed - [id number]    BACK SURGERY  1997    Lumbar spinal stenosis    BREAST BIOPSY      2014? left breast; 9/12/16 right breast x 3    BREAST BIOPSY Left 03/30/2010    BREAST BIOPSY Right 09/12/2016    us bx- invasive br ca    BREAST SURGERY      needle bx.    BREAST SURGERY Right 03/16/2017    excision of right chest wall lesion    CARDIAC ELECTROPHYSIOLOGY PROCEDURE N/A 9/29/2023    Procedure: Cardiac eps/afib ablation;  Surgeon: Bernard Wong MD;  Location: [facility];  Service: Cardiology    CARDIAC ELECTROPHYSIOLOGY STUDY AND ABLATION      CARDIOVERSION      DENTAL SURGERY      HAND SURGERY      Joint replaced with silicone joint 5th finger right hand    HYSTERECTOMY  1993    Complete    LAMINECTOMY      Decompressive up to two lumbar segments    MASTECTOMY Right 10/28/2016    OOPHORECTOMY  1993    NM EXCISION MAL LESION TRUNK/ARM/LEG 0.5 CM/< Right 03/16/2017    Procedure: EXCISION CHEST WALL LESION; NEEDLE LOC @ 1400;   Surgeon: Nicolette Young MD;  Location: [facility];  Service: Surgical Oncology    CA INTRAOP SENTINEL LYMPH NODE ID W/DYE INJECTION Right 10/28/2016    Procedure: BREAST MASTECTOMY WITH SENTINAL LYMPH NODE BIOPSY; LYMPHSCINITIGRAPHY; LYMPHATIC MAPPING; 0800 NUC MED INJECTION;  Surgeon: Nicolette Young MD;  Location: [facility];  Service: Surgical Oncology    CA MASTECTOMY SIMPLE COMPLETE Left 10/22/2021    Procedure: BREAST MASTECTOMY SIMPLE MARTHA  GUIDED;  Surgeon: Brody Christine MD;  Location: [facility];  Service: Surgical Oncology    SENTINEL LYMPH NODE BIOPSY Right     US BREAST NEEDLE LOC LEFT Left 10/19/2021    US BREAST NEEDLE LOC RIGHT Right 03/16/2017    US GUIDANCE BREAST BIOPSY LEFT EACH ADDITIONAL Left 09/21/2021    US GUIDANCE BREAST BIOPSY RIGHT EACH ADDITIONAL Right 09/12/2016    US GUIDANCE BREAST BIOPSY RIGHT EACH ADDITIONAL Right 09/12/2016    US GUIDED BREAST BIOPSY LEFT COMPLETE Left 09/21/2021    US GUIDED BREAST BIOPSY RIGHT COMPLETE Right 09/12/2016    US GUIDED BREAST BIOPSY RIGHT COMPLETE Right 10/1/2021       Current Outpatient Medications:     apixaban (Eliquis) 5 mg, Take 1 tablet (5 mg total) by mouth daily, Disp: 90 tablet, Rfl: 2    cyanocobalamin (VITAMIN B-12) 500 mcg tablet, Take 1 tablet by mouth daily, Disp: , Rfl:     diltiazem (Dilt-XR) 120 MG 24 hr capsule, Take 1 capsule (120 mg total) by mouth daily, Disp: 90 capsule, Rfl: 3    levothyroxine 150 mcg tablet, TAKE 1 TABLET 4 DAYS A WEEK AND 1/2 TABLET 3 DAYS A WEEK, Disp: 72 tablet, Rfl: 1    meclizine (ANTIVERT) 25 mg tablet, Take 1 tablet (25 mg total) by mouth every 8 (eight) hours as needed for dizziness, Disp: 60 tablet, Rfl: 2    potassium chloride (MICRO-K) 10 MEQ CR capsule, TAKE 1 CAPSULE BY MOUTH EVERY DAY, Disp: 90 capsule, Rfl: 1    sotalol (BETAPACE) 120 mg tablet, Take 0.5 tablets (60 mg total) by mouth daily, Disp: , Rfl:     pramipexole (MIRAPEX) 0.25 mg tablet, TAKE 1 TABLET (0.25 MG TOTAL) BY MOUTH EVERY EVENING  "(Patient not taking: Reported on 11/6/2024), Disp: 90 tablet, Rfl: 1    RESTASIS 0.05 % ophthalmic emulsion, Administer 1 drop to both eyes every 12 (twelve) hours (Patient not taking: Reported on 12/12/2024), Disp: , Rfl:     tolterodine (DETROL) 1 mg tablet, Take 1 tablet (1 mg total) by mouth 2 (two) times a day (Patient not taking: Reported on 11/20/2024), Disp: 30 tablet, Rfl: 1  Allergies   Allergen Reactions    Morphine And Codeine GI Intolerance       Labs:     Chemistry        Component Value Date/Time     09/07/2017 0943     09/07/2017 0943    K 4.2 08/27/2024 1027     08/27/2024 1027    CO2 26 08/27/2024 1027    BUN 10 08/27/2024 1027    CREATININE 1.03 (H) 08/27/2024 1027    CREATININE 1.11 09/30/2023 0242    CREATININE 0.97 (H) 09/07/2017 0943    CREATININE 0.97 (H) 09/07/2017 0943        Component Value Date/Time    CALCIUM 8.2 (L) 09/30/2023 0242    CALCIUM 9.0 08/14/2018 1104    ALKPHOS 102 02/20/2023 1022    ALKPHOS 83 08/14/2018 1104    AST 36 02/27/2024 1109    ALT 16 02/27/2024 1109    BILITOT 0.8 09/07/2017 0943    BILITOT 0.8 09/07/2017 0943            Lab Results   Component Value Date    CHOL 110 09/07/2017    CHOL 132 04/07/2016    CHOL 135 09/29/2015     Lab Results   Component Value Date    HDL 37 (L) 07/17/2023    HDL 46 05/18/2022    HDL 41 12/21/2020     Lab Results   Component Value Date    LDLCALC 66 07/17/2023    LDLCALC 66 05/18/2022    LDLCALC 66 12/21/2020     Lab Results   Component Value Date    TRIG 67 07/17/2023    TRIG 65 05/18/2022    TRIG 88 12/21/2020     No results found for: \"CHOLHDL\"    Imaging: Echo complete w/ contrast if indicated  Result Date: 12/10/2024  Narrative:   Left Ventricle: Left ventricular cavity size is normal. Wall thickness is normal. The left ventricular ejection fraction is 55%. Systolic function is normal. Although no diagnostic regional wall motion abnormality was identified, this possibility cannot be completely excluded on the " "basis of this study. Diastolic function is severely abnormal, consistent with ungraded restrictive relaxation.  Left atrial filling pressure is elevated.   Right Ventricle: Right ventricular cavity size is normal. Systolic function is normal.   Left Atrium: The atrium is mildly dilated.   Right Atrium: The atrium is dilated.   Aortic Valve: There is mild to moderate regurgitation. There is aortic valve sclerosis.   Mitral Valve: There is mild regurgitation.   Tricuspid Valve: There is moderate to severe regurgitation. The tricuspid valve regurgitation jet is eccentric. The right ventricular systolic pressure is mildly to moderately elevated. The estimated right ventricular systolic pressure is 49.00 mmHg.     Review of Systems   Constitutional: Positive for malaise/fatigue and weight gain.   Cardiovascular:  Positive for dyspnea on exertion and leg swelling. Negative for chest pain, irregular heartbeat, near-syncope, orthopnea, palpitations and syncope.   Respiratory:  Positive for shortness of breath and sleep disturbances due to breathing. Negative for cough and hemoptysis.    Musculoskeletal:  Positive for muscle weakness.   Neurological:  Positive for headaches and weakness. Negative for dizziness and light-headedness.       Vitals:    12/12/24 1316   BP: 120/70   Pulse: 88     Vitals:    12/12/24 1316   Weight: 80.5 kg (177 lb 6.4 oz)     Height: 5' 2\" (157.5 cm)   Body mass index is 32.45 kg/m².    Physical Exam:  Physical Exam  Constitutional:       Appearance: She is well-developed.   HENT:      Head: Normocephalic and atraumatic.      Mouth/Throat:      Mouth: Mucous membranes are moist.   Cardiovascular:      Rate and Rhythm: Normal rate and regular rhythm.   Pulmonary:      Effort: Pulmonary effort is normal. No respiratory distress.      Breath sounds: No stridor. Examination of the left-lower field reveals rales. Rales present.   Abdominal:      Palpations: Abdomen is soft.   Musculoskeletal:      Right " lower leg: Edema present.      Left lower leg: Edema present.   Skin:     Capillary Refill: Capillary refill takes less than 2 seconds.   Neurological:      General: No focal deficit present.      Mental Status: She is alert and oriented to person, place, and time.

## 2024-12-16 ENCOUNTER — REMOTE DEVICE CLINIC VISIT (OUTPATIENT)
Dept: CARDIOLOGY CLINIC | Facility: CLINIC | Age: 79
End: 2024-12-16
Payer: COMMERCIAL

## 2024-12-16 DIAGNOSIS — Z95.0 CARDIAC PACEMAKER IN SITU: Primary | ICD-10-CM

## 2024-12-16 PROCEDURE — 93296 REM INTERROG EVL PM/IDS: CPT | Performed by: INTERNAL MEDICINE

## 2024-12-16 PROCEDURE — 93294 REM INTERROG EVL PM/LDLS PM: CPT | Performed by: INTERNAL MEDICINE

## 2024-12-16 NOTE — PROGRESS NOTES
Results for orders placed or performed in visit on 12/16/24   Cardiac EP device report    Narrative    MDT DUAL PM/ ACTIVE SYSTEM IS MRI CONDITIONAL  CARELINK TRANSMISSION: BATTERY VOLTAGE ADEQUATE (9.9 YRS). AP-68%, -0.5%. ALL AVAILABLE LEAD PARAMETERS WITHIN NORMAL LIMITS. 2 DEVICE CLASSIFIED NSVT EPISODES, MOST RECENT FOR 8 BEAT NSVT, AVG CL~365MS & SVT W/ PVC'S (SINGLE & COUPLET) ON OTHER EGM. 1 SVT-ST EPISODE @ 162 BPM LASTING 50 SEC. PT ON ELIQUIS, DILTIAZEM & SOTALOL. NORMAL DEVICE FUNCTION. GV

## 2024-12-17 ENCOUNTER — RESULTS FOLLOW-UP (OUTPATIENT)
Dept: CARDIOLOGY CLINIC | Facility: CLINIC | Age: 79
End: 2024-12-17

## 2024-12-19 DIAGNOSIS — E87.6 HYPOKALEMIA: ICD-10-CM

## 2024-12-19 DIAGNOSIS — I47.19 ATRIAL TACHYCARDIA (HCC): ICD-10-CM

## 2024-12-19 RX ORDER — POTASSIUM CHLORIDE 750 MG/1
CAPSULE, EXTENDED RELEASE ORAL
Qty: 180 CAPSULE | Refills: 1 | Status: SHIPPED | OUTPATIENT
Start: 2024-12-19

## 2024-12-19 RX ORDER — DILTIAZEM HYDROCHLORIDE 120 MG/1
120 CAPSULE, EXTENDED RELEASE ORAL DAILY
Qty: 90 CAPSULE | Refills: 1 | Status: SHIPPED | OUTPATIENT
Start: 2024-12-19

## 2024-12-19 NOTE — TELEPHONE ENCOUNTER
Pt states the last refill from 12/2023 is two a day for an amount of 180. It does say that in the med list as well. Thank you!       Medication: potassium chloride (MICRO-K) 10 MEQ CR capsule     Dose/Frequency: take 2 capsules by mouth daily      Quantity: 180 each     Pharmacy: [facility]/pharmacy #[id number] - [place], [place] - [address]     Office:   [x] PCP/Provider - Ronald   [] Speciality/Provider -     Does the patient have enough for 3 days?   [] Yes   [x] No - Send as HP to POD

## 2024-12-19 NOTE — TELEPHONE ENCOUNTER
Requested medication(s) are due for refill today: Yes  Patient has already received a courtesy refill: No  Other reason request has been forwarded to provider: was a dr hoang  pt, establishing care with you

## 2024-12-19 NOTE — TELEPHONE ENCOUNTER
Pt contacted Call Center requested refill of their medication.        Doctor Name: Bernard Wong MD       Medication Name: diltiazem (Dilt-XR)       Dosage of Med: 120 MG 24 hr capsule       Frequency of Med: Take 1 capsule (120 mg total) by mouth daily       Remaining Medication: 0      Pharmacy and Location: [facility]/pharmacy #[id number] [place], [place] - [address]        Pt. Preferred Callback Phone Number: [phone]      Thank you.

## 2025-01-03 ENCOUNTER — OFFICE VISIT (OUTPATIENT)
Dept: FAMILY MEDICINE CLINIC | Facility: HOSPITAL | Age: 80
End: 2025-01-03
Payer: COMMERCIAL

## 2025-01-03 VITALS
OXYGEN SATURATION: 100 % | DIASTOLIC BLOOD PRESSURE: 87 MMHG | WEIGHT: 175.6 LBS | TEMPERATURE: 97.1 F | BODY MASS INDEX: 32.31 KG/M2 | HEIGHT: 62 IN | HEART RATE: 71 BPM | SYSTOLIC BLOOD PRESSURE: 132 MMHG

## 2025-01-03 DIAGNOSIS — E03.9 ACQUIRED HYPOTHYROIDISM: ICD-10-CM

## 2025-01-03 DIAGNOSIS — I89.0 LYMPHEDEMA: ICD-10-CM

## 2025-01-03 DIAGNOSIS — I48.19 PERSISTENT ATRIAL FIBRILLATION (HCC): ICD-10-CM

## 2025-01-03 DIAGNOSIS — I42.8 OTHER CARDIOMYOPATHY (HCC): ICD-10-CM

## 2025-01-03 DIAGNOSIS — H81.10 BENIGN PAROXYSMAL POSITIONAL VERTIGO, UNSPECIFIED LATERALITY: ICD-10-CM

## 2025-01-03 DIAGNOSIS — N18.32 STAGE 3B CHRONIC KIDNEY DISEASE (HCC): ICD-10-CM

## 2025-01-03 DIAGNOSIS — R73.03 PREDIABETES: ICD-10-CM

## 2025-01-03 DIAGNOSIS — M25.561 ACUTE PAIN OF RIGHT KNEE: ICD-10-CM

## 2025-01-03 DIAGNOSIS — E03.9 ACQUIRED HYPOTHYROIDISM: Primary | ICD-10-CM

## 2025-01-03 PROBLEM — I49.5 SSS (SICK SINUS SYNDROME) (HCC): Status: RESOLVED | Noted: 2021-07-16 | Resolved: 2025-01-03

## 2025-01-03 PROBLEM — D69.6 PLATELETS DECREASED (HCC): Status: RESOLVED | Noted: 2022-01-17 | Resolved: 2025-01-03

## 2025-01-03 PROBLEM — B37.0 THRUSH, ORAL: Status: RESOLVED | Noted: 2023-07-19 | Resolved: 2025-01-03

## 2025-01-03 PROCEDURE — 99214 OFFICE O/P EST MOD 30 MIN: CPT | Performed by: NURSE PRACTITIONER

## 2025-01-03 PROCEDURE — G2211 COMPLEX E/M VISIT ADD ON: HCPCS | Performed by: NURSE PRACTITIONER

## 2025-01-03 RX ORDER — MECLIZINE HYDROCHLORIDE 25 MG/1
25 TABLET ORAL EVERY 8 HOURS PRN
Qty: 60 TABLET | Refills: 2 | Status: SHIPPED | OUTPATIENT
Start: 2025-01-03

## 2025-01-03 RX ORDER — LEVOTHYROXINE SODIUM 150 UG/1
TABLET ORAL
Qty: 72 TABLET | Refills: 1 | Status: SHIPPED | OUTPATIENT
Start: 2025-01-03

## 2025-01-03 NOTE — ASSESSMENT & PLAN NOTE
Meclizine refill issued as requested     Orders:    meclizine (ANTIVERT) 25 mg tablet; Take 1 tablet (25 mg total) by mouth every 8 (eight) hours as needed for dizziness

## 2025-01-03 NOTE — PROGRESS NOTES
Name: Josefina Mendoza      : 1945      MRN: [id number]  Encounter Provider: SHADI Mcclure  Encounter Date: 1/3/2025   Encounter department: [facility] [address]   :  Assessment & Plan  Acquired hypothyroidism  Compliant w/levothyroxine as rx'd - refill issued as requested  Update labs before next appt in 4 months    Orders:    levothyroxine 150 mcg tablet; Take 1 tablet 4 days a week and 1/2 tablet 3 days a week    CBC and differential; Future    TSH, 3rd generation with Free T4 reflex; Future    Persistent atrial fibrillation (HCC)  Clinically stable as managed by cardiology  Maintain f/u as scheduled       Stage 3b chronic kidney disease (HCC)  Lab Results   Component Value Date    EGFR 56 (L) 2024    EGFR 45 (L) 2024    EGFR 48 2023    CREATININE 1.03 (H) 2024    CREATININE 1.24 (H) 2024    CREATININE 1.11 2023      Renal functions stable  Update labs before next OV    Orders:    Comprehensive metabolic panel; Future    Other cardiomyopathy (HCC)  S/P 2024 echo as managed by cardiology       Lymphedema  Chronic despite diuretic tx as rx'd by cardiology  Recommend & she is agreeable to starting lymphedema PT     Orders:    Ambulatory Referral to Physical Therapy; Future    Acquired hypothyroidism    Orders:    levothyroxine 150 mcg tablet; Take 1 tablet 4 days a week and 1/2 tablet 3 days a week    CBC and differential; Future    TSH, 3rd generation with Free T4 reflex; Future    Benign paroxysmal positional vertigo, unspecified laterality  Meclizine refill issued as requested     Orders:    meclizine (ANTIVERT) 25 mg tablet; Take 1 tablet (25 mg total) by mouth every 8 (eight) hours as needed for dizziness    Acute pain of right knee  S/P fall  Will evaluate further w/xray as ordered    Orders:    XR knee 4+ vw right injury; Future    Prediabetes  Last A1C of 5.7% managing w/lifestyle  Update labs before next OV    Orders:     "Comprehensive metabolic panel; Future    Hemoglobin A1C; Future      PCV declined  Order for dexa scan refused      Depression Screening and Follow-up Plan: Patient was screened for depression during today's encounter. They screened negative with a PHQ-2 score of 0.    Falls Plan of Care: referral to physical therapy.       History of Present Illness       Here with  for routine follow up. States she has been well but fell recently and has had ongoing right knee pain. Legs continue to be swollen, no worse than her usual. Taking diuretics and all meds as prescribed. Continues to be SOB with short exertion. Planning to see a different cardiologist in March.         Review of Systems   Constitutional: Negative.    Respiratory:  Positive for shortness of breath (RICHARDS).    Cardiovascular:  Positive for leg swelling. Negative for chest pain and palpitations.   Musculoskeletal:  Positive for arthralgias (right knee since fall few weeks ago) and gait problem.   Psychiatric/Behavioral:  Negative for dysphoric mood.        Objective   /87 (BP Location: Left arm, Patient Position: Sitting, Cuff Size: Standard)   Pulse 71   Temp (!) 97.1 °F (36.2 °C) (Tympanic)   Ht 5' 2\" (1.575 m)   Wt 79.7 kg (175 lb 9.6 oz)   SpO2 100%   BMI 32.12 kg/m²        Physical Exam  Vitals reviewed.   Constitutional:       General: She is not in acute distress.     Appearance: She is obese.   Eyes:      General: No scleral icterus.  Neck:      Thyroid: No thyromegaly.   Cardiovascular:      Rate and Rhythm: Normal rate and regular rhythm.   Pulmonary:      Effort: Pulmonary effort is normal. No respiratory distress.      Breath sounds: Decreased breath sounds present. No wheezing or rales.   Musculoskeletal:      Cervical back: Normal range of motion.      Right lower leg: Edema (+3 pitting) present.      Left lower leg: Edema (+2 pitting) present.   Lymphadenopathy:      Cervical: No cervical adenopathy.   Skin:     General: Skin " is warm and dry.   Neurological:      General: No focal deficit present.      Mental Status: She is alert and oriented to person, place, and time.   Psychiatric:         Mood and Affect: Mood normal.         Behavior: Behavior normal.         Thought Content: Thought content normal.         Judgment: Judgment normal.         Administrative Statements   I have spent a total time of 20 minutes in caring for this patient on the day of the visit/encounter including Diagnostic results, Instructions for management, Patient and family education, Importance of tx compliance, Risk factor reductions, Impressions, Counseling / Coordination of care, Documenting in the medical record, Reviewing / ordering tests, medicine, procedures  , and Obtaining or reviewing history

## 2025-01-03 NOTE — ASSESSMENT & PLAN NOTE
Compliant w/levothyroxine as rx'd - refill issued as requested  Update labs before next appt in 4 months    Orders:    levothyroxine 150 mcg tablet; Take 1 tablet 4 days a week and 1/2 tablet 3 days a week    CBC and differential; Future    TSH, 3rd generation with Free T4 reflex; Future

## 2025-01-03 NOTE — ASSESSMENT & PLAN NOTE
Chronic despite diuretic tx as rx'd by cardiology  Recommend & she is agreeable to starting lymphedema PT     Orders:    Ambulatory Referral to Physical Therapy; Future

## 2025-01-03 NOTE — ASSESSMENT & PLAN NOTE
Last A1C of 5.7% managing w/lifestyle  Update labs before next OV    Orders:    Comprehensive metabolic panel; Future    Hemoglobin A1C; Future

## 2025-01-03 NOTE — ASSESSMENT & PLAN NOTE
Lab Results   Component Value Date    EGFR 56 (L) 08/27/2024    EGFR 45 (L) 02/27/2024    EGFR 48 09/30/2023    CREATININE 1.03 (H) 08/27/2024    CREATININE 1.24 (H) 02/27/2024    CREATININE 1.11 09/30/2023      Renal functions stable  Update labs before next OV    Orders:    Comprehensive metabolic panel; Future

## 2025-01-03 NOTE — ASSESSMENT & PLAN NOTE
Orders:    levothyroxine 150 mcg tablet; Take 1 tablet 4 days a week and 1/2 tablet 3 days a week    CBC and differential; Future    TSH, 3rd generation with Free T4 reflex; Future

## 2025-03-17 ENCOUNTER — RESULTS FOLLOW-UP (OUTPATIENT)
Dept: CARDIOLOGY CLINIC | Facility: CLINIC | Age: 80
End: 2025-03-17

## 2025-03-17 ENCOUNTER — REMOTE DEVICE CLINIC VISIT (OUTPATIENT)
Dept: CARDIOLOGY CLINIC | Facility: CLINIC | Age: 80
End: 2025-03-17
Payer: COMMERCIAL

## 2025-03-17 DIAGNOSIS — Z95.0 PRESENCE OF PERMANENT CARDIAC PACEMAKER: Primary | ICD-10-CM

## 2025-03-17 PROCEDURE — 93294 REM INTERROG EVL PM/LDLS PM: CPT | Performed by: INTERNAL MEDICINE

## 2025-03-17 PROCEDURE — 93296 REM INTERROG EVL PM/IDS: CPT | Performed by: INTERNAL MEDICINE

## 2025-03-17 NOTE — PROGRESS NOTES
Results for orders placed or performed in visit on 03/17/25   Cardiac EP device report    Narrative    MDT DUAL PM/ ACTIVE SYSTEM IS MRI CONDITIONAL  CARELINK TRANSMISSION: BATTERY VOLTAGE ADEQUATE (9.5 YR). AP 84.6%  0.6% (AAIR-DDDR 70 PPM). ALL AVAILABLE LEAD PARAMETERS WITHIN NORMAL LIMITS. NO SIGNIFICANT HIGH RATE EPISODES. NORMAL DEVICE FUNCTION.  ES

## 2025-03-27 ENCOUNTER — OFFICE VISIT (OUTPATIENT)
Dept: CARDIOLOGY CLINIC | Facility: CLINIC | Age: 80
End: 2025-03-27
Payer: COMMERCIAL

## 2025-03-27 ENCOUNTER — TELEPHONE (OUTPATIENT)
Dept: CARDIOLOGY CLINIC | Facility: CLINIC | Age: 80
End: 2025-03-27

## 2025-03-27 VITALS
BODY MASS INDEX: 30.73 KG/M2 | DIASTOLIC BLOOD PRESSURE: 76 MMHG | SYSTOLIC BLOOD PRESSURE: 120 MMHG | WEIGHT: 167 LBS | HEIGHT: 62 IN | HEART RATE: 88 BPM

## 2025-03-27 DIAGNOSIS — I42.9 CARDIOMYOPATHY, UNSPECIFIED TYPE (HCC): ICD-10-CM

## 2025-03-27 DIAGNOSIS — Z95.0 CARDIAC PACEMAKER: ICD-10-CM

## 2025-03-27 DIAGNOSIS — I48.0 PAROXYSMAL ATRIAL FIBRILLATION (HCC): Primary | ICD-10-CM

## 2025-03-27 DIAGNOSIS — Z98.890 HISTORY OF RADIOFREQUENCY ABLATION (RFA) FOR COMPLEX LEFT ATRIAL ARRHYTHMIA: ICD-10-CM

## 2025-03-27 DIAGNOSIS — Z86.79 HISTORY OF RADIOFREQUENCY ABLATION (RFA) FOR COMPLEX LEFT ATRIAL ARRHYTHMIA: ICD-10-CM

## 2025-03-27 DIAGNOSIS — E78.2 MIXED HYPERLIPIDEMIA: ICD-10-CM

## 2025-03-27 DIAGNOSIS — I36.1 NONRHEUMATIC TRICUSPID VALVE REGURGITATION: ICD-10-CM

## 2025-03-27 DIAGNOSIS — I10 BENIGN ESSENTIAL HYPERTENSION: ICD-10-CM

## 2025-03-27 PROCEDURE — 93000 ELECTROCARDIOGRAM COMPLETE: CPT | Performed by: INTERNAL MEDICINE

## 2025-03-27 PROCEDURE — 99214 OFFICE O/P EST MOD 30 MIN: CPT | Performed by: INTERNAL MEDICINE

## 2025-03-27 RX ORDER — METOPROLOL SUCCINATE 25 MG/1
25 TABLET, EXTENDED RELEASE ORAL DAILY
Qty: 90 TABLET | Refills: 3 | Status: SHIPPED | OUTPATIENT
Start: 2025-03-27

## 2025-03-27 NOTE — TELEPHONE ENCOUNTER
Pt was in for OV today w/ Dr. Lipscomb and stated that she stopped her Sotalol x3 weeks.    Per Dr. Lipscomb, please notify EP / Dr. Wong.

## 2025-03-27 NOTE — PROGRESS NOTES
Cardiology   Josefina Mendoza 79 y.o. female MRN: [id number]        Impression:  Acute on Chronic HFpEF - decompensated.  Afib s/p ablation x 2 - in NSR.  Stopped Sotalol 3 weeks.   SSS s/p PPM - A-paced 84%  HTN - controlled.   Tricuspid regurgitation - mod-sev.    Recommendations:  Discontinue diltiazem.  Start Metoprolol Succinate 25mg daily.    Start Jardiance 10mg daily.   Entresto 24/26mg 2x/day.  Continue remainder of medications.  Will discuss with Dr. Wong about restarting Sotalol.  Follow up in one month.       HPI: Josefina Mendoza is a 79 y.o. year old female with HFpEF, afib s/p ablation x 2, hypertension, and pacer who returns for follow up.  Echo 12/24 - EF 55%, mild-mod AI, mild MR, mod-sev TR with mild-mod PHTN.  Has stopped taking sotalol 3 weeks ago.  Has been having increased shortness of breath over past 3 months.         Review of Systems   Constitutional: Negative.    HENT: Negative.     Eyes: Negative.    Respiratory:  Positive for shortness of breath. Negative for chest tightness.    Cardiovascular:  Positive for palpitations and leg swelling. Negative for chest pain.   Gastrointestinal: Negative.    Endocrine: Negative.    Genitourinary: Negative.    Musculoskeletal: Negative.    Skin: Negative.    Allergic/Immunologic: Negative.    Neurological: Negative.    Hematological: Negative.    Psychiatric/Behavioral: Negative.     All other systems reviewed and are negative.        Past Medical History:   Diagnosis Date    Abnormal chest CT     last assessed - 10Mar2017    Abnormal glucose     Resolved - 22Jun2015    Arrhythmia     Arthritis     Slight    BCC (basal cell carcinoma of skin)     last assessed - [id number]    BRCA gene mutation negative 09/30/2021    [employer]; 36 gene panel    Breast cancer (HCC) 09/12/2016    rt    Cancer (HCC)     Cardiomyopathy (HCC)     Cervical spinal stenosis     Discharge from left nipple     last assessed - [id number]    Disease of thyroid gland      hypothyroidism    Full dentures     Upper and Lower    History of atrial fibrillation     Homocysteinemia     Hypertension     Hypokalemia     Intraductal papilloma of breast     last assessed - [id number]    Limb alert care status     right upper ext    Malignant neoplasm of skin     basal cell on face, Moh's surgery    Memory loss     last assessed - 30Mar2017    Obesity     Open wound     last assessed - 01Jun2017    Osteopenia     Overactive bladder     Palmar plantar erythrodysaesthesia     Resolved - [id number]    Platelets decreased (HCC) 01/17/2022    Pulmonary nodule seen on imaging study     Rosacea     s/p redo AFib ablation (posterior wall isolation, mitral flutter line, CTI line) 9/29/2023 09/29/2023    SSS (sick sinus syndrome) (HCC) 07/16/2021    Thrush, oral 07/19/2023    UTI (urinary tract infection)     recently    Vertigo     Vitamin D deficiency      Past Surgical History:   Procedure Laterality Date    APPENDECTOMY      ATRIAL ABLATION SURGERY      last assessed - [id number]    BACK SURGERY  1997    Lumbar spinal stenosis    BREAST BIOPSY      2014? left breast; 9/12/16 right breast x 3    BREAST BIOPSY Left 03/30/2010    BREAST BIOPSY Right 09/12/2016    us bx- invasive br ca    BREAST SURGERY      needle bx.    BREAST SURGERY Right 03/16/2017    excision of right chest wall lesion    CARDIAC ELECTROPHYSIOLOGY PROCEDURE N/A 9/29/2023    Procedure: Cardiac eps/afib ablation;  Surgeon: Bernard Wong MD;  Location: [facility];  Service: Cardiology    CARDIAC ELECTROPHYSIOLOGY STUDY AND ABLATION      CARDIOVERSION      DENTAL SURGERY      HAND SURGERY      Joint replaced with silicone joint 5th finger right hand    HYSTERECTOMY  1993    Complete    LAMINECTOMY      Decompressive up to two lumbar segments    MASTECTOMY Right 10/28/2016    OOPHORECTOMY  1993    IL EXCISION MAL LESION TRUNK/ARM/LEG 0.5 CM/< Right 03/16/2017    Procedure: EXCISION CHEST WALL LESION; NEEDLE LOC @ 1400;   Surgeon: Nicolette Young MD;  Location: [facility];  Service: Surgical Oncology    NJ INTRAOP SENTINEL LYMPH NODE ID W/DYE INJECTION Right 10/28/2016    Procedure: BREAST MASTECTOMY WITH SENTINAL LYMPH NODE BIOPSY; LYMPHSCINITIGRAPHY; LYMPHATIC MAPPING; 0800 NUC MED INJECTION;  Surgeon: Nicolette Young MD;  Location: [facility];  Service: Surgical Oncology    NJ MASTECTOMY SIMPLE COMPLETE Left 10/22/2021    Procedure: BREAST MASTECTOMY SIMPLE MARTHA  GUIDED;  Surgeon: Brody Christine MD;  Location: [facility];  Service: Surgical Oncology    SENTINEL LYMPH NODE BIOPSY Right     US BREAST NEEDLE LOC LEFT Left 10/19/2021    US BREAST NEEDLE LOC RIGHT Right 03/16/2017    US GUIDANCE BREAST BIOPSY LEFT EACH ADDITIONAL Left 09/21/2021    US GUIDANCE BREAST BIOPSY RIGHT EACH ADDITIONAL Right 09/12/2016    US GUIDANCE BREAST BIOPSY RIGHT EACH ADDITIONAL Right 09/12/2016    US GUIDED BREAST BIOPSY LEFT COMPLETE Left 09/21/2021    US GUIDED BREAST BIOPSY RIGHT COMPLETE Right 09/12/2016    US GUIDED BREAST BIOPSY RIGHT COMPLETE Right 10/1/2021     Social History     Substance and Sexual Activity   Alcohol Use Never     Social History     Substance and Sexual Activity   Drug Use No     Social History     Tobacco Use   Smoking Status Never   Smokeless Tobacco Never     Family History   Problem Relation Age of Onset    Coronary artery disease Mother         CABG    Diabetes Mother     Heart attack Mother     Diabetes Sister     Parkinsonism Sister     Coronary artery disease Brother         CABG    Diabetes Brother     Heart attack Brother     Heart attack Son     Kidney cancer Son 50    No Known Problems Son     No Known Problems Son     Emphysema Sister     No Known Problems Sister     Heart attack Brother     No Known Problems Brother     No Known Problems Maternal Grandmother     No Known Problems Maternal Grandfather     No Known Problems Paternal Grandmother     No Known Problems Paternal Grandfather     No Known Problems  Paternal Aunt     No Known Problems Paternal Aunt     No Known Problems Paternal Aunt     No Known Problems Paternal Aunt        Allergies:  Allergies   Allergen Reactions    Morphine And Codeine GI Intolerance       Medications:     Current Outpatient Medications:     apixaban (Eliquis) 5 mg, Take 1 tablet (5 mg total) by mouth daily, Disp: 90 tablet, Rfl: 2    cyanocobalamin (VITAMIN B-12) 500 mcg tablet, Take 1 tablet by mouth daily, Disp: , Rfl:     diltiazem (Dilt-XR) 120 MG 24 hr capsule, Take 1 capsule (120 mg total) by mouth daily, Disp: 90 capsule, Rfl: 1    levothyroxine 150 mcg tablet, Take 1 tablet 4 days a week and 1/2 tablet 3 days a week, Disp: 72 tablet, Rfl: 1    meclizine (ANTIVERT) 25 mg tablet, Take 1 tablet (25 mg total) by mouth every 8 (eight) hours as needed for dizziness, Disp: 60 tablet, Rfl: 2    potassium chloride (MICRO-K) 10 MEQ CR capsule, Take 2 capsules daily, Disp: 180 capsule, Rfl: 1    RESTASIS 0.05 % ophthalmic emulsion, Administer 1 drop to both eyes every 12 (twelve) hours (Patient not taking: Reported on 12/12/2024), Disp: , Rfl:     sotalol (BETAPACE) 120 mg tablet, Take 0.5 tablets (60 mg total) by mouth daily, Disp: , Rfl:     torsemide (DEMADEX) 20 mg tablet, Take 1 tablet (20 mg total) by mouth 2 (two) times a day, Disp: 180 tablet, Rfl: 0      Wt Readings from Last 3 Encounters:   03/27/25 75.8 kg (167 lb)   01/03/25 79.7 kg (175 lb 9.6 oz)   12/12/24 80.5 kg (177 lb 6.4 oz)     Temp Readings from Last 3 Encounters:   01/03/25 (!) 97.1 °F (36.2 °C) (Tympanic)   11/20/24 97.6 °F (36.4 °C) (Temporal)   08/27/24 (!) 97.1 °F (36.2 °C) (Tympanic)     BP Readings from Last 3 Encounters:   03/27/25 120/76   01/03/25 132/87   12/12/24 120/70     Pulse Readings from Last 3 Encounters:   03/27/25 88   01/03/25 71   12/12/24 88         Physical Exam      Laboratory Studies:  CMP:  Lab Results   Component Value Date     09/07/2017     09/07/2017    K 4.2 08/27/2024    CL  104 2024    CO2 26 2024    ANIONGAP 6 2015    BUN 10 2024    CREATININE 1.03 (H) 2024    GLUCOSE 95 2015    AST 36 2024    ALT 16 2024    BILITOT 0.8 2017    BILITOT 0.8 2017    EGFR 56 (L) 2024       Lipid Profile:   Lab Results   Component Value Date    CHOL 110 2017     Lab Results   Component Value Date    HDL 37 (L) 2023     Lab Results   Component Value Date    LDLCALC 66 2023     Lab Results   Component Value Date    TRIG 67 2023       Cardiac testing:   EKG reviewed personally: A-paced 88  Results for orders placed during the hospital encounter of 19    Echo complete with contrast if [place] [address]    Transthoracic Echocardiogram  2D, M-mode, Doppler, and Color Doppler    Study date:  2019    Patient: KARLO CLEVELAND  MR number: [id number]  Account number: [id number]  : 1945  Age: 73 years  Gender: Female  Status: Outpatient  Location: [facility]  Height: 62 in  Weight: 178.6 lb  BP: 138/ 74 mmHg    Indications: Shortness of breath, lower exremity edema, PAF.    Diagnoses: I48.0 - Atrial fibrillation, R06.02 - Shortness of breath    Sonographer:  CARLOS [facility]  Primary Physician:  Louis Cardenas MD  Referring Physician:  SHADI Kee  Group:  [facility]  Interpreting Physician:  Jay Gallagher MD    SUMMARY    LEFT VENTRICLE:  Systolic function was normal. Ejection fraction was estimated to be 65 %.  There were no regional wall motion abnormalities.  Doppler parameters were consistent with abnormal left ventricular relaxation (grade 1 diastolic dysfunction).    LEFT ATRIUM:  The atrium was mildly dilated.    MITRAL VALVE:  There was mild annular calcification.  There was mild regurgitation.    AORTIC VALVE:  There was mild  regurgitation.    TRICUSPID VALVE:  There was mild to moderate regurgitation.    HISTORY: PRIOR HISTORY: Right breast cancer, right mastectomy. PRIOR PROCEDURES: Arrhythmia ablation.    PROCEDURE: The study was performed in the [facility]. This was a routine study. The transthoracic approach was used. The study included complete 2D imaging, M-mode, complete spectral Doppler, and color Doppler.  Images were obtained from the parasternal, apical, subcostal, and suprasternal notch acoustic windows. Image quality was adequate.    LEFT VENTRICLE: Size was normal. Systolic function was normal. Ejection fraction was estimated to be 65 %. There were no regional wall motion abnormalities. Wall thickness was normal. DOPPLER: Doppler parameters were consistent with  abnormal left ventricular relaxation (grade 1 diastolic dysfunction).    RIGHT VENTRICLE: The size was normal. Systolic function was normal. Wall thickness was normal.    LEFT ATRIUM: The atrium was mildly dilated.    RIGHT ATRIUM: Size was normal.    MITRAL VALVE: There was mild annular calcification. Valve structure was normal. There was normal leaflet separation. DOPPLER: The transmitral velocity was within the normal range. There was no evidence for stenosis. There was mild  regurgitation.    AORTIC VALVE: The valve was trileaflet. Leaflets exhibited normal thickness, normal cuspal separation, and sclerosis. DOPPLER: Transaortic velocity was within the normal range. There was no evidence for stenosis. There was mild  regurgitation.    TRICUSPID VALVE: The valve structure was normal. There was normal leaflet separation. DOPPLER: The transtricuspid velocity was within the normal range. There was no evidence for stenosis. There was mild to moderate regurgitation. Pulmonary  artery systolic pressure was mildly increased.    PULMONIC VALVE: Leaflets exhibited normal thickness, no calcification, and normal cuspal separation. DOPPLER: The  transpulmonic velocity was within the normal range. There was no significant regurgitation.    PERICARDIUM: There was no pericardial effusion. The pericardium was normal in appearance.    AORTA: The root exhibited normal size.    SYSTEMIC VEINS: IVC: The inferior vena cava was normal in size.    PULMONARY VEINS: DOPPLER: There was systolic blunting in the pulmonary vein(s).    SYSTEM MEASUREMENT TABLES    2D  %FS: 31.66 %  Ao Diam: 2.23 cm  EDV(Teich): 105.22 ml  EF(Teich): 59.57 %  ESV(Cube): 34.34 ml  ESV(Teich): 42.54 ml  IVSd: 0.81 cm  LA Area: 21.18 cm2  LA Diam: 3.68 cm  LVEDV MOD A4C: 79.17 ml  LVEF MOD A4C: 66.73 %  LVESV MOD A4C: 26.34 ml  LVIDd: 4.76 cm  LVIDs: 3.25 cm  LVLd A4C: 6.9 cm  LVLs A4C: 5.58 cm  LVPWd: 0.76 cm  RA Area: 13.38 cm2  RV Diam.: 3.39 cm  SI(Cube): 40.02 ml/m2  SI(Teich): 34.25 ml/m2  SV MOD A4C: 52.83 ml  SV(Cube): 73.23 ml  SV(Teich): 62.68 ml    CW  AR Dec [place]: 2.07 m/s2  AR Dec Time: 2636.88 ms  AR PHT: 764.69 ms  AR Vmax: 4.56 m/s  AR maxP.02 mmHg  TR Vmax: 3.1 m/s  TR maxP.41 mmHg    MM  TAPSE: 2.23 cm    PW  E': 0.06 m/s  E/E': 15.62  MV A Obed: 0.32 m/s  MV Dec [place]: 2.53 m/s2  MV DecT: 397.07 ms  MV E Obed: 1 m/s  MV E/A Ratio: 3.16    In[employer] Commission Accredited Echocardiography Laboratory    Prepared and electronically signed by    Jay Gallagher MD  Signed 2019 12:44:18    Results for orders placed during the hospital encounter of 16    YANIV    Narrative  [place] [id number] [phone]    Transesophageal Echocardiogram  2D, Doppler, and Color Doppler    Study date:  12-Aug-2016    Patient: KARLO CLEVELAND  MR number: [id number]  Account number: [id number]  : 1945  Age: 70 years  Gender: Female  Status: Outpatient  Location: Cath lab  Height: 62 in  Weight: 190 lb  BP: 148/ 97 mmHg    Indications: Afib    Diagnoses: I48.0 - Atrial fibrillation    Sonographer:  Akilah  SOLE Wilkerson  Primary Physician:  Louis Cardenas MD  Referring Physician:  Josue Rhodes MD  Group:  [facility]  Interpreting Physician:  Josue Rhodes MD    SUMMARY    LEFT VENTRICLE:  Systolic function was mildly reduced. Ejection fraction was estimated to be 50  %.    LEFT ATRIUM:  The atrium was dilated.    LEFT ATRIAL APPENDAGE:  The appendage was dilated.  No thrombus was identified.    ATRIAL SEPTUM:  No defect or patent foramen ovale was identified.    RIGHT ATRIUM:  The atrium was dilated.    MITRAL VALVE:  There was mild regurgitation.    AORTIC VALVE:  There was mild regurgitation.    TRICUSPID VALVE:  There was moderate to severe regurgitation.    PULMONIC VALVE:  There was trace regurgitation.    HISTORY: PRIOR HISTORY: Obesity, Afib, Edema, Cardiomyopathy, HTN    PROCEDURE: The procedure was performed in the catheterization laboratory. This  was a routine study. The risks and alternatives of the procedure were explained  to the patient and informed consent was obtained. The transesophageal approach  was used. The study included complete 2D imaging, complete spectral Doppler,  and color Doppler. The heart rate was 118 bpm, at the start of the study. An  adult omniplane probe was inserted by the attending cardiologist. Intubated  with ease. One intubation attempt(s). There was no blood detected on the probe.  Image quality was adequate. MEDICATIONS: Anesthesia.    LEFT VENTRICLE: Size was normal. Systolic function was mildly reduced. Ejection  fraction was estimated to be 50 %. This study was inadequate for the evaluation  of regional wall motion. Wall thickness was normal.    RIGHT VENTRICLE: The size was normal. Systolic function was low normal.    LEFT ATRIUM: The atrium was dilated. APPENDAGE: The appendage was dilated. No  thrombus was identified.    ATRIAL SEPTUM: No defect or patent foramen ovale was identified.    RIGHT ATRIUM: The atrium was dilated.    MITRAL VALVE:  Valve structure was normal. There was normal leaflet separation.  DOPPLER: The transmitral velocity was within the normal range. There was no  evidence for stenosis. There was mild regurgitation.    AORTIC VALVE: The valve was trileaflet. Leaflets exhibited normal thickness and  normal cuspal separation. DOPPLER: Transaortic velocity was within the normal  range. There was no evidence for stenosis. There was mild regurgitation.    TRICUSPID VALVE: The valve structure was normal. There was normal leaflet  separation. DOPPLER: There was moderate to severe regurgitation. Estimated peak  PA pressure was 40 mmHg.    PULMONIC VALVE: Leaflets exhibited normal thickness, no calcification, and  normal cuspal separation. DOPPLER: There was trace regurgitation.    PERICARDIUM: There was no pericardial effusion.    AORTA: The root exhibited normal size. The ascending aorta was normal in size.  There was no significant atheroma.    PULMONARY VEINS: DOPPLER: Doppler flow pattern was normal in the pulmonary  vein(s).    Inters\[facility]\"" Commission Accredited Echocardiography Laboratory    Prepared and electronically signed by    Josue Rhodes MD  Signed 13-Aug-2016 17:28:53    No results found for this or any previous visit.    Results for orders placed during the hospital encounter of 19    NM myocardial perfusion spect (stress and/or rest)    [place] [address]    Stress Gated SPECT Myocardial Perfusion Imaging after Regadenoson    Patient: KARLO CLEVELAND  MR number: [id number]  Account number: [id number]  : 1945  Age: 73 years  Gender: Female  Status: Outpatient  Location: [facility]  Height: 62 in  Weight: 178 lb  BP: 141/ 80 mmHg    Allergies: MORPHINE AND RELATED, OXYCODONE-ACETAMINOPHEN    Diagnosis: R06.09 - Other forms of dyspnea    Primary Physician:  Louis Cardenas MD  RN:  Juli Fuentes RN  Referring  Physician:  SHADI Kee  Technician:  Christy Green  Group:  [facility]  Report Prepared By::  Juli Fuentes RN  Interpreting Physician:  Michael Villela MD    INDICATIONS: Detection of coronary artery disease.    HISTORY: Hx right breast CA The patient is a 73 year old  female. Chest pain status: chest pain. Other symptoms: dyspnea and lower extremity edema. Coronary artery disease risk factors: dyslipidemia, hypertension, family history  of premature coronary artery disease, and post-menopausal state. Cardiovascular history: arrhythmia(hx atrial fibrillation/PAF) Medications: include a beta blocker, a diuretic, and thyroid medications. Previous test results: abnormal ECG.    PHYSICAL EXAM: Baseline physical exam screening: no wheezes audible.    REST ECG: Sinus bradycardia ST segments and T waves were normal. The ECG showed occasional premature atrial contractions.    PROCEDURE: The study was performed in the [facility]. A regadenoson infusion pharmacologic stress test was performed. Gated SPECT myocardial perfusion imaging was performed during stress. Systolic blood  pressure was 141 mmHg, at the start of the study. Diastolic blood pressure was 80 mmHg, at the start of the study. The heart rate was 57 bpm, at the start of the study.  Regadenoson protocol:  HR bpm SBP mmHg DBP mmHg Symptoms  Baseline 57 141 80 none  Immediate 100 146 82 dyspnea, fatigue  1 min 98 155 86 subsiding  2 min 69 153 85 none  The patient also performed low level exercise.    STRESS SUMMARY: Duration of pharmacologic stress was 3 min and 0 sec. Maximal heart rate during stress was 101 bpm. The rate-pressure product for the peak heart rate and blood pressure was 27108. There was no chest pain during stress. The  stress test was terminated due to protocol completion. Pre oxygen saturation: 99 %. Peak oxygen saturation: 95 %. The stress ECG was negative for ischemia and  normal. There were no stress arrhythmias or conduction abnormalities.    ISOTOPE ADMINISTRATION:  The radiopharmaceutical was injected at the peak effect of pharmacologic stress.    PERFUSION DEFECTS:  -  There were no perfusion defects.    GATED SPECT:  The calculated left ventricular ejection fraction was 72 %. There was no left ventricular regional abnormality.    SUMMARY:  -  Stress results: There was no chest pain during stress.  -  ECG conclusions: The stress ECG was negative for ischemia and normal.  -  Perfusion imaging: There were no perfusion defects.  -  Gated SPECT: The calculated left ventricular ejection fraction was 72 %. There was no left ventricular regional abnormality.    IMPRESSIONS: Normal study after pharmacologic vasodilation without reproduction of symptoms. Myocardial perfusion imaging was normal at rest and with stress.    Prepared and signed by    Michael Villela MD  Signed 05/01/2019 09:21:35

## 2025-03-27 NOTE — PATIENT INSTRUCTIONS
Recommendations:  Discontinue diltiazem.  Start Metoprolol Succinate 25mg daily.    Start Jardiance 10mg daily.   Entresto 24/26mg 2x/day.  Continue remainder of medications.  Will discuss with Dr. Wong about restarting Sotalol.  Follow up in one month.

## 2025-03-28 ENCOUNTER — TELEPHONE (OUTPATIENT)
Dept: NON INVASIVE DIAGNOSTICS | Facility: CLINIC | Age: 80
End: 2025-03-28

## 2025-03-28 DIAGNOSIS — I48.0 PAROXYSMAL ATRIAL FIBRILLATION (HCC): Primary | ICD-10-CM

## 2025-03-28 RX ORDER — SOTALOL HYDROCHLORIDE 120 MG/1
60 TABLET ORAL DAILY
Qty: 45 TABLET | Refills: 3 | Status: SHIPPED | OUTPATIENT
Start: 2025-03-28

## 2025-03-28 NOTE — TELEPHONE ENCOUNTER
----- Message from Bernard Wong MD sent at 3/27/2025 10:50 PM EDT -----  Thx. She also did not take it for 2 weeks in [place]. Some non-compliance issue here.       Ep team - please reach out to her and let her know I recommend restarting sotalol and can give her prescription.     Thx  ----- Message -----  From: Denny Lipscomb MD  Sent: 3/27/2025   1:53 PM EDT  To: Bernard Wong MD    Hardi,    She stopped her sotalol 3 weeks ago (she was moving and didn't get a refill of her Rx).  She is currently a-paced.  For her HF, I took her off diltiazem and started her on metoprolol succinate (as well as Entresto/Jardiance).  Didn't know what you wanted to do for her anti-arrhythmics.    Thanks,  Denny

## 2025-03-31 ENCOUNTER — NURSE TRIAGE (OUTPATIENT)
Age: 80
End: 2025-03-31

## 2025-03-31 NOTE — TELEPHONE ENCOUNTER
"REASON FOR CONVERSATION: Dizziness  Pt called to report that she is experiencing dizziness and lightheadedness since she started the new medications Dr. Lipscomb prescribed on 3/27/25. She started Entresto 24/26mg 2x/day, Jardiance 10mg daily and Metoprolol succinate 25mg daily. She took them Friday and Saturday and since stopped the Jardiance and Entresto yesterday due to the symptoms. She continued the Metoprolol (AM) because she had been on this medication before and does not feel it is the cause of her issues. She did stop the Diltiazem as instructed and continued the torsemide 20mg BID. She does not take her vital signs at home. Unsure of blood pressure     SYMPTOMS: The patient reports dizziness and lightheadedness and can only stand for 10 seconds before she feels like she will fall; denies vertigo. Pt also denies SOB and palpitations, chest pain or racing heart. She reports that her leg swelling has improved greatly.     OTHER: She does not take her vital signs so it is not clear what her blood pressure is. No urgent visits available and patient refused visit today regardless. Advised the patient to increase her hydration. She reports she drinks drinks almost a gallon of water daily.     FOLLOW UP: Advised patient will inform provider of symptoms and call patient back with further recommendations.          Answer Assessment - Initial Assessment Questions  1. DESCRIPTION: \"Describe your dizziness.\"      Dizzy and lightheaded when she stands up   2. LIGHTHEADED: \"Do you feel lightheaded?\" (e.g., somewhat faint, woozy, weak upon standing)      Woozy in the head   3. VERTIGO: \"Do you feel like either you or the room is spinning or tilting?\" (i.e., vertigo)      Denies   4. SEVERITY: \"How bad is it?\"  \"Do you feel like you are going to faint?\" \"Can you stand and walk?\"      Trouble standing for more than 10 seconds  5. ONSET:  \"When did the dizziness begin?\"      Started after starting the new medications " "Jardiance and Entresto   8. CAUSE: \"What do you think is causing the dizziness?\" (e.g., decreased fluids or food, diarrhea, emotional distress, heat exposure, new medicine, sudden standing, vomiting; unknown)      Medications   9. RECURRENT SYMPTOM: \"Have you had dizziness before?\" If Yes, ask: \"When was the last time?\" \"What happened that time?\"      Denies   10. OTHER SYMPTOMS: \"Do you have any other symptoms?\" (e.g., fever, chest pain, vomiting, diarrhea, bleeding)        Denies other symptoms    Protocols used: Dizziness-Adult-[place]    "

## 2025-05-07 DIAGNOSIS — I48.91 ATRIAL FIBRILLATION, UNSPECIFIED TYPE (HCC): ICD-10-CM

## 2025-05-07 NOTE — TELEPHONE ENCOUNTER
Medication: apixaban (Eliquis) 5 mg     Dose/Frequency: Take 1 tablet (5 mg total) by mouth daily     Quantity: 90    Pharmacy: [facility]/pharmacy #[id number] - [place] - [address]     Office:   [x] PCP/Provider - Louis Cardenas MD   [] Speciality/Provider -     Does the patient have enough for 3 days?   [] Yes   [x] No - Send as HP to POD

## 2025-05-08 NOTE — TELEPHONE ENCOUNTER
She was a no show earlier this week so please call her to reschedule (POVL) and ask her to update labs as ordered before appt....   stroke. These may include:  ? Sudden numbness, tingling, weakness, or loss of movement in your face, arm, or leg, especially on only one side of your body. ? Sudden vision changes. ? Sudden trouble speaking. ? Sudden confusion or trouble understanding simple statements. ? Sudden problems with walking or balance. ? A sudden, severe headache that is different from past headaches.    Call your doctor now or seek immediate medical care if:    · You have new or worse nausea and vomiting.     · You have new symptoms such as hearing loss or roaring in your ears.    Watch closely for changes in your health, and be sure to contact your doctor if:    · You are not getting better as expected.     · Your vertigo gets worse. Where can you learn more? Go to https://[employer]er[employer]. org and sign in to your [employer] account. Enter  in the [employer] box to learn more about \"Benign Paroxysmal Positional Vertigo (BPPV): Care Instructions. \"     If you do not have an account, please click on the \"Sign Up Now\" link. Current as of: March 27, 2018  Content Version: 11.9  © 9011-7936 [employer]. Care instructions adapted under license by [facility] ([facility]). If you have questions about a medical condition or this instruction, always ask your healthcare professional. [address] any warranty or liability for your use of this information.

## 2025-06-16 ENCOUNTER — OFFICE VISIT (OUTPATIENT)
Dept: SURGICAL ONCOLOGY | Facility: CLINIC | Age: 80
End: 2025-06-16
Payer: COMMERCIAL

## 2025-06-16 VITALS
DIASTOLIC BLOOD PRESSURE: 76 MMHG | HEIGHT: 62 IN | RESPIRATION RATE: 18 BRPM | OXYGEN SATURATION: 98 % | SYSTOLIC BLOOD PRESSURE: 124 MMHG | WEIGHT: 161 LBS | BODY MASS INDEX: 29.63 KG/M2 | TEMPERATURE: 97.4 F | HEART RATE: 77 BPM

## 2025-06-16 DIAGNOSIS — Z85.3 HISTORY OF LEFT BREAST CANCER: ICD-10-CM

## 2025-06-16 DIAGNOSIS — Z08 ENCOUNTER FOR FOLLOW-UP EXAMINATION AFTER COMPLETED TREATMENT FOR MALIGNANT NEOPLASM: Primary | ICD-10-CM

## 2025-06-16 DIAGNOSIS — Z85.3 HISTORY OF RIGHT BREAST CANCER: ICD-10-CM

## 2025-06-16 PROCEDURE — 99214 OFFICE O/P EST MOD 30 MIN: CPT

## 2025-06-16 NOTE — ASSESSMENT & PLAN NOTE
Patient is a 79-year-old female presenting today for follow-up for bilateral breast cancer. 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 She underwent a right breast mastectomy with sentinel node biopsy with Dr. Young. She has 0/9 + LN. She completed adjuvant chemotherapy. She underwent a right chest wall lesion excision in 2017 for residual invasive carcinoma. 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 She underwent a left breast mastectomy. She had no lymphovascular invasion. She had genetic testing which was negative. She is currently on observation. There were no concerns on her exam today. Patient denies changes on her breast exam. She denies persistent headaches, bone pain, back pain, shortness of breath, or abdominal pain. Information provided on [facility]. I will see the patient back in 6 months or sooner should the need arise. She was instructed to call with any questions or concerns prior to this time. All questions were answered today.

## 2025-06-16 NOTE — PROGRESS NOTES
Name: Josefina Mendoza      : 1945      MRN: [id number]  Encounter Provider: SHADI Estrada  Encounter Date: 2025   Encounter department: [facility]  :  Assessment & Plan  Encounter for follow-up examination after completed treatment for malignant neoplasm  - 6 month f/u  History of left breast cancer  History of right breast cancer  Patient is a 79-year-old female presenting today for follow-up for bilateral breast cancer. 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 She underwent a right breast mastectomy with sentinel node biopsy with Dr. Young. She has 0/9 + LN. She completed adjuvant chemotherapy. She underwent a right chest wall lesion excision in  for residual invasive carcinoma. She was diagnosed with DCIS of the left breast in 2021. She underwent a left breast mastectomy. She had no lymphovascular invasion. She had genetic testing which was negative. She is currently on observation. There were no concerns on her exam today. Patient denies changes on her breast exam. She denies persistent headaches, bone pain, back pain, shortness of breath, or abdominal pain. Information provided on [facility]. I will see the patient back in 6 months or sooner should the need arise. She was instructed to call with any questions or concerns prior to this time. All questions were answered today.        Oncology History   Cancer Staging   No matching staging information was found for the patient.  Oncology History   History of left breast cancer   2016 Biopsy    Right breast 3 site biopsy  Invasive breast carcinoma  Grade 2  ER 0, ND 0, HER2      10/28/2016 Surgery    Right breast mastectomy with SLN biopsy (Dr. Young)  Invasive mammary carcinoma of no special type (ductal)  Grade 3  2.8 cm  Margins negative  0/9 Lymph nodes  Stage IIA     2016 - 2017  "Chemotherapy    Taxotere/Cytoxan. Scheduled for every 3 weeks x 4 cycles.  Completed 3 cycles under the guidance of Dr Godfrey.      3/16/2017 Surgery    Right chest wall lesion excision (Dr. Young)  Residual microscopic focus of invasive carcinoma  4 mm  Margins negative     History of ductal carcinoma in situ (DCIS) of breast   9/21/2021 Biopsy    Left breast ultrasound-guided biopsy  A. 3 o'clock, 6 cm from nipple  Intraductal papilloma  Focal stromal changes suggestive of PASH    B. 2 o'clock, 5 cm from nipple  Ductal carcinoma in situ  Grade 1        Concordant. Carcinoma measured up to 6mm on US. Left axilla US negative. If the papilloma is not excised at surgery, a 6 MO F/U US is recommended.     9/30/2021 Genetic Testing    A total of 36 genes were evaluated, including: FELIPE, BRCA1, BRCA2, CDH1, CHEK2, PALB2, PTEN, STK11, TP53  Negative result. No pathogenic sequence variants or deletions/dupllications identified  [employer]     10/22/2021 Surgery    Left breast simple mastectomy  Ductal carcinoma in situ  Grade 2  Size cannot be determined (multifocal)  Margins negative  0/1 Intramammary lymph node  Stage 0        Review of Systems   Constitutional:  Negative for activity change, appetite change, fatigue and unexpected weight change.   Respiratory:  Negative for cough and shortness of breath.    Cardiovascular:  Negative for chest pain.   Gastrointestinal:  Negative for abdominal pain, diarrhea, nausea and vomiting.   Endocrine: Negative for heat intolerance.   Musculoskeletal:  Negative for arthralgias, back pain and myalgias.   Skin:  Negative for rash.   Neurological:  Negative for weakness and headaches.   Hematological:  Negative for adenopathy.    A complete review of systems is negative other than that noted above in the HPI.      Objective   Visit Vitals  /76 (Patient Position: Sitting, Cuff Size: Large)   Pulse 77   Temp (!) 97.4 °F (36.3 °C) (Temporal)   Resp 18   Ht 5' 2\" (1.575 m)   Wt " 73 kg (161 lb)   SpO2 98%   BMI 29.45 kg/m²   OB Status Hysterectomy   Smoking Status Never   BSA 1.74 m²     Pain Screening:     ECOG    Physical Exam  Constitutional:       General: She is not in acute distress.     Appearance: Normal appearance.     Cardiovascular:      Rate and Rhythm: Normal rate and regular rhythm.      Pulses: Normal pulses.      Heart sounds: Normal heart sounds.   Pulmonary:      Effort: Pulmonary effort is normal.      Breath sounds: Normal breath sounds.   Chest:      Chest wall: No mass.   Breasts:     Right: No swelling, bleeding, mass, skin change or tenderness.      Left: No swelling, bleeding, mass, skin change or tenderness.        Comments: Pacemaker upper left chest wall, b/l mastectomy scars  Abdominal:      General: Abdomen is flat.      Palpations: Abdomen is soft.   Lymphadenopathy:      Upper Body:      Right upper body: No supraclavicular, axillary or pectoral adenopathy.      Left upper body: No supraclavicular, axillary or pectoral adenopathy.     Skin:     General: Skin is warm.     Neurological:      General: No focal deficit present.      Mental Status: She is alert and oriented to person, place, and time.     Psychiatric:         Mood and Affect: Mood normal.         Behavior: Behavior normal.

## 2025-06-17 ENCOUNTER — REMOTE DEVICE CLINIC VISIT (OUTPATIENT)
Dept: CARDIOLOGY CLINIC | Facility: CLINIC | Age: 80
End: 2025-06-17
Payer: COMMERCIAL

## 2025-06-17 DIAGNOSIS — Z95.0 CARDIAC PACEMAKER IN SITU: Primary | ICD-10-CM

## 2025-06-17 PROCEDURE — 93296 REM INTERROG EVL PM/IDS: CPT | Performed by: INTERNAL MEDICINE

## 2025-06-17 PROCEDURE — 93294 REM INTERROG EVL PM/LDLS PM: CPT | Performed by: INTERNAL MEDICINE

## 2025-06-17 NOTE — PROGRESS NOTES
"Results for orders placed or performed in visit on 06/17/25   Cardiac EP device report    Narrative    MDT DUAL PM/ ACTIVE SYSTEM IS MRI CONDITIONAL  CARELINK TRANSMISSION: PRESENTING RHYTHM SHOWS AP VS@ 69 BPM. BATTERY STATUS \"9 YRS.\" AP 74%  0%. ALL AVAILABLE LEAD PARAMETERS WITHIN NORMAL LIMITS. 2 VHRS NOTED; AVAIL EGRAMS PRESENT AS SVT & BRIEF NSVT. EF 55% (ECHO 2024). PT ON METO SUCC & ELIQUIS. NORMAL DEVICE FUNCTION. NC         "

## 2025-06-18 ENCOUNTER — RESULTS FOLLOW-UP (OUTPATIENT)
Dept: CARDIOLOGY CLINIC | Facility: CLINIC | Age: 80
End: 2025-06-18

## 2025-06-22 DIAGNOSIS — E87.6 HYPOKALEMIA: ICD-10-CM

## 2025-06-22 DIAGNOSIS — E03.9 ACQUIRED HYPOTHYROIDISM: ICD-10-CM

## 2025-06-22 RX ORDER — LEVOTHYROXINE SODIUM 150 UG/1
TABLET ORAL
Qty: 72 TABLET | Refills: 1 | Status: SHIPPED | OUTPATIENT
Start: 2025-06-22

## 2025-06-22 RX ORDER — POTASSIUM CHLORIDE 750 MG/1
20 CAPSULE, EXTENDED RELEASE ORAL DAILY
Qty: 180 CAPSULE | Refills: 1 | Status: SHIPPED | OUTPATIENT
Start: 2025-06-22

## 2025-07-22 DIAGNOSIS — I07.1 TRICUSPID VALVE INSUFFICIENCY, UNSPECIFIED ETIOLOGY: ICD-10-CM

## 2025-07-22 RX ORDER — TORSEMIDE 20 MG/1
20 TABLET ORAL 2 TIMES DAILY
Qty: 180 TABLET | Refills: 0 | Status: SHIPPED | OUTPATIENT
Start: 2025-07-22

## 2025-08-18 ENCOUNTER — TELEPHONE (OUTPATIENT)
Facility: HOSPITAL | Age: 80
End: 2025-08-18

## 2025-08-21 ENCOUNTER — NURSE TRIAGE (OUTPATIENT)
Age: 80
End: 2025-08-21

## 2025-08-21 ENCOUNTER — APPOINTMENT (EMERGENCY)
Dept: CT IMAGING | Facility: HOSPITAL | Age: 80
End: 2025-08-21
Payer: COMMERCIAL

## 2025-08-21 ENCOUNTER — HOSPITAL ENCOUNTER (EMERGENCY)
Facility: HOSPITAL | Age: 80
Discharge: HOME | End: 2025-08-21
Attending: EMERGENCY MEDICINE | Admitting: EMERGENCY MEDICINE
Payer: COMMERCIAL

## 2025-08-21 ENCOUNTER — APPOINTMENT (EMERGENCY)
Dept: NON INVASIVE DIAGNOSTICS | Facility: HOSPITAL | Age: 80
End: 2025-08-21
Payer: COMMERCIAL

## 2025-08-21 VITALS
OXYGEN SATURATION: 96 % | HEART RATE: 74 BPM | DIASTOLIC BLOOD PRESSURE: 66 MMHG | BODY MASS INDEX: 29.45 KG/M2 | SYSTOLIC BLOOD PRESSURE: 136 MMHG | RESPIRATION RATE: 22 BRPM | WEIGHT: 161 LBS | TEMPERATURE: 98.5 F

## 2025-08-21 DIAGNOSIS — R91.1 PULMONARY NODULE: ICD-10-CM

## 2025-08-21 DIAGNOSIS — R60.9 EDEMA: ICD-10-CM

## 2025-08-21 DIAGNOSIS — R06.00 DYSPNEA: Primary | ICD-10-CM

## 2025-08-21 LAB
2HR DELTA HS TROPONIN: -1 NG/L
ALBUMIN SERPL BCG-MCNC: 3.3 G/DL (ref 3.5–5)
ALP SERPL-CCNC: 77 U/L (ref 34–104)
ALT SERPL W P-5'-P-CCNC: 10 U/L (ref 7–52)
ANION GAP SERPL CALCULATED.3IONS-SCNC: 5 MMOL/L (ref 4–13)
APTT PPP: 32 SECONDS (ref 23–34)
AST SERPL W P-5'-P-CCNC: 28 U/L (ref 13–39)
ATRIAL RATE: 98 BPM
BASOPHILS # BLD AUTO: 0.05 THOUSANDS/ÂΜL (ref 0–0.1)
BASOPHILS NFR BLD AUTO: 1 % (ref 0–1)
BILIRUB SERPL-MCNC: 0.72 MG/DL (ref 0.2–1)
BNP SERPL-MCNC: 190 PG/ML (ref 0–100)
BUN SERPL-MCNC: 13 MG/DL (ref 5–25)
CALCIUM ALBUM COR SERPL-MCNC: 9.5 MG/DL (ref 8.3–10.1)
CALCIUM SERPL-MCNC: 8.9 MG/DL (ref 8.4–10.2)
CARDIAC TROPONIN I PNL SERPL HS: 4 NG/L (ref ?–50)
CARDIAC TROPONIN I PNL SERPL HS: 5 NG/L (ref ?–50)
CHLORIDE SERPL-SCNC: 104 MMOL/L (ref 96–108)
CO2 SERPL-SCNC: 30 MMOL/L (ref 21–32)
CREAT SERPL-MCNC: 1.2 MG/DL (ref 0.6–1.3)
EOSINOPHIL # BLD AUTO: 0.13 THOUSAND/ÂΜL (ref 0–0.61)
EOSINOPHIL NFR BLD AUTO: 3 % (ref 0–6)
ERYTHROCYTE [DISTWIDTH] IN BLOOD BY AUTOMATED COUNT: 13.8 % (ref 11.6–15.1)
GFR SERPL CREATININE-BSD FRML MDRD: 43 ML/MIN/1.73SQ M
GLUCOSE SERPL-MCNC: 89 MG/DL (ref 65–140)
HCT VFR BLD AUTO: 36.2 % (ref 34.8–46.1)
HGB BLD-MCNC: 11.6 G/DL (ref 11.5–15.4)
IMM GRANULOCYTES # BLD AUTO: 0.02 THOUSAND/UL (ref 0–0.2)
IMM GRANULOCYTES NFR BLD AUTO: 1 % (ref 0–2)
INR PPP: 1.67 (ref 0.85–1.19)
LYMPHOCYTES # BLD AUTO: 1.17 THOUSANDS/ÂΜL (ref 0.6–4.47)
LYMPHOCYTES NFR BLD AUTO: 27 % (ref 14–44)
MCH RBC QN AUTO: 30.1 PG (ref 26.8–34.3)
MCHC RBC AUTO-ENTMCNC: 32 G/DL (ref 31.4–37.4)
MCV RBC AUTO: 94 FL (ref 82–98)
MONOCYTES # BLD AUTO: 0.6 THOUSAND/ÂΜL (ref 0.17–1.22)
MONOCYTES NFR BLD AUTO: 14 % (ref 4–12)
NEUTROPHILS # BLD AUTO: 2.34 THOUSANDS/ÂΜL (ref 1.85–7.62)
NEUTS SEG NFR BLD AUTO: 54 % (ref 43–75)
NRBC BLD AUTO-RTO: 0 /100 WBCS
P AXIS: 27 DEGREES
PLATELET # BLD AUTO: 194 THOUSANDS/UL (ref 149–390)
PMV BLD AUTO: 10.9 FL (ref 8.9–12.7)
POTASSIUM SERPL-SCNC: 4.2 MMOL/L (ref 3.5–5.3)
PR INTERVAL: 226 MS
PROT SERPL-MCNC: 6.5 G/DL (ref 6.4–8.4)
PROTHROMBIN TIME: 20.5 SECONDS (ref 12.3–15)
QRS AXIS: 53 DEGREES
QRSD INTERVAL: 76 MS
QT INTERVAL: 362 MS
QTC INTERVAL: 462 MS
RBC # BLD AUTO: 3.86 MILLION/UL (ref 3.81–5.12)
SODIUM SERPL-SCNC: 139 MMOL/L (ref 135–147)
T WAVE AXIS: 59 DEGREES
VENTRICULAR RATE: 98 BPM
WBC # BLD AUTO: 4.31 THOUSAND/UL (ref 4.31–10.16)

## 2025-08-21 PROCEDURE — 93005 ELECTROCARDIOGRAM TRACING: CPT

## 2025-08-21 PROCEDURE — 85610 PROTHROMBIN TIME: CPT | Performed by: PHYSICIAN ASSISTANT

## 2025-08-21 PROCEDURE — 85730 THROMBOPLASTIN TIME PARTIAL: CPT | Performed by: PHYSICIAN ASSISTANT

## 2025-08-21 PROCEDURE — 71275 CT ANGIOGRAPHY CHEST: CPT

## 2025-08-21 PROCEDURE — 93970 EXTREMITY STUDY: CPT | Performed by: SURGERY

## 2025-08-21 PROCEDURE — 85025 COMPLETE CBC W/AUTO DIFF WBC: CPT | Performed by: PHYSICIAN ASSISTANT

## 2025-08-21 PROCEDURE — 99285 EMERGENCY DEPT VISIT HI MDM: CPT | Performed by: PHYSICIAN ASSISTANT

## 2025-08-21 PROCEDURE — 80053 COMPREHEN METABOLIC PANEL: CPT | Performed by: PHYSICIAN ASSISTANT

## 2025-08-21 PROCEDURE — 93970 EXTREMITY STUDY: CPT

## 2025-08-21 PROCEDURE — 99285 EMERGENCY DEPT VISIT HI MDM: CPT

## 2025-08-21 PROCEDURE — 83880 ASSAY OF NATRIURETIC PEPTIDE: CPT | Performed by: PHYSICIAN ASSISTANT

## 2025-08-21 PROCEDURE — 84484 ASSAY OF TROPONIN QUANT: CPT | Performed by: PHYSICIAN ASSISTANT

## 2025-08-21 PROCEDURE — 36415 COLL VENOUS BLD VENIPUNCTURE: CPT | Performed by: PHYSICIAN ASSISTANT

## 2025-08-21 RX ORDER — FUROSEMIDE 10 MG/ML
40 INJECTION INTRAMUSCULAR; INTRAVENOUS ONCE
Status: DISCONTINUED | OUTPATIENT
Start: 2025-08-21 | End: 2025-08-22 | Stop reason: HOSPADM

## 2025-08-21 RX ADMIN — IOHEXOL 50 ML: 350 INJECTION, SOLUTION INTRAVENOUS at 20:09

## (undated) DEVICE — INTENDED FOR TISSUE SEPARATION, AND OTHER PROCEDURES THAT REQUIRE A SHARP SURGICAL BLADE TO PUNCTURE OR CUT.: Brand: BARD-PARKER SAFETY BLADES SIZE 15, STERILE

## (undated) DEVICE — SUT VICRYL 2-0 SH 27 IN UNDYED J417H

## (undated) DEVICE — TUBING SUCTION 5MM X 12 FT

## (undated) DEVICE — MEDI-VAC YANKAUER SUCTION HANDLE W/BULBOUS AND CONTROL VENT: Brand: CARDINAL HEALTH

## (undated) DEVICE — NEEDLE TRANSSEPTAL BRK-1 98CM

## (undated) DEVICE — Device: Brand: SMARTABLATE

## (undated) DEVICE — GLOVE INDICATOR PI UNDERGLOVE SZ 8.5 BLUE

## (undated) DEVICE — PENCIL ELECTROSURG E-Z CLEAN -0035H

## (undated) DEVICE — GLOVE SRG BIOGEL 6

## (undated) DEVICE — DRAPE EQUIPMENT RF WAND

## (undated) DEVICE — SUT MONOCRYL 4-0 PS-2 27 IN Y426H

## (undated) DEVICE — SMOKE EVACUATION TUBING WITH 8 IN INTEGRAL WAND AND SPONGE GUARD: Brand: BUFFALO FILTER

## (undated) DEVICE — CATH ULTRASOUND ACUNAV ICE 8FR 90CM GE VIVID-I

## (undated) DEVICE — SUT SILK 2-0 SH 30 IN K833H

## (undated) DEVICE — PINNACLE INTRODUCER SHEATH: Brand: PINNACLE

## (undated) DEVICE — GLOVE SRG BIOGEL 7

## (undated) DEVICE — REM POLYHESIVE ADULT PATIENT RETURN ELECTRODE: Brand: VALLEYLAB

## (undated) DEVICE — Device: Brand: REFERENCE PATCH CARTO 3

## (undated) DEVICE — CHEST/BREAST DRAPE: Brand: CONVERTORS

## (undated) DEVICE — 2000CC GUARDIAN II: Brand: GUARDIAN

## (undated) DEVICE — LIGHT HANDLE COVER SLEEVE DISP BLUE STELLAR

## (undated) DEVICE — ADHESIVE SKN CLSR HISTOACRYL FLEX 0.5ML LF

## (undated) DEVICE — Device: Brand: PENTARAY NAV

## (undated) DEVICE — SCD SEQUENTIAL COMPRESSION COMFORT SLEEVE MEDIUM KNEE LENGTH: Brand: KENDALL SCD

## (undated) DEVICE — GLOVE SRG BIOGEL 8

## (undated) DEVICE — Device: Brand: THERMOCOOL SMARTTOUCH SF

## (undated) DEVICE — SUT MONOCRYL 3-0 SH 27 IN Y416H

## (undated) DEVICE — SMOKE EVAC BOVIE PENCIL BUTTON

## (undated) DEVICE — CHLORAPREP HI-LITE 26ML ORANGE

## (undated) DEVICE — STRL UNIVERSAL MINOR GENERAL: Brand: CARDINAL HEALTH

## (undated) DEVICE — VIAL DECANTER

## (undated) DEVICE — BETHLEHEM UNIVERSAL BREAST PK: Brand: CARDINAL HEALTH

## (undated) DEVICE — Device: Brand: PROTRACK PIGTAIL WIRE

## (undated) DEVICE — SURGICEL 2 X 3

## (undated) DEVICE — NEEDLE 25G X 1 1/2

## (undated) DEVICE — GLOVE INDICATOR PI UNDERGLOVE SZ 7 BLUE

## (undated) DEVICE — ELECTRODE BLADE MOD  E-Z CLEAN 6.5IN -0014M

## (undated) DEVICE — LIGACLIP MCA MULTIPLE CLIP APPLIERS, 20 MEDIUM CLIPS: Brand: LIGACLIP

## (undated) DEVICE — Device: Brand: WEBSTER CS

## (undated) DEVICE — SUT MONOCRYL 4-0 PS-2 18 IN Y496G

## (undated) DEVICE — Device: Brand: VIZIGO

## (undated) DEVICE — NEEDLE 25G X 5/8

## (undated) DEVICE — SUT VICRYL 2-0 REEL 54 IN J286G

## (undated) DEVICE — 3M™ STERI-STRIP™ REINFORCED ADHESIVE SKIN CLOSURES, R1547, 1/2 IN X 4 IN (12 MM X 100 MM), 6 STRIPS/ENVELOPE: Brand: 3M™ STERI-STRIP™